# Patient Record
Sex: MALE | Race: WHITE | NOT HISPANIC OR LATINO | Employment: FULL TIME | URBAN - NONMETROPOLITAN AREA
[De-identification: names, ages, dates, MRNs, and addresses within clinical notes are randomized per-mention and may not be internally consistent; named-entity substitution may affect disease eponyms.]

---

## 2017-01-19 DIAGNOSIS — E78.2 MIXED HYPERLIPIDEMIA: Primary | ICD-10-CM

## 2017-01-20 RX ORDER — SIMVASTATIN 20 MG
TABLET ORAL
Qty: 90 TABLET | Refills: 2 | Status: SHIPPED | OUTPATIENT
Start: 2017-01-20 | End: 2017-11-26

## 2017-05-10 ENCOUNTER — OFFICE VISIT (OUTPATIENT)
Dept: FAMILY MEDICINE | Facility: OTHER | Age: 57
End: 2017-05-10
Attending: FAMILY MEDICINE
Payer: COMMERCIAL

## 2017-05-10 VITALS
WEIGHT: 200 LBS | BODY MASS INDEX: 32.28 KG/M2 | OXYGEN SATURATION: 97 % | SYSTOLIC BLOOD PRESSURE: 130 MMHG | DIASTOLIC BLOOD PRESSURE: 82 MMHG | TEMPERATURE: 98.7 F | HEART RATE: 79 BPM

## 2017-05-10 DIAGNOSIS — R21 RASH: ICD-10-CM

## 2017-05-10 DIAGNOSIS — K62.5 RECTAL BLEEDING: Primary | ICD-10-CM

## 2017-05-10 PROCEDURE — 99214 OFFICE O/P EST MOD 30 MIN: CPT | Performed by: FAMILY MEDICINE

## 2017-05-10 ASSESSMENT — PAIN SCALES - GENERAL: PAINLEVEL: NO PAIN (0)

## 2017-05-10 NOTE — MR AVS SNAPSHOT
After Visit Summary   5/10/2017    Neo Cowan    MRN: [id number]           Patient Information     Date Of Birth          1960        Visit Information        Provider Department      5/10/2017 2:40 PM Sukhjinder Mccarthy MD [place] [facility]        Today's Diagnoses     Rectal bleeding    -  1    Rash          Care Instructions    Appointment with General Surgery  scheduled for evaluation and recommendations for further management.         Follow-ups after your visit        Additional Services     GENERAL SURG ADULT REFERRAL       Your provider has referred you to: [facility]: [facility] [place] [phone]   [email or web address]    Please be aware that coverage of these services is subject to the terms and limitations of your health insurance plan.  Call member services at your health plan with any benefit or coverage questions.      Please bring the following with you to your appointment:    (1) Any X-Rays, CTs or MRIs which have been performed.  Contact the facility where they were done to arrange for  prior to your scheduled appointment.   (2) List of current medications   (3) This referral request   (4) Any documents/labs given to you for this referral                  Follow-up notes from your care team     Return if symptoms worsen or fail to improve.      Who to contact     If you have questions or need follow up information about today's clinic visit or your schedule please contact [facility] [place] directly at [phone].  Normal or non-critical lab and imaging results will be communicated to you by MyChart, letter or phone within 4 business days after the clinic has received the results. If you do not hear from us within 7 days, please contact the clinic through MyChart or phone. If you have a critical or abnormal lab result, we will notify you by phone as soon as possible.  Submit refill requests  through Codeanywhere or call your pharmacy and they will forward the refill request to us. Please allow 3 business days for your refill to be completed.          Additional Information About Your Visit        Gridsumhart Information     Codeanywhere gives you secure access to your electronic health record. If you see a primary care provider, you can also send messages to your care team and make appointments. If you have questions, please call your primary care clinic.  If you do not have a primary care provider, please call [phone] and they will assist you.        Care EveryWhere ID     This is your Care EveryWhere ID. This could be used by other organizations to access your [facility] medical records  [id number]        Your Vitals Were     Pulse Temperature Pulse Oximetry BMI (Body Mass Index)          79 98.7  F (37.1  C) 97% 32.28 kg/m2         Blood Pressure from Last 3 Encounters:   05/10/17 130/82   09/27/16 116/62   04/06/16 118/72    Weight from Last 3 Encounters:   05/10/17 200 lb (90.7 kg)   09/27/16 212 lb (96.2 kg)   04/06/16 200 lb (90.7 kg)              We Performed the Following     GENERAL SURG ADULT REFERRAL        Primary Care Provider Office Phone # Fax #    Sukhjinder Mccarthy [phone]       [facility] [address]  [place] [facility] [place] [address]        Thank you!     Thank you for choosing [facility]  for your care. Our goal is always to provide you with excellent care. Hearing back from our patients is one way we can continue to improve our services. Please take a few minutes to complete the written survey that you may receive in the mail after your visit with us. Thank you!             Your Updated Medication List - Protect others around you: Learn how to safely use, store and throw away your medicines at www.disposemymeds.org.          This list is accurate as of: 5/10/17 11:59 PM.  Always use your most recent med list.                   Brand Name Dispense Instructions for  use    blood glucose monitoring lancets     1 Box    Use to test blood sugar 3 times daily or as directed.       blood glucose monitoring test strip    ACCU-CHEK SMARTVIEW    1 Month    Use to test blood sugar 3 times daily or as directed.       cetirizine 10 MG tablet    zyrTEC    60 tablet    Take 1 tablet (10 mg) by mouth every evening       mometasone 0.1 % cream    ELOCON    45 g    Apply sparingly to affected area twice daily as needed.  Do not apply to face.       simvastatin 20 MG tablet    ZOCOR    90 tablet    TAKE 1 TABLET BY MOUTH EVERY EVENING - GENERIC FOR ZOCOR

## 2017-05-10 NOTE — NURSING NOTE
"Chief Complaint   Patient presents with     Derm Problem     c/o intermittent intensely itchy rash, usually on chest for over 6 months  (since dx of shingles).  Also c/o rectal itching/bleeding for the past 6+ mos.     Toenail     left great toenail ripped off x 1wk ago, denies s/s infection       Initial /82 (BP Location: Right arm, Patient Position: Chair, Cuff Size: Adult Large)  Pulse 79  Temp 98.7  F (37.1  C)  Wt 200 lb (90.7 kg)  SpO2 97%  BMI 32.28 kg/m2 Estimated body mass index is 32.28 kg/(m^2) as calculated from the following:    Height as of 9/27/16: 5' 6\" (1.676 m).    Weight as of this encounter: 200 lb (90.7 kg).  Medication Reconciliation: complete     Pam Zhang      "

## 2017-05-10 NOTE — PROGRESS NOTES
SUBJECTIVE:  Neo Cowan, 56 year old, male is seen with rash and itching.  Located on the chest. Will occur intermittently. He has a previous history of herpes zoster as well as allergic rhinitis.    He continues to have intermittent rectal bleeding.  Some perirectal itching.  Present over the last 6 months. He has had previous colonoscopy in 2013 which was negative although his prior showed colonic polyps.    Denies fever, shaking, chills, dysphagia, change in appetite, weight loss, nausea, vomiting, diarrhea, or melena. No change in bowel habits.      Current Outpatient Prescriptions   Medication Sig Dispense Refill     simvastatin (ZOCOR) 20 MG tablet TAKE 1 TABLET BY MOUTH EVERY EVENING - GENERIC FOR ZOCOR 90 tablet 2     cetirizine (ZYRTEC) 10 MG tablet Take 1 tablet (10 mg) by mouth every evening 60 tablet 1     blood glucose (ACCU-CHEK SMARTVIEW) test strip Use to test blood sugar 3 times daily or as directed. 1 Month 12     blood glucose monitoring (ACCU-CHEK FASTCLIX) lancets Use to test blood sugar 3 times daily or as directed. 1 Box 12     mometasone (ELOCON) 0.1 % cream Apply sparingly to affected area twice daily as needed.  Do not apply to face. (Patient not taking: Reported on 5/10/2017) 45 g 0        Allergies   Allergen Reactions     Seasonal Allergies        Past Medical History:   Diagnosis Date     Alcoholism 1/1/2011     Allergic rhinitis, cause unspecified 3/2/2000     Anxiety  1/1/2011     Colonic Polyps 3/2/2000     Depression 1/1/2011     Gilbert's Syndrome 3/2/2000     Hypercholesterolemia 3/2/2000     Overweight(278.02) 3/2/2000     Prediabetes 3/2/2000     Past Surgical History:   Procedure Laterality Date     COLONOSCOPY  8/5/2013    Procedure: COLONOSCOPY;  colonoscopy ;  Surgeon: Yobani Ventura MD;  Location: [facility] OR     colonoscopy with polypectomy  1/27/2011    repeat 2 years     mandibular fracture repair       vasectomy       wisdom teeth extraction       Family History    Problem Relation Age of Onset     Alcohol/Drug Mother      Alcoholism     C.A.D. Father      C.A.D. 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    OBJECTIVE:  Vitals: B/P: 130/82, T: 98.7, P: 79, R: Data Unavailable    Exam:  Physical Exam   Constitutional: He is oriented to person, place, and time. He appears well-developed and well-nourished. No distress.   Genitourinary: Rectum normal.   Genitourinary Comments: No obvious hemorrhoids or fissures.   Neurological: He is alert and oriented to person, place, and time.   Skin: Skin is warm and dry.   There is a fine erythematous rash noted on the anterior chest.   Psychiatric: He has a normal mood and affect.     Other exam not repeated    Labs:  Orders Only on 10/10/2016   Component Date Value Ref Range Status     Sodium 10/10/2016 138  133 - 144 mmol/L Final     Potassium 10/10/2016 4.2  3.4 - 5.3 mmol/L Final     Chloride 10/10/2016 106  94 - 109 mmol/L Final     Carbon Dioxide  10/10/2016 23  20 - 32 mmol/L Final     Anion Gap 10/10/2016 9  3 - 14 mmol/L Final     Glucose 10/10/2016 140* 70 - 99 mg/dL Final     Urea Nitrogen 10/10/2016 16  7 - 30 mg/dL Final     Creatinine 10/10/2016 0.96  0.66 - 1.25 mg/dL Final     GFR Estimate 10/10/2016 81  >60 mL/min/1.7m2 Final    Non  GFR Calc     GFR Estimate If Black 10/10/2016   >60 mL/min/1.7m2 Final                    Value:>90   GFR Calc       Calcium 10/10/2016 8.6  8.5 - 10.1 mg/dL Final     Bilirubin Total 10/10/2016 1.5* 0.2 - 1.3 mg/dL Final     Albumin 10/10/2016 4.0  3.4 - 5.0 g/dL Final     Protein Total 10/10/2016 7.5  6.8 - 8.8 g/dL Final     Alkaline Phosphatase 10/10/2016 79  40 - 150 U/L Final     ALT 10/10/2016 46  0 - 70 U/L Final     AST 10/10/2016 32  0 - 45 U/L Final     Cholesterol 10/10/2016 146  <200 mg/dL Final     Triglycerides 10/10/2016 122  <150 mg/dL Final    Fasting specimen     HDL Cholesterol 10/10/2016 43  >39 mg/dL Final     LDL Cholesterol Calculated 10/10/2016 79  <100 mg/dL Final    Desirable:       <100 mg/dl     Non HDL Cholesterol 10/10/2016 103  <130 mg/dL Final     TSH 10/10/2016 1.14  0.40 - 4.00 mU/L Final     Hemoglobin A1C 10/10/2016 7.1* 4.3 - 6.0 % Final     WBC 10/10/2016 3.9* 4.0 - 11.0 10e9/L Final     RBC Count 10/10/2016 4.34* 4.4 - 5.9 10e12/L Final     Hemoglobin 10/10/2016 11.2* 13.3 - 17.7 g/dL Final     Hematocrit 10/10/2016 35.5* 40.0 - 53.0 % Final     MCV 10/10/2016 82  78 - 100 fl Final     MCH 10/10/2016 25.8* 26.5 - 33.0 pg Final     MCHC 10/10/2016 31.5  31.5 - 36.5 g/dL Final     RDW 10/10/2016 14.8  10.0 - 15.0 % Final     Platelet Count 10/10/2016 187  150 - 450 10e9/L Final     Diff Method 10/10/2016 Automated Method   Final     % Neutrophils 10/10/2016 56.9  % Final     % Lymphocytes 10/10/2016 33.0  % Final     % Monocytes 10/10/2016 9.0  % Final     % Eosinophils 10/10/2016 0.5  % Final     % Basophils 10/10/2016 0.3  % Final     % Immature  Granulocytes 10/10/2016 0.3  % Final     Nucleated RBCs 10/10/2016 0  0 /100 Final     Absolute Neutrophil 10/10/2016 2.2  1.6 - 8.3 10e9/L Final     Absolute Lymphocytes 10/10/2016 1.3  0.8 - 5.3 10e9/L Final     Absolute Monocytes 10/10/2016 0.4  0.0 - 1.3 10e9/L Final     Absolute Eosinophils 10/10/2016 0.0  0.0 - 0.7 10e9/L Final     Absolute Basophils 10/10/2016 0.0  0.0 - 0.2 10e9/L Final     Abs Immature Granulocytes 10/10/2016 0.0  0 - 0.4 10e9/L Final     Absolute Nucleated RBC 10/10/2016 0.0   Final     Creatinine Urine 10/10/2016 180  mg/dL Final     Albumin Urine mg/L 10/10/2016 10  mg/L Final     Albumin Urine mg/g Cr 10/10/2016 5.83  0 - 17 mg/g Cr Final     Color Urine 10/10/2016 Yellow   Final     Appearance Urine 10/10/2016 Clear   Final     Glucose Urine 10/10/2016 Negative  NEG mg/dL Final     Bilirubin Urine 10/10/2016 Negative  NEG Final     Ketones Urine 10/10/2016 Negative  NEG mg/dL Final     Specific Gravity Urine 10/10/2016 1.015  1.003 - 1.035 Final     Blood Urine 10/10/2016 Negative  NEG Final     pH Urine 10/10/2016 5.0  4.7 - 8.0 pH Final     Protein Albumin Urine 10/10/2016 Negative  NEG mg/dL Final     Urobilinogen mg/dL 10/10/2016 Normal  0.0 - 2.0 mg/dL Final     Nitrite Urine 10/10/2016 Negative  NEG Final     Leukocyte Esterase Urine 10/10/2016 Trace* NEG Final     Source 10/10/2016 Midstream Urine   Final     WBC Urine 10/10/2016 1  0 - 2 /HPF Final     RBC Urine 10/10/2016 <1  0 - 2 /HPF Final     Mucous Urine 10/10/2016 Present* NEG /LPF Final     PSA 10/10/2016 1.83  0 - 4 ug/L Final    Assay Method:  Chemiluminescence using Siemens Vista analyzer     Vitamin D Deficiency screening 10/10/2016 26  20 - 75 ug/L Final    Comment: Season, race, dietary intake, and treatment affect the concentration of   25-hydroxy-Vitamin D. Values may decrease during winter months and increase   during summer months. Values 20-29 ug/L may indicate Vitamin D insufficiency   and values <20 ug/L  may indicate Vitamin D deficiency.   Vitamin D determination is routinely performed by an immunoassay specific for   25 hydroxyvitamin D3.  If an individual is on vitamin D2 (ergocalciferol)   supplementation, please specify 25 OH vitamin D2 and D3 level determination   by   LCMSMS test VITD23.       Estimated Average Glucose 10/10/2016 157  mg/dL Final       ASSESSMENT/PLAN:  Rectal bleeding  No obvious source on external exam. Appointment with General Surgery scheduled for evaluation and recommendations for further management.   - GENERAL SURG ADULT REFERRAL    Rash  Suspect neurodermatitis.  This is discussed.  Will follow.            Sukhjinder Mccarthy MD

## 2017-05-17 NOTE — PATIENT INSTRUCTIONS
Appointment with General Surgery  scheduled for evaluation and recommendations for further management.

## 2017-05-26 ENCOUNTER — TELEPHONE (OUTPATIENT)
Dept: FAMILY MEDICINE | Facility: OTHER | Age: 57
End: 2017-05-26

## 2017-08-28 ENCOUNTER — OFFICE VISIT (OUTPATIENT)
Dept: FAMILY MEDICINE | Facility: OTHER | Age: 57
End: 2017-08-28
Attending: FAMILY MEDICINE
Payer: COMMERCIAL

## 2017-08-28 ENCOUNTER — TELEPHONE (OUTPATIENT)
Dept: FAMILY MEDICINE | Facility: OTHER | Age: 57
End: 2017-08-28

## 2017-08-28 VITALS
HEIGHT: 66 IN | BODY MASS INDEX: 32.14 KG/M2 | WEIGHT: 200 LBS | TEMPERATURE: 98.1 F | RESPIRATION RATE: 19 BRPM | SYSTOLIC BLOOD PRESSURE: 128 MMHG | OXYGEN SATURATION: 98 % | DIASTOLIC BLOOD PRESSURE: 74 MMHG | HEART RATE: 67 BPM

## 2017-08-28 DIAGNOSIS — J01.01 ACUTE RECURRENT MAXILLARY SINUSITIS: Primary | ICD-10-CM

## 2017-08-28 PROCEDURE — 99213 OFFICE O/P EST LOW 20 MIN: CPT | Performed by: FAMILY MEDICINE

## 2017-08-28 RX ORDER — AZITHROMYCIN 250 MG/1
TABLET, FILM COATED ORAL
Qty: 6 TABLET | Refills: 0 | Status: SHIPPED | OUTPATIENT
Start: 2017-08-28 | End: 2017-12-15

## 2017-08-28 ASSESSMENT — ANXIETY QUESTIONNAIRES

## 2017-08-28 ASSESSMENT — PATIENT HEALTH QUESTIONNAIRE - PHQ9
SUM OF ALL RESPONSES TO PHQ QUESTIONS 1-9: 0
5. POOR APPETITE OR OVEREATING: NOT AT ALL

## 2017-08-28 ASSESSMENT — PAIN SCALES - GENERAL: PAINLEVEL: NO PAIN (0)

## 2017-08-28 NOTE — NURSING NOTE
"Chief Complaint   Patient presents with     Allergies     runny nose, congestion, scratchy throat.        Initial /74  Pulse 67  Temp 98.1  F (36.7  C) (Tympanic)  Resp 19  Ht 5' 6\" (1.676 m)  Wt 200 lb (90.7 kg)  SpO2 98%  BMI 32.28 kg/m2 Estimated body mass index is 32.28 kg/(m^2) as calculated from the following:    Height as of this encounter: 5' 6\" (1.676 m).    Weight as of this encounter: 200 lb (90.7 kg).  Medication Reconciliation: complete   Anabel Pierce      "

## 2017-08-28 NOTE — TELEPHONE ENCOUNTER
8:22 AM    Reason for Call: OVERBOOK    Patient is having the following symptoms: Poss sinus infection for 1 weeks.    The patient is requesting an appointment for ASAP with Dr Mccarthy.    Was an appointment offered for this call? Yes  If yes : Appointment type   short              Date  09/01/2017    Preferred method for responding to this message: Telephone Call  What is your phone number ?    If we cannot reach you directly, may we leave a detailed response at the number you provided? Yes    Can this message wait until your PCP/provider returns, if unavailable today? Not applicable    Iliana Muir

## 2017-08-28 NOTE — MR AVS SNAPSHOT
After Visit Summary   8/28/2017    Neo Cowan    MRN: [id number]           Patient Information     Date Of Birth          1960        Visit Information        Provider Department      8/28/2017 11:20 AM Sukhjinder cMcarthy MD [facility]        Today's Diagnoses     Acute recurrent maxillary sinusitis    -  1      Care Instructions    Take azithromycin (Zithromax) as written          Follow-ups after your visit        Follow-up notes from your care team     Return if symptoms worsen or fail to improve.      Who to contact     If you have questions or need follow up information about today's clinic visit or your schedule please contact [facility] directly at [phone].  Normal or non-critical lab and imaging results will be communicated to you by MyChart, letter or phone within 4 business days after the clinic has received the results. If you do not hear from us within 7 days, please contact the clinic through StreamBase Systemshart or phone. If you have a critical or abnormal lab result, we will notify you by phone as soon as possible.  Submit refill requests through Waluzi or call your pharmacy and they will forward the refill request to us. Please allow 3 business days for your refill to be completed.          Additional Information About Your Visit        MyChart Information     Waluzi gives you secure access to your electronic health record. If you see a primary care provider, you can also send messages to your care team and make appointments. If you have questions, please call your primary care clinic.  If you do not have a primary care provider, please call [phone] and they will assist you.        Care EveryWhere ID     This is your Care EveryWhere ID. This could be used by other organizations to access your [facility] medical records  [id number]        Your Vitals Were     Pulse Temperature Respirations Height Pulse Oximetry BMI (Body Mass Index)    67 98.1  F  "(36.7  C) (Tympanic) 19 5' 6\" (1.676 m) 98% 32.28 kg/m2       Blood Pressure from Last 3 Encounters:   08/28/17 128/74   05/10/17 130/82   09/27/16 116/62    Weight from Last 3 Encounters:   08/28/17 200 lb (90.7 kg)   05/10/17 200 lb (90.7 kg)   09/27/16 212 lb (96.2 kg)              Today, you had the following     No orders found for display         Today's Medication Changes          These changes are accurate as of: 8/28/17 11:59 PM.  If you have any questions, ask your nurse or doctor.               Start taking these medicines.        Dose/Directions    azithromycin 250 MG tablet   Commonly known as:  ZITHROMAX   Used for:  Acute recurrent maxillary sinusitis   Started by:  Sukhjinder Mccarthy MD        Two tablets first day, then one tablet daily for four days.   Quantity:  6 tablet   Refills:  0            Where to get your medicines      These medications were sent to [id number] - [place] - [address]  [address] [place] [address]     Phone:  [phone]     azithromycin 250 MG tablet                Primary Care Provider Office Phone # Fax #    Sukhjinder Mccarthy [phone]       [facility] [address]  [place] [address]        Equal Access to Services     [facility] AGUEDA AH: Hadii aad ku hadasho Soomaali, waaxda luqadaha, qaybta kaalmada adeegyada, waxay patricia kim. So [facility] [phone].    ATENCIÓN: Si habla español, tiene a fabian disposición servicios gratuitos de asistencia lingüística. Llame al [phone].    We comply with applicable federal civil rights laws and [place] laws. We do not discriminate on the basis of race, color, national origin, age, disability sex, sexual orientation or gender identity.            Thank you!     Thank you for choosing [facility]  for your care. Our goal is always to provide you with excellent care. Hearing back from our patients is one way we can continue to improve our " services. Please take a few minutes to complete the written survey that you may receive in the mail after your visit with us. Thank you!             Your Updated Medication List - Protect others around you: Learn how to safely use, store and throw away your medicines at www.disposemymeds.org.          This list is accurate as of: 8/28/17 11:59 PM.  Always use your most recent med list.                   Brand Name Dispense Instructions for use Diagnosis    azithromycin 250 MG tablet    ZITHROMAX    6 tablet    Two tablets first day, then one tablet daily for four days.    Acute recurrent maxillary sinusitis       blood glucose monitoring lancets     1 Box    Use to test blood sugar 3 times daily or as directed.    Diabetes mellitus, type 2 (H)       blood glucose monitoring test strip    ACCU-CHEK SMARTVIEW    1 Month    Use to test blood sugar 3 times daily or as directed.    Diabetes mellitus, type 2 (H)       cetirizine 10 MG tablet    zyrTEC    60 tablet    Take 1 tablet (10 mg) by mouth every evening    Allergic rhinitis       simvastatin 20 MG tablet    ZOCOR    90 tablet    TAKE 1 TABLET BY MOUTH EVERY EVENING - GENERIC FOR ZOCOR    Mixed hyperlipidemia

## 2017-08-28 NOTE — PROGRESS NOTES
SUBJECTIVE:  Neo Cowan, 56 year old, male is seen with nasal congestion, cough, and maxillary pain and pressure.  Present over the last 2 two weeks.  Mild sore throat.  He has a history of allergic rhinitis.    Denies fever, shaking, chills, nausea, vomiting, or diarrhea.  No household contacts are ill.      Current Outpatient Prescriptions   Medication Sig Dispense Refill     simvastatin (ZOCOR) 20 MG tablet TAKE 1 TABLET BY MOUTH EVERY EVENING - GENERIC FOR ZOCOR 90 tablet 2     cetirizine (ZYRTEC) 10 MG tablet Take 1 tablet (10 mg) by mouth every evening 60 tablet 1     blood glucose (ACCU-CHEK SMARTVIEW) test strip Use to test blood sugar 3 times daily or as directed. 1 Month 12     blood glucose monitoring (ACCU-CHEK FASTCLIX) lancets Use to test blood sugar 3 times daily or as directed. 1 Box 12        Allergies   Allergen Reactions     Seasonal Allergies        Past Medical History:   Diagnosis Date     Alcoholism 1/1/2011     Allergic rhinitis, cause unspecified 3/2/2000     Anxiety  1/1/2011     Colonic Polyps 3/2/2000     Depression 1/1/2011     Gilbert's Syndrome 3/2/2000     Hypercholesterolemia 3/2/2000     Overweight(278.02) 3/2/2000     Prediabetes 3/2/2000     Past Surgical History:   Procedure Laterality Date     COLONOSCOPY  8/5/2013    Procedure: COLONOSCOPY;  colonoscopy ;  Surgeon: Yobani Ventura MD;  Location: [facility]     colonoscopy with polypectomy  1/27/2011    repeat 2 years     mandibular fracture repair       vasectomy       wisdom teeth extraction       Family History   Problem Relation Age of Onset     Alcohol/Drug Mother      Alcoholism     C.A.D. Father      C.A.D. 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      OBJECTIVE:/74  Pulse 67  Temp 98.1  F (36.7  C) (Tympanic)  Resp 19  Ht 5' 6\" (1.676 m)  Wt 200 lb (90.7 kg)  SpO2 98%  BMI 32.28 kg/m2      Exam:  Physical Exam   Constitutional: He is oriented to person, place, and time. He appears well-developed and well-nourished. No distress.   HENT:   Head: Normocephalic.   Right Ear: Hearing, tympanic membrane, external ear and ear canal normal.   Left Ear: Hearing, tympanic membrane, external ear and ear canal normal.   Nose: Right sinus exhibits maxillary sinus tenderness. Right sinus exhibits no frontal sinus tenderness. Left sinus exhibits maxillary sinus tenderness. Left sinus exhibits no frontal sinus tenderness.   Mouth/Throat: Uvula is midline and oropharynx is clear and moist. No oropharyngeal exudate or posterior oropharyngeal erythema.   Eyes: Conjunctivae are normal.   Neck: Neck supple.   Pulmonary/Chest: Effort normal and breath sounds normal.   Lymphadenopathy:     He has no cervical adenopathy.   Neurological: He is alert and oriented to person, place, and time.   Skin: Skin is warm and dry.   Psychiatric: He has a normal mood and affect.     Other exam not repeated    Labs:  Orders Only on 10/10/2016   Component Date " Value Ref Range Status     Sodium 10/10/2016 138  133 - 144 mmol/L Final     Potassium 10/10/2016 4.2  3.4 - 5.3 mmol/L Final     Chloride 10/10/2016 106  94 - 109 mmol/L Final     Carbon Dioxide 10/10/2016 23  20 - 32 mmol/L Final     Anion Gap 10/10/2016 9  3 - 14 mmol/L Final     Glucose 10/10/2016 140* 70 - 99 mg/dL Final     Urea Nitrogen 10/10/2016 16  7 - 30 mg/dL Final     Creatinine 10/10/2016 0.96  0.66 - 1.25 mg/dL Final     GFR Estimate 10/10/2016 81  >60 mL/min/1.7m2 Final    Non  GFR Calc     GFR Estimate If Black 10/10/2016   >60 mL/min/1.7m2 Final                    Value:>90   GFR Calc       Calcium 10/10/2016 8.6  8.5 - 10.1 mg/dL Final     Bilirubin Total 10/10/2016 1.5* 0.2 - 1.3 mg/dL Final     Albumin 10/10/2016 4.0  3.4 - 5.0 g/dL Final     Protein Total 10/10/2016 7.5  6.8 - 8.8 g/dL Final     Alkaline Phosphatase 10/10/2016 79  40 - 150 U/L Final     ALT 10/10/2016 46  0 - 70 U/L Final     AST 10/10/2016 32  0 - 45 U/L Final     Cholesterol 10/10/2016 146  <200 mg/dL Final     Triglycerides 10/10/2016 122  <150 mg/dL Final    Fasting specimen     HDL Cholesterol 10/10/2016 43  >39 mg/dL Final     LDL Cholesterol Calculated 10/10/2016 79  <100 mg/dL Final    Desirable:       <100 mg/dl     Non HDL Cholesterol 10/10/2016 103  <130 mg/dL Final     TSH 10/10/2016 1.14  0.40 - 4.00 mU/L Final     Hemoglobin A1C 10/10/2016 7.1* 4.3 - 6.0 % Final     WBC 10/10/2016 3.9* 4.0 - 11.0 10e9/L Final     RBC Count 10/10/2016 4.34* 4.4 - 5.9 10e12/L Final     Hemoglobin 10/10/2016 11.2* 13.3 - 17.7 g/dL Final     Hematocrit 10/10/2016 35.5* 40.0 - 53.0 % Final     MCV 10/10/2016 82  78 - 100 fl Final     MCH 10/10/2016 25.8* 26.5 - 33.0 pg Final     MCHC 10/10/2016 31.5  31.5 - 36.5 g/dL Final     RDW 10/10/2016 14.8  10.0 - 15.0 % Final     Platelet Count 10/10/2016 187  150 - 450 10e9/L Final     Diff Method 10/10/2016 Automated Method   Final     % Neutrophils 10/10/2016  56.9  % Final     % Lymphocytes 10/10/2016 33.0  % Final     % Monocytes 10/10/2016 9.0  % Final     % Eosinophils 10/10/2016 0.5  % Final     % Basophils 10/10/2016 0.3  % Final     % Immature Granulocytes 10/10/2016 0.3  % Final     Nucleated RBCs 10/10/2016 0  0 /100 Final     Absolute Neutrophil 10/10/2016 2.2  1.6 - 8.3 10e9/L Final     Absolute Lymphocytes 10/10/2016 1.3  0.8 - 5.3 10e9/L Final     Absolute Monocytes 10/10/2016 0.4  0.0 - 1.3 10e9/L Final     Absolute Eosinophils 10/10/2016 0.0  0.0 - 0.7 10e9/L Final     Absolute Basophils 10/10/2016 0.0  0.0 - 0.2 10e9/L Final     Abs Immature Granulocytes 10/10/2016 0.0  0 - 0.4 10e9/L Final     Absolute Nucleated RBC 10/10/2016 0.0   Final     Creatinine Urine 10/10/2016 180  mg/dL Final     Albumin Urine mg/L 10/10/2016 10  mg/L Final     Albumin Urine mg/g Cr 10/10/2016 5.83  0 - 17 mg/g Cr Final     Color Urine 10/10/2016 Yellow   Final     Appearance Urine 10/10/2016 Clear   Final     Glucose Urine 10/10/2016 Negative  NEG mg/dL Final     Bilirubin Urine 10/10/2016 Negative  NEG Final     Ketones Urine 10/10/2016 Negative  NEG mg/dL Final     Specific Gravity Urine 10/10/2016 1.015  1.003 - 1.035 Final     Blood Urine 10/10/2016 Negative  NEG Final     pH Urine 10/10/2016 5.0  4.7 - 8.0 pH Final     Protein Albumin Urine 10/10/2016 Negative  NEG mg/dL Final     Urobilinogen mg/dL 10/10/2016 Normal  0.0 - 2.0 mg/dL Final     Nitrite Urine 10/10/2016 Negative  NEG Final     Leukocyte Esterase Urine 10/10/2016 Trace* NEG Final     Source 10/10/2016 Midstream Urine   Final     WBC Urine 10/10/2016 1  0 - 2 /HPF Final     RBC Urine 10/10/2016 <1  0 - 2 /HPF Final     Mucous Urine 10/10/2016 Present* NEG /LPF Final     PSA 10/10/2016 1.83  0 - 4 ug/L Final    Assay Method:  Chemiluminescence using Siemens Vista analyzer     Vitamin D Deficiency screening 10/10/2016 26  20 - 75 ug/L Final    Comment: Season, race, dietary intake, and treatment affect the  concentration of   25-hydroxy-Vitamin D. Values may decrease during winter months and increase   during summer months. Values 20-29 ug/L may indicate Vitamin D insufficiency   and values <20 ug/L may indicate Vitamin D deficiency.   Vitamin D determination is routinely performed by an immunoas.diagmspecific for   25 hydroxyvitamin D3.  If an individual is on vitamin D2 (ergocalciferol)   supplementation, please specify 25 OH vitamin D2 and D3 level determination   by   LCMSMS test VITD23.       Estimated Average Glucose 10/10/2016 157  mg/dL Final       ASSESSMENT/PLAN:  Acute recurrent maxillary sinusitis  Azithromycin (Zithromax) as written.  Nasal saline.  Follow up with persistent symptoms;.  - azithromycin (ZITHROMAX) 250 MG tablet; Two tablets first day, then one tablet daily for four days.            Sukhjinder Mccarthy MD

## 2017-08-28 NOTE — TELEPHONE ENCOUNTER
Please schedule patient for date/time: can work into schedule today at 11:20, arrival time 11:00.     Have patient go to ER/Urgent Care Center. Urgent Care hours are 9:30 am to 8 pm, open 7 days a week. No.    Provider will call patient.No.    Other:

## 2017-08-29 ASSESSMENT — ANXIETY QUESTIONNAIRES: GAD7 TOTAL SCORE: 0

## 2017-11-26 DIAGNOSIS — E78.2 MIXED HYPERLIPIDEMIA: ICD-10-CM

## 2017-11-26 NOTE — LETTER
[facility]  [address]  [facility] [address]  Phone: [phone]    November 29, 2017       Neo Cowan  [address] [facility] [address]            Dear Neo:    We are concerned about your health care.  We recently provided you with medication refills.  Lab tests (Lipid Profile) are required every 12 months in order to continue refilling your Simvastatin.  Orders have been placed in our computer and should be accessible at most [facility] labs. Please complete this lab work as soon as possible.        Thank You,      Polo Mccarthy MD

## 2017-11-27 NOTE — TELEPHONE ENCOUNTER
Zocor      Last Written Prescription Date: 1/20/17  Last Fill Quantity: 90,  # refills: 2   Last Office Visit with G, [facility] or [facility] prescribing provider: 8/28/17

## 2017-11-29 RX ORDER — SIMVASTATIN 20 MG
TABLET ORAL
Qty: 30 TABLET | Refills: 0 | Status: SHIPPED | OUTPATIENT
Start: 2017-11-29 | End: 2017-12-23

## 2017-12-15 ENCOUNTER — OFFICE VISIT (OUTPATIENT)
Dept: FAMILY MEDICINE | Facility: OTHER | Age: 57
End: 2017-12-15
Attending: FAMILY MEDICINE
Payer: COMMERCIAL

## 2017-12-15 VITALS
HEART RATE: 74 BPM | DIASTOLIC BLOOD PRESSURE: 84 MMHG | OXYGEN SATURATION: 98 % | WEIGHT: 218 LBS | SYSTOLIC BLOOD PRESSURE: 138 MMHG | HEIGHT: 66 IN | BODY MASS INDEX: 35.03 KG/M2 | TEMPERATURE: 98.6 F

## 2017-12-15 DIAGNOSIS — E80.4 GILBERT SYNDROME: ICD-10-CM

## 2017-12-15 DIAGNOSIS — E66.09 NON MORBID OBESITY DUE TO EXCESS CALORIES: ICD-10-CM

## 2017-12-15 DIAGNOSIS — Z12.5 SCREENING FOR PROSTATE CANCER: ICD-10-CM

## 2017-12-15 DIAGNOSIS — E78.2 MIXED HYPERLIPIDEMIA: ICD-10-CM

## 2017-12-15 DIAGNOSIS — Z86.0101 HX OF ADENOMATOUS COLONIC POLYPS: ICD-10-CM

## 2017-12-15 DIAGNOSIS — Z00.00 ROUTINE GENERAL MEDICAL EXAMINATION AT A HEALTH CARE FACILITY: Primary | ICD-10-CM

## 2017-12-15 DIAGNOSIS — E11.9 TYPE 2 DIABETES MELLITUS WITHOUT COMPLICATION, WITHOUT LONG-TERM CURRENT USE OF INSULIN (H): ICD-10-CM

## 2017-12-15 PROCEDURE — 99396 PREV VISIT EST AGE 40-64: CPT | Performed by: FAMILY MEDICINE

## 2017-12-15 ASSESSMENT — ANXIETY QUESTIONNAIRES
GAD7 TOTAL SCORE: 0
2. NOT BEING ABLE TO STOP OR CONTROL WORRYING: NOT AT ALL
5. BEING SO RESTLESS THAT IT IS HARD TO SIT STILL: NOT AT ALL
7. FEELING AFRAID AS IF SOMETHING AWFUL MIGHT HAPPEN: NOT AT ALL
6. BECOMING EASILY ANNOYED OR IRRITABLE: NOT AT ALL
1. FEELING NERVOUS, ANXIOUS, OR ON EDGE: NOT AT ALL
3. WORRYING TOO MUCH ABOUT DIFFERENT THINGS: NOT AT ALL
IF YOU CHECKED OFF ANY PROBLEMS ON THIS QUESTIONNAIRE, HOW DIFFICULT HAVE THESE PROBLEMS MADE IT FOR YOU TO DO YOUR WORK, TAKE CARE OF THINGS AT HOME, OR GET ALONG WITH OTHER PEOPLE: NOT DIFFICULT AT ALL

## 2017-12-15 ASSESSMENT — PATIENT HEALTH QUESTIONNAIRE - PHQ9
SUM OF ALL RESPONSES TO PHQ QUESTIONS 1-9: 0
5. POOR APPETITE OR OVEREATING: NOT AT ALL

## 2017-12-15 ASSESSMENT — PAIN SCALES - GENERAL: PAINLEVEL: NO PAIN (0)

## 2017-12-15 NOTE — PROGRESS NOTES
"  SUBJECTIVE:   CC: Neo Cowan is an 56 year old male who presents for preventative health visit.     Healthy Habits:    Do you get at least three servings of calcium containing foods daily (dairy, green leafy vegetables, etc.)? No     Amount of exercise or daily activities, outside of work: 1-7 day(s) per week \"dends what is going on\"    Problems taking medications regularly No    Medication side effects: No    Have you had an eye exam in the past two years? yes    Do you see a dentist twice per year? yes    Do you have sleep apnea, excessive snoring or daytime drowsiness? Yes, snores when sleeping on back (very seldom happens), sometimes tired during the day         Today's PHQ-2 Score: PHQ-2 ( 1999 Pfizer) 7/17/2013   Q1: Little interest or pleasure in doing things 0   Q2: Feeling down, depressed or hopeless 0   PHQ-2 Score 0         Abuse: Current or Past(Physical, Sexual or Emotional)- No  Do you feel safe in your environment - Yes  Social History   Substance Use Topics     Smoking status: Never Smoker     Smokeless tobacco: Current User     Types: Chew      Comment: Tried to quit; No passive exposure     Alcohol use No      If you drink alcohol do you typically have >3 drinks per day or >7 drinks per week? No                      Last PSA:   PSA   Date Value Ref Range Status   10/10/2016 1.83 0 - 4 ug/L Final     Comment:     Assay Method:  Chemiluminescence using Siemens Vista analyzer       Reviewed orders with patient. Reviewed health maintenance and updated orders accordingly - Yes  Patient Active Problem List   Diagnosis     Gilbert syndrome     Obesity     Dyslipidemia     Seasonal allergic rhinitis     H/O adenomatous colonic polyps     Type 2 diabetes mellitus without complication (H)     Vitamin D deficiency     Comprehensive Medical Examination     Eczema     Past Surgical History:   Procedure Laterality Date     COLONOSCOPY  8/5/2013    Procedure: COLONOSCOPY;  colonoscopy ;  Surgeon: Lorenzo" Yobani MELCHOR MD;  Location: [facility]     colonoscopy with polypectomy  1/27/2011    repeat 2 years     mandibular fracture repair       vasectomy       wisdom teeth extraction         Social History   Substance Use Topics     Smoking status: Never Smoker     Smokeless tobacco: Current User     Types: Chew      Comment: Tried to quit; No passive exposure     Alcohol use No     Family History   Problem Relation Age of Onset     Alcohol/Drug Mother      Alcoholism     C.A.D. Father      C.A.D. Other      Family hx     CANCER Paternal Uncle      Cervical     CANCER Father      Lung, cause of death     CANCER Paternal Uncle      Throat     Other - See Comments Father      Colon polyps     Hypertension Other      Family hx     Other - See Comments Mother      Liver disease, cause of death         Current Outpatient Prescriptions   Medication Sig Dispense Refill     simvastatin (ZOCOR) 20 MG tablet TAKE 1 TABLET BY MOUTH EVERY EVENING - GENERIC FOR ZOCOR 30 tablet 0     cetirizine (ZYRTEC) 10 MG tablet Take 1 tablet (10 mg) by mouth every evening 60 tablet 1     blood glucose (ACCU-CHEK SMARTVIEW) test strip Use to test blood sugar 3 times daily or as directed. 1 Month 12     blood glucose monitoring (ACCU-CHEK FASTCLIX) lancets Use to test blood sugar 3 times daily or as directed. 1 Box 12     Allergies   Allergen Reactions     Seasonal Allergies          Reviewed and updated as needed this visit by clinical staffTobacco  Allergies  Meds  Med Hx  Surg Hx  Fam Hx  Soc Hx        Reviewed and updated as needed this visit by Provider        Past Medical History:   Diagnosis Date     Alcoholism 1/1/2011     Allergic rhinitis, cause unspecified 3/2/2000     Anxiety  1/1/2011     Colonic Polyps 3/2/2000     Depression 1/1/2011     Gilbert's Syndrome 3/2/2000     Hypercholesterolemia 3/2/2000     Overweight(278.02) 3/2/2000     Prediabetes 3/2/2000      Past Surgical History:   Procedure Laterality Date     COLONOSCOPY  8/5/2013  "   Procedure: COLONOSCOPY;  colonoscopy ;  Surgeon: Yobani Ventura MD;  Location: [facility]     colonoscopy with polypectomy  1/27/2011    repeat 2 years     mandibular fracture repair       vasectomy       wisdom teeth extraction         ROS:  C: NEGATIVE for fever, chills, change in weight  I: NEGATIVE for worrisome rashes, moles or lesions  E: NEGATIVE for vision changes or irritation  ENT: NEGATIVE for ear, mouth and throat problems  R: NEGATIVE for significant cough or SOB  CV: NEGATIVE for chest pain, palpitations or peripheral edema  GI: NEGATIVE for nausea, abdominal pain, heartburn, or change in bowel habits   male: negative for dysuria, hematuria, decreased urinary stream, erectile dysfunction, urethral discharge  M: NEGATIVE for significant arthralgias or myalgia  N: NEGATIVE for weakness, dizziness or paresthesias  P: NEGATIVE for changes in mood or affect    OBJECTIVE:   /84  Pulse 74  Temp 98.6  F (37  C) (Tympanic)  Ht 5' 6\" (1.676 m)  Wt 218 lb (98.9 kg)  SpO2 98%  BMI 35.19 kg/m2  EXAM:  Physical Exam   Constitutional: He is oriented to person, place, and time. He appears well-developed and well-nourished. No distress.   HENT:   Head: Normocephalic and atraumatic.   Right Ear: External ear normal.   Left Ear: External ear normal.   Nose: Nose normal.   Mouth/Throat: Oropharynx is clear and moist.   Eyes: Conjunctivae and EOM are normal. Pupils are equal, round, and reactive to light.   Neck: Normal range of motion. Neck supple. No JVD present. No thyromegaly present.   Cardiovascular: Normal rate, regular rhythm, normal heart sounds and intact distal pulses.  Exam reveals no gallop and no friction rub.    No murmur heard.  Pulmonary/Chest: Effort normal and breath sounds normal. He has no wheezes. He has no rales.   Abdominal: Soft. Bowel sounds are normal. He exhibits no mass. There is no tenderness.   Genitourinary: Rectum normal, prostate normal and penis normal.   Musculoskeletal: " "Normal range of motion.   Lymphadenopathy:     He has no cervical adenopathy.   Neurological: He is alert and oriented to person, place, and time. He has normal reflexes.   Skin: Skin is warm and dry.   Psychiatric: He has a normal mood and affect.         ASSESSMENT/PLAN:   1. Comprehensive Medical Examination  Appropriate screening labs are drawn.  Most recent colonoscopy and immunizations are reviewed.  Risk factors, aspirin use, screening recommendations, and follow up discussed.  Routine exam in 1 year.      2. Screening for prostate cancer  PSA drawn  - Prostate spec antigen screen; Future    3. Type 2 diabetes mellitus without complication, without long-term current use of insulin (H)  Fasting labs are reviewed.  Goal of hemoglobin A1C of less than 7 discussed.  Instructed in the importance of diet, exercise, and good glycemic control in prevention of secondary complications.    Continue same medication regimen. Repeat fasting glucose and hemoglobin A1C in 6 months.   - UA reflex to Microscopic and Culture; Future  - Hemoglobin A1c; Future    4. Dyslipidemia  Fasting labs reviewed.  Goals discussed.  Discussed the importance of diet, exercise, and weight reduction.  Follow up 12 months.    - CBC with platelets differential; Future  - Lipid Profile; Future  - Comprehensive metabolic panel; Future  - TSH with free T4 reflex; Future    5. Gilbert syndrome  Stable    6. Obesity  Encourage weight loss    7. H/O adenomatous colonic polyps  Follow colonoscopy      COUNSELING:  Reviewed preventive health counseling, as reflected in patient instructions  Special attention given to:        Regular exercise       Healthy diet/nutrition     reports that he has never smoked. His smokeless tobacco use includes Chew.      Estimated body mass index is 35.19 kg/(m^2) as calculated from the following:    Height as of this encounter: 5' 6\" (1.676 m).    Weight as of this encounter: 218 lb (98.9 kg).   Weight management plan: " Discussed healthy diet and exercise guidelines and patient will follow up in 12 months in clinic to re-evaluate.    Counseling Resources:  ATP IV Guidelines  Pooled Cohorts Equation Calculator  FRAX Risk Assessment  ICSI Preventive Guidelines  Dietary Guidelines for Americans, 2010  USDA's MyPlate  ASA Prophylaxis  Lung CA Screening    Sukhjinder Mccarthy MD  [facility]

## 2017-12-15 NOTE — MR AVS SNAPSHOT
After Visit Summary   12/15/2017    Neo Cowan    MRN: [id number]           Patient Information     Date Of Birth          1960        Visit Information        Provider Department      12/15/2017 2:00 PM Sukhjinder Mccarthy MD [facility]        Today's Diagnoses     Comprehensive Medical Examination    -  1    Screening for prostate cancer        Type 2 diabetes mellitus without complication, without long-term current use of insulin (H)        Dyslipidemia        Gilbert syndrome        Obesity        H/O adenomatous colonic polyps          Care Instructions      Preventive Health Recommendations  Male Ages 50 - 64    Yearly exam:             See your health care provider every year in order to  o   Review health changes.   o   Discuss preventive care.    o   Review your medicines if your doctor has prescribed any.     Have a cholesterol test every 5 years, or more frequently if you are at risk for high cholesterol/heart disease.     Have a diabetes test (fasting glucose) every three years. If you are at risk for diabetes, you should have this test more often.     Have a colonoscopy at age 50, or have a yearly FIT test (stool test). These exams will check for colon cancer.      Talk with your health care provider about whether or not a prostate cancer screening test (PSA) is right for you.    You should be tested each year for STDs (sexually transmitted diseases), if you re at risk.     Shots: Get a flu shot each year. Get a tetanus shot every 10 years.     Nutrition:    Eat at least 5 servings of fruits and vegetables daily.     Eat whole-grain bread, whole-wheat pasta and brown rice instead of white grains and rice.     Talk to your provider about Calcium and Vitamin D.     Lifestyle    Exercise for at least 150 minutes a week (30 minutes a day, 5 days a week). This will help you control your weight and prevent disease.     Limit alcohol to one drink per day.     No smoking.  "    Wear sunscreen to prevent skin cancer.     See your dentist every six months for an exam and cleaning.     See your eye doctor every 1 to 2 years.            Follow-ups after your visit        Follow-up notes from your care team     Return in about 6 months (around 6/15/2018) for Follow Up Visit.      Who to contact     If you have questions or need follow up information about today's clinic visit or your schedule please contact [facility] [place] directly at [phone].  Normal or non-critical lab and imaging results will be communicated to you by Crowdxhart, letter or phone within 4 business days after the clinic has received the results. If you do not hear from us within 7 days, please contact the clinic through NICOt or phone. If you have a critical or abnormal lab result, we will notify you by phone as soon as possible.  Submit refill requests through Acopio or call your pharmacy and they will forward the refill request to us. Please allow 3 business days for your refill to be completed.          Additional Information About Your Visit        CrowdxharDashwire Information     Acopio gives you secure access to your electronic health record. If you see a primary care provider, you can also send messages to your care team and make appointments. If you have questions, please call your primary care clinic.  If you do not have a primary care provider, please call [phone] and they will assist you.        Care EveryWhere ID     This is your Care EveryWhere ID. This could be used by other organizations to access your [place] medical records  [id number]        Your Vitals Were     Pulse Temperature Height Pulse Oximetry BMI (Body Mass Index)       74 98.6  F (37  C) (Tympanic) 5' 6\" (1.676 m) 98% 35.19 kg/m2        Blood Pressure from Last 3 Encounters:   12/15/17 138/84   08/28/17 128/74   05/10/17 130/82    Weight from Last 3 Encounters:   12/15/17 218 lb (98.9 kg)   08/28/17 200 lb (90.7 kg)   05/10/17 200 " lb (90.7 kg)               Primary Care Provider Office Phone # Fax #    Sukhjinder Mccarthy [phone]       [facility] [address]  [facility] [address]        Equal Access to Services     VANE ROMEO : Hadii patricia ku haddurano Soomaali, waaxda luqadaha, qaybta kaalmada adeegyada, waxfantasma idiin haytiarran adeverena langston sabi kim. So [facility] [phone].    ATENCIÓN: Si habla español, tiene a fabian disposición servicios gratuitos de asistencia lingüística. Llame al [phone].    We comply with applicable federal civil rights laws and [place] laws. We do not discriminate on the basis of race, color, national origin, age, disability, sex, sexual orientation, or gender identity.            Thank you!     Thank you for choosing [facility]  for your care. Our goal is always to provide you with excellent care. Hearing back from our patients is one way we can continue to improve our services. Please take a few minutes to complete the written survey that you may receive in the mail after your visit with us. Thank you!             Your Updated Medication List - Protect others around you: Learn how to safely use, store and throw away your medicines at www.disposemymeds.org.          This list is accurate as of: 12/15/17 11:59 PM.  Always use your most recent med list.                   Brand Name Dispense Instructions for use Diagnosis    blood glucose monitoring lancets     1 Box    Use to test blood sugar 3 times daily or as directed.    Diabetes mellitus, type 2 (H)       blood glucose monitoring test strip    ACCU-CHEK SMARTVIEW    1 Month    Use to test blood sugar 3 times daily or as directed.    Diabetes mellitus, type 2 (H)       cetirizine 10 MG tablet    zyrTEC    60 tablet    Take 1 tablet (10 mg) by mouth every evening    Allergic rhinitis       simvastatin 20 MG tablet    ZOCOR    30 tablet    TAKE 1 TABLET BY MOUTH EVERY EVENING - GENERIC FOR ZOCOR    Mixed hyperlipidemia

## 2017-12-15 NOTE — NURSING NOTE
"Chief Complaint   Patient presents with     Physical       Initial /84  Pulse 74  Temp 98.6  F (37  C) (Tympanic)  Ht 5' 6\" (1.676 m)  Wt 218 lb (98.9 kg)  SpO2 98%  BMI 35.19 kg/m2 Estimated body mass index is 35.19 kg/(m^2) as calculated from the following:    Height as of this encounter: 5' 6\" (1.676 m).    Weight as of this encounter: 218 lb (98.9 kg).  Medication Reconciliation: complete   GEORGE MACIAS LPN  "

## 2017-12-16 ASSESSMENT — ANXIETY QUESTIONNAIRES: GAD7 TOTAL SCORE: 0

## 2017-12-18 PROBLEM — Z12.5 SCREENING FOR PROSTATE CANCER: Status: ACTIVE | Noted: 2017-12-18

## 2017-12-19 DIAGNOSIS — Z12.5 SCREENING FOR PROSTATE CANCER: ICD-10-CM

## 2017-12-19 DIAGNOSIS — E11.9 TYPE 2 DIABETES MELLITUS WITHOUT COMPLICATION, WITHOUT LONG-TERM CURRENT USE OF INSULIN (H): ICD-10-CM

## 2017-12-19 DIAGNOSIS — E78.2 MIXED HYPERLIPIDEMIA: ICD-10-CM

## 2017-12-19 LAB
ALBUMIN SERPL-MCNC: 3.8 G/DL (ref 3.4–5)
ALBUMIN UR-MCNC: 10 MG/DL
ALP SERPL-CCNC: 98 U/L (ref 40–150)
ALT SERPL W P-5'-P-CCNC: 72 U/L (ref 0–70)
ANION GAP SERPL CALCULATED.3IONS-SCNC: 6 MMOL/L (ref 3–14)
APPEARANCE UR: CLEAR
AST SERPL W P-5'-P-CCNC: 39 U/L (ref 0–45)
BACTERIA #/AREA URNS HPF: ABNORMAL /HPF
BASOPHILS # BLD AUTO: 0 10E9/L (ref 0–0.2)
BASOPHILS NFR BLD AUTO: 0.1 %
BILIRUB SERPL-MCNC: 1.2 MG/DL (ref 0.2–1.3)
BILIRUB UR QL STRIP: NEGATIVE
BUN SERPL-MCNC: 10 MG/DL (ref 7–30)
CALCIUM SERPL-MCNC: 9.1 MG/DL (ref 8.5–10.1)
CHLORIDE SERPL-SCNC: 103 MMOL/L (ref 94–109)
CHOLEST SERPL-MCNC: 149 MG/DL
CO2 SERPL-SCNC: 28 MMOL/L (ref 20–32)
COLOR UR AUTO: YELLOW
CREAT SERPL-MCNC: 0.98 MG/DL (ref 0.66–1.25)
DIFFERENTIAL METHOD BLD: NORMAL
EOSINOPHIL # BLD AUTO: 0 10E9/L (ref 0–0.7)
EOSINOPHIL NFR BLD AUTO: 0.6 %
ERYTHROCYTE [DISTWIDTH] IN BLOOD BY AUTOMATED COUNT: 12.4 % (ref 10–15)
EST. AVERAGE GLUCOSE BLD GHB EST-MCNC: 189 MG/DL
GFR SERPL CREATININE-BSD FRML MDRD: 79 ML/MIN/1.7M2
GLUCOSE SERPL-MCNC: 182 MG/DL (ref 70–99)
GLUCOSE UR STRIP-MCNC: NEGATIVE MG/DL
HBA1C MFR BLD: 8.2 % (ref 4.3–6)
HCT VFR BLD AUTO: 43.5 % (ref 40–53)
HDLC SERPL-MCNC: 37 MG/DL
HGB BLD-MCNC: 14.8 G/DL (ref 13.3–17.7)
HGB UR QL STRIP: NEGATIVE
IMM GRANULOCYTES # BLD: 0 10E9/L (ref 0–0.4)
IMM GRANULOCYTES NFR BLD: 0.1 %
KETONES UR STRIP-MCNC: NEGATIVE MG/DL
LDLC SERPL CALC-MCNC: 82 MG/DL
LEUKOCYTE ESTERASE UR QL STRIP: NEGATIVE
LYMPHOCYTES # BLD AUTO: 1.3 10E9/L (ref 0.8–5.3)
LYMPHOCYTES NFR BLD AUTO: 19.1 %
MCH RBC QN AUTO: 30.6 PG (ref 26.5–33)
MCHC RBC AUTO-ENTMCNC: 34 G/DL (ref 31.5–36.5)
MCV RBC AUTO: 90 FL (ref 78–100)
MONOCYTES # BLD AUTO: 0.5 10E9/L (ref 0–1.3)
MONOCYTES NFR BLD AUTO: 6.6 %
MUCOUS THREADS #/AREA URNS LPF: PRESENT /LPF
NEUTROPHILS # BLD AUTO: 5 10E9/L (ref 1.6–8.3)
NEUTROPHILS NFR BLD AUTO: 73.5 %
NITRATE UR QL: NEGATIVE
NONHDLC SERPL-MCNC: 112 MG/DL
NRBC # BLD AUTO: 0 10*3/UL
NRBC BLD AUTO-RTO: 0 /100
PH UR STRIP: 5.5 PH (ref 4.7–8)
PLATELET # BLD AUTO: 180 10E9/L (ref 150–450)
POTASSIUM SERPL-SCNC: 4.3 MMOL/L (ref 3.4–5.3)
PROT SERPL-MCNC: 7.8 G/DL (ref 6.8–8.8)
PSA SERPL-ACNC: 3.05 UG/L (ref 0–4)
RBC # BLD AUTO: 4.84 10E12/L (ref 4.4–5.9)
RBC #/AREA URNS AUTO: 0 /HPF (ref 0–2)
SODIUM SERPL-SCNC: 137 MMOL/L (ref 133–144)
SOURCE: ABNORMAL
SP GR UR STRIP: 1.01 (ref 1–1.03)
TRIGL SERPL-MCNC: 152 MG/DL
TSH SERPL DL<=0.005 MIU/L-ACNC: 1.17 MU/L (ref 0.4–4)
UROBILINOGEN UR STRIP-MCNC: NORMAL MG/DL (ref 0–2)
WBC # BLD AUTO: 6.8 10E9/L (ref 4–11)
WBC #/AREA URNS AUTO: <1 /HPF (ref 0–2)

## 2017-12-19 PROCEDURE — 80050 GENERAL HEALTH PANEL: CPT | Performed by: FAMILY MEDICINE

## 2017-12-19 PROCEDURE — 80061 LIPID PANEL: CPT | Performed by: FAMILY MEDICINE

## 2017-12-19 PROCEDURE — 83036 HEMOGLOBIN GLYCOSYLATED A1C: CPT | Performed by: FAMILY MEDICINE

## 2017-12-19 PROCEDURE — 36415 COLL VENOUS BLD VENIPUNCTURE: CPT | Performed by: FAMILY MEDICINE

## 2017-12-19 PROCEDURE — G0103 PSA SCREENING: HCPCS | Performed by: FAMILY MEDICINE

## 2017-12-19 PROCEDURE — 81001 URINALYSIS AUTO W/SCOPE: CPT | Performed by: FAMILY MEDICINE

## 2017-12-23 ENCOUNTER — HOSPITAL ENCOUNTER (EMERGENCY)
Facility: HOSPITAL | Age: 57
Discharge: HOME | End: 2017-12-23
Attending: NURSE PRACTITIONER | Admitting: NURSE PRACTITIONER
Payer: COMMERCIAL

## 2017-12-23 VITALS
TEMPERATURE: 98.3 F | RESPIRATION RATE: 18 BRPM | OXYGEN SATURATION: 98 % | DIASTOLIC BLOOD PRESSURE: 91 MMHG | HEART RATE: 81 BPM | BODY MASS INDEX: 34.7 KG/M2 | WEIGHT: 215 LBS | SYSTOLIC BLOOD PRESSURE: 171 MMHG

## 2017-12-23 DIAGNOSIS — E78.2 MIXED HYPERLIPIDEMIA: ICD-10-CM

## 2017-12-23 DIAGNOSIS — H10.32 ACUTE CONJUNCTIVITIS OF LEFT EYE, UNSPECIFIED ACUTE CONJUNCTIVITIS TYPE: ICD-10-CM

## 2017-12-23 DIAGNOSIS — J32.9 SINUSITIS, UNSPECIFIED CHRONICITY, UNSPECIFIED LOCATION: ICD-10-CM

## 2017-12-23 PROCEDURE — 99213 OFFICE O/P EST LOW 20 MIN: CPT | Performed by: NURSE PRACTITIONER

## 2017-12-23 PROCEDURE — 99213 OFFICE O/P EST LOW 20 MIN: CPT

## 2017-12-23 RX ORDER — POLYMYXIN B SULFATE AND TRIMETHOPRIM 1; 10000 MG/ML; [USP'U]/ML
1 SOLUTION OPHTHALMIC
Qty: 1 BOTTLE | Refills: 0 | Status: SHIPPED | OUTPATIENT
Start: 2017-12-23 | End: 2017-12-30

## 2017-12-23 ASSESSMENT — ENCOUNTER SYMPTOMS
SINUS PRESSURE: 1
COUGH: 1
PHOTOPHOBIA: 0
EYE ITCHING: 1
EYE REDNESS: 1
APPETITE CHANGE: 0
RHINORRHEA: 1
SINUS PAIN: 1
EYE DISCHARGE: 1
EYE PAIN: 0
FEVER: 0

## 2017-12-23 NOTE — ED PROVIDER NOTES
"  History     Chief Complaint   Patient presents with     Eye Drainage     \"left eye red and draining\"      Sinusitis     The history is provided by the patient. No  was used.     Neo Cowan is a 57 year old male who presents with sinus congestion for 10 days. Left eye drainage since yesterday. Can't sleep, has a mild cough. Face hurts. Has a HA that comes and goes. Taking his allergy meds and an occasional Alkalizer cold medication.     Problem List:    Patient Active Problem List    Diagnosis Date Noted     Screening for prostate cancer 12/18/2017     Priority: Medium     Comprehensive Medical Examination 09/27/2016     Priority: Medium     Eczema 09/27/2016     Priority: Medium     Type 2 diabetes mellitus without complication (H) 10/01/2014     Priority: Medium     Vitamin D deficiency 10/01/2014     Priority: Medium     H/O adenomatous colonic polyps 07/19/2013     Priority: Medium     Dyslipidemia 07/17/2013     Priority: Medium     Seasonal allergic rhinitis 07/17/2013     Priority: Medium     Gilbert syndrome 03/02/2000     Priority: Medium     Obesity 03/02/2000     Priority: Medium        Past Medical History:    Past Medical History:   Diagnosis Date     Alcoholism 1/1/2011     Allergic rhinitis, cause unspecified 3/2/2000     Anxiety  1/1/2011     Colonic Polyps 3/2/2000     Depression 1/1/2011     Gilbert's Syndrome 3/2/2000     Hypercholesterolemia 3/2/2000     Overweight(278.02) 3/2/2000     Prediabetes 3/2/2000       Past Surgical History:    Past Surgical History:   Procedure Laterality Date     COLONOSCOPY  8/5/2013    Procedure: COLONOSCOPY;  colonoscopy ;  Surgeon: Yobani Ventura MD;  Location: [facility] OR     colonoscopy with polypectomy  1/27/2011    repeat 2 years     mandibular fracture repair       vasectomy       wisdom teeth extraction         Family History:    Family History   Problem Relation Age of Onset     Alcohol/Drug Mother      Alcoholism     C.A.D. Father "      C.A.D. Other      Family hx     CANCER Paternal Uncle      Cervical     CANCER Father      Lung, cause of death     CANCER Paternal Uncle      Throat     Other - See Comments Father      Colon polyps     Hypertension Other      Family hx     Other - See Comments Mother      Liver disease, cause of death       Social History:  Marital Status:   [2]  Social History   Substance Use Topics     Smoking status: Never Smoker     Smokeless tobacco: Current User     Types: Chew      Comment: Tried to quit; No passive exposure     Alcohol use No        Medications:      trimethoprim-polymyxin b (POLYTRIM) ophthalmic solution   amoxicillin-clavulanate (AUGMENTIN) 875-125 MG per tablet   simvastatin (ZOCOR) 20 MG tablet   cetirizine (ZYRTEC) 10 MG tablet   blood glucose (ACCU-CHEK SMARTVIEW) test strip   blood glucose monitoring (ACCU-CHEK FASTCLIX) lancets         Review of Systems   Constitutional: Negative for appetite change and fever.   HENT: Positive for postnasal drip, rhinorrhea, sinus pain and sinus pressure.    Eyes: Positive for discharge, redness and itching. Negative for photophobia, pain and visual disturbance.   Respiratory: Positive for cough.        Physical Exam   BP: 171/91  Pulse: 81  Temp: 98.3  F (36.8  C)  Resp: 18  Weight: 97.5 kg (215 lb)  SpO2: 98 %      Physical Exam   Constitutional: Vital signs are normal. He appears well-developed and well-nourished. He is active and cooperative. He does not appear ill. No distress.   HENT:   Head: Normocephalic and atraumatic. Head is without right periorbital erythema and without left periorbital erythema.   Right Ear: Tympanic membrane and external ear normal.   Left Ear: Tympanic membrane and external ear normal.   Nose: Right sinus exhibits maxillary sinus tenderness and frontal sinus tenderness. Left sinus exhibits maxillary sinus tenderness and frontal sinus tenderness.   Mouth/Throat: Uvula is midline, oropharynx is clear and moist and mucous  membranes are normal. No oropharyngeal exudate.   Eyes: EOM are normal. Pupils are equal, round, and reactive to light. Left eye exhibits discharge. Left eye exhibits no exudate and no hordeolum. No foreign body present in the left eye. Right conjunctiva is not injected. Left conjunctiva is injected.   Clear yellow drainage, crusted at corners of eyes.   Neck: Normal range of motion. Neck supple.   Cardiovascular: Normal rate, regular rhythm and normal heart sounds.    Pulmonary/Chest: Effort normal and breath sounds normal. No respiratory distress. He has no wheezes.   Lymphadenopathy:     He has no cervical adenopathy.   Skin: He is not diaphoretic.   Nursing note and vitals reviewed.      ED Course     ED Course     Procedures    Labs Ordered and Resulted from Time of ED Arrival Up to the Time of Departure from the ED - No data to display    Assessments & Plan (with Medical Decision Making)     I have reviewed the nursing notes.  I have reviewed the findings, diagnosis, plan and need for follow up with the patient.  Advised:   Warm wash clothes to the eye for 5 minutes, then put in eye drops as ordered.   Take antibiotics as ordered. Rest and push fluids.   Follow up with Dr. Mccarthy in 5-7 days for re-evalution or sooner if needed.     Discharge Medication List as of 12/23/2017 11:23 AM      START taking these medications    Details   trimethoprim-polymyxin b (POLYTRIM) ophthalmic solution Apply 1 drop to eye every 3 hours for 7 days, Disp-1 Bottle, R-0, E-Prescribe      amoxicillin-clavulanate (AUGMENTIN) 875-125 MG per tablet Take 1 tablet by mouth 2 times daily for 10 days, Disp-20 tablet, R-0, E-Prescribe             Final diagnoses:   Acute conjunctivitis of left eye, unspecified acute conjunctivitis type   Sinusitis, unspecified chronicity, unspecified location       12/23/2017   [facility]     Mandi Quiroga NP  12/23/17 [id number]

## 2017-12-23 NOTE — ED AVS SNAPSHOT
[facility]    [address]    [place] [address]    Phone:  [phone]                                       Neo Cowan   MRN: [id number]    Department:  [facility]   Date of Visit:  12/23/2017           Patient Information     Date Of Birth          1960        Your diagnoses for this visit were:     Acute conjunctivitis of left eye, unspecified acute conjunctivitis type     Sinusitis, unspecified chronicity, unspecified location        You were seen by Mandi Quiroga NP.      Follow-up Information     Follow up with Sukhjinder Mccarthy MD. Call in 1 week.    Specialty:  Family Practice    Why:  for re-evaluation    Contact information:    [facility]  [address] [place] [address]  [phone]          Discharge Instructions       Warm wash clothes to the eye for 5 minutes, then put in eye drops as ordered.     Take antibiotics as ordered. Rest and push fluids.     Follow up with Dr. Mccarthy in 5-7 days for re-evalution or sooner if needed.       What Is Conjunctivitis?    Conjunctivitis is an irritation or infection. It affects the membrane that covers the white of your eye and the inside of your eyelid (conjunctiva). It can happen to one or both eyes. The membrane swells and the blood vessels enlarge (dilate). This makes your eye red. That's why conjunctivitis is sometimes called red eye or pink eye.  What are the symptoms?  If you have one or more of these symptoms, see an eye doctor:    Redness in and around your eye    Eyes that are puffy and sore    Itching, burning, or stinging eyes    Watery eyes or discharge from your eye    Eyelids that are crusty or stuck together when you wake up in the morning    Pink color in the whites of one or both eyes  Getting treatment quickly can help prevent damage to your eyes.  How is it diagnosed?  Conjunctivitis is usually a minor eye infection. But it can sometimes become a more serious problem. Some more  serious eye diseases have symptoms that look like conjunctivitis. So it's important for an eye doctor to diagnose you. Your eye doctor will ask about your symptoms and any medicines you take. He or she will ask about any illnesses or medical conditions you may have. The doctor will also check your eyes with a hand-held light and a special microscope called a slit lamp.  Date Last Reviewed: 6/11/2015 2000-2017 [employer]. [address] [place] [address], [place] [address]. All rights reserved. This information is not intended as a substitute for professional medical care. Always follow your healthcare professional's instructions.             Review of your medicines      START taking        Dose / Directions Last dose taken    amoxicillin-clavulanate 875-125 MG per tablet   Commonly known as:  AUGMENTIN   Dose:  1 tablet   Quantity:  20 tablet        Take 1 tablet by mouth 2 times daily for 10 days   Refills:  0        trimethoprim-polymyxin b ophthalmic solution   Commonly known as:  POLYTRIM   Dose:  1 drop   Quantity:  1 Bottle        Apply 1 drop to eye every 3 hours for 7 days   Refills:  0          Our records show that you are taking the medicines listed below. If these are incorrect, please call your family doctor or clinic.        Dose / Directions Last dose taken    blood glucose monitoring lancets   Quantity:  1 Box        Use to test blood sugar 3 times daily or as directed.   Refills:  12        blood glucose monitoring test strip   Commonly known as:  ACCU-CHEK SMARTVIEW   Quantity:  1 Month        Use to test blood sugar 3 times daily or as directed.   Refills:  12        cetirizine 10 MG tablet   Commonly known as:  zyrTEC   Dose:  10 mg   Quantity:  60 tablet        Take 1 tablet (10 mg) by mouth every evening   Refills:  1        simvastatin 20 MG tablet   Commonly known as:  ZOCOR   Quantity:  30 tablet        TAKE 1 TABLET BY MOUTH EVERY EVENING - GENERIC FOR ZOCOR   Refills:  0                 Prescriptions were sent or printed at these locations (2 Prescriptions)                   [id number] - [place], [place] - [address]   [address] [place] [address]    Telephone:  [phone]   Fax:  [phone]   Hours:                  E-Prescribed (2 of 2)         trimethoprim-polymyxin b (POLYTRIM) ophthalmic solution               amoxicillin-clavulanate (AUGMENTIN) 875-125 MG per tablet                Orders Needing Specimen Collection     None      Pending Results     No orders found from 12/21/2017 to 12/24/2017.            Pending Culture Results     No orders found from 12/21/2017 to 12/24/2017.            Thank you for choosing [place]       Thank you for choosing [place] for your care. Our goal is always to provide you with excellent care. Hearing back from our patients is one way we can continue to improve our services. Please take a few minutes to complete the written survey that you may receive in the mail after you visit with us. Thank you!        StylectharDuriana Information     [employer] gives you secure access to your electronic health record. If you see a primary care provider, you can also send messages to your care team and make appointments. If you have questions, please call your primary care clinic.  If you do not have a primary care provider, please call [phone] and they will assist you.        Care EveryWhere ID     This is your Care EveryWhere ID. This could be used by other organizations to access your [place] medical records  [id number]        Equal Access to Services     VANE ROMEO : Hadangela Can, waodalis benavides, qaybta chaipncito arizmendi . So [facility] [phone].    ATENCIÓN: Si habla español, tiene a fabian disposición servicios gratuitos de asistencia lingüística. Christina al [phone].    We comply with applicable federal civil rights laws and [place] laws. We do not discriminate on the basis of  race, color, national origin, age, disability, sex, sexual orientation, or gender identity.            After Visit Summary       This is your record. Keep this with you and show to your community pharmacist(s) and doctor(s) at your next visit.

## 2017-12-23 NOTE — DISCHARGE INSTRUCTIONS
Warm wash clothes to the eye for 5 minutes, then put in eye drops as ordered.     Take antibiotics as ordered. Rest and push fluids.     Follow up with Dr. Mccarthy in 5-7 days for re-evalution or sooner if needed.       What Is Conjunctivitis?    Conjunctivitis is an irritation or infection. It affects the membrane that covers the white of your eye and the inside of your eyelid (conjunctiva). It can happen to one or both eyes. The membrane swells and the blood vessels enlarge (dilate). This makes your eye red. That's why conjunctivitis is sometimes called red eye or pink eye.  What are the symptoms?  If you have one or more of these symptoms, see an eye doctor:    Redness in and around your eye    Eyes that are puffy and sore    Itching, burning, or stinging eyes    Watery eyes or discharge from your eye    Eyelids that are crusty or stuck together when you wake up in the morning    Pink color in the whites of one or both eyes  Getting treatment quickly can help prevent damage to your eyes.  How is it diagnosed?  Conjunctivitis is usually a minor eye infection. But it can sometimes become a more serious problem. Some more serious eye diseases have symptoms that look like conjunctivitis. So it's important for an eye doctor to diagnose you. Your eye doctor will ask about your symptoms and any medicines you take. He or she will ask about any illnesses or medical conditions you may have. The doctor will also check your eyes with a hand-held light and a special microscope called a slit lamp.  Date Last Reviewed: 6/11/2015 2000-2017 [employer]. [address] [place] [address], [place] [address]. All rights reserved. This information is not intended as a substitute for professional medical care. Always follow your healthcare professional's instructions.

## 2017-12-23 NOTE — ED NOTES
C/o week long history of sinusitis and headaches, pt states left eye bagan to drain and become red yesterday.

## 2017-12-23 NOTE — ED AVS SNAPSHOT
[facility]    [address] [place], [place] [address] [address]    Phone:  [phone]                                       Neo Cowan   MRN: [id number]    Department:  [facility]   Date of Visit:  12/23/2017           After Visit Summary Signature Page     I have received my discharge instructions, and my questions have been answered. I have discussed any challenges I see with this plan with the nurse or doctor.    ..........................................................................................................................................  Patient/Patient Representative Signature      ..........................................................................................................................................  Patient Representative Print Name and Relationship to Patient    ..................................................               ................................................  Date                                            Time    ..........................................................................................................................................  Reviewed by Signature/Title    ...................................................              ..............................................  Date                                                            Time

## 2017-12-26 RX ORDER — SIMVASTATIN 20 MG
TABLET ORAL
Qty: 30 TABLET | Refills: 5 | Status: SHIPPED | OUTPATIENT
Start: 2017-12-26 | End: 2018-06-20

## 2018-01-16 ENCOUNTER — OFFICE VISIT (OUTPATIENT)
Dept: FAMILY MEDICINE | Facility: OTHER | Age: 58
End: 2018-01-16
Attending: FAMILY MEDICINE
Payer: COMMERCIAL

## 2018-01-16 VITALS
OXYGEN SATURATION: 98 % | WEIGHT: 217 LBS | RESPIRATION RATE: 20 BRPM | TEMPERATURE: 98 F | HEART RATE: 58 BPM | BODY MASS INDEX: 34.87 KG/M2 | SYSTOLIC BLOOD PRESSURE: 140 MMHG | DIASTOLIC BLOOD PRESSURE: 98 MMHG | HEIGHT: 66 IN

## 2018-01-16 DIAGNOSIS — J30.1 CHRONIC SEASONAL ALLERGIC RHINITIS DUE TO POLLEN: ICD-10-CM

## 2018-01-16 DIAGNOSIS — K13.70 ORAL MUCOSAL LESION: ICD-10-CM

## 2018-01-16 DIAGNOSIS — R42 VERTIGO: ICD-10-CM

## 2018-01-16 DIAGNOSIS — Z12.11 COLON CANCER SCREENING: Primary | ICD-10-CM

## 2018-01-16 PROCEDURE — 99214 OFFICE O/P EST MOD 30 MIN: CPT | Performed by: FAMILY MEDICINE

## 2018-01-16 ASSESSMENT — ANXIETY QUESTIONNAIRES
7. FEELING AFRAID AS IF SOMETHING AWFUL MIGHT HAPPEN: NOT AT ALL
4. TROUBLE RELAXING: NOT AT ALL
3. WORRYING TOO MUCH ABOUT DIFFERENT THINGS: NOT AT ALL
1. FEELING NERVOUS, ANXIOUS, OR ON EDGE: NOT AT ALL
5. BEING SO RESTLESS THAT IT IS HARD TO SIT STILL: NOT AT ALL
6. BECOMING EASILY ANNOYED OR IRRITABLE: NOT AT ALL
GAD7 TOTAL SCORE: 0
2. NOT BEING ABLE TO STOP OR CONTROL WORRYING: NOT AT ALL

## 2018-01-16 ASSESSMENT — PATIENT HEALTH QUESTIONNAIRE - PHQ9: SUM OF ALL RESPONSES TO PHQ QUESTIONS 1-9: 0

## 2018-01-16 ASSESSMENT — PAIN SCALES - GENERAL: PAINLEVEL: NO PAIN (0)

## 2018-01-16 NOTE — MR AVS SNAPSHOT
After Visit Summary   1/16/2018    Neo Cowan    MRN: [id number]           Patient Information     Date Of Birth          1960        Visit Information        Provider Department      1/16/2018 4:00 PM Sukhjinder Mccarthy MD [facility]        Today's Diagnoses     Colon cancer screening    -  1    Chronic seasonal allergic rhinitis due to pollen        Oral mucosal lesion        Vertigo          Care Instructions    Appointment with ENT scheduled for evaluation and recommendations for further management.           Follow-ups after your visit        Additional Services     GENERAL SURG ADULT REFERRAL       Your provider has referred you to: [facility]: [facility] [phone]   [email or web address]    Please be aware that coverage of these services is subject to the terms and limitations of your health insurance plan.  Call member services at your health plan with any benefit or coverage questions.      Please bring the following with you to your appointment:    (1) Any X-Rays, CTs or MRIs which have been performed.  Contact the facility where they were done to arrange for  prior to your scheduled appointment.   (2) List of current medications   (3) This referral request   (4) Any documents/labs given to you for this referral            OTOLARYNGOLOGY REFERRAL       Your provider has referred you to: [facility] [phone]   [email or web address]    Please be aware that coverage of these services is subject to the terms and limitations of your health insurance plan.  Call member services at your health plan with any benefit or coverage questions.      Please bring the following with you to your appointment:    (1) Any X-Rays, CTs or MRIs which have been performed.  Contact the facility where they were done to arrange for   prior to your scheduled appointment.   (2) List of current medications  (3) This referral request   (4) Any documents/labs given to you for this referral                  Follow-up notes from your care team     Return if symptoms worsen or fail to improve.      Your next 10 appointments already scheduled     Jan 30, 2018  2:00 PM CST   (Arrive by 1:45 PM)   Hearing Eval with Alvaro Almaguer   [facility] [place] ([facility] [place] [facility]    [address]  [place] [address]   [phone]            Jan 30, 2018  2:30 PM CST   (Arrive by 2:15 PM)   New Visit with Rona Moraes PA-C   [facility] [place] ([facility] [place] )    [address]  [place] [address]   [phone]            Feb 02, 2018  3:00 PM CST   (Arrive by 2:45 PM)   CONSULT with Donovan Mayfield MD   [facility] [place] ([facility] [place] )    [address]  [place] [address]   [phone]              Who to contact     If you have questions or need follow up information about today's clinic visit or your schedule please contact [facility] directly at [phone].  Normal or non-critical lab and imaging results will be communicated to you by MyChart, letter or phone within 4 business days after the clinic has received the results. If you do not hear from us within 7 days, please contact the clinic through [employer] or phone. If you have a critical or abnormal lab result, we will notify you by phone as soon as possible.  Submit refill requests through [employer] or call your pharmacy and they will forward the refill request to us. Please allow 3 business days for your refill to be completed.          Additional Information About Your Visit        [employer] Information     [employer] gives you secure access to your electronic health record. If you see a primary care provider, you can also send messages to your care team and make appointments. If you have questions, please call  "your primary care clinic.  If you do not have a primary care provider, please call [phone] and they will assist you.        Care EveryWhere ID     This is your Care EveryWhere ID. This could be used by other organizations to access your [facility] medical records  [id number]        Your Vitals Were     Pulse Temperature Respirations Height Pulse Oximetry BMI (Body Mass Index)    58 98  F (36.7  C) (Tympanic) 20 5' 6\" (1.676 m) 98% 35.02 kg/m2       Blood Pressure from Last 3 Encounters:   01/16/18 (!) 140/98   12/23/17 171/91   12/15/17 138/84    Weight from Last 3 Encounters:   01/16/18 217 lb (98.4 kg)   12/23/17 215 lb (97.5 kg)   12/15/17 218 lb (98.9 kg)              We Performed the Following     GENERAL SURG ADULT REFERRAL     OTOLARYNGOLOGY REFERRAL        Primary Care Provider Office Phone # Fax #    Sukhjinder Mccarthy [phone]       [facility] [address] [address]        Equal Access to Services     [facility] : Hadii aad ku hadasho Soomaali, waaxda luqadaha, qaybta kaalmada adeegyada, chapincito celestin . So [facility] [phone].    ATENCIÓN: Si habla español, tiene a fabian disposición servicios gratuitos de asistencia lingüística. [facility] [phone].    We comply with applicable federal civil rights laws and [place] laws. We do not discriminate on the basis of race, color, national origin, age, disability, sex, sexual orientation, or gender identity.            Thank you!     Thank you for choosing [facility]  for your care. Our goal is always to provide you with excellent care. Hearing back from our patients is one way we can continue to improve our services. Please take a few minutes to complete the written survey that you may receive in the mail after your visit with us. Thank you!             Your Updated Medication List - Protect others around you: Learn how to safely use, store and throw away your medicines at " www.disposemymeds.org.          This list is accurate as of: 1/16/18 11:59 PM.  Always use your most recent med list.                   Brand Name Dispense Instructions for use Diagnosis    blood glucose monitoring lancets     1 Box    Use to test blood sugar 3 times daily or as directed.    Diabetes mellitus, type 2 (H)       blood glucose monitoring test strip    ACCU-CHEK SMARTVIEW    1 Month    Use to test blood sugar 3 times daily or as directed.    Diabetes mellitus, type 2 (H)       cetirizine 10 MG tablet    zyrTEC    60 tablet    Take 1 tablet (10 mg) by mouth every evening    Allergic rhinitis       simvastatin 20 MG tablet    ZOCOR    30 tablet    TAKE 1 TABLET BY MOUTH EVERY EVENING    Mixed hyperlipidemia

## 2018-01-16 NOTE — NURSING NOTE
"Chief Complaint   Patient presents with     Sinus Problem       Initial BP (!) 140/98 (BP Location: Right arm, Patient Position: Sitting, Cuff Size: Adult Large)  Pulse 58  Temp 98  F (36.7  C) (Tympanic)  Resp 20  Ht 5' 6\" (1.676 m)  Wt 217 lb (98.4 kg)  SpO2 98%  BMI 35.02 kg/m2 Estimated body mass index is 35.02 kg/(m^2) as calculated from the following:    Height as of this encounter: 5' 6\" (1.676 m).    Weight as of this encounter: 217 lb (98.4 kg).  Medication Reconciliation: complete   Asha Mackey LPN      "

## 2018-01-16 NOTE — PROGRESS NOTES
SUBJECTIVE:  Neo Cowan, 57 year old, male is seen with the following medical problems.    URI.  He has noted persistent nasal congestion, sore throat, as well as cough.  Some associated vertigiinous symptoms. Neo was previously placed on amoxicillin-clavulinic acid (Augmentin).  He has a history of allergic rhinitis and has been taking cetirizine.    Left lip lesion.  In addition, he has noted a lower lip lesion.  Located in the left oral mucosa.  This was associated with a tingling sensation..  History of oral tobacco abuse.  Current Outpatient Prescriptions   Medication Sig Dispense Refill     simvastatin (ZOCOR) 20 MG tablet TAKE 1 TABLET BY MOUTH EVERY EVENING 30 tablet 5     cetirizine (ZYRTEC) 10 MG tablet Take 1 tablet (10 mg) by mouth every evening 60 tablet 1     blood glucose (ACCU-CHEK SMARTVIEW) test strip Use to test blood sugar 3 times daily or as directed. 1 Month 12     blood glucose monitoring (ACCU-CHEK FASTCLIX) lancets Use to test blood sugar 3 times daily or as directed. 1 Box 12        Allergies   Allergen Reactions     Seasonal Allergies        Past Medical History:   Diagnosis Date     Alcoholism 1/1/2011     Allergic rhinitis, cause unspecified 3/2/2000     Anxiety  1/1/2011     Colonic Polyps 3/2/2000     Depression 1/1/2011     Gilbert's Syndrome 3/2/2000     Hypercholesterolemia 3/2/2000     Overweight(278.02) 3/2/2000     Prediabetes 3/2/2000     Past Surgical History:   Procedure Laterality Date     COLONOSCOPY  8/5/2013    Procedure: COLONOSCOPY;  colonoscopy ;  Surgeon: Yobani Ventura MD;  Location: [facility] OR     colonoscopy with polypectomy  1/27/2011    repeat 2 years     mandibular fracture repair       vasectomy       wisdom teeth extraction       Family History   Problem Relation Age of Onset     C.A.D. Father      CANCER Father      Lung, cause of death     Other - See Comments Father      Colon polyps     Alcohol/Drug Mother      Alcoholism     Other - See Comments  "Mother      Liver disease, cause of death     C.A.D. 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      OBJECTIVE:BP (!) 140/98 (BP Location: Right arm, Patient Position: Sitting, Cuff Size: Adult Large)  Pulse 58  Temp 98  F (36.7  C) (Tympanic)  Resp 20  Ht 5' 6\" (1.676 m)  Wt 217 lb (98.4 kg)  SpO2 98%  BMI 35.02 kg/m2      Exam:  Physical Exam   Constitutional: He is oriented to person, place, and time. He appears well-developed and well-nourished. No distress.   HENT:   Head: Normocephalic and atraumatic.   Right Ear: Hearing, tympanic membrane, external ear and ear canal normal.   Left Ear: Hearing, tympanic membrane, external ear and ear canal normal.   Nose: Nose normal. Right sinus exhibits no maxillary sinus tenderness and no frontal sinus tenderness. Left sinus exhibits no maxillary sinus tenderness and no frontal sinus tenderness.   There is an lesion noted in the oral mucosa left lower lip. Erythematous with a whitish area.   Eyes: Conjunctivae are normal.   Neck: Neck supple. No thyromegaly present.   Cardiovascular: Regular rhythm.    Pulmonary/Chest: Effort normal and " breath sounds normal. He has no wheezes. He has no rales.   Lymphadenopathy:     He has no cervical adenopathy.   Neurological: He is alert and oriented to person, place, and time.   Skin: Skin is warm and dry.   Psychiatric: He has a normal mood and affect.     Other exam not repeated    Labs:  Orders Only on 12/19/2017   Component Date Value Ref Range Status     WBC 12/19/2017 6.8  4.0 - 11.0 10e9/L Final     RBC Count 12/19/2017 4.84  4.4 - 5.9 10e12/L Final     Hemoglobin 12/19/2017 14.8  13.3 - 17.7 g/dL Final     Hematocrit 12/19/2017 43.5  40.0 - 53.0 % Final     MCV 12/19/2017 90  78 - 100 fl Final     MCH 12/19/2017 30.6  26.5 - 33.0 pg Final     MCHC 12/19/2017 34.0  31.5 - 36.5 g/dL Final     RDW 12/19/2017 12.4  10.0 - 15.0 % Final     Platelet Count 12/19/2017 180  150 - 450 10e9/L Final     Diff Method 12/19/2017 Automated Method   Final     % Neutrophils 12/19/2017 73.5  % Final     % Lymphocytes 12/19/2017 19.1  % Final     % Monocytes 12/19/2017 6.6  % Final     % Eosinophils 12/19/2017 0.6  % Final     % Basophils 12/19/2017 0.1  % Final     % Immature Granulocytes 12/19/2017 0.1  % Final     Nucleated RBCs 12/19/2017 0  0 /100 Final     Absolute Neutrophil 12/19/2017 5.0  1.6 - 8.3 10e9/L Final     Absolute Lymphocytes 12/19/2017 1.3  0.8 - 5.3 10e9/L Final     Absolute Monocytes 12/19/2017 0.5  0.0 - 1.3 10e9/L Final     Absolute Eosinophils 12/19/2017 0.0  0.0 - 0.7 10e9/L Final     Absolute Basophils 12/19/2017 0.0  0.0 - 0.2 10e9/L Final     Abs Immature Granulocytes 12/19/2017 0.0  0 - 0.4 10e9/L Final     Absolute Nucleated RBC 12/19/2017 0.0   Final     Color Urine 12/19/2017 Yellow   Final     Appearance Urine 12/19/2017 Clear   Final     Glucose Urine 12/19/2017 Negative  NEG^Negative mg/dL Final     Bilirubin Urine 12/19/2017 Negative  NEG^Negative Final     Ketones Urine 12/19/2017 Negative  NEG^Negative mg/dL Final     Specific Gravity Urine 12/19/2017 1.015  1.003 - 1.035 Final      Blood Urine 12/19/2017 Negative  NEG^Negative Final     pH Urine 12/19/2017 5.5  4.7 - 8.0 pH Final     Protein Albumin Urine 12/19/2017 10* NEG^Negative mg/dL Final     Urobilinogen mg/dL 12/19/2017 Normal  0.0 - 2.0 mg/dL Final     Nitrite Urine 12/19/2017 Negative  NEG^Negative Final     Leukocyte Esterase Urine 12/19/2017 Negative  NEG^Negative Final     Source 12/19/2017 Midstream Urine   Final     RBC Urine 12/19/2017 0  0 - 2 /HPF Final     WBC Urine 12/19/2017 <1  0 - 2 /HPF Final     Bacteria Urine 12/19/2017 None* NEG^Negative /HPF Final     Mucous Urine 12/19/2017 Present* NEG^Negative /LPF Final     PSA 12/19/2017 3.05  0 - 4 ug/L Final    Assay Method:  Chemiluminescence using Siemens Vista analyzer     Cholesterol 12/19/2017 149  <200 mg/dL Final     Triglycerides 12/19/2017 152* <150 mg/dL Final    Comment: Borderline high:  150-199 mg/dl  High:             200-499 mg/dl  Very high:       >499 mg/dl       HDL Cholesterol 12/19/2017 37* >39 mg/dL Final     LDL Cholesterol Calculated 12/19/2017 82  <100 mg/dL Final    Desirable:       <100 mg/dl     Non HDL Cholesterol 12/19/2017 112  <130 mg/dL Final     Sodium 12/19/2017 137  133 - 144 mmol/L Final     Potassium 12/19/2017 4.3  3.4 - 5.3 mmol/L Final     Chloride 12/19/2017 103  94 - 109 mmol/L Final     Carbon Dioxide 12/19/2017 28  20 - 32 mmol/L Final     Anion Gap 12/19/2017 6  3 - 14 mmol/L Final     Glucose 12/19/2017 182* 70 - 99 mg/dL Final     Urea Nitrogen 12/19/2017 10  7 - 30 mg/dL Final     Creatinine 12/19/2017 0.98  0.66 - 1.25 mg/dL Final     GFR Estimate 12/19/2017 79  >60 mL/min/1.7m2 Final    Non  GFR Calc     GFR Estimate If Black 12/19/2017 >90  >60 mL/min/1.7m2 Final    African American GFR Calc     Calcium 12/19/2017 9.1  8.5 - 10.1 mg/dL Final     Bilirubin Total 12/19/2017 1.2  0.2 - 1.3 mg/dL Final     Albumin 12/19/2017 3.8  3.4 - 5.0 g/dL Final     Protein Total 12/19/2017 7.8  6.8 - 8.8 g/dL Final      Alkaline Phosphatase 12/19/2017 98  40 - 150 U/L Final     ALT 12/19/2017 72* 0 - 70 U/L Final     AST 12/19/2017 39  0 - 45 U/L Final     TSH 12/19/2017 1.17  0.40 - 4.00 mU/L Final     Hemoglobin A1C 12/19/2017 8.2* 4.3 - 6.0 % Final     Estimated Average Glucose 12/19/2017 189  mg/dL Final       ASSESSMENT/PLAN:  Chronic seasonal allergic rhinitis due to pollen  Continue antihisatmines and symptomatic therapy.  Discussed nasal steroids.  Appointment with ENT scheduled for evaluation and recommendations for further management.   - OTOLARYNGOLOGY REFERRAL    Oral mucosal lesion  Concerned regarding malignancy in light of oral tobacco use.  - OTOLARYNGOLOGY REFERRAL    Vertigo  Will follow.  - OTOLARYNGOLOGY REFERRAL    Colon cancer screening  Colonoscopy to be updated.  - GENERAL SURG ADULT REFERRAL            Sukhjinder Mccarthy MD

## 2018-01-17 ASSESSMENT — ANXIETY QUESTIONNAIRES: GAD7 TOTAL SCORE: 0

## 2018-01-22 DIAGNOSIS — H81.10 BENIGN PAROXYSMAL POSITIONAL VERTIGO, UNSPECIFIED LATERALITY: Primary | ICD-10-CM

## 2018-01-23 ENCOUNTER — TELEPHONE (OUTPATIENT)
Dept: FAMILY MEDICINE | Facility: OTHER | Age: 58
End: 2018-01-23

## 2018-01-23 ENCOUNTER — OFFICE VISIT (OUTPATIENT)
Dept: FAMILY MEDICINE | Facility: OTHER | Age: 58
End: 2018-01-23
Attending: FAMILY MEDICINE
Payer: COMMERCIAL

## 2018-01-23 VITALS
RESPIRATION RATE: 18 BRPM | TEMPERATURE: 98.6 F | HEIGHT: 66 IN | BODY MASS INDEX: 34.87 KG/M2 | WEIGHT: 217 LBS | OXYGEN SATURATION: 96 % | SYSTOLIC BLOOD PRESSURE: 146 MMHG | DIASTOLIC BLOOD PRESSURE: 98 MMHG | HEART RATE: 85 BPM

## 2018-01-23 DIAGNOSIS — R30.0 DYSURIA: Primary | ICD-10-CM

## 2018-01-23 LAB
ALBUMIN UR-MCNC: NEGATIVE MG/DL
APPEARANCE UR: CLEAR
BILIRUB UR QL STRIP: NEGATIVE
COLOR UR AUTO: ABNORMAL
GLUCOSE UR STRIP-MCNC: >1000 MG/DL
HGB UR QL STRIP: NEGATIVE
KETONES UR STRIP-MCNC: NEGATIVE MG/DL
LEUKOCYTE ESTERASE UR QL STRIP: NEGATIVE
NITRATE UR QL: NEGATIVE
PH UR STRIP: 6 PH (ref 4.7–8)
SOURCE: ABNORMAL
SP GR UR STRIP: 1 (ref 1–1.03)
UROBILINOGEN UR STRIP-MCNC: NORMAL MG/DL (ref 0–2)

## 2018-01-23 PROCEDURE — 99000 SPECIMEN HANDLING OFFICE-LAB: CPT | Performed by: FAMILY MEDICINE

## 2018-01-23 PROCEDURE — 36415 COLL VENOUS BLD VENIPUNCTURE: CPT | Performed by: FAMILY MEDICINE

## 2018-01-23 PROCEDURE — 86694 HERPES SIMPLEX NES ANTBDY: CPT | Mod: 90 | Performed by: FAMILY MEDICINE

## 2018-01-23 PROCEDURE — 81003 URINALYSIS AUTO W/O SCOPE: CPT | Performed by: FAMILY MEDICINE

## 2018-01-23 PROCEDURE — 86696 HERPES SIMPLEX TYPE 2 TEST: CPT | Mod: 90 | Performed by: FAMILY MEDICINE

## 2018-01-23 PROCEDURE — 99213 OFFICE O/P EST LOW 20 MIN: CPT | Performed by: FAMILY MEDICINE

## 2018-01-23 PROCEDURE — 86695 HERPES SIMPLEX TYPE 1 TEST: CPT | Mod: 90 | Performed by: FAMILY MEDICINE

## 2018-01-23 ASSESSMENT — PAIN SCALES - GENERAL: PAINLEVEL: NO PAIN (0)

## 2018-01-23 ASSESSMENT — PATIENT HEALTH QUESTIONNAIRE - PHQ9: SUM OF ALL RESPONSES TO PHQ QUESTIONS 1-9: 0

## 2018-01-23 ASSESSMENT — ANXIETY QUESTIONNAIRES
3. WORRYING TOO MUCH ABOUT DIFFERENT THINGS: NOT AT ALL
GAD7 TOTAL SCORE: 0
4. TROUBLE RELAXING: NOT AT ALL
2. NOT BEING ABLE TO STOP OR CONTROL WORRYING: NOT AT ALL
5. BEING SO RESTLESS THAT IT IS HARD TO SIT STILL: NOT AT ALL
7. FEELING AFRAID AS IF SOMETHING AWFUL MIGHT HAPPEN: NOT AT ALL
6. BECOMING EASILY ANNOYED OR IRRITABLE: NOT AT ALL
1. FEELING NERVOUS, ANXIOUS, OR ON EDGE: NOT AT ALL

## 2018-01-23 NOTE — PROGRESS NOTES
SUBJECTIVE:  eNo Cowan, 57 year old, male is seen with the following medical problems.    Herpes simplex.  Neo is seen with concerns about herpes simplex.  His wife was recently diagnosed.  He notes no symptoms;    No fever, flank pain, nausea, vomiting, dysuria, hematuria, pneumaturia, nocturia, frequency, urgency, or incontinence.    Current Outpatient Prescriptions   Medication Sig Dispense Refill     simvastatin (ZOCOR) 20 MG tablet TAKE 1 TABLET BY MOUTH EVERY EVENING 30 tablet 5     cetirizine (ZYRTEC) 10 MG tablet Take 1 tablet (10 mg) by mouth every evening 60 tablet 1     blood glucose (ACCU-CHEK SMARTVIEW) test strip Use to test blood sugar 3 times daily or as directed. 1 Month 12     blood glucose monitoring (ACCU-CHEK FASTCLIX) lancets Use to test blood sugar 3 times daily or as directed. 1 Box 12        Allergies   Allergen Reactions     Seasonal Allergies        Past Medical History:   Diagnosis Date     Alcoholism 1/1/2011     Allergic rhinitis, cause unspecified 3/2/2000     Anxiety  1/1/2011     Colonic Polyps 3/2/2000     Depression 1/1/2011     Gilbert's Syndrome 3/2/2000     Hypercholesterolemia 3/2/2000     Overweight(278.02) 3/2/2000     Prediabetes 3/2/2000     Past Surgical History:   Procedure Laterality Date     COLONOSCOPY  8/5/2013    Procedure: COLONOSCOPY;  colonoscopy ;  Surgeon: Yobani Ventura MD;  Location: [facility]     colonoscopy with polypectomy  1/27/2011    repeat 2 years     mandibular fracture repair       vasectomy       wisdom teeth extraction       Family History   Problem Relation Age of Onset     C.A.D. Father      CANCER Father      Lung, cause of death     Other - See Comments Father      Colon polyps     Alcohol/Drug Mother      Alcoholism     Other - See Comments Mother      Liver disease, cause of death     C.A.D. 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    OBJECTIVE:  BP (!) 146/98 (BP Location: Left arm, Patient Position: Sitting, Cuff Size: Adult Large)  Pulse 85  Temp 98.6  F (37  C) (Tympanic)  Resp 18  Ht 5' 6\" (1.676 m)  Wt 217 lb (98.4 kg)  SpO2 96%  BMI 35.02 kg/m2      Exam:  Physical Exam   Constitutional: He is oriented to person, place, and time. He appears well-developed and well-nourished. No distress.   Neurological: He is alert and oriented to person, place, and time.   Psychiatric: He has a normal mood and affect.     Other exam not repeated    Labs:  Orders Only on 12/19/2017   Component Date Value Ref Range Status     WBC 12/19/2017 6.8  4.0 - 11.0 10e9/L Final     RBC Count 12/19/2017 4.84  4.4 - 5.9 10e12/L Final     Hemoglobin 12/19/2017 14.8  13.3 - 17.7 g/dL Final     Hematocrit 12/19/2017 43.5  40.0 - 53.0 % Final     MCV 12/19/2017 90  78 - 100 fl Final     MCH 12/19/2017 30.6  26.5 - 33.0 pg Final     MCHC 12/19/2017 34.0  31.5 - 36.5 g/dL Final     RDW 12/19/2017 12.4  10.0 - 15.0 % Final     Platelet Count 12/19/2017 180  150 - 450 10e9/L Final     Diff Method 12/19/2017 Automated Method   Final     % Neutrophils 12/19/2017 73.5  % Final     % Lymphocytes 12/19/2017 19.1  % Final     % Monocytes " 12/19/2017 6.6  % Final     % Eosinophils 12/19/2017 0.6  % Final     % Basophils 12/19/2017 0.1  % Final     % Immature Granulocytes 12/19/2017 0.1  % Final     Nucleated RBCs 12/19/2017 0  0 /100 Final     Absolute Neutrophil 12/19/2017 5.0  1.6 - 8.3 10e9/L Final     Absolute Lymphocytes 12/19/2017 1.3  0.8 - 5.3 10e9/L Final     Absolute Monocytes 12/19/2017 0.5  0.0 - 1.3 10e9/L Final     Absolute Eosinophils 12/19/2017 0.0  0.0 - 0.7 10e9/L Final     Absolute Basophils 12/19/2017 0.0  0.0 - 0.2 10e9/L Final     Abs Immature Granulocytes 12/19/2017 0.0  0 - 0.4 10e9/L Final     Absolute Nucleated RBC 12/19/2017 0.0   Final     Color Urine 12/19/2017 Yellow   Final     Appearance Urine 12/19/2017 Clear   Final     Glucose Urine 12/19/2017 Negative  NEG^Negative mg/dL Final     Bilirubin Urine 12/19/2017 Negative  NEG^Negative Final     Ketones Urine 12/19/2017 Negative  NEG^Negative mg/dL Final     Specific Gravity Urine 12/19/2017 1.015  1.003 - 1.035 Final     Blood Urine 12/19/2017 Negative  NEG^Negative Final     pH Urine 12/19/2017 5.5  4.7 - 8.0 pH Final     Protein Albumin Urine 12/19/2017 10* NEG^Negative mg/dL Final     Urobilinogen mg/dL 12/19/2017 Normal  0.0 - 2.0 mg/dL Final     Nitrite Urine 12/19/2017 Negative  NEG^Negative Final     Leukocyte Esterase Urine 12/19/2017 Negative  NEG^Negative Final     Source 12/19/2017 Midstream Urine   Final     RBC Urine 12/19/2017 0  0 - 2 /HPF Final     WBC Urine 12/19/2017 <1  0 - 2 /HPF Final     Bacteria Urine 12/19/2017 None* NEG^Negative /HPF Final     Mucous Urine 12/19/2017 Present* NEG^Negative /LPF Final     PSA 12/19/2017 3.05  0 - 4 ug/L Final    Assay Method:  Chemiluminescence using Siemens Vista analyzer     Cholesterol 12/19/2017 149  <200 mg/dL Final     Triglycerides 12/19/2017 152* <150 mg/dL Final    Comment: Borderline high:  150-199 mg/dl  High:             200-499 mg/dl  Very high:       >499 mg/dl       HDL Cholesterol 12/19/2017 37* >39  mg/dL Final     LDL Cholesterol Calculated 12/19/2017 82  <100 mg/dL Final    Desirable:       <100 mg/dl     Non HDL Cholesterol 12/19/2017 112  <130 mg/dL Final     Sodium 12/19/2017 137  133 - 144 mmol/L Final     Potassium 12/19/2017 4.3  3.4 - 5.3 mmol/L Final     Chloride 12/19/2017 103  94 - 109 mmol/L Final     Carbon Dioxide 12/19/2017 28  20 - 32 mmol/L Final     Anion Gap 12/19/2017 6  3 - 14 mmol/L Final     Glucose 12/19/2017 182* 70 - 99 mg/dL Final     Urea Nitrogen 12/19/2017 10  7 - 30 mg/dL Final     Creatinine 12/19/2017 0.98  0.66 - 1.25 mg/dL Final     GFR Estimate 12/19/2017 79  >60 mL/min/1.7m2 Final    Non  GFR Calc     GFR Estimate If Black 12/19/2017 >90  >60 mL/min/1.7m2 Final    African American GFR Calc     Calcium 12/19/2017 9.1  8.5 - 10.1 mg/dL Final     Bilirubin Total 12/19/2017 1.2  0.2 - 1.3 mg/dL Final     Albumin 12/19/2017 3.8  3.4 - 5.0 g/dL Final     Protein Total 12/19/2017 7.8  6.8 - 8.8 g/dL Final     Alkaline Phosphatase 12/19/2017 98  40 - 150 U/L Final     ALT 12/19/2017 72* 0 - 70 U/L Final     AST 12/19/2017 39  0 - 45 U/L Final     TSH 12/19/2017 1.17  0.40 - 4.00 mU/L Final     Hemoglobin A1C 12/19/2017 8.2* 4.3 - 6.0 % Final     Estimated Average Glucose 12/19/2017 189  mg/dL Final       ASSESSMENT/PLAN:  Dysuria  Acute and convalescent HS1 and HS2 titers drawn.  Symptoms discussed.  Routine follow up.  - UA reflex to Microscopic and Culture - [place]  - Herpes Simplex Virus 1 and 2 IgG  - Herpes: HSV IgM antibody            Sukhjinder Mccarthy MD

## 2018-01-23 NOTE — NURSING NOTE
"Chief Complaint   Patient presents with     Dysuria     onset 2 days       Initial BP (!) 146/98 (BP Location: Left arm, Patient Position: Sitting, Cuff Size: Adult Large)  Pulse 85  Temp 98.6  F (37  C) (Tympanic)  Resp 18  Ht 5' 6\" (1.676 m)  Wt 217 lb (98.4 kg)  SpO2 96%  BMI 35.02 kg/m2 Estimated body mass index is 35.02 kg/(m^2) as calculated from the following:    Height as of this encounter: 5' 6\" (1.676 m).    Weight as of this encounter: 217 lb (98.4 kg).  Medication Reconciliation: complete   Asha Mackey LPN      "

## 2018-01-23 NOTE — TELEPHONE ENCOUNTER
8:06 AM    Reason for Call: OVERBOOK    Patient is having the following symptoms: Urinary issues for 2-3 days.    The patient is requesting an appointment for ASAP with Dr Mccarthy.    Was an appointment offered for this call? Yes  If yes : Appointment type short              Date  01/23/18    Preferred method for responding to this message: Telephone Call  What is your phone number ?  [phone]    If we cannot reach you directly, may we leave a detailed response at the number you provided? Yes    Can this message wait until your PCP/provider returns, if unavailable today? Not applicable    Iliana Muir

## 2018-01-23 NOTE — MR AVS SNAPSHOT
After Visit Summary   1/23/2018    Neo Cowan    MRN: [id number]           Patient Information     Date Of Birth          1960        Visit Information        Provider Department      1/23/2018 2:00 PM Sukhjinder Mccarthy MD [facility]        Today's Diagnoses     Dysuria    -  1      Care Instructions    Labs are drawn.          Follow-ups after your visit        Follow-up notes from your care team     Return if symptoms worsen or fail to improve.      Your next 10 appointments already scheduled     Jan 30, 2018  2:00 PM CST   (Arrive by 1:45 PM)   Hearing Eval with Alvaro Almaguer   [facility] [place] ([facility] [place] )    [address]  [place] [address]   [phone]            Jan 30, 2018  2:30 PM CST   (Arrive by 2:15 PM)   New Visit with Rona Moraes PA-C   [facility] [place] ([facility] [place] )    [address]  [place] [address]   [phone]            Feb 02, 2018  3:40 PM CST   (Arrive by 3:25 PM)   SHORT with Sukhjinder Mccarthy MD   [facility] [place] ([facility] [place] )    [address]  [place] [address]   [phone]            Feb 06, 2018  3:00 PM CST   (Arrive by 2:45 PM)   CONSULT with Donovan Mayfield MD   [facility] [place] ([facility] [place] )    [address]  [place] [address]   [phone]              Who to contact     If you have questions or need follow up information about today's clinic visit or your schedule please contact [facility] directly at [phone].  Normal or non-critical lab and imaging results will be communicated to you by MyChart, letter or phone within 4 business days after the clinic has received the results. If you do not hear from us within 7 days, please contact the clinic through MyChart or phone. If you have a critical or abnormal lab result, we will notify you by phone as soon as possible.  Submit  "refill requests through [employer] or call your pharmacy and they will forward the refill request to us. Please allow 3 business days for your refill to be completed.          Additional Information About Your Visit        DevelopIntelligencehart Information     [employer]io gives you secure access to your electronic health record. If you see a primary care provider, you can also send messages to your care team and make appointments. If you have questions, please call your primary care clinic.  If you do not have a primary care provider, please call [phone] and they will assist you.        Care EveryWhere ID     This is your Care EveryWhere ID. This could be used by other organizations to access your [place] medical records  [id number]        Your Vitals Were     Pulse Temperature Respirations Height Pulse Oximetry BMI (Body Mass Index)    85 98.6  F (37  C) (Tympanic) 18 5' 6\" (1.676 m) 96% 35.02 kg/m2       Blood Pressure from Last 3 Encounters:   01/23/18 (!) 146/98   01/16/18 (!) 140/98   12/23/17 171/91    Weight from Last 3 Encounters:   01/23/18 217 lb (98.4 kg)   01/16/18 217 lb (98.4 kg)   12/23/17 215 lb (97.5 kg)              We Performed the Following     Herpes Simplex Virus 1 and 2 IgG     Herpes: HSV IgM antibody     UA reflex to Microscopic and Culture - [place]        Primary Care Provider Office Phone # Fax #    Sukhjinder Mccarthy [phone]       [facility] [address]  [place] [address]        Equal Access to Services     [facility] AH: Hadii aad ku hadasho Soomaali, waaxda luqadaha, qaybta kaalmada adeegyada, chapincito kim. So [facility] [phone].    ATENCIÓN: Si habla fili, tiene a fabian disposición servicios gratuitos de asistencia lingüística. Llame al [phone].    We comply with applicable federal civil rights laws and [place] laws. We do not discriminate on the basis of race, color, national origin, age, disability, sex, sexual orientation, or gender " identity.            Thank you!     Thank you for choosing [facility] [place]  for your care. Our goal is always to provide you with excellent care. Hearing back from our patients is one way we can continue to improve our services. Please take a few minutes to complete the written survey that you may receive in the mail after your visit with us. Thank you!             Your Updated Medication List - Protect others around you: Learn how to safely use, store and throw away your medicines at www.disposemymeds.org.          This list is accurate as of 1/23/18 11:59 PM.  Always use your most recent med list.                   Brand Name Dispense Instructions for use Diagnosis    blood glucose monitoring lancets     1 Box    Use to test blood sugar 3 times daily or as directed.    Diabetes mellitus, type 2 (H)       blood glucose monitoring test strip    ACCU-CHEK SMARTVIEW    1 Month    Use to test blood sugar 3 times daily or as directed.    Diabetes mellitus, type 2 (H)       cetirizine 10 MG tablet    zyrTEC    60 tablet    Take 1 tablet (10 mg) by mouth every evening    Allergic rhinitis       simvastatin 20 MG tablet    ZOCOR    30 tablet    TAKE 1 TABLET BY MOUTH EVERY EVENING    Mixed hyperlipidemia

## 2018-01-24 ASSESSMENT — ANXIETY QUESTIONNAIRES: GAD7 TOTAL SCORE: 0

## 2018-01-25 LAB
HSV1 IGG SERPL QL IA: >8 AI (ref 0–0.8)
HSV2 IGG SERPL QL IA: <0.2 AI (ref 0–0.8)

## 2018-01-26 ENCOUNTER — TELEPHONE (OUTPATIENT)
Dept: FAMILY MEDICINE | Facility: OTHER | Age: 58
End: 2018-01-26

## 2018-01-26 NOTE — TELEPHONE ENCOUNTER
Reason for call:  RESULTS    1. What is the test that was ordered? Labs  2. Who ordered your test? Dr Mccarthy  3. When was the test performed?  1-23-18  Description: Patient is requesting the results of the labs performed  Was an appointment offered for this a call? No  If Yes :  Appointment type                 Date    Preferred method for responding to this message: Telephone Call  What is your phone number ?    If we cannot reach you directly, may we leave a detailed response at the number you provided? Yes  Can this message wait until your PCP/Provider returns if not available today? YES

## 2018-01-30 ENCOUNTER — OFFICE VISIT (OUTPATIENT)
Dept: AUDIOLOGY | Facility: OTHER | Age: 58
End: 2018-01-30
Attending: AUDIOLOGIST
Payer: COMMERCIAL

## 2018-01-30 ENCOUNTER — OFFICE VISIT (OUTPATIENT)
Dept: OTOLARYNGOLOGY | Facility: OTHER | Age: 58
End: 2018-01-30
Attending: FAMILY MEDICINE
Payer: COMMERCIAL

## 2018-01-30 VITALS
DIASTOLIC BLOOD PRESSURE: 88 MMHG | TEMPERATURE: 97.7 F | HEIGHT: 66 IN | BODY MASS INDEX: 34.87 KG/M2 | WEIGHT: 217 LBS | HEART RATE: 78 BPM | SYSTOLIC BLOOD PRESSURE: 144 MMHG

## 2018-01-30 DIAGNOSIS — R42 VERTIGO: ICD-10-CM

## 2018-01-30 DIAGNOSIS — H90.3 SENSORINEURAL HEARING LOSS (SNHL) OF BOTH EARS: Primary | ICD-10-CM

## 2018-01-30 DIAGNOSIS — J34.2 DNS (DEVIATED NASAL SEPTUM): ICD-10-CM

## 2018-01-30 DIAGNOSIS — H90.3 SENSORINEURAL HEARING LOSS, BILATERAL: ICD-10-CM

## 2018-01-30 DIAGNOSIS — Z71.6 ENCOUNTER FOR TOBACCO USE CESSATION COUNSELING: ICD-10-CM

## 2018-01-30 DIAGNOSIS — R42 DIZZINESS: ICD-10-CM

## 2018-01-30 DIAGNOSIS — J30.1 CHRONIC SEASONAL ALLERGIC RHINITIS DUE TO POLLEN: ICD-10-CM

## 2018-01-30 DIAGNOSIS — K13.70 ORAL MUCOSAL LESION: ICD-10-CM

## 2018-01-30 LAB — HSV 1+2 IGM SER-IMP: 0.41 INDEX VALUE (ref 0–0.89)

## 2018-01-30 PROCEDURE — 99214 OFFICE O/P EST MOD 30 MIN: CPT | Mod: 25 | Performed by: PHYSICIAN ASSISTANT

## 2018-01-30 PROCEDURE — 92557 COMPREHENSIVE HEARING TEST: CPT | Performed by: AUDIOLOGIST

## 2018-01-30 PROCEDURE — 92567 TYMPANOMETRY: CPT | Performed by: AUDIOLOGIST

## 2018-01-30 PROCEDURE — 31231 NASAL ENDOSCOPY DX: CPT | Performed by: PHYSICIAN ASSISTANT

## 2018-01-30 RX ORDER — FEXOFENADINE HCL 180 MG/1
180 TABLET ORAL DAILY
Qty: 90 TABLET | Refills: 1 | Status: SHIPPED | OUTPATIENT
Start: 2018-01-30 | End: 2019-12-31

## 2018-01-30 ASSESSMENT — PAIN SCALES - GENERAL: PAINLEVEL: NO PAIN (0)

## 2018-01-30 NOTE — PATIENT INSTRUCTIONS
Ears normal. No fluid, infection.   Vertigo- multifactorial- related to orthostatic changes, possible glucose.   Consider PT for further testing if there is no improvement.     Hearing protection. Moderate hearing loss, high frequencies.     Stop tobacco. Arrange for oral biopsy.  Consider septoplasty     Start Nasal saline or rinse. Start Allegra one tablet. Hold Zyrtec.   Follow up in 1-2 months, will complete biopsy at that time    Thank you for allowing MATTHEW Morris and our ENT team to participate in your care.  If your medications are too expensive, please give the nurse a call.  We can possibly change this medication.  If you have a scheduling or an appointment question please contact [facility] our Health Unit Coordinator at their direct line [phone].   ALL nursing questions or concerns can be directed to your ENT nurse at: [phone] Kristina

## 2018-01-30 NOTE — MR AVS SNAPSHOT
After Visit Summary   1/30/2018    Neo Cowan    MRN: [id number]           Patient Information     Date Of Birth          1960        Visit Information        Provider Department      1/30/2018 2:00 PM Arabella Alston AuD [facility] [place]        Today's Diagnoses     Dizziness        Sensorineural hearing loss, bilateral           Follow-ups after your visit        Your next 10 appointments already scheduled     Jan 30, 2018  2:30 PM CST   (Arrive by 2:15 PM)   New Visit with Rona Moraes PA-C   [facility] [place] ([facility] [place] )    [address]  [place] [address]   [phone]            Feb 06, 2018  3:00 PM CST   (Arrive by 2:45 PM)   CONSULT with Donovan Mayfield MD   [facility] [place] ([facility] [place] )    [address]  [place] [address]   [phone]              Who to contact     If you have questions or need follow up information about today's clinic visit or your schedule please contact [facility] directly at [phone].  Normal or non-critical lab and imaging results will be communicated to you by MyChart, letter or phone within 4 business days after the clinic has received the results. If you do not hear from us within 7 days, please contact the clinic through Tag[employer]ity[employer] or phone. If you have a critical or abnormal lab result, we will notify you by phone as soon as possible.  Submit refill requests through [employer] or call your pharmacy and they will forward the refill request to us. Please allow 3 business days for your refill to be completed.          Additional Information About Your Visit        MyChart Information     [employer] gives you secure access to your electronic health record. If you see a primary care provider, you can also send messages to your care team and make appointments. If you have questions, please call your primary care clinic.  If you do not have a primary care provider,  please call [phone] and they will assist you.        Care EveryWhere ID     This is your Care EveryWhere ID. This could be used by other organizations to access your [place] medical records  [id number]         Blood Pressure from Last 3 Encounters:   01/23/18 (!) 146/98   01/16/18 (!) 140/98   12/23/17 171/91    Weight from Last 3 Encounters:   01/23/18 217 lb (98.4 kg)   01/16/18 217 lb (98.4 kg)   12/23/17 215 lb (97.5 kg)              We Performed the Following     AUDIOGRAM/TYMPANOGRAM - INTERFACE        Primary Care Provider Office Phone # Fax #    Sukhjinder Mccarthy [phone]       [facility] [address]  [place] [address]        Equal Access to Services     VANE ROMEO : Hadii patricia mar hadasho Soomaali, waaxda luqadaha, qaybta kaalmada adeegyada, chapincito celestin . So [facility] [phone].    ATENCIÓN: Si habla español, tiene a fabian disposición servicios gratuitos de asistencia lingüística. Christina al [phone].    We comply with applicable federal civil rights laws and [place] laws. We do not discriminate on the basis of race, color, national origin, age, disability, sex, sexual orientation, or gender identity.            Thank you!     Thank you for choosing [facility]  for your care. Our goal is always to provide you with excellent care. Hearing back from our patients is one way we can continue to improve our services. Please take a few minutes to complete the written survey that you may receive in the mail after your visit with us. Thank you!             Your Updated Medication List - Protect others around you: Learn how to safely use, store and throw away your medicines at www.disposemymeds.org.          This list is accurate as of 1/30/18  2:16 PM.  Always use your most recent med list.                   Brand Name Dispense Instructions for use Diagnosis    blood glucose monitoring lancets     1 Box    Use to test blood sugar 3 times  daily or as directed.    Diabetes mellitus, type 2 (H)       blood glucose monitoring test strip    ACCU-CHEK SMARTVIEW    1 Month    Use to test blood sugar 3 times daily or as directed.    Diabetes mellitus, type 2 (H)       cetirizine 10 MG tablet    zyrTEC    60 tablet    Take 1 tablet (10 mg) by mouth every evening    Allergic rhinitis       simvastatin 20 MG tablet    ZOCOR    30 tablet    TAKE 1 TABLET BY MOUTH EVERY EVENING    Mixed hyperlipidemia

## 2018-01-30 NOTE — PROGRESS NOTES
Audiology Evaluation Completed. Please refer SCANNED AUDIOGRAM and/or TYMPANOGRAM for BACKGROUND, RESULTS, RECOMMENDATIONS.    UNDER RECOMMENDATIONS ON AUDIOGRAM PATIENT REFERRED TO ENT WITH SYMPTOMS      Arabella ZAZUETA, [facility]  Audiologist #[id number]        NO EPIC REFERRAL/ORDER NEEDED TO ENT BY AUDIOLOGY AS PATIENT ALREADY HAS AN APPOINTMENT WITH ENT

## 2018-01-30 NOTE — NURSING NOTE
"Chief Complaint   Patient presents with     Consult     Allergies, Oral Lesion and Vertigo; Referred by Dr. Mccarthy       Initial /88 (Cuff Size: Adult Regular)  Pulse 78  Temp 97.7  F (36.5  C) (Tympanic)  Ht 5' 6\" (1.676 m)  Wt 217 lb (98.4 kg)  BMI 35.02 kg/m2 Estimated body mass index is 35.02 kg/(m^2) as calculated from the following:    Height as of this encounter: 5' 6\" (1.676 m).    Weight as of this encounter: 217 lb (98.4 kg).  Medication Reconciliation: complete   Tamika Almonte      "

## 2018-01-30 NOTE — LETTER
2018         RE: Neo Cowan  [address] [facility] [address]        Dear Colleague,    Thank you for referring your patient, Neo Cowan, to the [facility]. Please see a copy of my visit note below.    Otolaryngology Consultation    Patient: Neo Cowan  : 1960    Patient presents with:  Consult: Allergies, Oral Lesion and Vertigo; Referred by Dr. Mccarthy      HPI:  Neo Cowan is a 57 year old male seen today for hearing loss, vertigo.   He presents with hearing loss and dizziness. He has no concerns with his hearing at this time. He has lightheadedness upon standing up quickly, bending over. He has no rotary vertigo.   No otalgia, otorrhea. No hx of COM or otologic surgeries.   He does firearm exposure during hunting. No tinnitus.  Neo feels more symptoms when he awakes in the night and AM upon standing up from bed. He feels lightheaded. He does have some symptoms w/ turning to his left side in bed. He has no concerns when turing to left now.   Symptoms present for several weeks.   No associated hearing loss, nausea or emesis.      Audiogram  Type A tympanograms.   Thresholds are in normal range sloping to moderate SNHL.     Allergies and sinus concerns.    Neo presents for concerns for sinus and allergy issues. He has symptoms of nasal congestion, allergies for several years. He has noticed increase in symptoms with swimming, eye irritation worsening in the last 2 years. He has symptoms worsening in spring, fall. Neo has using Zyrtec with some relief, but not optimal. He generally has sinusitis twice a year.   No daily facial pain or pressure.     No recent trials of other AH.   Tried nasal sprays years ago, and has not used them.   No recent CT, MQT. No use of fish med rinses.     Oral lesion- he reports hx of oral lesion, but it has gone. He denies any lesions now. No pain, bleeding. He does chew at this time.   No concerns with white lesions in oral  "mucosa.       Current Outpatient Rx   Medication Sig Dispense Refill     simvastatin (ZOCOR) 20 MG tablet TAKE 1 TABLET BY MOUTH EVERY EVENING 30 tablet 5     cetirizine (ZYRTEC) 10 MG tablet Take 1 tablet (10 mg) by mouth every evening 60 tablet 1     blood glucose (ACCU-CHEK SMARTVIEW) test strip Use to test blood sugar 3 times daily or as directed. 1 Month 12     blood glucose monitoring (ACCU-CHEK FASTCLIX) lancets Use to test blood sugar 3 times daily or as directed. 1 Box 12       Allergies: Seasonal allergies     Past Medical History:   Diagnosis Date     Alcoholism 1/1/2011     Allergic rhinitis, cause unspecified 3/2/2000     Anxiety  1/1/2011     Colonic Polyps 3/2/2000     Depression 1/1/2011     Gilbert's Syndrome 3/2/2000     Hypercholesterolemia 3/2/2000     Overweight(278.02) 3/2/2000     Prediabetes 3/2/2000       Past Surgical History:   Procedure Laterality Date     COLONOSCOPY  8/5/2013    Procedure: COLONOSCOPY;  colonoscopy ;  Surgeon: Yobani Ventura MD;  Location: [facility] OR     colonoscopy with polypectomy  1/27/2011    repeat 2 years     mandibular fracture repair       vasectomy       wisdom teeth extraction         ENT family history reviewed    Social History   Substance Use Topics     Smoking status: Never Smoker     Smokeless tobacco: Current User     Types: Chew      Comment: Tried to quit; No passive exposure     Alcohol use No       Review of Systems  ROS: 10 point ROS neg other than the symptoms noted above in the HPI     Physical Exam  /88 (Cuff Size: Adult Regular)  Pulse 78  Temp 97.7  F (36.5  C) (Tympanic)  Ht 5' 6\" (1.676 m)  Wt 217 lb (98.4 kg)  BMI 35.02 kg/m2  General - The patient is well nourished and well developed, and appears to have good nutritional status.  Alert and oriented to person and place, answers questions and cooperates with examination appropriately.   Head and Face - Normocephalic and atraumatic, with no gross asymmetry noted.  The facial nerve " is intact, with strong symmetric movements.  Voice and Breathing - The patient was breathing comfortably without the use of accessory muscles. There was no wheezing, stridor, or stertor.  The patients voice was clear and strong, and had appropriate pitch and quality.  Ears -The external auditory canals are patent, the tympanic membranes are intact without effusion, retraction or mass.  Bony landmarks are intact.  Eyes - Extraocular movements intact, and the pupils were reactive to light.  Sclera were not icteric or injected, conjunctiva were pink and moist.  Mouth - Examination of the oral cavity showed pink, healthy oral mucosa. No lesions or ulcerations noted.  The tongue was mobile and midline, and the dentition were in good condition.  Lower left mucosa. Changes c/w tobacco use, white lesions.     Throat - The walls of the oropharynx were smooth, pink, moist, symmetric, and had no lesions or ulcerations.  The tonsillar pillars and soft palate were symmetric.  The uvula was midline on elevation.    Neck - Normal midline excursion of the laryngotracheal complex during swallowing.  Full range of motion on passive movement.  Palpation of the occipital, submental, submandibular, internal jugular chain, and supraclavicular nodes did not demonstrate any abnormal lymph nodes or masses.  Palpation of the thyroid was soft and smooth, with no nodules or goiter appreciated.  The trachea was mobile and midline.  Nose - External nares normal. DNS to RIGHT with obstruction.   NEURO:  equal  strength, neg Romberg, DTR II/IV bilaterally (UE and LE), finger to nose normal, CN intact, ambulates without difficulty.  no focal deficits noted.  Negative Ervin Hallpike.     To further evaluate the nasal cavity, I performed rigid nasal endoscopy.  I first sprayed the nasal cavity bilaterally with a mix of lidocaine and neosynephrine.  I then began on the left side using a 2.7mm, 30 degree rigid nasal endoscope.  The septum was fairly  straight and the nasal airway was open.  No abnormal secretions, purulence, or polyps were noted. The left middle turbinate and middle meatus were clearly visualized and normal in appearance.  Looking up, the olfactory cleft was unobstructed.  Going further back, the sphenoethmoid recess was normal in appearance, with healthy appearing mucosa on the face of the sphenoid.  The nasopharynx was unremarkable, and the eustachian tube opening on this side was unobstructed.    I then turned my attention to the right side.  Once again, the septum was fairly straight, and the airway was open.  No abnormal secretions, purulence, polyps were noted.  The right middle turbinate and middle meatus were clearly visualized and normal in appearance.  Looking up, the olfactory cleft was unobstructed.  Going further back the right sphenoethmoid recess was normal in appearance, and eustachian tube opening was unobstructed.    1) DNS to RIGHT with obstruction.       ASSESSMENT:    ICD-10-CM    1. Sensorineural hearing loss (SNHL) of both ears H90.3    2. Vertigo R42    3. Chronic seasonal allergic rhinitis due to pollen J30.1 sodium chloride (OCEAN) 0.65 % nasal spray     fexofenadine (ALLEGRA) 180 MG tablet   4. DNS (deviated nasal septum) J34.2    5. Oral mucosal lesion K13.70    6. Encounter for tobacco use cessation counseling Z71.6      Ears normal. No fluid, infection. Reassured normal ear exam. SNHl reviewed with patient. Monitor hearing, hearing protection.    Moderate hearing loss, high frequencies.   Discussed with patient, I do not suspect BPPV, Meniere contributing to his complaints. Start exercises.     Vertigo- multifactorial- related to orthostatic changes, possible glucose. Monitor glucose, reviewed past DM results. He does have elevated BP on last several occasions, may need to see PCP for orthostatic BPs.     Consider PT for further testing if there is no improvement.     Stop tobacco. Arrange for oral biopsy.  Consider  septoplasty     Start Nasal saline or rinse. Start Allegra one tablet. Hold Zyrtec.   Consider MQT.   Follow up in 1-2 months, will complete biopsy at that time    It takes 20 years of quitting tobacco to be at same risk of developing head and neck cancer as a never smoker.  Every year of cessation offers health benefits. This was discussed with the patient today and they voiced understanding.  Tobacco cessation was strongly encouraged.    Rona Moraes PA-C  ENT  [facility]  [phone]      Again, thank you for allowing me to participate in the care of your patient.        Sincerely,        Rona Moraes PA-C

## 2018-01-30 NOTE — MR AVS SNAPSHOT
After Visit Summary   1/30/2018    Neo Cowan    MRN: [id number]           Patient Information     Date Of Birth          1960        Visit Information        Provider Department      1/30/2018 2:30 PM Rona Moraes PA-C [facility] [place]        Today's Diagnoses     Oral mucosal lesion        Vertigo        Chronic seasonal allergic rhinitis due to pollen          Care Instructions    Ears normal. No fluid, infection.   Vertigo- multifactorial- related to orthostatic changes, possible glucose.   Consider PT for further testing if there is no improvement.     Hearing protection. Moderate hearing loss, high frequencies.     Stop tobacco. Arrange for oral biopsy.  Consider septoplasty     Start Nasal saline or rinse. Start Allegra one tablet. Hold Zyrtec.   Follow up in 1-2 months, will complete biopsy at that time    Thank you for allowing MATTHEW Morris and our ENT team to participate in your care.  If your medications are too expensive, please give the nurse a call.  We can possibly change this medication.  If you have a scheduling or an appointment question please contact [facility] Unit Coordinator at their direct line [phone].   ALL nursing questions or concerns can be directed to your ENT nurse at: [phone] [facility]              Follow-ups after your visit        Your next 10 appointments already scheduled     Feb 06, 2018  3:00 PM CST   (Arrive by 2:45 PM)   CONSULT with Donovan Mayfield MD   [facility] [place] ([facility] [place] )    [address] [place] [address] [phone]              Who to contact     If you have questions or need follow up information about today's clinic visit or your schedule please contact [facility] directly at [phone].  Normal or non-critical lab and imaging results will be communicated to you by MyChart, letter or phone within 4 business days after the clinic has received the results. If  "you do not hear from us within 7 days, please contact the clinic through [employer] or phone. If you have a critical or abnormal lab result, we will notify you by phone as soon as possible.  Submit refill requests through [employer] or call your pharmacy and they will forward the refill request to us. Please allow 3 business days for your refill to be completed.          Additional Information About Your Visit        [employer] Information     [employer] gives you secure access to your electronic health record. If you see a primary care provider, you can also send messages to your care team and make appointments. If you have questions, please call your primary care clinic.  If you do not have a primary care provider, please call [phone] and they will assist you.        Care EveryWhere ID     This is your Care EveryWhere ID. This could be used by other organizations to access your [place] medical records  [id number]        Your Vitals Were     Pulse Temperature Height BMI (Body Mass Index)          78 97.7  F (36.5  C) (Tympanic) 5' 6\" (1.676 m) 35.02 kg/m2         Blood Pressure from Last 3 Encounters:   01/30/18 144/88   01/23/18 (!) 146/98   01/16/18 (!) 140/98    Weight from Last 3 Encounters:   01/30/18 217 lb (98.4 kg)   01/23/18 217 lb (98.4 kg)   01/16/18 217 lb (98.4 kg)              Today, you had the following     No orders found for display         Today's Medication Changes          These changes are accurate as of 1/30/18  3:03 PM.  If you have any questions, ask your nurse or doctor.               Start taking these medicines.        Dose/Directions    fexofenadine 180 MG tablet   Commonly known as:  ALLEGRA   Used for:  Chronic seasonal allergic rhinitis due to pollen   Started by:  Rona Moraes PA-C        Dose:  180 mg   Take 1 tablet (180 mg) by mouth daily   Quantity:  90 tablet   Refills:  1       sodium chloride 0.65 % nasal spray   Commonly known as:  OCEAN   Used for:  Chronic seasonal allergic " rhinitis due to pollen   Started by:  Rona Moraes PA-C        Dose:  2 spray   Spray 2 sprays into both nostrils daily as needed for congestion   Quantity:  2 Bottle   Refills:  11            Where to get your medicines      These medications were sent to [id number] - [place], [place] - [address]  [address] [place] [address]     Phone:  [phone]     fexofenadine 180 MG tablet    sodium chloride 0.65 % nasal spray                Primary Care Provider Office Phone # Fax #    Sukhjinder Mccarthy [phone]       [facility] [address]  [place] [address]        Equal Access to Services     [facility]: Hadii aad zaid hadasho Soomaali, waaxda luqadaha, qaybta kaalmada adeegyada, chapincito celestin . So [facility] [phone].    ATENCIÓN: Si habla español, tiene a fabian disposición servicios gratuitos de asistencia lingüística. Llame al [phone].    We comply with applicable federal civil rights laws and [place] laws. We do not discriminate on the basis of race, color, national origin, age, disability, sex, sexual orientation, or gender identity.            Thank you!     Thank you for choosing [facility]  for your care. Our goal is always to provide you with excellent care. Hearing back from our patients is one way we can continue to improve our services. Please take a few minutes to complete the written survey that you may receive in the mail after your visit with us. Thank you!             Your Updated Medication List - Protect others around you: Learn how to safely use, store and throw away your medicines at www.disposemymeds.org.          This list is accurate as of 1/30/18  3:03 PM.  Always use your most recent med list.                   Brand Name Dispense Instructions for use Diagnosis    blood glucose monitoring lancets     1 Box    Use to test blood sugar 3 times daily or as directed.    Diabetes mellitus, type 2 (H)        blood glucose monitoring test strip    ACCU-CHEK SMARTVIEW    1 Month    Use to test blood sugar 3 times daily or as directed.    Diabetes mellitus, type 2 (H)       cetirizine 10 MG tablet    zyrTEC    60 tablet    Take 1 tablet (10 mg) by mouth every evening    Allergic rhinitis       fexofenadine 180 MG tablet    ALLEGRA    90 tablet    Take 1 tablet (180 mg) by mouth daily    Chronic seasonal allergic rhinitis due to pollen       simvastatin 20 MG tablet    ZOCOR    30 tablet    TAKE 1 TABLET BY MOUTH EVERY EVENING    Mixed hyperlipidemia       sodium chloride 0.65 % nasal spray    OCEAN    2 Bottle    Spray 2 sprays into both nostrils daily as needed for congestion    Chronic seasonal allergic rhinitis due to pollen

## 2018-01-30 NOTE — PROGRESS NOTES
Otolaryngology Consultation    Patient: Neo Cowan  : 1960    Patient presents with:  Consult: Allergies, Oral Lesion and Vertigo; Referred by Dr. Mccarthy      HPI:  Neo Cowan is a 57 year old male seen today for hearing loss, vertigo.   He presents with hearing loss and dizziness. He has no concerns with his hearing at this time. He has lightheadedness upon standing up quickly, bending over. He has no rotary vertigo.   No otalgia, otorrhea. No hx of COM or otologic surgeries.   He does firearm exposure during hunting. No tinnitus.  Neo feels more symptoms when he awakes in the night and AM upon standing up from bed. He feels lightheaded. He does have some symptoms w/ turning to his left side in bed. He has no concerns when turing to left now.   Symptoms present for several weeks.   No associated hearing loss, nausea or emesis.      Audiogram  Type A tympanograms.   Thresholds are in normal range sloping to moderate SNHL.     Allergies and sinus concerns.    Neo presents for concerns for sinus and allergy issues. He has symptoms of nasal congestion, allergies for several years. He has noticed increase in symptoms with swimming, eye irritation worsening in the last 2 years. He has symptoms worsening in spring, fall. Neo has using Zyrtec with some relief, but not optimal. He generally has sinusitis twice a year.   No daily facial pain or pressure.     No recent trials of other AH.   Tried nasal sprays years ago, and has not used them.   No recent CT, MQT. No use of fish med rinses.     Oral lesion- he reports hx of oral lesion, but it has gone. He denies any lesions now. No pain, bleeding. He does chew at this time.   No concerns with white lesions in oral mucosa.       Current Outpatient Rx   Medication Sig Dispense Refill     simvastatin (ZOCOR) 20 MG tablet TAKE 1 TABLET BY MOUTH EVERY EVENING 30 tablet 5     cetirizine (ZYRTEC) 10 MG tablet Take 1 tablet (10 mg) by mouth every evening 60  "tablet 1     blood glucose (ACCU-CHEK SMARTVIEW) test strip Use to test blood sugar 3 times daily or as directed. 1 Month 12     blood glucose monitoring (ACCU-CHEK FASTCLIX) lancets Use to test blood sugar 3 times daily or as directed. 1 Box 12       Allergies: Seasonal allergies     Past Medical History:   Diagnosis Date     Alcoholism 1/1/2011     Allergic rhinitis, cause unspecified 3/2/2000     Anxiety  1/1/2011     Colonic Polyps 3/2/2000     Depression 1/1/2011     Gilbert's Syndrome 3/2/2000     Hypercholesterolemia 3/2/2000     Overweight(278.02) 3/2/2000     Prediabetes 3/2/2000       Past Surgical History:   Procedure Laterality Date     COLONOSCOPY  8/5/2013    Procedure: COLONOSCOPY;  colonoscopy ;  Surgeon: Yobani Ventura MD;  Location: [facility] OR     colonoscopy with polypectomy  1/27/2011    repeat 2 years     mandibular fracture repair       vasectomy       wisdom teeth extraction         ENT family history reviewed    Social History   Substance Use Topics     Smoking status: Never Smoker     Smokeless tobacco: Current User     Types: Chew      Comment: Tried to quit; No passive exposure     Alcohol use No       Review of Systems  ROS: 10 point ROS neg other than the symptoms noted above in the HPI     Physical Exam  /88 (Cuff Size: Adult Regular)  Pulse 78  Temp 97.7  F (36.5  C) (Tympanic)  Ht 5' 6\" (1.676 m)  Wt 217 lb (98.4 kg)  BMI 35.02 kg/m2  General - The patient is well nourished and well developed, and appears to have good nutritional status.  Alert and oriented to person and place, answers questions and cooperates with examination appropriately.   Head and Face - Normocephalic and atraumatic, with no gross asymmetry noted.  The facial nerve is intact, with strong symmetric movements.  Voice and Breathing - The patient was breathing comfortably without the use of accessory muscles. There was no wheezing, stridor, or stertor.  The patients voice was clear and strong, and had " appropriate pitch and quality.  Ears -The external auditory canals are patent, the tympanic membranes are intact without effusion, retraction or mass.  Bony landmarks are intact.  Eyes - Extraocular movements intact, and the pupils were reactive to light.  Sclera were not icteric or injected, conjunctiva were pink and moist.  Mouth - Examination of the oral cavity showed pink, healthy oral mucosa. No lesions or ulcerations noted.  The tongue was mobile and midline, and the dentition were in good condition.  Lower left mucosa. Changes c/w tobacco use, white lesions.     Throat - The walls of the oropharynx were smooth, pink, moist, symmetric, and had no lesions or ulcerations.  The tonsillar pillars and soft palate were symmetric.  The uvula was midline on elevation.    Neck - Normal midline excursion of the laryngotracheal complex during swallowing.  Full range of motion on passive movement.  Palpation of the occipital, submental, submandibular, internal jugular chain, and supraclavicular nodes did not demonstrate any abnormal lymph nodes or masses.  Palpation of the thyroid was soft and smooth, with no nodules or goiter appreciated.  The trachea was mobile and midline.  Nose - External nares normal. DNS to RIGHT with obstruction.   NEURO:  equal  strength, neg Romberg, DTR II/IV bilaterally (UE and LE), finger to nose normal, CN intact, ambulates without difficulty.  no focal deficits noted.  Negative [place] Hallpike.     To further evaluate the nasal cavity, I performed rigid nasal endoscopy.  I first sprayed the nasal cavity bilaterally with a mix of lidocaine and neosynephrine.  I then began on the left side using a 2.7mm, 30 degree rigid nasal endoscope.  The septum was fairly straight and the nasal airway was open.  No abnormal secretions, purulence, or polyps were noted. The left middle turbinate and middle meatus were clearly visualized and normal in appearance.  Looking up, the olfactory cleft was  unobstructed.  Going further back, the sphenoethmoid recess was normal in appearance, with healthy appearing mucosa on the face of the sphenoid.  The nasopharynx was unremarkable, and the eustachian tube opening on this side was unobstructed.    I then turned my attention to the right side.  Once again, the septum was fairly straight, and the airway was open.  No abnormal secretions, purulence, polyps were noted.  The right middle turbinate and middle meatus were clearly visualized and normal in appearance.  Looking up, the olfactory cleft was unobstructed.  Going further back the right sphenoethmoid recess was normal in appearance, and eustachian tube opening was unobstructed.    1) DNS to RIGHT with obstruction.       ASSESSMENT:    ICD-10-CM    1. Sensorineural hearing loss (SNHL) of both ears H90.3    2. Vertigo R42    3. Chronic seasonal allergic rhinitis due to pollen J30.1 sodium chloride (OCEAN) 0.65 % nasal spray     fexofenadine (ALLEGRA) 180 MG tablet   4. DNS (deviated nasal septum) J34.2    5. Oral mucosal lesion K13.70    6. Encounter for tobacco use cessation counseling Z71.6      Ears normal. No fluid, infection. Reassured normal ear exam. SNHl reviewed with patient. Monitor hearing, hearing protection.    Moderate hearing loss, high frequencies.   Discussed with patient, I do not suspect BPPV, Meniere contributing to his complaints. Start exercises.     Vertigo- multifactorial- related to orthostatic changes, possible glucose. Monitor glucose, reviewed past DM results. He does have elevated BP on last several occasions, may need to see PCP for orthostatic BPs.     Consider PT for further testing if there is no improvement.     Stop tobacco. Arrange for oral biopsy.  Consider septoplasty     Start Nasal saline or rinse. Start Allegra one tablet. Hold Zyrtec.   Consider MQT.   Follow up in 1-2 months, will complete biopsy at that time    It takes 20 years of quitting tobacco to be at same risk of  developing head and neck cancer as a never smoker.  Every year of cessation offers health benefits. This was discussed with the patient today and they voiced understanding.  Tobacco cessation was strongly encouraged.    Rona Moraes PA-C  [facility]  [phone]

## 2018-02-06 ENCOUNTER — OFFICE VISIT (OUTPATIENT)
Dept: SURGERY | Facility: OTHER | Age: 58
End: 2018-02-06
Attending: FAMILY MEDICINE
Payer: COMMERCIAL

## 2018-02-06 VITALS
HEIGHT: 66 IN | DIASTOLIC BLOOD PRESSURE: 84 MMHG | RESPIRATION RATE: 16 BRPM | WEIGHT: 210 LBS | HEART RATE: 77 BPM | TEMPERATURE: 98 F | SYSTOLIC BLOOD PRESSURE: 136 MMHG | BODY MASS INDEX: 33.75 KG/M2 | OXYGEN SATURATION: 98 %

## 2018-02-06 DIAGNOSIS — Z01.818 ENCOUNTER FOR PREOPERATIVE EXAMINATION FOR GENERAL SURGICAL PROCEDURE: ICD-10-CM

## 2018-02-06 DIAGNOSIS — Z12.11 SPECIAL SCREENING FOR MALIGNANT NEOPLASMS, COLON: Primary | ICD-10-CM

## 2018-02-06 PROCEDURE — 99214 OFFICE O/P EST MOD 30 MIN: CPT | Performed by: SURGERY

## 2018-02-06 PROCEDURE — 2894A VOIDCORRECT: CPT | Performed by: SURGERY

## 2018-02-06 PROCEDURE — 93000 ELECTROCARDIOGRAM COMPLETE: CPT | Performed by: INTERNAL MEDICINE

## 2018-02-06 ASSESSMENT — PAIN SCALES - GENERAL: PAINLEVEL: NO PAIN (0)

## 2018-02-06 NOTE — MR AVS SNAPSHOT
After Visit Summary   2/6/2018    Neo Cowan    MRN: [id number]           Patient Information     Date Of Birth          1960        Visit Information        Provider Department      2/6/2018 3:00 PM Donovan Mayfield MD [facility]        Today's Diagnoses     Special screening for malignant neoplasms, colon    -  1    Encounter for preoperative examination for general surgical procedure          Care Instructions    Thank you for allowing Dr. Mayfield and our surgical team to participate in your care.  If you have a scheduling or an appointment question please contact [facility] Unit Coordinator at her direct line [phone].   ALL nursing questions or concerns can be directed to Katina [phone]  You are scheduled for a: colonoscopy  Your procedure date is: 3/12/18    Follow printed Gol[employer]ely instructions provided -     You need a friend or family member available to drive you home AND stay with you for 24 hours after you leave the hospital. You will not be allowed to drive yourself. IF you need to take a taxi or the bus you MUST have a responsible person to ride with you. YOUR PROCEDURE WILL BE CANCELLED IF YOU DO NOT HAVE A RESPONSIBLE ADULT TO DRIVE YOU HOME.       You CANNOT have anything to eat or drink after midnight the night before your surgery, ncluding water and coffee. Your stomach needs to be completely empty. Do NOT chew gum, suck on hard candy, or smoke. You can brush your teeth the morning of surgery.       You need to call our Surgery Education Nurses 1-2 weeks prior to your surgery date at  [phone] or toll free [phone]. Please have you medication and allergy lists ready.      Stop your aspirin or other NSAIDs(Ibuprofen, Motrin, Aleve, Celebrex, Naproxen, etc...) 7 days before your surgery.      Hospital admitting will call you the day before your surgery with your arrival time. If you are scheduled on a Monday admitting will call  "you the Friday before.      Please call your primary care physician if you should become ill within 24 hours of scheduled surgery. (ex.vomiting, diarrhea, fever)            Follow-ups after your visit        Your next 10 appointments already scheduled     Mar 14, 2018  2:30 PM CDT   (Arrive by 2:15 PM)   Return Visit with Rona Moraes PA-C   [facility] [place] ([facility] [place] )    [place] [address]   [phone]              Who to contact     If you have questions or need follow up information about today's clinic visit or your schedule please contact [facility] directly at [phone].  Normal or non-critical lab and imaging results will be communicated to you by MyChart, letter or phone within 4 business days after the clinic has received the results. If you do not hear from us within 7 days, please contact the clinic through [employer] or phone. If you have a critical or abnormal lab result, we will notify you by phone as soon as possible.  Submit refill requests through [employer] or call your pharmacy and they will forward the refill request to us. Please allow 3 business days for your refill to be completed.          Additional Information About Your Visit        MyChart Information     [employer] gives you secure access to your electronic health record. If you see a primary care provider, you can also send messages to your care team and make appointments. If you have questions, please call your primary care clinic.  If you do not have a primary care provider, please call [phone] and they will assist you.        Care EveryWhere ID     This is your Care EveryWhere ID. This could be used by other organizations to access your [place] medical records  [id number]        Your Vitals Were     Pulse Temperature Respirations Height Pulse Oximetry BMI (Body Mass Index)    77 98  F (36.7  C) (Tympanic) 16 5' 6\" (1.676 m) 98% 33.89 kg/m2       Blood Pressure from " Last 3 Encounters:   02/06/18 136/84   01/30/18 144/88   01/23/18 (!) 146/98    Weight from Last 3 Encounters:   02/06/18 210 lb (95.3 kg)   01/30/18 217 lb (98.4 kg)   01/23/18 217 lb (98.4 kg)              Today, you had the following     No orders found for display       Primary Care Provider Office Phone # Fax #    Sukhjinder Mccarthy [phone]       [facility] [address] [address]        Equal Access to Services     [facility]: Hadii aad ku hadasho Soomaali, waaxda luqadaha, qaybta kaalmada adeegyada, chapincito celestin . So [facility] [phone].    ATENCIÓN: Si habla español, tiene a fabian disposición servicios gratuitos de asistencia lingüística. Llame al [phone].    We comply with applicable federal civil rights laws and [place] laws. We do not discriminate on the basis of race, color, national origin, age, disability, sex, sexual orientation, or gender identity.            Thank you!     Thank you for choosing [facility]  for your care. Our goal is always to provide you with excellent care. Hearing back from our patients is one way we can continue to improve our services. Please take a few minutes to complete the written survey that you may receive in the mail after your visit with us. Thank you!             Your Updated Medication List - Protect others around you: Learn how to safely use, store and throw away your medicines at www.disposemymeds.org.          This list is accurate as of 2/6/18  3:25 PM.  Always use your most recent med list.                   Brand Name Dispense Instructions for use Diagnosis    blood glucose monitoring lancets     1 Box    Use to test blood sugar 3 times daily or as directed.    Diabetes mellitus, type 2 (H)       blood glucose monitoring test strip    ACCU-CHEK SMARTVIEW    1 Month    Use to test blood sugar 3 times daily or as directed.    Diabetes mellitus, type 2 (H)       fexofenadine 180 MG  tablet    ALLEGRA    90 tablet    Take 1 tablet (180 mg) by mouth daily    Chronic seasonal allergic rhinitis due to pollen       simvastatin 20 MG tablet    ZOCOR    30 tablet    TAKE 1 TABLET BY MOUTH EVERY EVENING    Mixed hyperlipidemia       sodium chloride 0.65 % nasal spray    OCEAN    2 Bottle    Spray 2 sprays into both nostrils daily as needed for congestion    Chronic seasonal allergic rhinitis due to pollen

## 2018-02-06 NOTE — PATIENT INSTRUCTIONS
Thank you for allowing Dr. Mayfield and our surgical team to participate in your care.  If you have a scheduling or an appointment question please contact [facility] Unit Coordinator at her direct line [phone].   ALL nursing questions or concerns can be directed to Katina [phone]  You are scheduled for a: colonoscopy  Your procedure date is: 3/12/18    Follow printed Golytely instructions provided -     You need a friend or family member available to drive you home AND stay with you for 24 hours after you leave the hospital. You will not be allowed to drive yourself. IF you need to take a taxi or the bus you MUST have a responsible person to ride with you. YOUR PROCEDURE WILL BE CANCELLED IF YOU DO NOT HAVE A RESPONSIBLE ADULT TO DRIVE YOU HOME.       You CANNOT have anything to eat or drink after midnight the night before your surgery, ncluding water and coffee. Your stomach needs to be completely empty. Do NOT chew gum, suck on hard candy, or smoke. You can brush your teeth the morning of surgery.       You need to call our Surgery Education Nurses 1-2 weeks prior to your surgery date at  [phone] or toll free [phone]. Please have you medication and allergy lists ready.      Stop your aspirin or other NSAIDs(Ibuprofen, Motrin, Aleve, Celebrex, Naproxen, etc...) 7 days before your surgery.      Hospital admitting will call you the day before your surgery with your arrival time. If you are scheduled on a Monday admitting will call you the Friday before.      Please call your primary care physician if you should become ill within 24 hours of scheduled surgery. (ex.vomiting, diarrhea, fever)

## 2018-02-06 NOTE — NURSING NOTE
"Chief Complaint   Patient presents with     Consult     last colonoscopy 2013/ no family history of colon cancer/ no chnages with bowels        Initial /84  Pulse 77  Temp 98  F (36.7  C) (Tympanic)  Resp 16  Ht 5' 6\" (1.676 m)  Wt 210 lb (95.3 kg)  SpO2 98%  BMI 33.89 kg/m2 Estimated body mass index is 33.89 kg/(m^2) as calculated from the following:    Height as of this encounter: 5' 6\" (1.676 m).    Weight as of this encounter: 210 lb (95.3 kg).  Medication Reconciliation: complete     Katina Escalona LPN  "

## 2018-02-06 NOTE — PROGRESS NOTES
[facility] Surgery Consultation    CC:  Colorectal cancer screening    HPI:  This 57 year old year old male is seen at the request of Sukhjinder Mccarthy for evaluation of colorectal cancer screening.  The history is obtained from the patient, and reviewing the medical record.  He is good medical historian. He states that he has undergone colonoscopies in the past where he has multiple polyps identified. On his last colonoscopy there was no identification of any polyps. He states that he has had some minimal blood on the toilet paper. He will have anal pruritus that he will have to use some wipes and then it will go away. He has not had any anal drainage. He has not had any pain with defecation. He has no family history of colon cancer. He has not had any diarrhea, constipation, melena, or hematochezia. He has no abdominal pain or upper gastrointestinal symptoms.     Past Medical History:   Diagnosis Date     Alcoholism 1/1/2011     Allergic rhinitis, cause unspecified 3/2/2000     Anxiety  1/1/2011     Colonic Polyps 3/2/2000     Depression 1/1/2011     Gilbert's Syndrome 3/2/2000     Hypercholesterolemia 3/2/2000     Overweight(278.02) 3/2/2000     Prediabetes 3/2/2000       Past Surgical History:   Procedure Laterality Date     COLONOSCOPY  8/5/2013    Procedure: COLONOSCOPY;  colonoscopy ;  Surgeon: Yobani Ventura MD;  Location: [facility] OR     colonoscopy with polypectomy  1/27/2011    repeat 2 years     mandibular fracture repair       vasectomy       wisdom teeth extraction         Pt denied problems with bleeding or anesthesia    Current Outpatient Prescriptions   Medication Sig Dispense Refill     sodium chloride (OCEAN) 0.65 % nasal spray Spray 2 sprays into both nostrils daily as needed for congestion 2 Bottle 11     fexofenadine (ALLEGRA) 180 MG tablet Take 1 tablet (180 mg) by mouth daily 90 tablet 1     simvastatin (ZOCOR) 20 MG tablet TAKE 1 TABLET BY MOUTH EVERY EVENING 30 tablet 5     blood glucose  "(ACCU-CHEK SMARTVIEW) test strip Use to test blood sugar 3 times daily or as directed. 1 Month 12     blood glucose monitoring (ACCU-CHEK FASTCLIX) lancets Use to test blood sugar 3 times daily or as directed. 1 Box 12          Allergies   Allergen Reactions     Seasonal Allergies          HABITS:    Social History   Substance Use Topics     Smoking status: Never Smoker     Smokeless tobacco: Current User     Types: Chew      Comment: Tried to quit; No passive exposure     Alcohol use No     No mood altering drug use.    Family History   Problem Relation Age of Onset     C.A.D. Father      CANCER Father      Lung, cause of death     Other - See Comments Father      Colon polyps     Alcohol/Drug Mother      Alcoholism     Other - See Comments Mother      Liver disease, cause of death     C.A.D. Other      Family hx     CANCER Paternal Uncle      Cervical     CANCER Paternal Uncle      Throat     Hypertension Other      Family hx       REVIEW OF SYSTEMS:  Ten point review of systems negative except those mentioned in the HPI.     The patient denies sleep apnea, latex allergies or MRSA    OBJECTIVE:    /84  Pulse 77  Temp 98  F (36.7  C) (Tympanic)  Resp 16  Ht 5' 6\" (1.676 m)  Wt 210 lb (95.3 kg)  SpO2 98%  BMI 33.89 kg/m2    GENERAL: Generally appears well, in no distress with appropriate affect.  HEENT:   Sclerae anicteric - No cervical, supra/infraclavicular lymphadenopathy, no thyroid masses  Respiratory:  Lungs clear to ausculation bilaterally with good air excursion  Cardiovascular:  Regular Rate and Rhythm with no murmurs gallops or rubs, normal   Abdomen: soft, reducible umbilical hernia with defect ~5-8 mm, non-tender, non-distended  :  deferred  Extremities:  Extremities normal. No deformities, edema, or skin discoloration.  Skin:  no suspicious lesions or rashes  Neurological: grossly intact    Psych:  Alert, oriented, affect appropriate with normal decision making " ability.      IMPRESSION:  57 year old male for colorectal cancer screening    PLAN:  A detailed description of the [place] Preventive Task Force development of colorectal cancer screening was had. I described the pathology of the adenoma to carcinoma progression and its genetic changes that occur. I discussed how with colorectal cancer screening by endoscopic surveillance we are able to identify potential malignancies and remove them before they progress along the adenoma to carcinoma pathway. The risks for colon cancer progression were discussed including first degree relatives with colon cancer, inflammatory bowel disease, smoking, obesity, and diet. I then discussed how there are certain attributes which can decrease the risk of colon cancer such as a healthy diet and physical activity. The patient understood the adenoma to carcinoma sequence, the reasoning for screening at specific intervals, and risk factor modification. I then described the technical portion of the procedure.    The indications, risks, benefits and technical aspects of whole colon colonoscopy were outlined with risks including, but not limited to, perforation, bleeding and inability to visualize entire colon.  Management of each was reviewed.  The need of mechanical preparation of the colon was reviewed along with the use of monitored anesthetic care.  The patient's questions were asked and answered.  Scheduled first available date.          Donovan Mayfield MD    2/6/2018  2:57 PM    cc:  Sukhjinder Mccarthy

## 2018-03-11 ENCOUNTER — ANESTHESIA EVENT (OUTPATIENT)
Dept: SURGERY | Facility: HOSPITAL | Age: 58
End: 2018-03-11
Payer: COMMERCIAL

## 2018-03-11 NOTE — ANESTHESIA PREPROCEDURE EVALUATION
Anesthesia Evaluation     . Pt has had prior anesthetic.     No history of anesthetic complications          ROS/MED HX    ENT/Pulmonary:     (+)allergic rhinitis, , . .    Neurologic:  - neg neurologic ROS     Cardiovascular:  - neg cardiovascular ROS   (+) ----. : . . . :. . Previous cardiac testing date:results:date: results:ECG reviewed date:2/6/2018 results:SB@59 w/ sinus arrhythmia date: results:          METS/Exercise Tolerance:     Hematologic:         Musculoskeletal:   (+) , , other musculoskeletal- Eczema      GI/Hepatic:     (+) bowel prep, Other GI/Hepatic Gilbert's Syndrome      Renal/Genitourinary:  - ROS Renal section negative       Endo:     (+) type II DM Last HgA1c: 8.2 date: 12/19/2017 Obesity, .      Psychiatric:     (+) psychiatric history depression and anxiety      Infectious Disease:  - neg infectious disease ROS       Malignancy:      - no malignancy   Other: Comment: EtOH Abuse                    Physical Exam  Normal systems: dental    Airway   Mallampati: III  TM distance: >3 FB  Neck ROM: full  Comment: Short neck    Dental     Cardiovascular   Rhythm and rate: regular and normal      Pulmonary    breath sounds clear to auscultation                    Anesthesia Plan      History & Physical Review  History and physical reviewed and following examination; no interval change.    ASA Status:  3 .    NPO Status:  > 8 hours    Plan for MAC with Intravenous and Propofol induction. Maintenance will be TIVA.  Reason for MAC:  Chronic cardiopulmonary disease (G9), Extreme anxiety (QS) and Other - see comments  PONV prophylaxis:  Ondansetron (or other 5HT-3)  Surgeon requests deep sedation. Patient is an ASA 3 and has an anxiety d/o treated with anxiolytics. Will provide MAC.      Postoperative Care  Postoperative pain management:  IV analgesics.      Consents  Anesthetic plan, risks, benefits and alternatives discussed with:  Patient..                          .

## 2018-03-12 ENCOUNTER — SURGERY (OUTPATIENT)
Age: 58
End: 2018-03-12

## 2018-03-12 ENCOUNTER — HOSPITAL ENCOUNTER (OUTPATIENT)
Facility: HOSPITAL | Age: 58
Discharge: HOME | End: 2018-03-12
Attending: SURGERY | Admitting: SURGERY
Payer: COMMERCIAL

## 2018-03-12 ENCOUNTER — ANESTHESIA (OUTPATIENT)
Dept: SURGERY | Facility: HOSPITAL | Age: 58
End: 2018-03-12
Payer: COMMERCIAL

## 2018-03-12 VITALS
HEIGHT: 66 IN | WEIGHT: 205 LBS | BODY MASS INDEX: 32.95 KG/M2 | RESPIRATION RATE: 16 BRPM | HEART RATE: 69 BPM | DIASTOLIC BLOOD PRESSURE: 94 MMHG | TEMPERATURE: 97.4 F | OXYGEN SATURATION: 100 % | SYSTOLIC BLOOD PRESSURE: 173 MMHG

## 2018-03-12 PROCEDURE — 36000050 ZZH SURGERY LEVEL 2 1ST 30 MIN: Performed by: SURGERY

## 2018-03-12 PROCEDURE — 27210794 ZZH OR GENERAL SUPPLY STERILE: Performed by: SURGERY

## 2018-03-12 PROCEDURE — 71000027 ZZH RECOVERY PHASE 2 EACH 15 MINS: Performed by: SURGERY

## 2018-03-12 PROCEDURE — 25000125 ZZHC RX 250: Performed by: NURSE ANESTHETIST, CERTIFIED REGISTERED

## 2018-03-12 PROCEDURE — 40000306 ZZH STATISTIC PRE PROC ASSESS II: Performed by: SURGERY

## 2018-03-12 PROCEDURE — 25000128 H RX IP 250 OP 636: Performed by: ANESTHESIOLOGY

## 2018-03-12 PROCEDURE — 25000128 H RX IP 250 OP 636: Performed by: NURSE ANESTHETIST, CERTIFIED REGISTERED

## 2018-03-12 PROCEDURE — 45385 COLONOSCOPY W/LESION REMOVAL: CPT | Performed by: ANESTHESIOLOGY

## 2018-03-12 PROCEDURE — 01999 UNLISTED ANES PROCEDURE: CPT | Performed by: NURSE ANESTHETIST, CERTIFIED REGISTERED

## 2018-03-12 PROCEDURE — 88305 TISSUE EXAM BY PATHOLOGIST: CPT | Mod: TC | Performed by: SURGERY

## 2018-03-12 PROCEDURE — 37000008 ZZH ANESTHESIA TECHNICAL FEE, 1ST 30 MIN: Performed by: SURGERY

## 2018-03-12 PROCEDURE — 45380 COLONOSCOPY AND BIOPSY: CPT | Mod: PT | Performed by: SURGERY

## 2018-03-12 RX ORDER — SODIUM CHLORIDE, SODIUM LACTATE, POTASSIUM CHLORIDE, CALCIUM CHLORIDE 600; 310; 30; 20 MG/100ML; MG/100ML; MG/100ML; MG/100ML
INJECTION, SOLUTION INTRAVENOUS CONTINUOUS
Status: DISCONTINUED | OUTPATIENT
Start: 2018-03-12 | End: 2018-03-12 | Stop reason: HOSPADM

## 2018-03-12 RX ORDER — FLUMAZENIL 0.1 MG/ML
0.2 INJECTION, SOLUTION INTRAVENOUS
Status: CANCELLED | OUTPATIENT
Start: 2018-03-12 | End: 2018-03-12

## 2018-03-12 RX ORDER — ONDANSETRON 2 MG/ML
4 INJECTION INTRAMUSCULAR; INTRAVENOUS EVERY 30 MIN PRN
Status: DISCONTINUED | OUTPATIENT
Start: 2018-03-12 | End: 2018-03-12 | Stop reason: HOSPADM

## 2018-03-12 RX ORDER — ALBUTEROL SULFATE 0.83 MG/ML
2.5 SOLUTION RESPIRATORY (INHALATION) EVERY 4 HOURS PRN
Status: DISCONTINUED | OUTPATIENT
Start: 2018-03-12 | End: 2018-03-12 | Stop reason: HOSPADM

## 2018-03-12 RX ORDER — FENTANYL CITRATE 50 UG/ML
INJECTION, SOLUTION INTRAMUSCULAR; INTRAVENOUS PRN
Status: DISCONTINUED | OUTPATIENT
Start: 2018-03-12 | End: 2018-03-12

## 2018-03-12 RX ORDER — ONDANSETRON 4 MG/1
4 TABLET, ORALLY DISINTEGRATING ORAL EVERY 30 MIN PRN
Status: DISCONTINUED | OUTPATIENT
Start: 2018-03-12 | End: 2018-03-12 | Stop reason: HOSPADM

## 2018-03-12 RX ORDER — DEXAMETHASONE SODIUM PHOSPHATE 4 MG/ML
4 INJECTION, SOLUTION INTRA-ARTICULAR; INTRALESIONAL; INTRAMUSCULAR; INTRAVENOUS; SOFT TISSUE EVERY 10 MIN PRN
Status: DISCONTINUED | OUTPATIENT
Start: 2018-03-12 | End: 2018-03-12 | Stop reason: HOSPADM

## 2018-03-12 RX ORDER — FENTANYL CITRATE 50 UG/ML
25-50 INJECTION, SOLUTION INTRAMUSCULAR; INTRAVENOUS
Status: DISCONTINUED | OUTPATIENT
Start: 2018-03-12 | End: 2018-03-12 | Stop reason: HOSPADM

## 2018-03-12 RX ORDER — HYDRALAZINE HYDROCHLORIDE 20 MG/ML
2.5-5 INJECTION INTRAMUSCULAR; INTRAVENOUS EVERY 10 MIN PRN
Status: DISCONTINUED | OUTPATIENT
Start: 2018-03-12 | End: 2018-03-12 | Stop reason: HOSPADM

## 2018-03-12 RX ORDER — LIDOCAINE HYDROCHLORIDE 20 MG/ML
INJECTION, SOLUTION INFILTRATION; PERINEURAL PRN
Status: DISCONTINUED | OUTPATIENT
Start: 2018-03-12 | End: 2018-03-12

## 2018-03-12 RX ORDER — NALOXONE HYDROCHLORIDE 0.4 MG/ML
.1-.4 INJECTION, SOLUTION INTRAMUSCULAR; INTRAVENOUS; SUBCUTANEOUS
Status: CANCELLED | OUTPATIENT
Start: 2018-03-12 | End: 2018-03-13

## 2018-03-12 RX ORDER — NALOXONE HYDROCHLORIDE 0.4 MG/ML
.1-.4 INJECTION, SOLUTION INTRAMUSCULAR; INTRAVENOUS; SUBCUTANEOUS
Status: DISCONTINUED | OUTPATIENT
Start: 2018-03-12 | End: 2018-03-12 | Stop reason: HOSPADM

## 2018-03-12 RX ORDER — KETOROLAC TROMETHAMINE 30 MG/ML
30 INJECTION, SOLUTION INTRAMUSCULAR; INTRAVENOUS EVERY 6 HOURS PRN
Status: DISCONTINUED | OUTPATIENT
Start: 2018-03-12 | End: 2018-03-12 | Stop reason: HOSPADM

## 2018-03-12 RX ORDER — PROPOFOL 10 MG/ML
INJECTION, EMULSION INTRAVENOUS PRN
Status: DISCONTINUED | OUTPATIENT
Start: 2018-03-12 | End: 2018-03-12

## 2018-03-12 RX ORDER — LIDOCAINE 40 MG/G
CREAM TOPICAL
Status: DISCONTINUED | OUTPATIENT
Start: 2018-03-12 | End: 2018-03-12 | Stop reason: HOSPADM

## 2018-03-12 RX ORDER — PROMETHAZINE HYDROCHLORIDE 25 MG/ML
12.5 INJECTION, SOLUTION INTRAMUSCULAR; INTRAVENOUS
Status: DISCONTINUED | OUTPATIENT
Start: 2018-03-12 | End: 2018-03-12 | Stop reason: HOSPADM

## 2018-03-12 RX ORDER — MEPERIDINE HYDROCHLORIDE 25 MG/ML
12.5 INJECTION INTRAMUSCULAR; INTRAVENOUS; SUBCUTANEOUS
Status: DISCONTINUED | OUTPATIENT
Start: 2018-03-12 | End: 2018-03-12 | Stop reason: HOSPADM

## 2018-03-12 RX ADMIN — PROPOFOL 30 MG: 10 INJECTION, EMULSION INTRAVENOUS at 10:48

## 2018-03-12 RX ADMIN — PROPOFOL 20 MG: 10 INJECTION, EMULSION INTRAVENOUS at 11:03

## 2018-03-12 RX ADMIN — PROPOFOL 20 MG: 10 INJECTION, EMULSION INTRAVENOUS at 10:50

## 2018-03-12 RX ADMIN — PROPOFOL 20 MG: 10 INJECTION, EMULSION INTRAVENOUS at 10:54

## 2018-03-12 RX ADMIN — PROPOFOL 20 MG: 10 INJECTION, EMULSION INTRAVENOUS at 10:57

## 2018-03-12 RX ADMIN — FENTANYL CITRATE 100 MCG: 50 INJECTION, SOLUTION INTRAMUSCULAR; INTRAVENOUS at 10:45

## 2018-03-12 RX ADMIN — SODIUM CHLORIDE, POTASSIUM CHLORIDE, SODIUM LACTATE AND CALCIUM CHLORIDE: 600; 310; 30; 20 INJECTION, SOLUTION INTRAVENOUS at 09:12

## 2018-03-12 RX ADMIN — MIDAZOLAM 2 MG: 1 INJECTION INTRAMUSCULAR; INTRAVENOUS at 10:45

## 2018-03-12 RX ADMIN — LIDOCAINE HYDROCHLORIDE 40 MG: 20 INJECTION, SOLUTION INFILTRATION; PERINEURAL at 10:47

## 2018-03-12 RX ADMIN — PROPOFOL 20 MG: 10 INJECTION, EMULSION INTRAVENOUS at 11:01

## 2018-03-12 NOTE — OR NURSING
Patient and responsible adult given discharge instructions with no questions regarding instructions. Haritha score 20/20. Pain level 0/10.  Discharged from unit via ambulatory. Patient discharged to home.

## 2018-03-12 NOTE — ANESTHESIA POSTPROCEDURE EVALUATION
Patient: Neo Cowan    Procedure(s):  COLONOSCOPY with polypectomy - Wound Class: II-Clean Contaminated    Diagnosis:SCREENING  Diagnosis Additional Information: No value filed.    Anesthesia Type:  MAC    Note:  Anesthesia Post Evaluation    Patient location during evaluation: Phase 2 and Bedside  Patient participation: Able to fully participate in evaluation  Level of consciousness: awake and alert  Pain management: adequate  Airway patency: patent  Cardiovascular status: acceptable  Respiratory status: acceptable  Hydration status: stable  PONV: none     Anesthetic complications: None          Last vitals:  Vitals:    03/12/18 1155 03/12/18 1200 03/12/18 1205   BP: 179/88 169/92 173/94   Pulse:      Resp: 16 16 16   Temp:   97.4  F (36.3  C)   SpO2:  100% 100%         Electronically Signed By: Justice Yanes MD  March 12, 2018  5:32 PM

## 2018-03-12 NOTE — ANESTHESIA CARE TRANSFER NOTE
Patient: Neo Cowan    Procedure(s):  COLONOSCOPY with polypectomy - Wound Class: II-Clean Contaminated    Diagnosis: SCREENING  Diagnosis Additional Information: No value filed.    Anesthesia Type:   MAC     Note:  Airway :Nasal Cannula  Patient transferred to:Phase II  Handoff Report: Identifed the Patient, Identified the Reponsible Provider, Reviewed the pertinent medical history, Discussed the surgical course, Reviewed Intra-OP anesthesia mangement and issues during anesthesia, Set expectations for post-procedure period and Allowed opportunity for questions and acknowledgement of understanding      Vitals: (Last set prior to Anesthesia Care Transfer)    CRNA VITALS  3/12/2018 1039 - 3/12/2018 1110      3/12/2018             Resp Rate (set): 8                Electronically Signed By: AHMET Avila CRNA  March 12, 2018  11:10 AM

## 2018-03-12 NOTE — OP NOTE
Neo Cowan MRN# [id number]   YOB: 1960 Age: 57 year old      Date of Admission:  3/12/2018    Primary care provider: Sukhjinder Mccarthy    PREOPERATIVE DIAGNOSIS:  Screening colonoscopy.         POSTOPERATIVE DIAGNOSIS:  Normal colonoscopic examination.          PROCEDURE:  Whole colon colonoscopy.         INDICATIONS:  This 57 year old male presents for interval screening colonoscopy.        OPERATIVE FINDINGS:  The colonic mucosa was normal from cecum to the rectum. In the rectum there was a small polyp that was removed with cold forcep biopsy.          DESCRIPTION OF PROCEDURE:  With the patient in the supine position on the transport cart, IV sedation was administered by the nurse anesthetist.  His correct identity and planned procedure were confirmed during the requisite timeout pause and he was rolled to the left lateral position.  The anus was digitally dilated and the fiberoptic colonoscope was introduced and negotiated through the length of the colon to the cecal base.  The cecum was intubated and its landmarks clearly identified.  A circumferential examination of the mucosa on introduction of the colonoscope and on its slow withdrawal confirmed the absence of neoplasia, inflammation and/or stricture.  There were minimal diverticuli noted. In the mid rectum there was a small polyp that was biopsied with cold forcep. Hemostasis was maintained.  Retroflex in the rectal ampulla showed no evidence of pathology.  Air was aspirated and the colonoscope was withdrawn; the patient was returned to day surgery in good condition, without suggestion of complication and with our invitation to return in 10 years for followup screening examination.   The post surgical debriefing was held and acknowledged at completion.          Donovan Mayfield MD    3/12/2018 11:08 AM

## 2018-03-12 NOTE — DISCHARGE INSTRUCTIONS
INSTRUCTIONS AFTER COLONOSCOPY    WHEN YOU ARE BACK HOME:    Plan to rest for an hour or two after you get home.    You may have some cramping or pressure until you pass gas.    You may resume your regular medications.    Eat a small, light meal at first, and then gradually return to normal meal sizes.  If you had a polyp removed:    Slight bleeding may occur.  You may have a slight blood stain on the toilet paper after a bowel movement.    To lessen the chance of bleeding, avoid heavy exercise for ONE WEEK.  This includes heavy lifting, vigorous sport activities, and heavy physical labor.  You may resume your normal sexual activity.      Avoid aspirin or aspirin products if instructed by your doctor.    WHAT TO WATCH FOR:  Problems rarely occur after the exam; however, it is important for you to watch for early signs of possible problems.  If you have     Unusual pain in your abdomen    Nausea and vomiting that persists    Excessive bleeding    Black or bloody bowel movements    Fever or temperature above 100.6 F  Please call your doctor ([facility] [phone]) or go to the nearest hospital emergency room.    Post-Anesthesia Patient Instructions    IMMEDIATELY FOLLOWING SURGERY:  Do not drive or operate machinery for the first twenty four hours after surgery.  Do not make any important decisions for twenty four hours after surgery or while taking narcotic pain medications or sedatives.  If you develop intractable nausea and vomiting or a severe headache please notify your doctor immediately.    FOLLOW-UP:  Please make an appointment with your surgeon as instructed. You do not need to follow up with anesthesia unless specifically instructed to do so.    WOUND CARE INSTRUCTIONS (if applicable):  Keep a dry clean dressing on the anesthesia/puncture wound site if there is drainage.  Once the wound has quit draining you may leave it open to air.  Generally you should leave the bandage intact for twenty four  hours unless there is drainage.  If the epidural site drains for more than 36-48 hours please call the anesthesia department.    QUESTIONS?:  Please feel free to call your physician or the hospital  if you have any questions, and they will be happy to assist you.

## 2018-03-12 NOTE — H&P
[facility] Surgery Consultation    CC:  Colorectal cancer screening    HPI: This 57 year old year old male is seen at the request of Sukhjinder Mccarthy for evaluation of colorectal cancer screening.  The history is obtained from the patient, and reviewing the medical record.  He is good medical historian. He states that he has undergone colonoscopies in the past where he has multiple polyps identified. On his last colonoscopy there was no identification of any polyps. He states that he has had some minimal blood on the toilet paper. He will have anal pruritus that he will have to use some wipes and then it will go away. He has not had any anal drainage. He has not had any pain with defecation. He has no family history of colon cancer. He has not had any diarrhea, constipation, melena, or hematochezia. He has no abdominal pain or upper gastrointestinal symptoms    He states he has not had any changes since our last visit and has tolerated the bowel prep with no complications.     Past Medical History:   Diagnosis Date     Alcoholism 1/1/2011     Allergic rhinitis, cause unspecified 3/2/2000     Anxiety  1/1/2011     Colonic Polyps 3/2/2000     Depression 1/1/2011     Gilbert's Syndrome 3/2/2000     Hypercholesterolemia 3/2/2000     Overweight(278.02) 3/2/2000     Prediabetes 3/2/2000       Past Surgical History:   Procedure Laterality Date     COLONOSCOPY  8/5/2013    Procedure: COLONOSCOPY;  colonoscopy ;  Surgeon: Yobani Ventura MD;  Location: [facility] OR     colonoscopy with polypectomy  1/27/2011    repeat 2 years     mandibular fracture repair       vasectomy       wisdom teeth extraction         Pt denied problems with bleeding or anesthesia    No current outpatient prescriptions on file.          Allergies   Allergen Reactions     Seasonal Allergies          HABITS:    Social History   Substance Use Topics     Smoking status: Never Smoker     Smokeless tobacco: Current User     Types: Chew      Comment: Tried  "to quit; No passive exposure     Alcohol use No     No mood altering drug use.    Family History   Problem Relation Age of Onset     C.A.D. Father      CANCER Father      Lung, cause of death     Other - See Comments Father      Colon polyps     Alcohol/Drug Mother      Alcoholism     Other - See Comments Mother      Liver disease, cause of death     C.A.D. Other      Family hx     CANCER Paternal Uncle      Cervical     CANCER Paternal Uncle      Throat     Hypertension Other      Family hx       REVIEW OF SYSTEMS:  Ten point review of systems negative except those mentioned in the HPI.     The patient denies sleep apnea, latex allergies or MRSA    OBJECTIVE:    Pulse 69  Temp 97.4  F (36.3  C) (Oral)  Resp 20  Ht 1.676 m (5' 6\")  Wt 93 kg (205 lb)  SpO2 99%  BMI 33.09 kg/m2    GENERAL: Generally appears well, in no distress with appropriate affect.  Respiratory:  Lungs clear to ausculation bilaterally with good air excursion  Cardiovascular:  Regular Rate and Rhythm with no murmurs gallops or rubs, normal   Abdomen: soft, non-tender, non-distended  Psych:  Alert, oriented, affect appropriate with normal decision making ability.      IMPRESSION:  57 year old male for colonoscopy    PLAN:  OK to proceed with colonoscopy.        Donovan Mayfield MD    3/12/2018  9:55 AM        "

## 2018-03-12 NOTE — IP AVS SNAPSHOT
[facility] Preop/Phase II    [address] [address]    Phone:  [phone]                                       After Visit Summary   3/12/2018    Neo Cowan    MRN: [id number]           After Visit Summary Signature Page     I have received my discharge instructions, and my questions have been answered. I have discussed any challenges I see with this plan with the nurse or doctor.    ..........................................................................................................................................  Patient/Patient Representative Signature      ..........................................................................................................................................  Patient Representative Print Name and Relationship to Patient    ..................................................               ................................................  Date                                            Time    ..........................................................................................................................................  Reviewed by Signature/Title    ...................................................              ..............................................  Date                                                            Time

## 2018-03-12 NOTE — IP AVS SNAPSHOT
MRN:[id number]                      After Visit Summary   3/12/2018    Neo Cowan    MRN: [id number]           Thank you!     Thank you for choosing [facility] for your care. Our goal is always to provide you with excellent care. Hearing back from our patients is one way we can continue to improve our services. Please take a few minutes to complete the written survey that you may receive in the mail after you visit with us. Thank you!        Patient Information     Date Of Birth          1960        About your hospital stay     You were admitted on:  March 12, 2018 You last received care in the:  [facility] Preop/Phase II    You were discharged on:  March 12, 2018       Who to Call     For medical emergencies, please call 911.  For non-urgent questions about your medical care, please call your primary care provider or clinic, [phone]  For questions related to your surgery, please call your surgery clinic        Attending Provider     Provider Specialty    Donovan Mayfield MD Surgery       Primary Care Provider Office Phone # Fax #    Sukhjinder Mccarthy [phone]      After Care Instructions     Discharge Instructions       Resume pre procedure diet            Discharge Instructions       Restart home medications.                  Your next 10 appointments already scheduled     Mar 14, 2018  2:30 PM CDT   (Arrive by 2:15 PM)   Return Visit with Rona Moraes PA-C   [facility] ([facility] [place] [facility] )    [address] [place] [address]   [phone]              Further instructions from your care team           INSTRUCTIONS AFTER COLONOSCOPY    WHEN YOU ARE BACK HOME:    Plan to rest for an hour or two after you get home.    You may have some cramping or pressure until you pass gas.    You may resume your regular medications.    Eat a small, light meal at first, and then gradually return to normal meal sizes.  If you had a polyp  removed:    Slight bleeding may occur.  You may have a slight blood stain on the toilet paper after a bowel movement.    To lessen the chance of bleeding, avoid heavy exercise for ONE WEEK.  This includes heavy lifting, vigorous sport activities, and heavy physical labor.  You may resume your normal sexual activity.      Avoid aspirin or aspirin products if instructed by your doctor.    WHAT TO WATCH FOR:  Problems rarely occur after the exam; however, it is important for you to watch for early signs of possible problems.  If you have     Unusual pain in your abdomen    Nausea and vomiting that persists    Excessive bleeding    Black or bloody bowel movements    Fever or temperature above 100.6 F  Please call your doctor ([facility] [phone]) or go to the nearest hospital emergency room.    Post-Anesthesia Patient Instructions    IMMEDIATELY FOLLOWING SURGERY:  Do not drive or operate machinery for the first twenty four hours after surgery.  Do not make any important decisions for twenty four hours after surgery or while taking narcotic pain medications or sedatives.  If you develop intractable nausea and vomiting or a severe headache please notify your doctor immediately.    FOLLOW-UP:  Please make an appointment with your surgeon as instructed. You do not need to follow up with anesthesia unless specifically instructed to do so.    WOUND CARE INSTRUCTIONS (if applicable):  Keep a dry clean dressing on the anesthesia/puncture wound site if there is drainage.  Once the wound has quit draining you may leave it open to air.  Generally you should leave the bandage intact for twenty four hours unless there is drainage.  If the epidural site drains for more than 36-48 hours please call the anesthesia department.    QUESTIONS?:  Please feel free to call your physician or the hospital  if you have any questions, and they will be happy to assist you.       Pending Results     No orders found from 3/10/2018 to  "3/13/2018.            Admission Information     Date & Time Provider Department Dept. Phone    3/12/2018 Donovan Mayfield MD HI Preop/Phase [phone]      Your Vitals Were     Blood Pressure Pulse Temperature Respirations Height Weight    120/74 69 97.4  F (36.3  C) (Oral) 16 1.676 m (5' 6\") 93 kg (205 lb)    Pulse Oximetry BMI (Body Mass Index)                95% 33.09 kg/m2          [employer] Information     [email or web address] gives you secure access to your electronic health record. If you see a primary care provider, you can also send messages to your care team and make appointments. If you have questions, please call your primary care clinic.  If you do not have a primary care provider, please call [phone] and they will assist you.        Care EveryWhere ID     This is your Care EveryWhere ID. This could be used by other organizations to access your [facility] medical records  [id number]        Equal Access to Services     VANE ROMEO : Hadii patricia garcias Socrow, waaxda lumarvaadaha, qaybta kaalmada adelarisa, chapincito celestin . So [facility] [phone].    ATENCIÓN: Si habla español, tiene a fabian disposición servicios gratuitos de asistencia lingüística. Llame al [phone].    We comply with applicable federal civil rights laws and [place] laws. We do not discriminate on the basis of race, color, national origin, age, disability, sex, sexual orientation, or gender identity.               Review of your medicines      CONTINUE these medicines which have NOT CHANGED        Dose / Directions    blood glucose monitoring lancets   Used for:  Diabetes mellitus, type 2 (H)        Use to test blood sugar 3 times daily or as directed.   Quantity:  1 Box   Refills:  12       blood glucose monitoring test strip   Commonly known as:  ACCU-CHEK SMARTVIEW   Used for:  Diabetes mellitus, type 2 (H)        Use to test blood sugar 3 times daily or as directed.   Quantity:  1 Month   Refills:  12       " fexofenadine 180 MG tablet   Commonly known as:  ALLEGRA   Used for:  Chronic seasonal allergic rhinitis due to pollen        Dose:  180 mg   Take 1 tablet (180 mg) by mouth daily   Quantity:  90 tablet   Refills:  1       simvastatin 20 MG tablet   Commonly known as:  ZOCOR   Used for:  Mixed hyperlipidemia        TAKE 1 TABLET BY MOUTH EVERY EVENING   Quantity:  30 tablet   Refills:  5       sodium chloride 0.65 % nasal spray   Commonly known as:  OCEAN   Used for:  Chronic seasonal allergic rhinitis due to pollen        Dose:  2 spray   Spray 2 sprays into both nostrils daily as needed for congestion   Quantity:  2 Bottle   Refills:  11         STOP taking     polyethylene glycol 236 G suspension   Commonly known as:  GOLYTELY                    Protect others around you: Learn how to safely use, store and throw away your medicines at www.disposemymeds.org.             Medication List: This is a list of all your medications and when to take them. Check marks below indicate your daily home schedule. Keep this list as a reference.      Medications           Morning Afternoon Evening Bedtime As Needed    blood glucose monitoring lancets   Use to test blood sugar 3 times daily or as directed.                                blood glucose monitoring test strip   Commonly known as:  ACCU-CHEK SMARTVIEW   Use to test blood sugar 3 times daily or as directed.                                fexofenadine 180 MG tablet   Commonly known as:  ALLEGRA   Take 1 tablet (180 mg) by mouth daily                                simvastatin 20 MG tablet   Commonly known as:  ZOCOR   TAKE 1 TABLET BY MOUTH EVERY EVENING                                sodium chloride 0.65 % nasal spray   Commonly known as:  OCEAN   Spray 2 sprays into both nostrils daily as needed for congestion

## 2018-03-13 LAB — COPATH REPORT: NORMAL

## 2018-03-14 ENCOUNTER — OFFICE VISIT (OUTPATIENT)
Dept: OTOLARYNGOLOGY | Facility: OTHER | Age: 58
End: 2018-03-14
Attending: PHYSICIAN ASSISTANT
Payer: COMMERCIAL

## 2018-03-14 VITALS
HEIGHT: 66 IN | HEART RATE: 72 BPM | WEIGHT: 205 LBS | BODY MASS INDEX: 32.95 KG/M2 | DIASTOLIC BLOOD PRESSURE: 90 MMHG | OXYGEN SATURATION: 98 % | TEMPERATURE: 98 F | SYSTOLIC BLOOD PRESSURE: 158 MMHG

## 2018-03-14 DIAGNOSIS — Z71.6 ENCOUNTER FOR TOBACCO USE CESSATION COUNSELING: ICD-10-CM

## 2018-03-14 DIAGNOSIS — K13.70 ORAL MUCOSAL LESION: Primary | ICD-10-CM

## 2018-03-14 PROCEDURE — 88305 TISSUE EXAM BY PATHOLOGIST: CPT | Mod: TC | Performed by: PHYSICIAN ASSISTANT

## 2018-03-14 PROCEDURE — 40808 BIOPSY OF MOUTH LESION: CPT | Performed by: PHYSICIAN ASSISTANT

## 2018-03-14 ASSESSMENT — PAIN SCALES - GENERAL: PAINLEVEL: NO PAIN (0)

## 2018-03-14 NOTE — LETTER
3/14/2018         RE: Neo Cowan  [address] [place] [address]        Dear Colleague,    Thank you for referring your patient, [facility] Cowan, to the [facility]. Please see a copy of my visit note below.    Chief Complaint   Patient presents with     Procedure     Oral Mucosal Lesion Biopsy     Neo returns for oral bx. He was last seen on 1/30/18 for SNHL, vertigo, seasonal allergies, DNS and oral lesions. We discussed his SNHL, vertigo, allergies at length at his last visit.   He was started on Nasal saline and Allegra with fair control. He does do well with use of allegra. He has no other concerns. He has not used nasal saline due to congestion.     He had normal ear exam, and SNHL reviewed with patient. He did have lightheadedness. Recommended monitor, fluids, monitor glucose, recheck orthostatic with his PCP.     He was to return for oral bx. He has left lower mucosal changes. C/w with use of oral tobacco. He felt that at his last visit it was gone, and had no oral lesions present. However, upon examination there was mucosal changes from chronic tobacco use on exam.     Past Medical History:   Diagnosis Date     Alcoholism 1/1/2011     Allergic rhinitis, cause unspecified 3/2/2000     Anxiety  1/1/2011     Colonic Polyps 3/2/2000     Depression 1/1/2011     Gilbert's Syndrome 3/2/2000     Hypercholesterolemia 3/2/2000     Overweight(278.02) 3/2/2000     Prediabetes 3/2/2000        Allergies   Allergen Reactions     Seasonal Allergies      Current Outpatient Prescriptions   Medication     sodium chloride (OCEAN) 0.65 % nasal spray     fexofenadine (ALLEGRA) 180 MG tablet     simvastatin (ZOCOR) 20 MG tablet     blood glucose (ACCU-CHEK SMARTVIEW) test strip     blood glucose monitoring (ACCU-CHEK FASTCLIX) lancets     No current facility-administered medications for this visit.       ROS: 10 point ROS neg other than the symptoms noted above in the HPI.  /90 (Cuff Size: Adult  "Regular)  Pulse 72  Temp 98  F (36.7  C) (Tympanic)  Ht 5' 6\" (1.676 m)  Wt 205 lb (93 kg)  SpO2 98%  BMI 33.09 kg/m2      Procedure:  After informed consent. I reviewed risks and complications with patient. Written consent obtained. He was placed in supine position for left lateral buccal mucosal biopsy. Scattered whitish marks along mucosa, possible papilloma appearance vs. Fibroma. Limited oral exam. (patient had jaw fx).   I palpated the site and then infiltrated with 1% lidocaine with 1:100,000 Epinephrine.  After this was done, I then used a fifteen blade to incise through mucosa. I removed a small specimen. Site was irrigated. Cauterized with silver nitrate. No bleeding.   Specimen sent to lab.     May require additional excision. However, due to site it is difficulty\ in office.     ASSESSMENT:    ICD-10-CM    1. Oral mucosal lesion K13.70 Surgical pathology exam   2. Encounter for tobacco use cessation counseling Z71.6      Biopsy completed today. Tolerated well  Follow closely  Tobacco cessation encouraged    Wound cares reviewed. Gargle with peroxide/ water for 5 days.   Pending pathology may need to consider repeat bx in upcoming 1-2 years.     It takes 20 years of quitting tobacco to be at same risk of developing head and neck cancer as a never smoker.  Every year of cessation offers health benefits. This was discussed with the patient today and they voiced understanding.  Tobacco cessation was strongly encouraged.    Rona Moraes PA-C  ENT  [facility], [place]  [phone]          Again, thank you for allowing me to participate in the care of your patient.        Sincerely,        Rona Moraes PA-C    "

## 2018-03-14 NOTE — MR AVS SNAPSHOT
After Visit Summary   3/14/2018    Neo Cowan    MRN: [id number]           Patient Information     Date Of Birth          1960        Visit Information        Provider Department      3/14/2018 2:30 PM Rona Moraes PA-C [facility] [address]        Today's Diagnoses     Oral mucosal lesion    -  1    Encounter for tobacco use cessation counseling          Care Instructions    Continue with Allegra daily.   Use Nasal saline 2 sprays to each nostril 2-3 times daily/ as needed.   Consider septoplasty in future if symptoms worsen.     Monitor blood pressure. Multiple higher readings. Follow/ check blood pressure.   Hearing protection.     Monitor oral lesions.   Rinse w/ water/ peroxide 3-4 times daily. Especially after meals.   No bleeding, no sutures were placed. If bleeding use cold water/ ice to area.     Thank you for allowing MATTHEW Morris and our ENT team to participate in your care.  If your medications are too expensive, please give the nurse a call.  We can possibly change this medication.  If you have a scheduling or an appointment question please contact [facility] Unit Coordinator at their direct line [phone].   ALL nursing questions or concerns can be directed to your ENT nurse at: [phone] [facility]                     Follow-ups after your visit        Who to contact     If you have questions or need follow up information about today's clinic visit or your schedule please contact [facility] JESSICA directly at [phone].  Normal or non-critical lab and imaging results will be communicated to you by MyChart, letter or phone within 4 business days after the clinic has received the results. If you do not hear from us within 7 days, please contact the clinic through MyChart or phone. If you have a critical or abnormal lab result, we will notify you by phone as soon as possible.  Submit refill requests through [employer] or call your pharmacy and they will forward  "the refill request to us. Please allow 3 business days for your refill to be completed.          Additional Information About Your Visit        [employer] Information     [employer] gives you secure access to your electronic health record. If you see a primary care provider, you can also send messages to your care team and make appointments. If you have questions, please call your primary care clinic.  If you do not have a primary care provider, please call [phone] and they will assist you.        Care EveryWhere ID     This is your Care EveryWhere ID. This could be used by other organizations to access your [facility] medical records  [id number]        Your Vitals Were     Pulse Temperature Height Pulse Oximetry BMI (Body Mass Index)       72 98  F (36.7  C) (Tympanic) 5' 6\" (1.676 m) 98% 33.09 kg/m2        Blood Pressure from Last 3 Encounters:   03/14/18 158/90   03/12/18 173/94   02/06/18 136/84    Weight from Last 3 Encounters:   03/14/18 205 lb (93 kg)   03/12/18 205 lb (93 kg)   02/06/18 210 lb (95.3 kg)              Today, you had the following     No orders found for display       Primary Care Provider Office Phone # Fax #    Sukhjinder Mccarthy [phone]       [address] [place] [id number]        Equal Access to Services     VANE ROMEO AH: Hadii patricia mar hadasho Soomaali, waaxda luqadaha, qaybta kaalmada adeegyada, chapincito kim. So [facility] [phone].    ATENCIÓN: Si habla español, tiene a fabian disposición servicios gratuitos de asistencia lingüística. Llame al [phone].    We comply with applicable federal civil rights laws and [place] laws. We do not discriminate on the basis of race, color, national origin, age, disability, sex, sexual orientation, or gender identity.            Thank you!     Thank you for choosing [facility]  for your care. Our goal is always to provide you with excellent care. Hearing back from our patients is one " way we can continue to improve our services. Please take a few minutes to complete the written survey that you may receive in the mail after your visit with us. Thank you!             Your Updated Medication List - Protect others around you: Learn how to safely use, store and throw away your medicines at www.disposemymeds.org.          This list is accurate as of 3/14/18  2:57 PM.  Always use your most recent med list.                   Brand Name Dispense Instructions for use Diagnosis    blood glucose monitoring lancets     1 Box    Use to test blood sugar 3 times daily or as directed.    Diabetes mellitus, type 2 (H)       blood glucose monitoring test strip    ACCU-CHEK SMARTVIEW    1 Month    Use to test blood sugar 3 times daily or as directed.    Diabetes mellitus, type 2 (H)       fexofenadine 180 MG tablet    ALLEGRA    90 tablet    Take 1 tablet (180 mg) by mouth daily    Chronic seasonal allergic rhinitis due to pollen       simvastatin 20 MG tablet    ZOCOR    30 tablet    TAKE 1 TABLET BY MOUTH EVERY EVENING    Mixed hyperlipidemia       sodium chloride 0.65 % nasal spray    OCEAN    2 Bottle    Spray 2 sprays into both nostrils daily as needed for congestion    Chronic seasonal allergic rhinitis due to pollen

## 2018-03-14 NOTE — PROGRESS NOTES
"Chief Complaint   Patient presents with     Procedure     Oral Mucosal Lesion Biopsy     Neo returns for oral bx. He was last seen on 1/30/18 for SNHL, vertigo, seasonal allergies, DNS and oral lesions. We discussed his SNHL, vertigo, allergies at length at his last visit.   He was started on Nasal saline and Allegra with fair control. He does do well with use of allegra. He has no other concerns. He has not used nasal saline due to congestion.     He had normal ear exam, and SNHL reviewed with patient. He did have lightheadedness. Recommended monitor, fluids, monitor glucose, recheck orthostatic with his PCP.     He was to return for oral bx. He has left lower mucosal changes. C/w with use of oral tobacco. He felt that at his last visit it was gone, and had no oral lesions present. However, upon examination there was mucosal changes from chronic tobacco use on exam.     Past Medical History:   Diagnosis Date     Alcoholism 1/1/2011     Allergic rhinitis, cause unspecified 3/2/2000     Anxiety  1/1/2011     Colonic Polyps 3/2/2000     Depression 1/1/2011     Gilbert's Syndrome 3/2/2000     Hypercholesterolemia 3/2/2000     Overweight(278.02) 3/2/2000     Prediabetes 3/2/2000        Allergies   Allergen Reactions     Seasonal Allergies      Current Outpatient Prescriptions   Medication     sodium chloride (OCEAN) 0.65 % nasal spray     fexofenadine (ALLEGRA) 180 MG tablet     simvastatin (ZOCOR) 20 MG tablet     blood glucose (ACCU-CHEK SMARTVIEW) test strip     blood glucose monitoring (ACCU-CHEK FASTCLIX) lancets     No current facility-administered medications for this visit.       ROS: 10 point ROS neg other than the symptoms noted above in the HPI.  /90 (Cuff Size: Adult Regular)  Pulse 72  Temp 98  F (36.7  C) (Tympanic)  Ht 5' 6\" (1.676 m)  Wt 205 lb (93 kg)  SpO2 98%  BMI 33.09 kg/m2      Procedure:  After informed consent. I reviewed risks and complications with patient. Written consent " obtained. He was placed in supine position for left lateral buccal mucosal biopsy. Scattered whitish marks along mucosa, possible papilloma appearance vs. Fibroma. Limited oral exam. (patient had jaw fx).   I palpated the site and then infiltrated with 1% lidocaine with 1:100,000 Epinephrine.  After this was done, I then used a fifteen blade to incise through mucosa. I removed a small specimen. Site was irrigated. Cauterized with silver nitrate. No bleeding.   Specimen sent to lab.     May require additional excision. However, due to site it is difficulty\ in office.     ASSESSMENT:    ICD-10-CM    1. Oral mucosal lesion K13.70 Surgical pathology exam   2. Encounter for tobacco use cessation counseling Z71.6      Biopsy completed today. Tolerated well  Follow closely  Tobacco cessation encouraged    Wound cares reviewed. Gargle with peroxide/ water for 5 days.   Pending pathology may need to consider repeat bx in upcoming 1-2 years.     It takes 20 years of quitting tobacco to be at same risk of developing head and neck cancer as a never smoker.  Every year of cessation offers health benefits. This was discussed with the patient today and they voiced understanding.  Tobacco cessation was strongly encouraged.    Rona Moraes PA-C  ENT  [facility], [place]  [phone]

## 2018-03-14 NOTE — NURSING NOTE
"Chief Complaint   Patient presents with     Procedure     Oral Mucosal Lesion Biopsy       Initial /90 (Cuff Size: Adult Regular)  Pulse 72  Temp 98  F (36.7  C) (Tympanic)  Ht 5' 6\" (1.676 m)  Wt 205 lb (93 kg)  SpO2 98%  BMI 33.09 kg/m2 Estimated body mass index is 33.09 kg/(m^2) as calculated from the following:    Height as of this encounter: 5' 6\" (1.676 m).    Weight as of this encounter: 205 lb (93 kg).  Medication Reconciliation: complete   Tamika Almonte      "

## 2018-03-14 NOTE — PATIENT INSTRUCTIONS
Continue with Allegra daily.   Use Nasal saline 2 sprays to each nostril 2-3 times daily/ as needed.   Consider septoplasty in future if symptoms worsen.     Monitor blood pressure. Multiple higher readings. Follow/ check blood pressure.   Hearing protection.     Monitor oral lesions.   Rinse w/ water/ peroxide 3-4 times daily. Especially after meals.   No bleeding, no sutures were placed. If bleeding use cold water/ ice to area.     Thank you for allowing MATTHEW Morris and our ENT team to participate in your care.  If your medications are too expensive, please give the nurse a call.  We can possibly change this medication.  If you have a scheduling or an appointment question please contact [facility] our Health Unit Coordinator at their direct line [phone].   ALL nursing questions or concerns can be directed to your ENT nurse at: [phone] Kristina

## 2018-03-16 LAB — COPATH REPORT: NORMAL

## 2018-06-20 DIAGNOSIS — E78.2 MIXED HYPERLIPIDEMIA: ICD-10-CM

## 2018-06-20 RX ORDER — SIMVASTATIN 20 MG
TABLET ORAL
Qty: 30 TABLET | Refills: 4 | Status: SHIPPED | OUTPATIENT
Start: 2018-06-20 | End: 2018-11-14

## 2018-06-20 NOTE — TELEPHONE ENCOUNTER
Simvastatin   Last Written Prescription Date: 12/26/17  Last Fill Quantity: 30 # of Refills: 5  Last Office Visit: 1/23/18    Mahsa Whyte LPN

## 2018-09-23 NOTE — TELEPHONE ENCOUNTER
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 No    Preferred method for responding to this message: Telephone Call    If we cannot reach you directly, may we leave a detailed response at the number you provided? Yes    Can this message wait until your PCP/provider returns, if available today? Not applicable, pcp is in    Teagan Muñoz    
May  tomorrow  
Patient notified that form would be left at  for .   
overweight

## 2018-09-24 ENCOUNTER — TRANSFERRED RECORDS (OUTPATIENT)
Dept: HEALTH INFORMATION MANAGEMENT | Facility: CLINIC | Age: 58
End: 2018-09-24

## 2018-09-26 ENCOUNTER — TELEPHONE (OUTPATIENT)
Dept: FAMILY MEDICINE | Facility: OTHER | Age: 58
End: 2018-09-26

## 2018-12-14 NOTE — PATIENT INSTRUCTIONS
Preventive Health Recommendations  Male Ages 50 - 64    Yearly exam:             See your health care provider every year in order to  o   Review health changes.   o   Discuss preventive care.    o   Review your medicines if your doctor has prescribed any.     Have a cholesterol test every 5 years, or more frequently if you are at risk for high cholesterol/heart disease.     Have a diabetes test (fasting glucose) every three years. If you are at risk for diabetes, you should have this test more often.     Have a colonoscopy at age 50, or have a yearly FIT test (stool test). These exams will check for colon cancer.      Talk with your health care provider about whether or not a prostate cancer screening test (PSA) is right for you.    You should be tested each year for STDs (sexually transmitted diseases), if you re at risk.     Shots: Get a flu shot each year. Get a tetanus shot every 10 years.     Nutrition:    Eat at least 5 servings of fruits and vegetables daily.     Eat whole-grain bread, whole-wheat pasta and brown rice instead of white grains and rice.     Get adequate Calcium and Vitamin D.     Lifestyle    Exercise for at least 150 minutes a week (30 minutes a day, 5 days a week). This will help you control your weight and prevent disease.     Limit alcohol to one drink per day.     No smoking.     Wear sunscreen to prevent skin cancer.     See your dentist every six months for an exam and cleaning.     See your eye doctor every 1 to 2 years.    
62

## 2018-12-14 NOTE — PROGRESS NOTES
SUBJECTIVE:   CC: Neo Cowan is an 57 year old male who presents for preventive health visit.     Healthy Habits:    Do you get at least three servings of calcium containing foods daily (dairy, green leafy vegetables, etc.)? yes    Amount of exercise or daily activities, outside of work: 2-3 day(s) per week    Problems taking medications regularly No    Medication side effects: No    Have you had an eye exam in the past two years? yes    Do you see a dentist twice per year? yes    Do you have sleep apnea, excessive snoring or daytime drowsiness?no    Nasal lesion dorsum nose  Today's PHQ-2 Score:   PHQ-2 ( 1999 [employer]) 7/17/2013   Q1: Little interest or pleasure in doing things 0   Q2: Feeling down, depressed or hopeless 0   PHQ-2 Score 0       Abuse: Current or Past(Physical, Sexual or Emotional)- No  Do you feel safe in your environment? Yes    Social History     Tobacco Use     Smoking status: Never Smoker     Smokeless tobacco: Current User     Types: Chew     Tobacco comment: Tried to quit; No passive exposure   Substance Use Topics     Alcohol use: No     If you drink alcohol do you typically have >3 drinks per day or >7 drinks per week? No    Last PSA:   PSA   Date Value Ref Range Status   12/19/2017 3.05 0 - 4 ug/L Final     Comment:     Assay Method:  Chemiluminescence using Siemens Vista analyzer       Reviewed orders with patient. Reviewed health maintenance and updated orders accordingly - Yes  BP Readings from Last 3 Encounters:   12/18/18 134/82   03/14/18 158/90   03/12/18 173/94    Wt Readings from Last 3 Encounters:   12/18/18 95.3 kg (210 lb)   03/14/18 93 kg (205 lb)   03/12/18 93 kg (205 lb)                  Patient Active Problem List   Diagnosis     Gilbert syndrome     Obesity     Dyslipidemia     Seasonal allergic rhinitis     H/O adenomatous colonic polyps     Type 2 diabetes mellitus without complication (H)     Vitamin D deficiency     Comprehensive Medical Examination     Eczema      Screening for prostate cancer     Past Surgical History:   Procedure Laterality Date     COLONOSCOPY  8/5/2013    Procedure: COLONOSCOPY;  colonoscopy ;  Surgeon: Yobani Ventura MD;  Location: [facility]     COLONOSCOPY N/A 3/12/2018    Procedure: COLONOSCOPY;  COLONOSCOPY with polypectomy;  Surgeon: Donovan Mayfield MD;  Location: [facility]     colonoscopy with polypectomy  1/27/2011    repeat 2 years     mandibular fracture repair       vasectomy       wisdom teeth extraction         Social History     Tobacco Use     Smoking status: Never Smoker     Smokeless tobacco: Current User     Types: Chew     Tobacco comment: Tried to quit; No passive exposure   Substance Use Topics     Alcohol use: No     Family History   Problem Relation Age of Onset     C.A.D. Father      Cancer Father         Lung, cause of death     Other - See Comments Father         Colon polyps     Alcohol/Drug Mother         Alcoholism     Other - See Comments Mother         Liver disease, cause of death     C.A.D. Other         Family hx     Cancer Paternal Uncle         Cervical     Cancer Paternal Uncle         Throat     Hypertension Other         Family hx         Current Outpatient Medications   Medication Sig Dispense Refill     blood glucose (ACCU-CHEK SMARTVIEW) test strip Use to test blood sugar 3 times daily or as directed. 1 Month 12     blood glucose monitoring (ACCU-CHEK FASTCLIX) lancets Use to test blood sugar 3 times daily or as directed. 1 Box 12     fexofenadine (ALLEGRA) 180 MG tablet Take 1 tablet (180 mg) by mouth daily 90 tablet 1     simvastatin (ZOCOR) 20 MG tablet TAKE 1 TABLET BY MOUTH EVERY EVENING 30 tablet 0     Allergies   Allergen Reactions     Seasonal Allergies      Recent Labs   Lab Test 12/19/17  0739 10/10/16  0754 09/24/15  0748   A1C 8.2* 7.1* 6.5*   LDL 82 79 65   HDL 37* 43 41   TRIG 152* 122 81   ALT 72* 46 44   CR 0.98 0.96 0.98   GFRESTIMATED 79 81 79   GFRESTBLACK >90 >90   GFR  Calc   >90   GFR Calc     POTASSIUM 4.3 4.2 4.1   TSH 1.17 1.14 1.31        Reviewed and updated as needed this visit by clinical staff  Tobacco  Allergies  Meds  Med Hx  Surg Hx  Fam Hx  Soc Hx        Reviewed and updated as needed this visit by Provider        Past Medical History:   Diagnosis Date     Alcoholism 1/1/2011     Allergic rhinitis, cause unspecified 3/2/2000     Anxiety  1/1/2011     Colonic Polyps 3/2/2000     Depression 1/1/2011     Gilbert's Syndrome 3/2/2000     Hypercholesterolemia 3/2/2000     Overweight(278.02) 3/2/2000     Prediabetes 3/2/2000      Past Surgical History:   Procedure Laterality Date     COLONOSCOPY  8/5/2013    Procedure: COLONOSCOPY;  colonoscopy ;  Surgeon: Yobani Ventura MD;  [facility]     COLONOSCOPY N/A 3/12/2018    Procedure: COLONOSCOPY;  COLONOSCOPY with polypectomy;  Surgeon: Donovan Mayfield MD;  Location: [facility]     colonoscopy with polypectomy  1/27/2011    repeat 2 years     mandibular fracture repair       vasectomy       wisdom teeth extraction         ROS:  CONSTITUTIONAL: NEGATIVE for fever, chills, change in weight  INTEGUMENTARY/SKIN: NEGATIVE for worrisome rashes, moles or lesions  EYES: NEGATIVE for vision changes or irritation  ENT: NEGATIVE for ear, mouth and throat problems  RESP: NEGATIVE for significant cough or SOB  CV: NEGATIVE for chest pain, palpitations or peripheral edema  GI: NEGATIVE for nausea, abdominal pain, heartburn, or change in bowel habits   male: negative for dysuria, hematuria, decreased urinary stream, erectile dysfunction, urethral discharge  MUSCULOSKELETAL: NEGATIVE for significant arthralgias or myalgia  NEURO: NEGATIVE for weakness, dizziness or paresthesias  PSYCHIATRIC: NEGATIVE for changes in mood or affect    OBJECTIVE:   /82 (BP Location: Right arm, Patient Position: Sitting, Cuff Size: Adult Regular)   Pulse 88   Temp 98.4  F (36.9  C) (Tympanic)   Resp 18   Ht 1.676 m (5'  "6\")   Wt 95.3 kg (210 lb)   SpO2 97%   BMI 33.89 kg/m    EXAM:  Physical Exam   Constitutional: He is oriented to person, place, and time. He appears well-developed and well-nourished. No distress.   HENT:   Head: Normocephalic and atraumatic.   Right Ear: External ear normal.   Left Ear: External ear normal.   Nose: Nose normal.   Mouth/Throat: Oropharynx is clear and moist.   Eyes: Conjunctivae and EOM are normal. Pupils are equal, round, and reactive to light.   Neck: Normal range of motion. Neck supple. No JVD present. No thyromegaly present.   Cardiovascular: Normal rate, regular rhythm, normal heart sounds and intact distal pulses. Exam reveals no gallop and no friction rub.   No murmur heard.  Pulmonary/Chest: Effort normal and breath sounds normal. He has no wheezes. He has no rales.   Abdominal: Soft. Bowel sounds are normal. He exhibits no mass. There is no tenderness.   Genitourinary: Rectum normal, prostate normal and penis normal.   Musculoskeletal: Normal range of motion.   Lymphadenopathy:     He has no cervical adenopathy.   Neurological: He is alert and oriented to person, place, and time. He has normal reflexes.   Skin: Skin is warm and dry.   Psychiatric: He has a normal mood and affect.         Diagnostic Test Results:  none     ASSESSMENT/PLAN:   1. Comprehensive Medical Examination  Appropriate screening labs are drawn.  Most recent colonoscopy and immunizations are reviewed.  Risk factors, aspirin use, screening recommendations, and follow up discussed.  Routine exam in 12 year.      2. Screening for prostate cancer  PSA drawn  - PSA Diagnostic; Future    3. Type 2 diabetes mellitus without complication, without long-term current use of insulin (H)  Fasting labs are reviewed.  Goal of hemoglobin A1C of less than 7 discussed.  Instructed in the importance of diet, exercise, and good glycemic control in prevention of secondary complications.    Continue same medication regimen. Repeat fasting " "glucose and hemoglobin A1C in 3 months.   - C FOOT EXAM  NO CHARGE  - Hemoglobin A1c; Future  - Albumin Random Urine Quantitative with Creat Ratio; Future    4. Dyslipidemia  Fasting labs reviewed.  Goals discussed.  Discussed the importance of diet, exercise, and weight reduction.  Follow up 12 months.   - CBC with platelets and differential; Future  - Comprehensive metabolic panel; Future  - Lipid Profile (Chol, Trig, HDL, LDL calc); Future  - TSH with free T4 reflex; Future  - UA with Microscopic reflex to Culture - [place]; Future    5. Gilbert syndrome  Stable.      COUNSELING:  Reviewed preventive health counseling, as reflected in patient instructions  Special attention given to:        Regular exercise       Healthy diet/nutrition    BP Readings from Last 1 Encounters:   12/18/18 134/82     Estimated body mass index is 33.89 kg/m  as calculated from the following:    Height as of this encounter: 1.676 m (5' 6\").    Weight as of this encounter: 95.3 kg (210 lb).    Weight management plan: Discussed healthy diet and exercise guidelines     reports that  has never smoked. His smokeless tobacco use includes chew.      Counseling Resources:  ATP IV Guidelines  Pooled Cohorts Equation Calculator  FRAX Risk Assessment  ICSI Preventive Guidelines  Dietary Guidelines for Americans, 2010  USDA's MyPlate  ASA Prophylaxis  Lung CA Screening    Sukhjinder Mccarthy MD  [facility] [place]  "

## 2018-12-18 ENCOUNTER — OFFICE VISIT (OUTPATIENT)
Dept: FAMILY MEDICINE | Facility: OTHER | Age: 58
End: 2018-12-18
Attending: FAMILY MEDICINE
Payer: COMMERCIAL

## 2018-12-18 VITALS
WEIGHT: 210 LBS | BODY MASS INDEX: 33.75 KG/M2 | SYSTOLIC BLOOD PRESSURE: 134 MMHG | RESPIRATION RATE: 18 BRPM | TEMPERATURE: 98.4 F | DIASTOLIC BLOOD PRESSURE: 82 MMHG | OXYGEN SATURATION: 97 % | HEIGHT: 66 IN | HEART RATE: 88 BPM

## 2018-12-18 DIAGNOSIS — E11.9 TYPE 2 DIABETES MELLITUS WITHOUT COMPLICATION, WITHOUT LONG-TERM CURRENT USE OF INSULIN (H): ICD-10-CM

## 2018-12-18 DIAGNOSIS — E80.4 GILBERT SYNDROME: ICD-10-CM

## 2018-12-18 DIAGNOSIS — Z00.00 ROUTINE GENERAL MEDICAL EXAMINATION AT A HEALTH CARE FACILITY: Primary | ICD-10-CM

## 2018-12-18 DIAGNOSIS — Z12.5 SCREENING FOR PROSTATE CANCER: ICD-10-CM

## 2018-12-18 DIAGNOSIS — E78.2 MIXED HYPERLIPIDEMIA: ICD-10-CM

## 2018-12-18 PROCEDURE — 99396 PREV VISIT EST AGE 40-64: CPT | Performed by: FAMILY MEDICINE

## 2018-12-18 ASSESSMENT — ANXIETY QUESTIONNAIRES
4. TROUBLE RELAXING: NOT AT ALL
6. BECOMING EASILY ANNOYED OR IRRITABLE: NOT AT ALL
2. NOT BEING ABLE TO STOP OR CONTROL WORRYING: NOT AT ALL
1. FEELING NERVOUS, ANXIOUS, OR ON EDGE: NOT AT ALL
7. FEELING AFRAID AS IF SOMETHING AWFUL MIGHT HAPPEN: NOT AT ALL
IF YOU CHECKED OFF ANY PROBLEMS ON THIS QUESTIONNAIRE, HOW DIFFICULT HAVE THESE PROBLEMS MADE IT FOR YOU TO DO YOUR WORK, TAKE CARE OF THINGS AT HOME, OR GET ALONG WITH OTHER PEOPLE: NOT DIFFICULT AT ALL
5. BEING SO RESTLESS THAT IT IS HARD TO SIT STILL: NOT AT ALL
GAD7 TOTAL SCORE: 0
3. WORRYING TOO MUCH ABOUT DIFFERENT THINGS: NOT AT ALL

## 2018-12-18 ASSESSMENT — PAIN SCALES - GENERAL: PAINLEVEL: NO PAIN (0)

## 2018-12-18 ASSESSMENT — PATIENT HEALTH QUESTIONNAIRE - PHQ9: SUM OF ALL RESPONSES TO PHQ QUESTIONS 1-9: 0

## 2018-12-18 ASSESSMENT — MIFFLIN-ST. JEOR: SCORE: 1715.3

## 2018-12-18 NOTE — NURSING NOTE
"Chief Complaint   Patient presents with     Physical       Initial /82 (BP Location: Right arm, Patient Position: Sitting, Cuff Size: Adult Regular)   Pulse 88   Temp 98.4  F (36.9  C) (Tympanic)   Resp 18   Ht 1.676 m (5' 6\")   Wt 95.3 kg (210 lb)   SpO2 97%   BMI 33.89 kg/m   Estimated body mass index is 33.89 kg/m  as calculated from the following:    Height as of this encounter: 1.676 m (5' 6\").    Weight as of this encounter: 95.3 kg (210 lb).  Medication Reconciliation: complete    Ashley A. Lechevalier, LPN  "

## 2018-12-19 ASSESSMENT — ANXIETY QUESTIONNAIRES: GAD7 TOTAL SCORE: 0

## 2018-12-20 DIAGNOSIS — E11.9 TYPE 2 DIABETES MELLITUS WITHOUT COMPLICATION, WITHOUT LONG-TERM CURRENT USE OF INSULIN (H): ICD-10-CM

## 2018-12-20 DIAGNOSIS — Z12.5 SCREENING FOR PROSTATE CANCER: ICD-10-CM

## 2018-12-20 DIAGNOSIS — E78.2 MIXED HYPERLIPIDEMIA: ICD-10-CM

## 2018-12-20 LAB
ALBUMIN SERPL-MCNC: 4.1 G/DL (ref 3.4–5)
ALBUMIN UR-MCNC: 30 MG/DL
ALP SERPL-CCNC: 161 U/L (ref 40–150)
ALT SERPL W P-5'-P-CCNC: 110 U/L (ref 0–70)
ANION GAP SERPL CALCULATED.3IONS-SCNC: 10 MMOL/L (ref 3–14)
APPEARANCE UR: CLEAR
AST SERPL W P-5'-P-CCNC: 74 U/L (ref 0–45)
BACTERIA #/AREA URNS HPF: ABNORMAL /HPF
BASOPHILS # BLD AUTO: 0 10E9/L (ref 0–0.2)
BASOPHILS NFR BLD AUTO: 0.2 %
BILIRUB SERPL-MCNC: 0.7 MG/DL (ref 0.2–1.3)
BILIRUB UR QL STRIP: NEGATIVE
BUN SERPL-MCNC: 10 MG/DL (ref 7–30)
CALCIUM SERPL-MCNC: 9.1 MG/DL (ref 8.5–10.1)
CHLORIDE SERPL-SCNC: 101 MMOL/L (ref 94–109)
CHOLEST SERPL-MCNC: 153 MG/DL
CO2 SERPL-SCNC: 25 MMOL/L (ref 20–32)
COLOR UR AUTO: YELLOW
CREAT SERPL-MCNC: 0.92 MG/DL (ref 0.66–1.25)
CREAT UR-MCNC: 163 MG/DL
DIFFERENTIAL METHOD BLD: NORMAL
EOSINOPHIL # BLD AUTO: 0 10E9/L (ref 0–0.7)
EOSINOPHIL NFR BLD AUTO: 0.7 %
ERYTHROCYTE [DISTWIDTH] IN BLOOD BY AUTOMATED COUNT: 12.2 % (ref 10–15)
EST. AVERAGE GLUCOSE BLD GHB EST-MCNC: 263 MG/DL
GFR SERPL CREATININE-BSD FRML MDRD: >90 ML/MIN/{1.73_M2}
GLUCOSE SERPL-MCNC: 259 MG/DL (ref 70–99)
GLUCOSE UR STRIP-MCNC: >1000 MG/DL
HBA1C MFR BLD: 10.8 % (ref 0–5.6)
HCT VFR BLD AUTO: 44.3 % (ref 40–53)
HDLC SERPL-MCNC: 41 MG/DL
HGB BLD-MCNC: 15.8 G/DL (ref 13.3–17.7)
HGB UR QL STRIP: NEGATIVE
IMM GRANULOCYTES # BLD: 0 10E9/L (ref 0–0.4)
IMM GRANULOCYTES NFR BLD: 0.4 %
KETONES UR STRIP-MCNC: 5 MG/DL
LDLC SERPL CALC-MCNC: 83 MG/DL
LEUKOCYTE ESTERASE UR QL STRIP: NEGATIVE
LYMPHOCYTES # BLD AUTO: 1 10E9/L (ref 0.8–5.3)
LYMPHOCYTES NFR BLD AUTO: 17.4 %
MCH RBC QN AUTO: 32.2 PG (ref 26.5–33)
MCHC RBC AUTO-ENTMCNC: 35.7 G/DL (ref 31.5–36.5)
MCV RBC AUTO: 90 FL (ref 78–100)
MICROALBUMIN UR-MCNC: 231 MG/L
MICROALBUMIN/CREAT UR: 141.72 MG/G CR (ref 0–17)
MONOCYTES # BLD AUTO: 0.5 10E9/L (ref 0–1.3)
MONOCYTES NFR BLD AUTO: 7.9 %
NEUTROPHILS # BLD AUTO: 4.2 10E9/L (ref 1.6–8.3)
NEUTROPHILS NFR BLD AUTO: 73.4 %
NITRATE UR QL: NEGATIVE
NONHDLC SERPL-MCNC: 112 MG/DL
NRBC # BLD AUTO: 0 10*3/UL
NRBC BLD AUTO-RTO: 0 /100
PH UR STRIP: 5.5 PH (ref 4.7–8)
PLATELET # BLD AUTO: 167 10E9/L (ref 150–450)
POTASSIUM SERPL-SCNC: 4.1 MMOL/L (ref 3.4–5.3)
PROT SERPL-MCNC: 7.6 G/DL (ref 6.8–8.8)
PSA SERPL-MCNC: 2.27 UG/L (ref 0–4)
RBC # BLD AUTO: 4.9 10E12/L (ref 4.4–5.9)
RBC #/AREA URNS AUTO: 1 /HPF (ref 0–2)
SODIUM SERPL-SCNC: 136 MMOL/L (ref 133–144)
SOURCE: ABNORMAL
SP GR UR STRIP: 1.02 (ref 1–1.03)
TRIGL SERPL-MCNC: 145 MG/DL
TSH SERPL DL<=0.005 MIU/L-ACNC: 1.65 MU/L (ref 0.4–4)
UROBILINOGEN UR STRIP-MCNC: NORMAL MG/DL (ref 0–2)
WBC # BLD AUTO: 5.7 10E9/L (ref 4–11)
WBC #/AREA URNS AUTO: <1 /HPF (ref 0–5)

## 2018-12-20 PROCEDURE — 36415 COLL VENOUS BLD VENIPUNCTURE: CPT | Performed by: FAMILY MEDICINE

## 2018-12-20 PROCEDURE — 82043 UR ALBUMIN QUANTITATIVE: CPT | Performed by: FAMILY MEDICINE

## 2018-12-20 PROCEDURE — 80050 GENERAL HEALTH PANEL: CPT | Performed by: FAMILY MEDICINE

## 2018-12-20 PROCEDURE — 80061 LIPID PANEL: CPT | Performed by: FAMILY MEDICINE

## 2018-12-20 PROCEDURE — 83036 HEMOGLOBIN GLYCOSYLATED A1C: CPT | Performed by: FAMILY MEDICINE

## 2018-12-20 PROCEDURE — 81001 URINALYSIS AUTO W/SCOPE: CPT | Performed by: FAMILY MEDICINE

## 2018-12-20 PROCEDURE — G0103 PSA SCREENING: HCPCS | Performed by: FAMILY MEDICINE

## 2018-12-21 DIAGNOSIS — E78.2 MIXED HYPERLIPIDEMIA: ICD-10-CM

## 2018-12-24 RX ORDER — SIMVASTATIN 20 MG
TABLET ORAL
Qty: 30 TABLET | Refills: 11 | Status: SHIPPED | OUTPATIENT
Start: 2018-12-24 | End: 2020-01-09

## 2019-02-22 ENCOUNTER — MYC MEDICAL ADVICE (OUTPATIENT)
Dept: FAMILY MEDICINE | Facility: OTHER | Age: 59
End: 2019-02-22

## 2019-02-22 DIAGNOSIS — L98.9 SKIN LESION: Primary | ICD-10-CM

## 2019-04-08 ENCOUNTER — OFFICE VISIT (OUTPATIENT)
Dept: FAMILY MEDICINE | Facility: OTHER | Age: 59
End: 2019-04-08
Attending: FAMILY MEDICINE
Payer: COMMERCIAL

## 2019-04-08 VITALS
DIASTOLIC BLOOD PRESSURE: 88 MMHG | WEIGHT: 203 LBS | TEMPERATURE: 98 F | SYSTOLIC BLOOD PRESSURE: 144 MMHG | OXYGEN SATURATION: 98 % | HEART RATE: 79 BPM | BODY MASS INDEX: 32.77 KG/M2

## 2019-04-08 DIAGNOSIS — H65.91 OME (OTITIS MEDIA WITH EFFUSION), RIGHT: Primary | ICD-10-CM

## 2019-04-08 PROCEDURE — 99213 OFFICE O/P EST LOW 20 MIN: CPT | Performed by: FAMILY MEDICINE

## 2019-04-08 RX ORDER — AZITHROMYCIN 250 MG/1
TABLET, FILM COATED ORAL
Qty: 6 TABLET | Refills: 0 | Status: SHIPPED | OUTPATIENT
Start: 2019-04-08 | End: 2019-07-23

## 2019-04-08 ASSESSMENT — PAIN SCALES - GENERAL: PAINLEVEL: NO PAIN (0)

## 2019-04-08 NOTE — PROGRESS NOTES
"  SUBJECTIVE:                                                    Neo Cowan is a 58 year old male who presents to clinic today for the following health issues:        Right Ear problem       Duration: ongoing for a month feeling of water in the ear     Description (location/character/radiation): right ear    Intensity:  No pain    Accompanying signs and symptoms: water in the ear feeling, feels like its \"sloshing\" around     History (similar episodes/previous evaluation): None    Precipitating or alleviating factors: None    Therapies tried and outcome: None       Problem list and histories reviewed & adjusted, as indicated.  Additional history: as documented    Patient Active Problem List   Diagnosis     Gilbert syndrome     Obesity     Dyslipidemia     Seasonal allergic rhinitis     H/O adenomatous colonic polyps     Type 2 diabetes mellitus without complication (H)     Vitamin D deficiency     Comprehensive Medical Examination     Eczema     Screening for prostate cancer     Past Surgical History:   Procedure Laterality Date     COLONOSCOPY  8/5/2013    Procedure: COLONOSCOPY;  colonoscopy ;  Surgeon: Yobani Ventura MD;  [facility]     COLONOSCOPY N/A 3/12/2018    Procedure: COLONOSCOPY;  COLONOSCOPY with polypectomy;  Surgeon: Donovan Mayfield MD;  Location: [facility]     colonoscopy with polypectomy  1/27/2011    repeat 2 years     mandibular fracture repair       vasectomy       wisdom teeth extraction         Social History     Tobacco Use     Smoking status: Never Smoker     Smokeless tobacco: Current User     Types: Chew     Tobacco comment: Tried to quit; No passive exposure   Substance Use Topics     Alcohol use: No     Family History   Problem Relation Age of Onset     C.A.D. Father      Cancer Father         Lung, cause of death     Other - See Comments Father         Colon polyps     Alcohol/Drug Mother         Alcoholism     Other - See Comments Mother         Liver disease, cause of " death     C.A.D. Other         Family hx     Cancer Paternal Uncle         Cervical     Cancer Paternal Uncle         Throat     Hypertension Other         Family hx         Current Outpatient Medications   Medication Sig Dispense Refill     blood glucose (ACCU-CHEK SMARTVIEW) test strip Use to test blood sugar 3 times daily or as directed. 1 Month 12     blood glucose monitoring (ACCU-CHEK FASTCLIX) lancets Use to test blood sugar 3 times daily or as directed. 1 Box 12     fexofenadine (ALLEGRA) 180 MG tablet Take 1 tablet (180 mg) by mouth daily 90 tablet 1     simvastatin (ZOCOR) 20 MG tablet TAKE 1 TABLET BY MOUTH EVERY EVENING 30 tablet 11     Allergies   Allergen Reactions     Seasonal Allergies      Recent Labs   Lab Test 12/20/18  0741 12/19/17  0739 10/10/16  0754   A1C 10.8* 8.2* 7.1*   LDL 83 82 79   HDL 41 37* 43   TRIG 145 152* 122   * 72* 46   CR 0.92 0.98 0.96   GFRESTIMATED >90 79 81   GFRESTBLACK >90 >90 >90  African American GFR Calc     POTASSIUM 4.1 4.3 4.2   TSH 1.65 1.17 1.14      BP Readings from Last 3 Encounters:   04/08/19 144/88   12/18/18 134/82   03/14/18 158/90    Wt Readings from Last 3 Encounters:   04/08/19 92.1 kg (203 lb)   12/18/18 95.3 kg (210 lb)   03/14/18 93 kg (205 lb)                    ROS:  Constitutional, HEENT, cardiovascular, pulmonary, gi and gu systems are negative, except as otherwise noted.    OBJECTIVE:     /88 (BP Location: Right arm, Patient Position: Sitting, Cuff Size: Adult Large)   Pulse 79   Temp 98  F (36.7  C) (Tympanic)   Wt 92.1 kg (203 lb)   SpO2 98%   BMI 32.77 kg/m    Body mass index is 32.77 kg/m .  Physical Exam   Constitutional: He is oriented to person, place, and time. He appears well-developed and well-nourished. No distress.   HENT:   Head: Normocephalic.   Right Ear: Hearing, external ear and ear canal normal. Tympanic membrane is bulging. A middle ear effusion is present.   Left Ear: Hearing, tympanic membrane, external ear  and ear canal normal.   Nose: Nose normal. Right sinus exhibits no maxillary sinus tenderness and no frontal sinus tenderness. Left sinus exhibits no maxillary sinus tenderness and no frontal sinus tenderness.   Mouth/Throat: Uvula is midline and oropharynx is clear and moist. No oropharyngeal exudate or posterior oropharyngeal erythema.   Eyes: Conjunctivae are normal.   Neck: Neck supple.   Pulmonary/Chest: Effort normal and breath sounds normal.   Lymphadenopathy:     He has no cervical adenopathy.   Neurological: He is alert and oriented to person, place, and time.   Skin: Skin is warm and dry.   Psychiatric: He has a normal mood and affect.         Diagnostic Test Results:  none     ASSESSMENT/PLAN:     OME (otitis media with effusion), right  Azithromycin (Zithromax) as written.  Continue antihistamines.  If persists recommend ENT consultation.  - azithromycin (ZITHROMAX) 250 MG tablet; Two tablets first day, then one tablet daily for four days.    Sukhjinder Mccarthy MD  [facility] [place]

## 2019-04-08 NOTE — NURSING NOTE
"Chief Complaint   Patient presents with     Otitis Media       Initial /88 (BP Location: Right arm, Patient Position: Sitting, Cuff Size: Adult Large)   Pulse 79   Temp 98  F (36.7  C) (Tympanic)   Wt 92.1 kg (203 lb)   SpO2 98%   BMI 32.77 kg/m   Estimated body mass index is 32.77 kg/m  as calculated from the following:    Height as of 12/18/18: 1.676 m (5' 6\").    Weight as of this encounter: 92.1 kg (203 lb).  Medication Reconciliation: complete    Pati Linton LPN  "

## 2019-07-23 ENCOUNTER — OFFICE VISIT (OUTPATIENT)
Dept: DERMATOLOGY | Facility: OTHER | Age: 59
End: 2019-07-23
Attending: FAMILY MEDICINE
Payer: COMMERCIAL

## 2019-07-23 VITALS
SYSTOLIC BLOOD PRESSURE: 138 MMHG | DIASTOLIC BLOOD PRESSURE: 84 MMHG | BODY MASS INDEX: 32.28 KG/M2 | HEART RATE: 97 BPM | OXYGEN SATURATION: 97 % | WEIGHT: 200 LBS | RESPIRATION RATE: 16 BRPM

## 2019-07-23 DIAGNOSIS — L98.9 SKIN LESION: ICD-10-CM

## 2019-07-23 PROCEDURE — 99201 ZZC OFFICE/OUTPT VISIT, NEW, LEVEL I: CPT | Mod: 25 | Performed by: DERMATOLOGY

## 2019-07-23 PROCEDURE — 17000 DESTRUCT PREMALG LESION: CPT | Performed by: DERMATOLOGY

## 2019-07-23 ASSESSMENT — PAIN SCALES - GENERAL: PAINLEVEL: NO PAIN (0)

## 2019-07-23 NOTE — PROGRESS NOTES
Visit Date:   2019      SUBJECTIVE:  Radha comes in because of a lesion on the nose that has been of concern to him.  It scales from time to time and is causing him some concern.      OBJECTIVE:  Shows a very subtle tiny 1 mm area of roughness on touch.  Does not have a visible component, but I suspect the location and that particular symptom of feeling something that comes and goes is an actinic keratosis.      The lesion was treated with cryotherapy.  He was reassured.  He did have another lesion on his chin that appeared to be a tiny hemangioma.  Chest and back were checked and these did not show any lesions of concern.     PLAN:  Return p.rreal CONLEY MD             D: 2019   T: 2019   MT: LIANNA      Name:     RADHA RAYGOZA   MRN:      [id number]        Account:      [id number]   :      1960           Visit Date:   2019      Document: [id number]

## 2019-07-23 NOTE — LETTER
2019       RE: Radha Raygoza  [address] [facility] [id number]     Dear Colleague,    Thank you for referring your patient, Radha Raygoza, to the [facility]. Please see a copy of my visit note below.    Visit Date:   2019      SUBJECTIVE:  Radha comes in because of a lesion on the nose that has been of concern to him.  It scales from time to time and is causing him some concern.      OBJECTIVE:  Shows a very subtle tiny 1 mm area of roughness on touch.  Does not have a visible component, but I suspect the location and that particular symptom of feeling something that comes and goes is an actinic keratosis.      The lesion was treated with cryotherapy.  He was reassured.  He did have another lesion on his chin that appeared to be a tiny hemangioma.  Chest and back were checked and these did not show any lesions of concern.     PLAN:  Return p.r.n.         CAYLA CONLEY MD             D: 2019   T: 2019   MT: LIANNA      Name:     RADHA RAYGOZA   MRN:      [id number]        Account:      [id number]   :      1960           Visit Date:   2019      Document: [id number]        Again, thank you for allowing me to participate in the care of your patient.      Sincerely,    CAYLA Conley MD

## 2019-07-23 NOTE — NURSING NOTE
"Chief Complaint   Patient presents with     Consult     Skin lesion per Shari       Initial /84   Pulse 97   Resp 16   Wt 90.7 kg (200 lb)   SpO2 97%   BMI 32.28 kg/m   Estimated body mass index is 32.28 kg/m  as calculated from the following:    Height as of 12/18/18: 1.676 m (5' 6\").    Weight as of this encounter: 90.7 kg (200 lb).  Medication Reconciliation: complete    "

## 2019-09-30 ENCOUNTER — TRANSFERRED RECORDS (OUTPATIENT)
Dept: HEALTH INFORMATION MANAGEMENT | Facility: CLINIC | Age: 59
End: 2019-09-30

## 2019-10-23 ENCOUNTER — TRANSFERRED RECORDS (OUTPATIENT)
Dept: HEALTH INFORMATION MANAGEMENT | Facility: CLINIC | Age: 59
End: 2019-10-23

## 2019-12-26 NOTE — PROGRESS NOTES
3  SUBJECTIVE:   CC: Neo Cowan is an 59 year old male who presents for preventive health visit.     Healthy Habits:    Do you get at least three servings of calcium containing foods daily (dairy, green leafy vegetables, etc.)? no, taking calcium and/or vitamin D supplement: no    Amount of exercise or daily activities, outside of work: 2 day(s) per week    Problems taking medications regularly No    Medication side effects: No    Have you had an eye exam in the past two years? yes    Do you see a dentist twice per year? yes    Do you have sleep apnea, excessive snoring or daytime drowsiness?no      Today's PHQ-2 Score:   PHQ-2 ( 1999 [employer]) 12/31/2019 4/8/2019   Q1: Little interest or pleasure in doing things 0 0   Q2: Feeling down, depressed or hopeless 0 0   PHQ-2 Score 0 0       Abuse: Current or Past(Physical, Sexual or Emotional)- No  Do you feel safe in your environment? Yes    Have you ever done Advance Care Planning? (For example, a Health Directive, POLST, or a discussion with a medical provider or your loved ones about your wishes): Yes, patient states has an Advance Care Planning document and will bring a copy to the clinic.    Social History     Tobacco Use     Smoking status: Never Smoker     Smokeless tobacco: Current User     Types: Chew     Tobacco comment: Tried to quit; No passive exposure   Substance Use Topics     Alcohol use: No     If you drink alcohol do you typically have >3 drinks per day or >7 drinks per week? No                      Last PSA:   PSA   Date Value Ref Range Status   12/20/2018 2.27 0 - 4 ug/L Final     Comment:     Assay Method:  Chemiluminescence using Siemens Vista analyzer       Reviewed orders with patient. Reviewed health maintenance and updated orders accordingly - Yes  BP Readings from Last 3 Encounters:   12/31/19 (!) 154/84   07/23/19 138/84   04/08/19 144/88    Wt Readings from Last 3 Encounters:   12/31/19 91.6 kg (202 lb)   07/23/19 90.7 kg (200 lb)    04/08/19 92.1 kg (203 lb)                  Patient Active Problem List   Diagnosis     Gilbert syndrome     Obesity     Dyslipidemia     Seasonal allergic rhinitis     H/O adenomatous colonic polyps     Type 2 diabetes mellitus without complication (H)     Vitamin D deficiency     Comprehensive Medical Examination     Eczema     Screening for prostate cancer     Past Surgical History:   Procedure Laterality Date     COLONOSCOPY  8/5/2013    Procedure: COLONOSCOPY;  colonoscopy ;  Surgeon: Yobani Ventura MD;  Location: [facility]     COLONOSCOPY N/A 3/12/2018    Procedure: COLONOSCOPY;  COLONOSCOPY with polypectomy;  Surgeon: Donovan Mayfield MD;  Location: [facility]     colonoscopy with polypectomy  1/27/2011    repeat 2 years     mandibular fracture repair       vasectomy       wisdom teeth extraction         Social History     Tobacco Use     Smoking status: Never Smoker     Smokeless tobacco: Current User     Types: Chew     Tobacco comment: Tried to quit; No passive exposure   Substance Use Topics     Alcohol use: No     Family History   Problem Relation Age of Onset     C.A.D. Father      Cancer Father         Lung, cause of death     Other - See Comments Father         Colon polyps     Alcohol/Drug Mother         Alcoholism     Other - See Comments Mother         Liver disease, cause of death     C.A.D. Other         Family hx     Cancer Paternal Uncle         Cervical     Cancer Paternal Uncle         Throat     Hypertension Other         Family hx         Current Outpatient Medications   Medication Sig Dispense Refill     blood glucose (ACCU-CHEK SMARTVIEW) test strip Use to test blood sugar 3 times daily or as directed. 1 Month 12     blood glucose monitoring (ACCU-CHEK FASTCLIX) lancets Use to test blood sugar 3 times daily or as directed. 1 Box 12     fexofenadine (ALLEGRA) 180 MG tablet Take 1 tablet (180 mg) by mouth daily 90 tablet 1     simvastatin (ZOCOR) 20 MG tablet TAKE 1 TABLET BY MOUTH EVERY  EVENING 30 tablet 11     Allergies   Allergen Reactions     Seasonal Allergies      Recent Labs   Lab Test 12/20/18  0741 12/19/17  0739 10/10/16  0754   A1C 10.8* 8.2* 7.1*   LDL 83 82 79   HDL 41 37* 43   TRIG 145 152* 122   * 72* 46   CR 0.92 0.98 0.96   GFRESTIMATED >90 79 81   GFRESTBLACK >90 >90 >90  African American GFR Calc     POTASSIUM 4.1 4.3 4.2   TSH 1.65 1.17 1.14        Reviewed and updated as needed this visit by clinical staff  Tobacco  Allergies  Meds  Problems  Med Hx  Surg Hx  Fam Hx         Reviewed and updated as needed this visit by Provider        Past Medical History:   Diagnosis Date     Alcoholism 1/1/2011     Allergic rhinitis, cause unspecified 3/2/2000     Anxiety  1/1/2011     Colonic Polyps 3/2/2000     Depression 1/1/2011     Gilbert's Syndrome 3/2/2000     Hypercholesterolemia 3/2/2000     Overweight(278.02) 3/2/2000     Prediabetes 3/2/2000      Past Surgical History:   Procedure Laterality Date     COLONOSCOPY  8/5/2013    Procedure: COLONOSCOPY;  colonoscopy ;  Surgeon: Yobani Ventura MD;  [facility]     COLONOSCOPY N/A 3/12/2018    Procedure: COLONOSCOPY;  COLONOSCOPY with polypectomy;  Surgeon: Donovan Mayfield MD;  Location: [facility]     colonoscopy with polypectomy  1/27/2011    repeat 2 years     mandibular fracture repair       vasectomy       wisdom teeth extraction         ROS:  CONSTITUTIONAL: NEGATIVE for fever, chills, change in weight  INTEGUMENTARY/SKIN: NEGATIVE for worrisome rashes, moles or lesions  EYES: NEGATIVE for vision changes or irritation  ENT: NEGATIVE for ear, mouth and throat problems  RESP: NEGATIVE for significant cough or SOB  CV: NEGATIVE for chest pain, palpitations or peripheral edema  GI: NEGATIVE for nausea, abdominal pain, heartburn, or change in bowel habits   male: negative for dysuria, hematuria, decreased urinary stream, erectile dysfunction, urethral discharge  MUSCULOSKELETAL: NEGATIVE for significant  "arthralgias or myalgia  NEURO: NEGATIVE for weakness, dizziness or paresthesias  PSYCHIATRIC: NEGATIVE for changes in mood or affect    OBJECTIVE:   BP (!) 154/84 (BP Location: Left arm, Patient Position: Sitting, Cuff Size: Adult Regular)   Pulse 79   Temp 98.7  F (37.1  C) (Tympanic)   Resp 20   Ht 1.676 m (5' 6\")   Wt 91.6 kg (202 lb)   SpO2 97%   BMI 32.60 kg/m    EXAM:  GENERAL: healthy, alert and no distress  EYES: Eyes grossly normal to inspection, PERRL and conjunctivae and sclerae normal  HENT: ear canals and TM's normal, nose and mouth without ulcers or lesions  NECK: no adenopathy, no asymmetry, masses, or scars and thyroid normal to palpation  RESP: lungs clear to auscultation - no rales, rhonchi or wheezes  CV: regular rate and rhythm, normal S1 S2, no S3 or S4, no murmur, click or rub, no peripheral edema and peripheral pulses strong  ABDOMEN: soft, nontender, no hepatosplenomegaly, no masses and bowel sounds normal  MS: no gross musculoskeletal defects noted, no edema  SKIN: no suspicious lesions or rashes  NEURO: Normal strength and tone, mentation intact and speech normal  PSYCH: mentation appears normal, affect normal/bright    Diagnostic Test Results:  Labs reviewed in Epic    ASSESSMENT/PLAN:   1. Routine general medical examination at a health care facility  Appropriate screening labs are drawn.  Most recent colonoscopy and immunizations are reviewed.  Risk factors, aspirin use, screening recommendations, and follow up discussed.  Routine exam in 1 year.      2. Type 2 diabetes mellitus without complication, without long-term current use of insulin (H)  Fasting labs are reviewed.  Goal of hemoglobin A1C of less than 7 discussed.  Instructed in the importance of diet, exercise, and good glycemic control in prevention of secondary complications.    Continue same medication regimen. Repeat fasting glucose and hemoglobin A1C in 6 months.     3. Dyslipidemia  Fasting labs reviewed.  Goals " "discussed.  Discussed the importance of diet, exercise, and weight reduction.  Follow up 12 months.       COUNSELING:  Reviewed preventive health counseling, as reflected in patient instructions  Special attention given to:        Regular exercise       Healthy diet/nutrition    Estimated body mass index is 32.6 kg/m  as calculated from the following:    Height as of this encounter: 1.676 m (5' 6\").    Weight as of this encounter: 91.6 kg (202 lb).    Weight management plan: Discussed healthy diet and exercise guidelines     reports that he has never smoked. His smokeless tobacco use includes chew.      Counseling Resources:  ATP IV Guidelines  Pooled Cohorts Equation Calculator  FRAX Risk Assessment  ICSI Preventive Guidelines  Dietary Guidelines for Americans, 2010  USDA's MyPlate  ASA Prophylaxis  Lung CA Screening    Sukhjinder Mccarthy MD  [facility] - [place]  "

## 2019-12-31 ENCOUNTER — OFFICE VISIT (OUTPATIENT)
Dept: FAMILY MEDICINE | Facility: OTHER | Age: 59
End: 2019-12-31
Attending: FAMILY MEDICINE
Payer: COMMERCIAL

## 2019-12-31 VITALS
RESPIRATION RATE: 20 BRPM | WEIGHT: 202 LBS | BODY MASS INDEX: 32.47 KG/M2 | TEMPERATURE: 98.7 F | DIASTOLIC BLOOD PRESSURE: 84 MMHG | HEIGHT: 66 IN | OXYGEN SATURATION: 97 % | SYSTOLIC BLOOD PRESSURE: 154 MMHG | HEART RATE: 79 BPM

## 2019-12-31 DIAGNOSIS — Z12.5 SCREENING FOR PROSTATE CANCER: ICD-10-CM

## 2019-12-31 DIAGNOSIS — J30.1 CHRONIC SEASONAL ALLERGIC RHINITIS DUE TO POLLEN: ICD-10-CM

## 2019-12-31 DIAGNOSIS — E11.9 TYPE 2 DIABETES MELLITUS WITHOUT COMPLICATION, WITHOUT LONG-TERM CURRENT USE OF INSULIN (H): ICD-10-CM

## 2019-12-31 DIAGNOSIS — Z00.00 ROUTINE GENERAL MEDICAL EXAMINATION AT A HEALTH CARE FACILITY: Primary | ICD-10-CM

## 2019-12-31 DIAGNOSIS — E78.2 MIXED HYPERLIPIDEMIA: ICD-10-CM

## 2019-12-31 PROCEDURE — 99396 PREV VISIT EST AGE 40-64: CPT | Performed by: FAMILY MEDICINE

## 2019-12-31 RX ORDER — FEXOFENADINE HCL 180 MG/1
180 TABLET ORAL DAILY
Qty: 90 TABLET | Refills: 1 | Status: SHIPPED | OUTPATIENT
Start: 2019-12-31 | End: 2022-11-30

## 2019-12-31 ASSESSMENT — MIFFLIN-ST. JEOR: SCORE: 1669.02

## 2019-12-31 ASSESSMENT — PAIN SCALES - GENERAL: PAINLEVEL: NO PAIN (0)

## 2019-12-31 NOTE — NURSING NOTE
"Chief Complaint   Patient presents with     Physical       Initial BP (!) 154/84 (BP Location: Left arm, Patient Position: Sitting, Cuff Size: Adult Regular)   Pulse 79   Temp 98.7  F (37.1  C) (Tympanic)   Resp 20   Ht 1.676 m (5' 6\")   Wt 91.6 kg (202 lb)   SpO2 97%   BMI 32.60 kg/m   Estimated body mass index is 32.6 kg/m  as calculated from the following:    Height as of this encounter: 1.676 m (5' 6\").    Weight as of this encounter: 91.6 kg (202 lb).  Medication Reconciliation: complete  Asha Mackey LPN  "

## 2020-01-13 DIAGNOSIS — Z12.5 SCREENING FOR PROSTATE CANCER: ICD-10-CM

## 2020-01-13 DIAGNOSIS — E78.2 MIXED HYPERLIPIDEMIA: ICD-10-CM

## 2020-01-13 DIAGNOSIS — E11.9 TYPE 2 DIABETES MELLITUS WITHOUT COMPLICATION, WITHOUT LONG-TERM CURRENT USE OF INSULIN (H): ICD-10-CM

## 2020-01-13 LAB
ALBUMIN SERPL-MCNC: 3.6 G/DL (ref 3.4–5)
ALP SERPL-CCNC: 83 U/L (ref 40–150)
ALT SERPL W P-5'-P-CCNC: 106 U/L (ref 0–70)
ANION GAP SERPL CALCULATED.3IONS-SCNC: 6 MMOL/L (ref 3–14)
AST SERPL W P-5'-P-CCNC: 67 U/L (ref 0–45)
BASOPHILS # BLD AUTO: 0 10E9/L (ref 0–0.2)
BASOPHILS NFR BLD AUTO: 0.2 %
BILIRUB SERPL-MCNC: 1.2 MG/DL (ref 0.2–1.3)
BUN SERPL-MCNC: 11 MG/DL (ref 7–30)
CALCIUM SERPL-MCNC: 9.3 MG/DL (ref 8.5–10.1)
CHLORIDE SERPL-SCNC: 101 MMOL/L (ref 94–109)
CHOLEST SERPL-MCNC: 143 MG/DL
CO2 SERPL-SCNC: 26 MMOL/L (ref 20–32)
CREAT SERPL-MCNC: 1 MG/DL (ref 0.66–1.25)
DIFFERENTIAL METHOD BLD: ABNORMAL
EOSINOPHIL # BLD AUTO: 0 10E9/L (ref 0–0.7)
EOSINOPHIL NFR BLD AUTO: 0.7 %
ERYTHROCYTE [DISTWIDTH] IN BLOOD BY AUTOMATED COUNT: 12.2 % (ref 10–15)
EST. AVERAGE GLUCOSE BLD GHB EST-MCNC: 258 MG/DL
GFR SERPL CREATININE-BSD FRML MDRD: 82 ML/MIN/{1.73_M2}
GLUCOSE SERPL-MCNC: 243 MG/DL (ref 70–99)
HBA1C MFR BLD: 10.6 % (ref 0–5.6)
HCT VFR BLD AUTO: 46.6 % (ref 40–53)
HDLC SERPL-MCNC: 40 MG/DL
HGB BLD-MCNC: 16 G/DL (ref 13.3–17.7)
IMM GRANULOCYTES # BLD: 0 10E9/L (ref 0–0.4)
IMM GRANULOCYTES NFR BLD: 0.2 %
LDLC SERPL CALC-MCNC: 76 MG/DL
LYMPHOCYTES # BLD AUTO: 1.4 10E9/L (ref 0.8–5.3)
LYMPHOCYTES NFR BLD AUTO: 30.1 %
MCH RBC QN AUTO: 31.5 PG (ref 26.5–33)
MCHC RBC AUTO-ENTMCNC: 34.3 G/DL (ref 31.5–36.5)
MCV RBC AUTO: 92 FL (ref 78–100)
MONOCYTES # BLD AUTO: 0.6 10E9/L (ref 0–1.3)
MONOCYTES NFR BLD AUTO: 12 %
NEUTROPHILS # BLD AUTO: 2.6 10E9/L (ref 1.6–8.3)
NEUTROPHILS NFR BLD AUTO: 56.8 %
NONHDLC SERPL-MCNC: 103 MG/DL
NRBC # BLD AUTO: 0 10*3/UL
NRBC BLD AUTO-RTO: 0 /100
PLATELET # BLD AUTO: 120 10E9/L (ref 150–450)
POTASSIUM SERPL-SCNC: 3.9 MMOL/L (ref 3.4–5.3)
PROT SERPL-MCNC: 7.7 G/DL (ref 6.8–8.8)
PSA SERPL-ACNC: 1.68 UG/L (ref 0–4)
RBC # BLD AUTO: 5.08 10E12/L (ref 4.4–5.9)
SODIUM SERPL-SCNC: 133 MMOL/L (ref 133–144)
TRIGL SERPL-MCNC: 135 MG/DL
TSH SERPL DL<=0.005 MIU/L-ACNC: 1.71 MU/L (ref 0.4–4)
WBC # BLD AUTO: 4.6 10E9/L (ref 4–11)

## 2020-01-13 PROCEDURE — 80061 LIPID PANEL: CPT | Performed by: FAMILY MEDICINE

## 2020-01-13 PROCEDURE — 36415 COLL VENOUS BLD VENIPUNCTURE: CPT | Performed by: FAMILY MEDICINE

## 2020-01-13 PROCEDURE — 80050 GENERAL HEALTH PANEL: CPT | Performed by: FAMILY MEDICINE

## 2020-01-13 PROCEDURE — 83036 HEMOGLOBIN GLYCOSYLATED A1C: CPT | Performed by: FAMILY MEDICINE

## 2020-01-13 PROCEDURE — G0103 PSA SCREENING: HCPCS | Performed by: FAMILY MEDICINE

## 2020-01-24 ENCOUNTER — OFFICE VISIT (OUTPATIENT)
Dept: FAMILY MEDICINE | Facility: OTHER | Age: 60
End: 2020-01-24
Attending: FAMILY MEDICINE
Payer: COMMERCIAL

## 2020-01-24 VITALS
BODY MASS INDEX: 32.14 KG/M2 | OXYGEN SATURATION: 98 % | SYSTOLIC BLOOD PRESSURE: 156 MMHG | HEIGHT: 66 IN | TEMPERATURE: 98.5 F | DIASTOLIC BLOOD PRESSURE: 90 MMHG | HEART RATE: 79 BPM | WEIGHT: 200 LBS | RESPIRATION RATE: 18 BRPM

## 2020-01-24 DIAGNOSIS — E11.9 TYPE 2 DIABETES MELLITUS WITHOUT COMPLICATION, WITHOUT LONG-TERM CURRENT USE OF INSULIN (H): Primary | ICD-10-CM

## 2020-01-24 PROCEDURE — 99213 OFFICE O/P EST LOW 20 MIN: CPT | Performed by: FAMILY MEDICINE

## 2020-01-24 ASSESSMENT — ANXIETY QUESTIONNAIRES
4. TROUBLE RELAXING: NOT AT ALL
1. FEELING NERVOUS, ANXIOUS, OR ON EDGE: NOT AT ALL
GAD7 TOTAL SCORE: 0
7. FEELING AFRAID AS IF SOMETHING AWFUL MIGHT HAPPEN: NOT AT ALL
2. NOT BEING ABLE TO STOP OR CONTROL WORRYING: NOT AT ALL
3. WORRYING TOO MUCH ABOUT DIFFERENT THINGS: NOT AT ALL
5. BEING SO RESTLESS THAT IT IS HARD TO SIT STILL: NOT AT ALL
6. BECOMING EASILY ANNOYED OR IRRITABLE: NOT AT ALL

## 2020-01-24 ASSESSMENT — MIFFLIN-ST. JEOR: SCORE: 1659.94

## 2020-01-24 ASSESSMENT — PATIENT HEALTH QUESTIONNAIRE - PHQ9: SUM OF ALL RESPONSES TO PHQ QUESTIONS 1-9: 0

## 2020-01-24 ASSESSMENT — PAIN SCALES - GENERAL: PAINLEVEL: NO PAIN (0)

## 2020-01-24 NOTE — NURSING NOTE
"Chief Complaint   Patient presents with     RECHECK       Initial BP (!) 156/90 (BP Location: Right arm, Patient Position: Sitting, Cuff Size: Adult Large)   Pulse 79   Temp 98.5  F (36.9  C) (Tympanic)   Resp 18   Ht 1.676 m (5' 6\")   Wt 90.7 kg (200 lb)   SpO2 98%   BMI 32.28 kg/m   Estimated body mass index is 32.28 kg/m  as calculated from the following:    Height as of this encounter: 1.676 m (5' 6\").    Weight as of this encounter: 90.7 kg (200 lb).  Medication Reconciliation: complete  Asha Mackey LPN  "

## 2020-01-25 ASSESSMENT — ANXIETY QUESTIONNAIRES: GAD7 TOTAL SCORE: 0

## 2020-01-25 NOTE — PROGRESS NOTES
SUBJECTIVE:   Neo Cowan is a 59 year old male who presents to clinic today for the following health issues:    Diabetes Follow-up      How often are you checking your blood sugar? Not at all    What concerns do you have today about your diabetes? Other: Elevated blood glucose      Do you have any of these symptoms? (Select all that apply)  No numbness or tingling in feet.  No redness, sores or blisters on feet.  No complaints of excessive thirst.  No reports of blurry vision.  No significant changes to weight.      BP Readings from Last 2 Encounters:   01/24/20 (!) 156/90   12/31/19 (!) 154/84     Hemoglobin A1C (%)   Date Value   01/13/2020 10.6 (H)   12/20/2018 10.8 (H)     LDL Cholesterol Calculated (mg/dL)   Date Value   01/13/2020 76   12/20/2018 83       How many servings of fruits and vegetables do you eat daily?  2-3    On average, how many sweetened beverages do you drink each day (Examples: soda, juice, sweet tea, etc.  Do NOT count diet or artificially sweetened beverages)?   0    How many days per week do you exercise enough to make your heart beat faster? 3 or less    How many minutes a day do you exercise enough to make your heart beat faster? 10 - 19    How many days per week do you miss taking your medication? 0    Neo has had a progressively rising hemoglobin A1c over the last years.  He has been on diet therapy and has not required medication.  Neo has not been regularly checking his blood glucose.       Problem list and histories reviewed & adjusted, as indicated.  Additional history: as documented    Patient Active Problem List   Diagnosis     Gilbert syndrome     Obesity     Dyslipidemia     Seasonal allergic rhinitis     H/O adenomatous colonic polyps     Type 2 diabetes mellitus without complication (H)     Vitamin D deficiency     Comprehensive Medical Examination     Eczema     Screening for prostate cancer     Past Surgical History:   Procedure Laterality Date     COLONOSCOPY   8/5/2013    Procedure: COLONOSCOPY;  colonoscopy ;  Surgeon: Yobani Ventura MD;  [facility]     COLONOSCOPY N/A 3/12/2018    Procedure: COLONOSCOPY;  COLONOSCOPY with polypectomy;  Surgeon: Donovan Mayfield MD;  Location: [facility]     colonoscopy with polypectomy  1/27/2011    repeat 2 years     mandibular fracture repair       vasectomy       wisdom teeth extraction         Social History     Tobacco Use     Smoking status: Never Smoker     Smokeless tobacco: Current User     Types: Chew     Tobacco comment: Tried to quit; No passive exposure   Substance Use Topics     Alcohol use: No     Family History   Problem Relation Age of Onset     C.A.D. Father      Cancer Father         Lung, cause of death     Other - See Comments Father         Colon polyps     Alcohol/Drug Mother         Alcoholism     Other - See Comments Mother         Liver disease, cause of death     C.A.D. Other         Family hx     Cancer Paternal Uncle         Cervical     Cancer Paternal Uncle         Throat     Hypertension Other         Family hx         Current Outpatient Medications   Medication Sig Dispense Refill     blood glucose (ACCU-CHEK SMARTVIEW) test strip Use to test blood sugar 3 times daily or as directed. 1 Month 12     blood glucose monitoring (ACCU-CHEK FASTCLIX) lancets Use to test blood sugar 3 times daily or as directed. 1 Box 12     fexofenadine (ALLEGRA) 180 MG tablet Take 1 tablet (180 mg) by mouth daily 90 tablet 1     metFORMIN (GLUCOPHAGE) 500 MG tablet Take 1 tablet (500 mg) by mouth 2 times daily (with meals) 60 tablet 1     simvastatin (ZOCOR) 20 MG tablet TAKE 1 TABLET BY MOUTH EVERY EVENING 30 tablet 0     Allergies   Allergen Reactions     Seasonal Allergies      Recent Labs   Lab Test 01/13/20  0749 12/20/18  0741 12/19/17  0739   A1C 10.6* 10.8* 8.2*   LDL 76 83 82   HDL 40 41 37*   TRIG 135 145 152*   * 110* 72*   CR 1.00 0.92 0.98   GFRESTIMATED 82 >90 79   GFRESTBLACK >90 >90 >90  "  POTASSIUM 3.9 4.1 4.3   TSH 1.71 1.65 1.17      BP Readings from Last 3 Encounters:   01/24/20 (!) 156/90   12/31/19 (!) 154/84   07/23/19 138/84    Wt Readings from Last 3 Encounters:   01/24/20 90.7 kg (200 lb)   12/31/19 91.6 kg (202 lb)   07/23/19 90.7 kg (200 lb)                    Reviewed and updated as needed this visit by clinical staff  Tobacco  Allergies  Meds  Problems  Med Hx  Surg Hx  Fam Hx       Reviewed and updated as needed this visit by Provider         ROS:  Constitutional, HEENT, cardiovascular, pulmonary, gi and gu systems are negative, except as otherwise noted.    OBJECTIVE:     BP (!) 156/90 (BP Location: Right arm, Patient Position: Sitting, Cuff Size: Adult Large)   Pulse 79   Temp 98.5  F (36.9  C) (Tympanic)   Resp 18   Ht 1.676 m (5' 6\")   Wt 90.7 kg (200 lb)   SpO2 98%   BMI 32.28 kg/m    Body mass index is 32.28 kg/m .  Physical Exam    Other exam not repeated.  Diagnostic Test Results:  No results found for this or any previous visit (from the past 24 hour(s)).    ASSESSMENT/PLAN:     Type 2 diabetes mellitus without complication, without long-term current use of insulin (H)  Fasting labs are reviewed.  Goal of hemoglobin A1C of less than 7 discussed.  Instructed in the importance of diet, exercise, and good glycemic control in prevention of secondary complications.  Begin metformin twice daily.  Repeat fasting glucose and hemoglobin A1C in 6 months.   - metFORMIN (GLUCOPHAGE) 500 MG tablet; Take 1 tablet (500 mg) by mouth 2 times daily (with meals)  - DIABETES EDUCATION REFERRAL ([place])            Sukhjinder Mccarthy MD  [facility] - [place]    "

## 2020-02-06 ENCOUNTER — HOSPITAL ENCOUNTER (OUTPATIENT)
Dept: EDUCATION SERVICES | Facility: HOSPITAL | Age: 60
Discharge: HOME | End: 2020-02-06
Attending: FAMILY MEDICINE | Admitting: FAMILY MEDICINE
Payer: COMMERCIAL

## 2020-02-06 VITALS
BODY MASS INDEX: 31.34 KG/M2 | DIASTOLIC BLOOD PRESSURE: 80 MMHG | HEART RATE: 87 BPM | RESPIRATION RATE: 16 BRPM | OXYGEN SATURATION: 97 % | SYSTOLIC BLOOD PRESSURE: 140 MMHG | HEIGHT: 67 IN | WEIGHT: 199.7 LBS

## 2020-02-06 DIAGNOSIS — E11.9 TYPE 2 DIABETES MELLITUS WITHOUT COMPLICATION, WITHOUT LONG-TERM CURRENT USE OF INSULIN (H): Primary | ICD-10-CM

## 2020-02-06 DIAGNOSIS — E11.9 DIABETES MELLITUS, TYPE 2 (H): ICD-10-CM

## 2020-02-06 PROCEDURE — G0108 DIAB MANAGE TRN  PER INDIV: HCPCS | Performed by: DIETITIAN, REGISTERED

## 2020-02-06 RX ORDER — LANCETS
EACH MISCELLANEOUS
Qty: 100 EACH | Refills: 3 | Status: SHIPPED | OUTPATIENT
Start: 2020-02-06

## 2020-02-06 ASSESSMENT — MIFFLIN-ST. JEOR: SCORE: 1674.46

## 2020-02-06 ASSESSMENT — PAIN SCALES - GENERAL: PAINLEVEL: NO PAIN (0)

## 2020-02-06 NOTE — LETTER
"    2/6/2020        RE: Neo LEWIS Camryn  [address] [facility] [address]        Diabetes Self-Management Education & Support    Presents for: Individual review    SUBJECTIVE/OBJECTIVE:  Presents for: Individual review  Accompanied by: Self  Diabetes education in the past 24mo: No  Focus of Visit: Monitoring, Assistance w/ making life changes  Diabetes type: Type 2  Date of diagnosis: 12/2014  Disease course: Worsening  How confident are you filling out medical forms by yourself:: Extremely  Diabetes management related comments/concerns: Wants to get A1c down  Transportation concerns: No  Difficulty affording diabetes medication?: No  Difficulty affording diabetes testing supplies?: No  Other concerns:: None  Cultural Influences/Ethnic Background:  American    Diabetes Symptoms & Complications:  Fatigue: No  Neuropathy: No  Polydipsia: No  Polyphagia: No  Polyuria: No  Visual change: No  Slow healing wounds: No  Other: Yes(Recent cold, fever, chills )  Symptom course: Stable  Weight trend: Stable  Complications assessed today?: Yes  CVA: No  Heart disease: No  Nephropathy: No    Patient Problem List and Family Medical History reviewed for relevant medical history, current medical status, and diabetes risk factors.    Vitals:  /80   Pulse 87   Resp 16   Ht 1.702 m (5' 7\")   Wt 90.6 kg (199 lb 11.2 oz)   SpO2 97%   BMI 31.28 kg/m     Estimated body mass index is 31.28 kg/m  as calculated from the following:    Height as of this encounter: 1.702 m (5' 7\").    Weight as of this encounter: 90.6 kg (199 lb 11.2 oz).   Last 3 BP:   BP Readings from Last 3 Encounters:   02/06/20 140/80   01/24/20 (!) 156/90   12/31/19 (!) 154/84       History   Smoking Status     Never Smoker   Smokeless Tobacco     Current User     Types: Chew     Comment: Tried to quit; No passive exposure       Labs:  Lab Results   Component Value Date    A1C 10.6 01/13/2020     Lab Results   Component Value Date     01/13/2020     Lab " Results   Component Value Date    LDL 76 01/13/2020     HDL Cholesterol   Date Value Ref Range Status   01/13/2020 40 >39 mg/dL Final   ]  GFR Estimate   Date Value Ref Range Status   01/13/2020 82 >60 mL/min/[1.73_m2] Final     Comment:     Non  GFR Calc  Starting 12/18/2018, serum creatinine based estimated GFR (eGFR) will be   calculated using the Chronic Kidney Disease Epidemiology Collaboration   (CKD-EPI) equation.       GFR Estimate If Black   Date Value Ref Range Status   01/13/2020 >90 >60 mL/min/[1.73_m2] Final     Comment:      GFR Calc  Starting 12/18/2018, serum creatinine based estimated GFR (eGFR) will be   calculated using the Chronic Kidney Disease Epidemiology Collaboration   (CKD-EPI) equation.       Lab Results   Component Value Date    CR 1.00 01/13/2020     No results found for: MICROALBUMIN    Healthy Eating:  Healthy Eating Assessed Today: Yes  Cultural/Mandaeism diet restrictions?: No  Meal planning/habits: Carb counting  How many times a week on average do you eat food made away from home (restaurant/take-out)?: (Limited dining out)  Meals include: Breakfast, Lunch, Dinner, Morning Snack  Breakfast: 2 each granola bars or egg muffins - water   Lunch: salads with cheese/sometimes eggs, Kosovan/blue cheese - water or diet soda   Dinner: meat, potatoes, veggies -water   Snacks: am-cheese stick, jerky, peanuts, sometimes popcorn   Other: stopped milk and chocolate milk   Beverages: Water, Diet soda  Has patient met with a dietitian in the past?: Yes    Being Active:  Being Active Assessed Today: Yes  Exercise:: Currently not exercising  Barrier to exercise: None    Monitoring:  Monitoring Assessed Today: Yes  Did patient bring glucose meter to appointment? : No(Meter is broken )    Taking Medications:  Diabetes Medication(s)     Biguanides       metFORMIN (GLUCOPHAGE) 500 MG tablet    Take 1 tablet (500 mg) by mouth 2 times daily (with meals)        Taking  Medication Assessed Today: Yes  Current Treatments: Diet, Oral Medication (taken by mouth)(Metformin 500 mg bid - just started on 1/24/20 )  Problems taking diabetes medications regularly?: No  Diabetes medication side effects?: Yes(some diarrhea but tolerable)    Problem Solving:  Problem Solving Assessed Today: Yes  Is the patient at risk for hypoglycemia?: No  Is the patient at risk for DKA?: No  Does patient have severe weather/disaster plan for diabetes management?: Not Needed    Reducing Risks:  Reducing Risks Assessed Today: Yes  Diabetes Risks: Age over 45 years, Hyperlipidemia, Sedentary Lifestyle  CAD Risks: Diabetes Mellitus, Family history, Male sex, Obesity, Sedentary lifestyle  Has dilated eye exam at least once a year?: Yes  Sees dentist every 6 months?: Yes  Feet checked by healthcare provider in the last year?: No    Healthy Coping:  Healthy Coping Assessed Today: Yes  Emotional response to diabetes: Acceptance, Concern for health and well-being  Informal Support system:: Family  Stage of change: ACTION (Actively working towards change)  Support resources: None  Patient Activation Measure Survey Score:  NOY Score (Last Two) 12/18/2018   NOY Raw Score 37   Activation Score 79.2   NOY Level 4     Diabetes knowledge and skills assessment:   Patient is knowledgeable in diabetes management concepts related to: Healthy Eating    Patient needs further education on the following diabetes management concepts: Being Active, Monitoring, Taking Medication, Problem Solving, Reducing Risks and Healthy Coping    Based on learning assessment above, most appropriate setting for further diabetes education would be: Individual setting.      INTERVENTIONS:    Education provided today on:  AADE Self-Care Behaviors:  Healthy Eating: carbohydrate counting  Being Active: relationship to blood glucose  Monitoring: New Accu-Chek Guide Meter provided and supplies ordered.  Instructed pt on use.  Glucose level was 166 - 1.5  hours post lunch  Reducing Risks:  A1c goal of < 7.0%.     Opportunities for ongoing education and support in diabetes-self management were discussed.    Pt verbalized understanding of concepts discussed and recommendations provided today.       Education Materials Provided:  Accu-Chek Guide Me meter kit      ASSESSMENT:  Pt had education when first diagnosed with diabetes in 2014 but none since.  He has not been testing glucose - meter broken.  Pt does follow healthy diet for glucose control.  He does not exercise and this was discussed.  Metformin recently started.  Pt listened attentively and seemed willing to make changes necessary to control glucose levels.     Goals Addressed as of 2/6/2020        Patient Stated      Monitoring (pt-stated)     Added 2/6/20 by Jacque Lamas RD     My Goal: I will lower A1c to less than 7.0%.     What I need to meet my goal: continue to follow meal plan, add exercise, take medication as ordered.     I plan to meet my goal by this date: next A1c check.             Patient's most recent   Lab Results   Component Value Date    A1C 10.6 01/13/2020    is not meeting goal of <7.0    PLAN  Continue to follow a low carbohydrate meal plan - 60 gram/meal, 15-30 grams/snack.  Try to add some routine exercise.  Test glucose 2x/day - fasting and 2 hours post supper.  See Patient Instructions for co-developed, patient-stated behavior change goals.  AVS printed and provided to patient today.   Follow up in one month.  Bring meter.    Time Spent: 40 minutes  Encounter Type: Individual    Any diabetes medication dose changes were made via the CDE Protocol and Collaborative Practice Agreement with the patient's referring provider. A copy of this encounter was shared with the provider.        Sincerely,        Jacque Lamas RD

## 2020-02-06 NOTE — PROGRESS NOTES
"Diabetes Self-Management Education & Support    Presents for: Individual review    SUBJECTIVE/OBJECTIVE:  Presents for: Individual review  Accompanied by: Self  Diabetes education in the past 24mo: No  Focus of Visit: Monitoring, Assistance w/ making life changes  Diabetes type: Type 2  Date of diagnosis: 12/2014  Disease course: Worsening  How confident are you filling out medical forms by yourself:: Extremely  Diabetes management related comments/concerns: Wants to get A1c down  Transportation concerns: No  Difficulty affording diabetes medication?: No  Difficulty affording diabetes testing supplies?: No  Other concerns:: None  Cultural Influences/Ethnic Background:  American    Diabetes Symptoms & Complications:  Fatigue: No  Neuropathy: No  Polydipsia: No  Polyphagia: No  Polyuria: No  Visual change: No  Slow healing wounds: No  Other: Yes(Recent cold, fever, chills )  Symptom course: Stable  Weight trend: Stable  Complications assessed today?: Yes  CVA: No  Heart disease: No  Nephropathy: No    Patient Problem List and Family Medical History reviewed for relevant medical history, current medical status, and diabetes risk factors.    Vitals:  /80   Pulse 87   Resp 16   Ht 1.702 m (5' 7\")   Wt 90.6 kg (199 lb 11.2 oz)   SpO2 97%   BMI 31.28 kg/m    Estimated body mass index is 31.28 kg/m  as calculated from the following:    Height as of this encounter: 1.702 m (5' 7\").    Weight as of this encounter: 90.6 kg (199 lb 11.2 oz).   Last 3 BP:   BP Readings from Last 3 Encounters:   02/06/20 140/80   01/24/20 (!) 156/90   12/31/19 (!) 154/84       History   Smoking Status     Never Smoker   Smokeless Tobacco     Current User     Types: Chew     Comment: Tried to quit; No passive exposure       Labs:  Lab Results   Component Value Date    A1C 10.6 01/13/2020     Lab Results   Component Value Date     01/13/2020     Lab Results   Component Value Date    LDL 76 01/13/2020     HDL Cholesterol   Date " Value Ref Range Status   01/13/2020 40 >39 mg/dL Final   ]  GFR Estimate   Date Value Ref Range Status   01/13/2020 82 >60 mL/min/[1.73_m2] Final     Comment:     Non  GFR Calc  Starting 12/18/2018, serum creatinine based estimated GFR (eGFR) will be   calculated using the Chronic Kidney Disease Epidemiology Collaboration   (CKD-EPI) equation.       GFR Estimate If Black   Date Value Ref Range Status   01/13/2020 >90 >60 mL/min/[1.73_m2] Final     Comment:      GFR Calc  Starting 12/18/2018, serum creatinine based estimated GFR (eGFR) will be   calculated using the Chronic Kidney Disease Epidemiology Collaboration   (CKD-EPI) equation.       Lab Results   Component Value Date    CR 1.00 01/13/2020     No results found for: MICROALBUMIN    Healthy Eating:  Healthy Eating Assessed Today: Yes  Cultural/Catholic diet restrictions?: No  Meal planning/habits: Carb counting  How many times a week on average do you eat food made away from home (restaurant/take-out)?: (Limited dining out)  Meals include: Breakfast, Lunch, Dinner, Morning Snack  Breakfast: 2 each granola bars or egg muffins - water   Lunch: salads with cheese/sometimes eggs, [place]/blue cheese - water or diet soda   Dinner: meat, potatoes, veggies -water   Snacks: am-cheese stick, jerky, peanuts, sometimes popcorn   Other: stopped milk and chocolate milk   Beverages: Water, Diet soda  Has patient met with a dietitian in the past?: Yes    Being Active:  Being Active Assessed Today: Yes  Exercise:: Currently not exercising  Barrier to exercise: None    Monitoring:  Monitoring Assessed Today: Yes  Did patient bring glucose meter to appointment? : No(Meter is broken )    Taking Medications:  Diabetes Medication(s)     Biguanides       metFORMIN (GLUCOPHAGE) 500 MG tablet    Take 1 tablet (500 mg) by mouth 2 times daily (with meals)        Taking Medication Assessed Today: Yes  Current Treatments: Diet, Oral Medication (taken by  mouth)(Metformin 500 mg bid - just started on 1/24/20 )  Problems taking diabetes medications regularly?: No  Diabetes medication side effects?: Yes(some diarrhea but tolerable)    Problem Solving:  Problem Solving Assessed Today: Yes  Is the patient at risk for hypoglycemia?: No  Is the patient at risk for DKA?: No  Does patient have severe weather/disaster plan for diabetes management?: Not Needed    Reducing Risks:  Reducing Risks Assessed Today: Yes  Diabetes Risks: Age over 45 years, Hyperlipidemia, Sedentary Lifestyle  CAD Risks: Diabetes Mellitus, Family history, Male sex, Obesity, Sedentary lifestyle  Has dilated eye exam at least once a year?: Yes  Sees dentist every 6 months?: Yes  Feet checked by healthcare provider in the last year?: No    Healthy Coping:  Healthy Coping Assessed Today: Yes  Emotional response to diabetes: Acceptance, Concern for health and well-being  Informal Support system:: Family  Stage of change: ACTION (Actively working towards change)  Support resources: None  Patient Activation Measure Survey Score:  NOY Score (Last Two) 12/18/2018   NOY Raw Score 37   Activation Score 79.2   NOY Level 4     Diabetes knowledge and skills assessment:   Patient is knowledgeable in diabetes management concepts related to: Healthy Eating    Patient needs further education on the following diabetes management concepts: Being Active, Monitoring, Taking Medication, Problem Solving, Reducing Risks and Healthy Coping    Based on learning assessment above, most appropriate setting for further diabetes education would be: Individual setting.      INTERVENTIONS:    Education provided today on:  AADE Self-Care Behaviors:  Healthy Eating: carbohydrate counting  Being Active: relationship to blood glucose  Monitoring: New Accu-Chek Guide Meter provided and supplies ordered.  Instructed pt on use.  Glucose level was 166 - 1.5 hours post lunch  Reducing Risks:  A1c goal of < 7.0%.     Opportunities for ongoing  education and support in diabetes-self management were discussed.    Pt verbalized understanding of concepts discussed and recommendations provided today.       Education Materials Provided:  Accu-Chek Guide Me meter kit      ASSESSMENT:  Pt had education when first diagnosed with diabetes in 2014 but none since.  He has not been testing glucose - meter broken.  Pt does follow healthy diet for glucose control.  He does not exercise and this was discussed.  Metformin recently started.  Pt listened attentively and seemed willing to make changes necessary to control glucose levels.     Goals Addressed as of 2/6/2020        Patient Stated      Monitoring (pt-stated)     Added 2/6/20 by Jacque Lamas RD     My Goal: I will lower A1c to less than 7.0%.     What I need to meet my goal: continue to follow meal plan, add exercise, take medication as ordered.     I plan to meet my goal by this date: next A1c check.             Patient's most recent   Lab Results   Component Value Date    A1C 10.6 01/13/2020    is not meeting goal of <7.0    PLAN  Continue to follow a low carbohydrate meal plan - 60 gram/meal, 15-30 grams/snack.  Try to add some routine exercise.  Test glucose 2x/day - fasting and 2 hours post supper.  See Patient Instructions for co-developed, patient-stated behavior change goals.  AVS printed and provided to patient today.   Follow up in one month.  Bring meter.    Time Spent: 40 minutes  Encounter Type: Individual    Any diabetes medication dose changes were made via the CDE Protocol and Collaborative Practice Agreement with the patient's referring provider. A copy of this encounter was shared with the provider.

## 2020-02-06 NOTE — PATIENT INSTRUCTIONS
-Continue following low carbohydrate meal plan - 60 grams (4 choices)/meal, 15-30 grams/ (1-2 choices)/snack.  -Try to begin some routine exercise - 30 minutes most days of the week is a good goal.  -Test glucose 2x/day - fasting and 2 hours after supper.   -Target levels are fasting , 2 hours after supper less than 180.  -Follow up in 2 weeks.  Bring your meter.  -Call with any concerns - TODD Bran, [phone].

## 2020-02-10 DIAGNOSIS — E78.2 MIXED HYPERLIPIDEMIA: ICD-10-CM

## 2020-02-10 RX ORDER — SIMVASTATIN 20 MG
TABLET ORAL
Qty: 30 TABLET | Refills: 10 | Status: SHIPPED | OUTPATIENT
Start: 2020-02-10 | End: 2021-01-04

## 2020-02-20 ENCOUNTER — TELEPHONE (OUTPATIENT)
Dept: EDUCATION SERVICES | Facility: HOSPITAL | Age: 60
End: 2020-02-20

## 2020-02-20 ENCOUNTER — HOSPITAL ENCOUNTER (OUTPATIENT)
Dept: EDUCATION SERVICES | Facility: HOSPITAL | Age: 60
Discharge: HOME | End: 2020-02-20
Attending: FAMILY MEDICINE | Admitting: FAMILY MEDICINE
Payer: COMMERCIAL

## 2020-02-20 VITALS
WEIGHT: 200.3 LBS | RESPIRATION RATE: 16 BRPM | OXYGEN SATURATION: 96 % | HEIGHT: 67 IN | DIASTOLIC BLOOD PRESSURE: 96 MMHG | SYSTOLIC BLOOD PRESSURE: 148 MMHG | BODY MASS INDEX: 31.44 KG/M2 | HEART RATE: 80 BPM

## 2020-02-20 DIAGNOSIS — E11.9 TYPE 2 DIABETES MELLITUS WITHOUT COMPLICATION, WITHOUT LONG-TERM CURRENT USE OF INSULIN (H): ICD-10-CM

## 2020-02-20 DIAGNOSIS — E11.65 TYPE 2 DIABETES MELLITUS WITH HYPERGLYCEMIA, WITHOUT LONG-TERM CURRENT USE OF INSULIN (H): Primary | ICD-10-CM

## 2020-02-20 PROCEDURE — 97803 MED NUTRITION INDIV SUBSEQ: CPT | Performed by: DIETITIAN, REGISTERED

## 2020-02-20 ASSESSMENT — MIFFLIN-ST. JEOR: SCORE: 1677.18

## 2020-02-20 ASSESSMENT — PAIN SCALES - GENERAL: PAINLEVEL: NO PAIN (0)

## 2020-02-20 NOTE — LETTER
"    2/20/2020        RE: Neo LEWIS Camryn  [address] [facility] [address]        Diabetes Self-Management Education & Support    Presents for: Individual review    SUBJECTIVE/OBJECTIVE:  Presents for: Individual review  Accompanied by: Self  Diabetes education in the past 24mo: No  Focus of Visit: Monitoring, Assistance w/ making life changes  Diabetes type: Type 2  Date of diagnosis: 12/2014  Disease course: Worsening  How confident are you filling out medical forms by yourself:: Extremely  Diabetes management related comments/concerns: Wants to get A1c down  Transportation concerns: No  Difficulty affording diabetes medication?: No  Difficulty affording diabetes testing supplies?: No  Other concerns:: None  Cultural Influences/Ethnic Background:  American    Diabetes Symptoms & Complications:  Fatigue: No  Neuropathy: No  Polydipsia: No  Polyphagia: No  Polyuria: No  Visual change: No  Slow healing wounds: No  Other: Yes(Recent cold, fever, chills )  Symptom course: Stable  Weight trend: Stable  Complications assessed today?: Yes  CVA: No  Heart disease: No  Nephropathy: No    Patient Problem List and Family Medical History reviewed for relevant medical history, current medical status, and diabetes risk factors.    Vitals:  /96   Pulse 80   Resp 16   Ht 1.702 m (5' 7\")   Wt 90.9 kg (200 lb 4.8 oz)   SpO2 96%   BMI 31.37 kg/m     Estimated body mass index is 31.37 kg/m  as calculated from the following:    Height as of this encounter: 1.702 m (5' 7\").    Weight as of this encounter: 90.9 kg (200 lb 4.8 oz).   Last 3 BP:   BP Readings from Last 3 Encounters:   02/20/20 148/96   02/06/20 140/80   01/24/20 (!) 156/90       History   Smoking Status     Never Smoker   Smokeless Tobacco     Current User     Types: Chew     Comment: Tried to quit; No passive exposure       Labs:  Lab Results   Component Value Date    A1C 10.6 01/13/2020     Lab Results   Component Value Date     01/13/2020     Lab " Results   Component Value Date    LDL 76 01/13/2020     HDL Cholesterol   Date Value Ref Range Status   01/13/2020 40 >39 mg/dL Final   ]  GFR Estimate   Date Value Ref Range Status   01/13/2020 82 >60 mL/min/[1.73_m2] Final     Comment:     Non  GFR Calc  Starting 12/18/2018, serum creatinine based estimated GFR (eGFR) will be   calculated using the Chronic Kidney Disease Epidemiology Collaboration   (CKD-EPI) equation.       GFR Estimate If Black   Date Value Ref Range Status   01/13/2020 >90 >60 mL/min/[1.73_m2] Final     Comment:      GFR Calc  Starting 12/18/2018, serum creatinine based estimated GFR (eGFR) will be   calculated using the Chronic Kidney Disease Epidemiology Collaboration   (CKD-EPI) equation.       Lab Results   Component Value Date    CR 1.00 01/13/2020     No results found for: MICROALBUMIN    Healthy Eating:  Healthy Eating Assessed Today: Yes  Cultural/Sikh diet restrictions?: No  Meal planning/habits: Carb counting  How many times a week on average do you eat food made away from home (restaurant/take-out)?: (Limited dining out)  Meals include: Breakfast, Lunch, Dinner, Morning Snack  Breakfast: 2 each granola bars or egg muffins - water   Lunch: salads with cheese/sometimes eggs, [place]/blue cheese - water or diet soda   Dinner: meat, potatoes, veggies -water   Snacks: am-cheese stick, jerky, peanuts, sometimes popcorn   Other: stopped milk and chocolate milk   Beverages: Water, Diet soda  Has patient met with a dietitian in the past?: Yes    Being Active:  Being Active Assessed Today: Yes  Exercise:: Currently not exercising  Barrier to exercise: None    Monitoring:  Monitoring Assessed Today: Yes  Did patient bring glucose meter to appointment? : Yes  Blood Glucose Meter: Accu-chek  Times checking blood sugar at home (number): 2  Times checking blood sugar at home (per): Day  Blood glucose trend: No change  [id number]  2 hours post supper-258,  200, 199, 258  Two week average is 193    Taking Medications:  Diabetes Medication(s)     Biguanides       metFORMIN (GLUCOPHAGE) 500 MG tablet    Take 1 tablet (500 mg) by mouth 2 times daily (with meals)          Taking Medication Assessed Today: Yes  Current Treatments: Diet, Oral Medication (taken by mouth)(Metformin 500 mg bid )  Problems taking diabetes medications regularly?: No  Diabetes medication side effects?: Yes(some diarrhea but tolerable)    Problem Solving:  Problem Solving Assessed Today: Yes  Is the patient at risk for hypoglycemia?: No  Is the patient at risk for DKA?: No  Does patient have severe weather/disaster plan for diabetes management?: Not Needed    Reducing Risks:  Reducing Risks Assessed Today: No  Diabetes Risks: Age over 45 years, Hyperlipidemia, Sedentary Lifestyle  CAD Risks: Diabetes Mellitus, Family history, Male sex, Obesity, Sedentary lifestyle  Has dilated eye exam at least once a year?: Yes  Sees dentist every 6 months?: Yes  Feet checked by healthcare provider in the last year?: No    Healthy Coping:  Healthy Coping Assessed Today: Yes  Emotional response to diabetes: Acceptance, Concern for health and well-being  Informal Support system:: Family  Stage of change: ACTION (Actively working towards change)  Support resources: None  Patient Activation Measure Survey Score:  NOY Score (Last Two) 12/18/2018   NOY Raw Score 37   Activation Score 79.2   NOY Level 4     Diabetes knowledge and skills assessment:   Patient is knowledgeable in diabetes management concepts related to: Healthy Eating, Being Active, Monitoring and Taking Medication    Patient needs further education on the following diabetes management concepts: Being Active, Taking Medication and Reducing Risks    Based on learning assessment above, most appropriate setting for further diabetes education would be: Individual setting.      INTERVENTIONS:    Education provided today on:  AADE Self-Care Behaviors:  Healthy  Eating: consistency in amount, composition, and timing of food intake and portion control  Being Active: relationship to blood glucose  Taking Medication: dose adjustment Metformin to get levels to target  Reducing Risks: major complications of diabetes and A1C - goals, relating to blood glucose levels, how often to check    Opportunities for ongoing education and support in diabetes-self management were discussed.    Pt verbalized understanding of concepts discussed and recommendations provided today.       Education Materials Provided:  No new materials      ASSESSMENT:  Glucose levels remain elevated even with addition of Metformin.  Pt notes he tries to limit foods high in carbohydrates but at times when special events occur he is not as compliant.  No exercise yet.      Patient's most recent   Lab Results   Component Value Date    A1C 10.6 01/13/2020    is not meeting goal of <7.0    PLAN  Continue efforts to follow a healthy low carbohydrate meal plan.   Try to add some routine exercise.  Keep testing 2x/day - fasting and 2 hours post supper.  Message sent to provider requesting increase to Metformin 1000 mg bid in effort to get glucose levels to target.   See Patient Instructions for co-developed, patient-stated behavior change goals.  AVS printed and provided to patient today.   Follow up in six weeks.    Time Spent: 20 minutes  Encounter Type: Individual    Any diabetes medication dose changes were made via the CDE Protocol and Collaborative Practice Agreement with the patient's referring provider. A copy of this encounter was shared with the provider.        Sincerely,        Jacque Lamas RD

## 2020-02-20 NOTE — PATIENT INSTRUCTIONS
-Keep limiting foods high in carbohydrates.   -Try to begin some routine exercise.   -Test 2x/day - fasting and 2 hours post supper meal.  -Target levels are fasting , 2 hours after supper less than 180.  -Increase your Metformin to 500 mg with breakfast and 1000 mg with supper.   If after one week you have no side effects then increase to 1000 mg with breakfast and supper.   -Call if any side effects.   -Follow up in 6 weeks.    -Jacque Lamas, TODD, -[phone]

## 2020-02-20 NOTE — TELEPHONE ENCOUNTER
Pt was here today for diabetes follow up.  He is taking 500 mg Metformin bid.  Glucose levels not yet to target.    [id number]   2 hours post [id number]  Okay to increase Metformin to 1000 mg bid?  Thanks!

## 2020-02-20 NOTE — PROGRESS NOTES
"Diabetes Self-Management Education & Support    Presents for: Individual review    SUBJECTIVE/OBJECTIVE:  Presents for: Individual review  Accompanied by: Self  Diabetes education in the past 24mo: No  Focus of Visit: Monitoring, Assistance w/ making life changes  Diabetes type: Type 2  Date of diagnosis: 12/2014  Disease course: Worsening  How confident are you filling out medical forms by yourself:: Extremely  Diabetes management related comments/concerns: Wants to get A1c down  Transportation concerns: No  Difficulty affording diabetes medication?: No  Difficulty affording diabetes testing supplies?: No  Other concerns:: None  Cultural Influences/Ethnic Background:  American    Diabetes Symptoms & Complications:  Fatigue: No  Neuropathy: No  Polydipsia: No  Polyphagia: No  Polyuria: No  Visual change: No  Slow healing wounds: No  Other: Yes(Recent cold, fever, chills )  Symptom course: Stable  Weight trend: Stable  Complications assessed today?: Yes  CVA: No  Heart disease: No  Nephropathy: No    Patient Problem List and Family Medical History reviewed for relevant medical history, current medical status, and diabetes risk factors.    Vitals:  /96   Pulse 80   Resp 16   Ht 1.702 m (5' 7\")   Wt 90.9 kg (200 lb 4.8 oz)   SpO2 96%   BMI 31.37 kg/m    Estimated body mass index is 31.37 kg/m  as calculated from the following:    Height as of this encounter: 1.702 m (5' 7\").    Weight as of this encounter: 90.9 kg (200 lb 4.8 oz).   Last 3 BP:   BP Readings from Last 3 Encounters:   02/20/20 148/96   02/06/20 140/80   01/24/20 (!) 156/90       History   Smoking Status     Never Smoker   Smokeless Tobacco     Current User     Types: Chew     Comment: Tried to quit; No passive exposure       Labs:  Lab Results   Component Value Date    A1C 10.6 01/13/2020     Lab Results   Component Value Date     01/13/2020     Lab Results   Component Value Date    LDL 76 01/13/2020     HDL Cholesterol   Date Value " Ref Range Status   01/13/2020 40 >39 mg/dL Final   ]  GFR Estimate   Date Value Ref Range Status   01/13/2020 82 >60 mL/min/[1.73_m2] Final     Comment:     Non  GFR Calc  Starting 12/18/2018, serum creatinine based estimated GFR (eGFR) will be   calculated using the Chronic Kidney Disease Epidemiology Collaboration   (CKD-EPI) equation.       GFR Estimate If Black   Date Value Ref Range Status   01/13/2020 >90 >60 mL/min/[1.73_m2] Final     Comment:      GFR Calc  Starting 12/18/2018, serum creatinine based estimated GFR (eGFR) will be   calculated using the Chronic Kidney Disease Epidemiology Collaboration   (CKD-EPI) equation.       Lab Results   Component Value Date    CR 1.00 01/13/2020     No results found for: MICROALBUMIN    Healthy Eating:  Healthy Eating Assessed Today: Yes  Cultural/Restorationist diet restrictions?: No  Meal planning/habits: Carb counting  How many times a week on average do you eat food made away from home (restaurant/take-out)?: (Limited dining out)  Meals include: Breakfast, Lunch, Dinner, Morning Snack  Breakfast: 2 each granola bars or egg muffins - water   Lunch: salads with cheese/sometimes eggs, [place]/blue cheese - water or diet soda   Dinner: meat, potatoes, veggies -water   Snacks: am-cheese stick, jerky, peanuts, sometimes popcorn   Other: stopped milk and chocolate milk   Beverages: Water, Diet soda  Has patient met with a dietitian in the past?: Yes    Being Active:  Being Active Assessed Today: Yes  Exercise:: Currently not exercising  Barrier to exercise: None    Monitoring:  Monitoring Assessed Today: Yes  Did patient bring glucose meter to appointment? : Yes  Blood Glucose Meter: Accu-chek  Times checking blood sugar at home (number): 2  Times checking blood sugar at home (per): Day  Blood glucose trend: No change  [id number]  2 hours post supper-258, 200, 199, 258  Two week average is 193    Taking Medications:  Diabetes Medication(s)      Biguanides       metFORMIN (GLUCOPHAGE) 500 MG tablet    Take 1 tablet (500 mg) by mouth 2 times daily (with meals)          Taking Medication Assessed Today: Yes  Current Treatments: Diet, Oral Medication (taken by mouth)(Metformin 500 mg bid )  Problems taking diabetes medications regularly?: No  Diabetes medication side effects?: Yes(some diarrhea but tolerable)    Problem Solving:  Problem Solving Assessed Today: Yes  Is the patient at risk for hypoglycemia?: No  Is the patient at risk for DKA?: No  Does patient have severe weather/disaster plan for diabetes management?: Not Needed    Reducing Risks:  Reducing Risks Assessed Today: No  Diabetes Risks: Age over 45 years, Hyperlipidemia, Sedentary Lifestyle  CAD Risks: Diabetes Mellitus, Family history, Male sex, Obesity, Sedentary lifestyle  Has dilated eye exam at least once a year?: Yes  Sees dentist every 6 months?: Yes  Feet checked by healthcare provider in the last year?: No    Healthy Coping:  Healthy Coping Assessed Today: Yes  Emotional response to diabetes: Acceptance, Concern for health and well-being  Informal Support system:: Family  Stage of change: ACTION (Actively working towards change)  Support resources: None  Patient Activation Measure Survey Score:  NOY Score (Last Two) 12/18/2018   NOY Raw Score 37   Activation Score 79.2   NOY Level 4     Diabetes knowledge and skills assessment:   Patient is knowledgeable in diabetes management concepts related to: Healthy Eating, Being Active, Monitoring and Taking Medication    Patient needs further education on the following diabetes management concepts: Being Active, Taking Medication and Reducing Risks    Based on learning assessment above, most appropriate setting for further diabetes education would be: Individual setting.      INTERVENTIONS:    Education provided today on:  AADE Self-Care Behaviors:  Healthy Eating: consistency in amount, composition, and timing of food intake and portion  control  Being Active: relationship to blood glucose  Taking Medication: dose adjustment Metformin to get levels to target  Reducing Risks: major complications of diabetes and A1C - goals, relating to blood glucose levels, how often to check    Opportunities for ongoing education and support in diabetes-self management were discussed.    Pt verbalized understanding of concepts discussed and recommendations provided today.       Education Materials Provided:  No new materials      ASSESSMENT:  Glucose levels remain elevated even with addition of Metformin.  Pt notes he tries to limit foods high in carbohydrates but at times when special events occur he is not as compliant.  No exercise yet.      Patient's most recent   Lab Results   Component Value Date    A1C 10.6 01/13/2020    is not meeting goal of <7.0    PLAN  Continue efforts to follow a healthy low carbohydrate meal plan.   Try to add some routine exercise.  Keep testing 2x/day - fasting and 2 hours post supper.  Message sent to provider requesting increase to Metformin 1000 mg bid in effort to get glucose levels to target.   See Patient Instructions for co-developed, patient-stated behavior change goals.  AVS printed and provided to patient today.   Follow up in six weeks.    Time Spent: 20 minutes  Encounter Type: Individual    Any diabetes medication dose changes were made via the CDE Protocol and Collaborative Practice Agreement with the patient's referring provider. A copy of this encounter was shared with the provider.

## 2020-04-03 ENCOUNTER — HOSPITAL ENCOUNTER (OUTPATIENT)
Dept: EDUCATION SERVICES | Facility: OTHER | Age: 60
Discharge: HOME | End: 2020-04-03
Attending: NURSE PRACTITIONER | Admitting: FAMILY MEDICINE
Payer: COMMERCIAL

## 2020-04-03 DIAGNOSIS — E11.9 TYPE 2 DIABETES MELLITUS WITHOUT COMPLICATION, WITHOUT LONG-TERM CURRENT USE OF INSULIN (H): ICD-10-CM

## 2020-04-03 PROCEDURE — 97803 MED NUTRITION INDIV SUBSEQ: CPT | Mod: 95 | Performed by: DIETITIAN, REGISTERED

## 2020-04-03 ASSESSMENT — PAIN SCALES - GENERAL: PAINLEVEL: NO PAIN (0)

## 2020-04-03 NOTE — LETTER
"    4/3/2020        RE: Neo Cowan  [address] [facility] [address]        Neo Cowan is a 59 year old male who is being evaluated via a billable telephone visit.      The patient has been notified of following:     \"This telephone visit will be conducted via a call between you and your physician/provider. We have found that certain health care needs can be provided without the need for a physical exam.  This service lets us provide the care you need with a short phone conversation.  If a prescription is necessary we can send it directly to your pharmacy.  If lab work is needed we can place an order for that and you can then stop by our lab to have the test done at a later time.    If during the course of the call the physician/provider feels a telephone visit is not appropriate, you will not be charged for this service.\"     Patient has given verbal consent for Telephone visit?  Yes    Neo Cowan complains of    Chief Complaint   Patient presents with     Diabetes       I have reviewed and updated the patient's Past Medical History, Social History, Family History and Medication List.    ALLERGIES  Seasonal allergies    The pt calls in BG reading's:  on 03/27/20 BG was 184 in the am and 183 after dinner  on 03/28/20 BG was  136 in the am and 192 after dinner   on 03/29/20 BG was 144 in the am, 198 after dinner   on 03/30/20 BG was  148 in the am, 196 after dinner   on 03/31/20 BG was 139 in the am, 154 after dinner  on 04/01/20 BG was 145 in the am, 155 after dinner   on 0402/20 BG was 015 in the am  On 04/03/20 BG was 136 in the am    Diabetes Education Telephone Visit    Subjective/Objective:    Noe Cowan is being evaluated for blood glucose and diabetes management review via a billable telephone visit.     Verbal Consent has been obtained for this service by care team member: yes.     See the scanned image of consent in the medical record.    I have reviewed and updated the patient's Past " Medical History, Social History, Family History and Medication List.    Date of last visit was: 2/20/2020.    Diabetes is being managed with   Lifestyle (diet/activity), Diabetes Medications   Diabetes Medication(s)     Biguanides       metFORMIN (GLUCOPHAGE) 500 MG tablet    Take 2 tablets (1,000 mg) by mouth 2 times daily (with meals)        BG/Food Log:   Breakfast- 2 granola bars (36 grams carb) on weekdays or eggs, duarte or sausage and 2 toast on weekends  Lunch-salad or sometimes skips (pt notes lunch is always within 45-60 grams carb)  Supper-meat, small amount starch, veggies   Pt drinks no drinks with sugar.   Snacks in am on cheese stick, beef jerky, nuts.     He has been walking his dog some but feels that will increase now with better weather.     Fasting-184, 136, 144, 148, 139, 145, 150, 136  2 hours post supper-183, 192, 198, 154, 155    Clinical Decision-Making and Treatment Plan  Assessment:  Glucose levels improved with increased dose of Metformin.  Pt still with loose stools but wants to continue for now vs changing to Metformin ER.  He has made positive changes to diet.     Plan/Response:  See Patient Instructions for co-developed, patient-stated behavior change goals.  Exercise / activity plan: Increase walking for exercise.    Keep a blood glucose record for next visit.  A1c check at next visit.  If A1c not to target will consider additional medication but levels are improving.    Follow up in six weeks.      Total time of call between patient and provider was 12 minutes     Any diabetes medication dose changes were made via the CDE Protocol and Collaborative Practice Agreement with the patient's referring provider. A copy of this encounter was shared with the provider.      Sincerely,        Jacque Lamas RD

## 2020-04-03 NOTE — PROGRESS NOTES
"Neo Cowan is a 59 year old male who is being evaluated via a billable telephone visit.      The patient has been notified of following:     \"This telephone visit will be conducted via a call between you and your physician/provider. We have found that certain health care needs can be provided without the need for a physical exam.  This service lets us provide the care you need with a short phone conversation.  If a prescription is necessary we can send it directly to your pharmacy.  If lab work is needed we can place an order for that and you can then stop by our lab to have the test done at a later time.    If during the course of the call the physician/provider feels a telephone visit is not appropriate, you will not be charged for this service.\"     Patient has given verbal consent for Telephone visit?  Yes    Neo Cowan complains of    Chief Complaint   Patient presents with     Diabetes       I have reviewed and updated the patient's Past Medical History, Social History, Family History and Medication List.    ALLERGIES  Seasonal allergies    The pt calls in BG reading's:  on 03/27/20 BG was 184 in the am and 183 after dinner  on 03/28/20 BG was  136 in the am and 192 after dinner   on 03/29/20 BG was 144 in the am, 198 after dinner   on 03/30/20 BG was  148 in the am, 196 after dinner   on 03/31/20 BG was 139 in the am, 154 after dinner  on 04/01/20 BG was 145 in the am, 155 after dinner   on 0402/20 BG was 015 in the am  On 04/03/20 BG was 136 in the am  "

## 2020-04-03 NOTE — PROGRESS NOTES
Diabetes Education Telephone Visit    Subjective/Objective:    Neo Cowan is being evaluated for blood glucose and diabetes management review via a billable telephone visit.     Verbal Consent has been obtained for this service by care team member: yes.     See the scanned image of consent in the medical record.    I have reviewed and updated the patient's Past Medical History, Social History, Family History and Medication List.    Date of last visit was: 2/20/2020.    Diabetes is being managed with   Lifestyle (diet/activity), Diabetes Medications   Diabetes Medication(s)     Biguanides       metFORMIN (GLUCOPHAGE) 500 MG tablet    Take 2 tablets (1,000 mg) by mouth 2 times daily (with meals)        BG/Food Log:   Breakfast- 2 granola bars (36 grams carb) on weekdays or eggs, duarte or sausage and 2 toast on weekends  Lunch-salad or sometimes skips (pt notes lunch is always within 45-60 grams carb)  Supper-meat, small amount starch, veggies   Pt drinks no drinks with sugar.   Snacks in am on cheese stick, beef jerky, nuts.     He has been walking his dog some but feels that will increase now with better weather.     Fasting-184, 136, 144, 148, 139, 145, 150, 136  2 hours post supper-183, 192, 198, 154, 155    Clinical Decision-Making and Treatment Plan  Assessment:  Glucose levels improved with increased dose of Metformin.  Pt still with loose stools but wants to continue for now vs changing to Metformin ER.  He has made positive changes to diet.     Plan/Response:  See Patient Instructions for co-developed, patient-stated behavior change goals.  Exercise / activity plan: Increase walking for exercise.    Keep a blood glucose record for next visit.  A1c check at next visit.  If A1c not to target will consider additional medication but levels are improving.    Follow up in six weeks.      Total time of call between patient and provider was 12 minutes     Any diabetes medication dose changes were made via the  CDE Protocol and Collaborative Practice Agreement with the patient's referring provider. A copy of this encounter was shared with the provider.

## 2020-05-19 ENCOUNTER — TELEPHONE (OUTPATIENT)
Dept: EDUCATION SERVICES | Facility: HOSPITAL | Age: 60
End: 2020-05-19

## 2020-05-19 ENCOUNTER — HOSPITAL ENCOUNTER (OUTPATIENT)
Dept: EDUCATION SERVICES | Facility: HOSPITAL | Age: 60
Discharge: HOME | End: 2020-05-19
Attending: NURSE PRACTITIONER | Admitting: FAMILY MEDICINE
Payer: COMMERCIAL

## 2020-05-19 DIAGNOSIS — E11.9 TYPE 2 DIABETES MELLITUS WITHOUT COMPLICATION, WITHOUT LONG-TERM CURRENT USE OF INSULIN (H): Primary | ICD-10-CM

## 2020-05-19 PROCEDURE — 97803 MED NUTRITION INDIV SUBSEQ: CPT | Mod: TEL | Performed by: DIETITIAN, REGISTERED

## 2020-05-19 RX ORDER — METFORMIN HCL 500 MG
2000 TABLET, EXTENDED RELEASE 24 HR ORAL
Qty: 120 TABLET | Refills: 3 | Status: SHIPPED | OUTPATIENT
Start: 2020-05-19 | End: 2020-10-09

## 2020-05-19 ASSESSMENT — PAIN SCALES - GENERAL: PAINLEVEL: NO PAIN (0)

## 2020-05-19 NOTE — TELEPHONE ENCOUNTER
Spoke with pt today for phone visit.  Glucose levels much improved with Metformin but patient is having gi issues.  Okay to change to Metformin ER?  Thanks!

## 2020-05-19 NOTE — PROGRESS NOTES
"Neo Cowan is a 59 year old male who is being evaluated via a billable telephone visit.      The patient has been notified of following:     \"This telephone visit will be conducted via a call between you and your physician/provider. We have found that certain health care needs can be provided without the need for a physical exam.  This service lets us provide the care you need with a short phone conversation.  If a prescription is necessary we can send it directly to your pharmacy.  If lab work is needed we can place an order for that and you can then stop by our lab to have the test done at a later time.    Telephone visits are billed at different rates depending on your insurance coverage. During this emergency period, for some insurers they may be billed the same as an in-person visit.  Please reach out to your insurance provider with any questions.    If during the course of the call the physician/provider feels a telephone visit is not appropriate, you will not be charged for this service.\"    Patient has given verbal consent for Telephone visit?  Yes    The pt calls in BG reading's:  on 05/19/20 BG was 133 in the am    on 05/18/20 BG was 152 in the am and 161 after dinner  on 05/17/20 BG was 135 in the am and 165 after dinner  on 05/16/20 BG was 129 in the am and 168 after dinner  on 05/15/20 BG was 140 in the am   on 05/14/20 BG was 119 in the am and 216 after dinner  on 05/13/20 BG was 121 in the am and 126 after dinner      "

## 2020-05-19 NOTE — PROGRESS NOTES
Diabetes Education Telephone Visit    Subjective/Objective:    Neo Cowan is being evaluated for blood glucose and diabetes management review via a billable telephone visit.     Verbal Consent has been obtained for this service by care team member: yes.     See the scanned image of consent in the medical record.    I have reviewed and updated the patient's Past Medical History, Social History, Family History and Medication List.    Date of last visit was: 4/3/2020.    Diabetes is being managed with   Lifestyle (diet/activity), Diabetes Medications   Diabetes Medication(s)     Biguanides       metFORMIN (GLUCOPHAGE) 500 MG tablet    Take 2 tablets (1,000 mg) by mouth 2 times daily (with meals)      Pt notes some gi issues with Metformin - diarrhea.     BG/Food Log:   Breakfast-2 granola bars on weekdays or eggs/duarte/2 toast on weekends  Lunch-limits to 45-60 grams carbohydrate - often a salad  Supper-meat, small portion of starch, veggies  Pt does not consume drinks with sugar.   No hs snacks.  If he snacks in the day it is popcorn, jerky, cheese.     Pt golfs in the summer and just [place] so will be busy working on that this summer.      Fasting-121, 119, 140, 129, 135, 152, 133  2 hours post supper-126, 216, 168, 161, 165  Pt notes very few 2 hour post supper levels above 180.     Clinical Decision-Making and Treatment Plan  Assessment:  Glucose levels much improved with addition of Metformin.  Pt does seem to make an effort to make healthy food choices and get some exercise.  A1c is due.      Glucose result assessment:  Fasting blood glucose: 43% in target.  After dinner glucose: 80% in target.    Plan/Response:  See Patient Instructions for co-developed, patient-stated behavior change goals.  Meal Plan Recommendation: Continue efforts to limit carbohydrates in diet.   Exercise / activity plan: Try to get some daily exercise.  Check blood sugars 1-2x/day.  Recommend change to oral  medication regimen - request sent to provider to change to Metformin ER r/t some gi issues.    A1c when able.     Total time of call between patient and provider was 10 minutes     Any diabetes medication dose changes were made via the CDE Protocol and Collaborative Practice Agreement with the patient's referring provider. A copy of this encounter was shared with the provider.

## 2020-05-25 DIAGNOSIS — E11.9 TYPE 2 DIABETES MELLITUS WITHOUT COMPLICATION, WITHOUT LONG-TERM CURRENT USE OF INSULIN (H): ICD-10-CM

## 2020-05-28 NOTE — TELEPHONE ENCOUNTER
metformin      Last Written Prescription Date:  5/19/2020  Last Fill Quantity: 120,   # refills: 3  Last Office Visit: 5/19/20 telephone  Future Office visit:       Routing refill request to provider for review/approval because:  Patient has documented A1c within the specified period of time.  Hemoglobin A1C   Date Value Ref Range Status   01/13/2020 10.6 (H) 0 - 5.6 % Final     Comment:     Normal <5.7% Prediabetes 5.7-6.4%  Diabetes 6.5% or higher - adopted from ADA   consensus guidelines.

## 2020-08-11 ENCOUNTER — HOSPITAL ENCOUNTER (OUTPATIENT)
Dept: EDUCATION SERVICES | Facility: HOSPITAL | Age: 60
Discharge: HOME | End: 2020-08-11
Attending: NURSE PRACTITIONER | Admitting: FAMILY MEDICINE
Payer: COMMERCIAL

## 2020-08-11 VITALS
WEIGHT: 189 LBS | HEIGHT: 67 IN | HEART RATE: 89 BPM | OXYGEN SATURATION: 98 % | RESPIRATION RATE: 16 BRPM | DIASTOLIC BLOOD PRESSURE: 72 MMHG | BODY MASS INDEX: 29.66 KG/M2 | SYSTOLIC BLOOD PRESSURE: 142 MMHG

## 2020-08-11 DIAGNOSIS — E11.9 TYPE 2 DIABETES MELLITUS WITHOUT COMPLICATION, WITHOUT LONG-TERM CURRENT USE OF INSULIN (H): Primary | ICD-10-CM

## 2020-08-11 LAB — HBA1C MFR BLD: 6 % (ref 0–5.7)

## 2020-08-11 PROCEDURE — G0108 DIAB MANAGE TRN  PER INDIV: HCPCS | Performed by: DIETITIAN, REGISTERED

## 2020-08-11 PROCEDURE — 36416 COLLJ CAPILLARY BLOOD SPEC: CPT | Performed by: DIETITIAN, REGISTERED

## 2020-08-11 PROCEDURE — 83036 HEMOGLOBIN GLYCOSYLATED A1C: CPT | Performed by: DIETITIAN, REGISTERED

## 2020-08-11 ASSESSMENT — MIFFLIN-ST. JEOR: SCORE: 1625.93

## 2020-08-11 ASSESSMENT — PAIN SCALES - GENERAL: PAINLEVEL: NO PAIN (0)

## 2020-08-11 NOTE — LETTER
"    8/11/2020        RE: Neo LEWIS Camryn  [address] [facility] [address]        Diabetes Self-Management Education & Support    Presents for: Individual review    SUBJECTIVE/OBJECTIVE:  Presents for: Individual review  Accompanied by: Self  Diabetes education in the past 24mo: Yes  Focus of Visit: Monitoring, Assistance w/ making life changes  Diabetes type: Type 2  Date of diagnosis: 12/2014  Disease course: Improving  How confident are you filling out medical forms by yourself:: Extremely  Diabetes management related comments/concerns: No concerns  Transportation concerns: No  Difficulty affording diabetes medication?: No  Difficulty affording diabetes testing supplies?: No  Other concerns:: None  Cultural Influences/Ethnic Background:  American    Diabetes Symptoms & Complications:  Fatigue: No  Neuropathy: No  Polydipsia: No  Polyphagia: No  Polyuria: No  Visual change: No  Slow healing wounds: No  Other: No  Symptom course: Stable  Weight trend: Decreasing(Down 11# since Feb 2020.)  Complications assessed today?: Yes  CVA: No  Heart disease: No  Nephropathy: No    Patient Problem List and Family Medical History reviewed for relevant medical history, current medical status, and diabetes risk factors.    Vitals:  /72   Pulse 89   Resp 16   Ht 1.702 m (5' 7\")   Wt 85.7 kg (189 lb)   SpO2 98%   BMI 29.60 kg/m    Estimated body mass index is 29.6 kg/m  as calculated from the following:    Height as of this encounter: 1.702 m (5' 7\").    Weight as of this encounter: 85.7 kg (189 lb).   Last 3 BP:   BP Readings from Last 3 Encounters:   08/11/20 142/72   02/20/20 148/96   02/06/20 140/80       History   Smoking Status     Never Smoker   Smokeless Tobacco     Current User     Types: Chew     Comment: Tried to quit; No passive exposure       Labs:  Lab Results   Component Value Date    A1C 6.0 08/11/2020     Lab Results   Component Value Date     01/13/2020     Lab Results   Component Value Date    " LDL 76 01/13/2020     HDL Cholesterol   Date Value Ref Range Status   01/13/2020 40 >39 mg/dL Final   ]  GFR Estimate   Date Value Ref Range Status   01/13/2020 82 >60 mL/min/[1.73_m2] Final     Comment:     Non  GFR Calc  Starting 12/18/2018, serum creatinine based estimated GFR (eGFR) will be   calculated using the Chronic Kidney Disease Epidemiology Collaboration   (CKD-EPI) equation.       GFR Estimate If Black   Date Value Ref Range Status   01/13/2020 >90 >60 mL/min/[1.73_m2] Final     Comment:      GFR Calc  Starting 12/18/2018, serum creatinine based estimated GFR (eGFR) will be   calculated using the Chronic Kidney Disease Epidemiology Collaboration   (CKD-EPI) equation.       Lab Results   Component Value Date    CR 1.00 01/13/2020     No results found for: MICROALBUMIN    Healthy Eating:  Healthy Eating Assessed Today: Yes  Cultural/Mormon diet restrictions?: No  Meal planning/habits: Carb counting(Pt keeps meals < 60 grams carbohydrate for the most part.)  How many times a week on average do you eat food made away from home (restaurant/take-out)?: (Limited dining out)  Meals include: Breakfast, Lunch, Dinner, Morning Snack  Breakfast: 2 each granola bars or egg muffins - water   Lunch: salads with cheese/sometimes eggs, Vincentian/blue cheese - water or diet soda   Dinner: meat, potatoes, veggies -water   Snacks: am-cheese stick, jerky, peanuts, sometimes popcorn   Other: stopped milk and chocolate milk   Beverages: Water, Diet soda  Has patient met with a dietitian in the past?: Yes    Being Active:  Being Active Assessed Today: Yes  Exercise:: Currently not exercising[place]  Barrier to exercise: None    Monitoring:  Monitoring Assessed Today: Yes  Did patient bring glucose meter to appointment? : Yes  Blood Glucose Meter: Accu-chek  Times checking blood sugar at home (number): 2  Times checking blood sugar at home  (per): Day  Blood glucose trend: No change  [id number]  2 hours post [id number]  Two week average is 134.    Taking Medications:  Diabetes Medication(s)     Biguanides       metFORMIN (GLUCOPHAGE-XR) 500 MG 24 hr tablet    Take 4 tablets (2,000 mg) by mouth daily (with dinner)        Taking Medication Assessed Today: Yes  Current Treatments: Diet, Oral Medication (taken by mouth)(Metformin XR 2000 mg hs)  Problems taking diabetes medications regularly?: No  Diabetes medication side effects?: Yes(some diarrhea even with change to Metformin XR but pt able to tolerate)    Problem Solving:  Problem Solving Assessed Today: Yes  Is the patient at risk for hypoglycemia?: No  Is the patient at risk for DKA?: No  Does patient have severe weather/disaster plan for diabetes management?: Not Needed    Reducing Risks:  Reducing Risks Assessed Today: No  Diabetes Risks: Age over 45 years, Hyperlipidemia, Sedentary Lifestyle  CAD Risks: Diabetes Mellitus, Family history, Male sex, Obesity, Sedentary lifestyle  Has dilated eye exam at least once a year?: Yes  Sees dentist every 6 months?: Yes  Feet checked by healthcare provider in the last year?: No    Healthy Coping:  Healthy Coping Assessed Today: Yes  Emotional response to diabetes: Acceptance, Concern for health and well-being  Informal Support system:: Family  Stage of change: ACTION (Actively working towards change)  Support resources: None  Patient Activation Measure Survey Score:  NOY Score (Last Two) 12/18/2018   NOY Raw Score 37   Activation Score 79.2   NOY Level 4       Diabetes knowledge and skills assessment:   Patient is knowledgeable in diabetes management concepts related to: Healthy Eating, Being Active, Monitoring and Taking Medication    Patient needs further education on the following diabetes management concepts: Pt is doing well.  No concerns.     Based on learning assessment above, most appropriate setting for further diabetes education would be:  Individual setting.      INTERVENTIONS:    Education provided today on:  AADE Self-Care Behaviors:  Reducing Risks: foot care, A1C - goals, relating to blood glucose levels, how often to check and blood pressure and goals    Opportunities for ongoing education and support in diabetes-self management were discussed.    Pt verbalized understanding of concepts discussed and recommendations provided today.       Education Materials Provided:  No new materials today.     ASSESSMENT:  Pt's A1c today is 6.0% which is down from 10.6% in January.  Pt is doing very well at following healthy diet and getting some exercise on most days.  He is pleased with his results.      Patient's most recent   Lab Results   Component Value Date    A1C 6.0 08/11/2020    is meeting goal of <7.0    PLAN  Continue efforts to eat a healthy low carbohydrate diet.  Try to get some daily exercise - 30 minutes is a good goal.  Test glucose 1-2x/day.    Follow up with provider in six months for A1c check.   See Patient Instructions for co-developed, patient-stated behavior change goals.  AVS printed and provided to patient today.  Follow up annually or sooner if indicated in diabetes resource center.     Time Spent: 30 minutes  Encounter Type: Individual    Any diabetes medication dose changes were made via the CDE Protocol and Collaborative Practice Agreement with the patient's referring provider. A copy of this encounter was shared with the provider.        Sincerely,        Jacque Lamas RD

## 2020-08-11 NOTE — PATIENT INSTRUCTIONS
-Keep limiting foods high in carbohydrates.  60 grams/meal, 15-30 grams/snack.  -Try to get some daily exercise - 30 minutes most days of the week is a good goal.  -Test glucose 1-2x/day.  Good times are fasting and 2 hours after supper.  -Target levels are fasting  and 2 hours after supper less than 180.  -A1c today was 6.0% which is down from 10.6% in January.  Great job!  Goal is to keep A1c less than 7.0%.   -You should have it checked every 6 months.  -Follow up annually or sooner if indicated.   -Call with any concerns - TODD Bran, [phone].

## 2020-08-11 NOTE — PROGRESS NOTES
"Diabetes Self-Management Education & Support    Presents for: Individual review    SUBJECTIVE/OBJECTIVE:  Presents for: Individual review  Accompanied by: Self  Diabetes education in the past 24mo: Yes  Focus of Visit: Monitoring, Assistance w/ making life changes  Diabetes type: Type 2  Date of diagnosis: 12/2014  Disease course: Improving  How confident are you filling out medical forms by yourself:: Extremely  Diabetes management related comments/concerns: No concerns  Transportation concerns: No  Difficulty affording diabetes medication?: No  Difficulty affording diabetes testing supplies?: No  Other concerns:: None  Cultural Influences/Ethnic Background:  American    Diabetes Symptoms & Complications:  Fatigue: No  Neuropathy: No  Polydipsia: No  Polyphagia: No  Polyuria: No  Visual change: No  Slow healing wounds: No  Other: No  Symptom course: Stable  Weight trend: Decreasing(Down 11# since Feb 2020.)  Complications assessed today?: Yes  CVA: No  Heart disease: No  Nephropathy: No    Patient Problem List and Family Medical History reviewed for relevant medical history, current medical status, and diabetes risk factors.    Vitals:  /72   Pulse 89   Resp 16   Ht 1.702 m (5' 7\")   Wt 85.7 kg (189 lb)   SpO2 98%   BMI 29.60 kg/m    Estimated body mass index is 29.6 kg/m  as calculated from the following:    Height as of this encounter: 1.702 m (5' 7\").    Weight as of this encounter: 85.7 kg (189 lb).   Last 3 BP:   BP Readings from Last 3 Encounters:   08/11/20 142/72   02/20/20 148/96   02/06/20 140/80       History   Smoking Status     Never Smoker   Smokeless Tobacco     Current User     Types: Chew     Comment: Tried to quit; No passive exposure       Labs:  Lab Results   Component Value Date    A1C 6.0 08/11/2020     Lab Results   Component Value Date     01/13/2020     Lab Results   Component Value Date    LDL 76 01/13/2020     HDL Cholesterol   Date Value Ref Range Status   01/13/2020 " 40 >39 mg/dL Final   ]  GFR Estimate   Date Value Ref Range Status   01/13/2020 82 >60 mL/min/[1.73_m2] Final     Comment:     Non  GFR Calc  Starting 12/18/2018, serum creatinine based estimated GFR (eGFR) will be   calculated using the Chronic Kidney Disease Epidemiology Collaboration   (CKD-EPI) equation.       GFR Estimate If Black   Date Value Ref Range Status   01/13/2020 >90 >60 mL/min/[1.73_m2] Final     Comment:      GFR Calc  Starting 12/18/2018, serum creatinine based estimated GFR (eGFR) will be   calculated using the Chronic Kidney Disease Epidemiology Collaboration   (CKD-EPI) equation.       Lab Results   Component Value Date    CR 1.00 01/13/2020     No results found for: MICROALBUMIN    Healthy Eating:  Healthy Eating Assessed Today: Yes  Cultural/Congregation diet restrictions?: No  Meal planning/habits: Carb counting(Pt keeps meals < 60 grams carbohydrate for the most part.)  How many times a week on average do you eat food made away from home (restaurant/take-out)?: (Limited dining out)  Meals include: Breakfast, Lunch, Dinner, Morning Snack  Breakfast: 2 each granola bars or egg muffins - water   Lunch: salads with cheese/sometimes eggs, Latvian/blue cheese - water or diet soda   Dinner: meat, potatoes, veggies -water   Snacks: am-cheese stick, jerky, peanuts, sometimes popcorn   Other: stopped milk and chocolate milk   Beverages: Water, Diet soda  Has patient met with a dietitian in the past?: Yes    Being Active:  Being Active Assessed Today: Yes  Exercise:: Currently not exercising[place]  Barrier to exercise: None    Monitoring:  Monitoring Assessed Today: Yes  Did patient bring glucose meter to appointment? : Yes  Blood Glucose Meter: Accu-chek  Times checking blood sugar at home (number): 2  Times checking blood sugar at home (per): Day  Blood glucose trend: No change  [id number]  2 hours post  [id number]  Two week average is 134.    Taking Medications:  Diabetes Medication(s)     Biguanides       metFORMIN (GLUCOPHAGE-XR) 500 MG 24 hr tablet    Take 4 tablets (2,000 mg) by mouth daily (with dinner)        Taking Medication Assessed Today: Yes  Current Treatments: Diet, Oral Medication (taken by mouth)(Metformin XR 2000 mg hs)  Problems taking diabetes medications regularly?: No  Diabetes medication side effects?: Yes(some diarrhea even with change to Metformin XR but pt able to tolerate)    Problem Solving:  Problem Solving Assessed Today: Yes  Is the patient at risk for hypoglycemia?: No  Is the patient at risk for DKA?: No  Does patient have severe weather/disaster plan for diabetes management?: Not Needed    Reducing Risks:  Reducing Risks Assessed Today: No  Diabetes Risks: Age over 45 years, Hyperlipidemia, Sedentary Lifestyle  CAD Risks: Diabetes Mellitus, Family history, Male sex, Obesity, Sedentary lifestyle  Has dilated eye exam at least once a year?: Yes  Sees dentist every 6 months?: Yes  Feet checked by healthcare provider in the last year?: No    Healthy Coping:  Healthy Coping Assessed Today: Yes  Emotional response to diabetes: Acceptance, Concern for health and well-being  Informal Support system:: Family  Stage of change: ACTION (Actively working towards change)  Support resources: None  Patient Activation Measure Survey Score:  NOY Score (Last Two) 12/18/2018   NOY Raw Score 37   Activation Score 79.2   NOY Level 4       Diabetes knowledge and skills assessment:   Patient is knowledgeable in diabetes management concepts related to: Healthy Eating, Being Active, Monitoring and Taking Medication    Patient needs further education on the following diabetes management concepts: Pt is doing well.  No concerns.     Based on learning assessment above, most appropriate setting for further diabetes education would be: Individual setting.      INTERVENTIONS:    Education provided today  on:  AADE Self-Care Behaviors:  Reducing Risks: foot care, A1C - goals, relating to blood glucose levels, how often to check and blood pressure and goals    Opportunities for ongoing education and support in diabetes-self management were discussed.    Pt verbalized understanding of concepts discussed and recommendations provided today.       Education Materials Provided:  No new materials today.     ASSESSMENT:  Pt's A1c today is 6.0% which is down from 10.6% in January.  Weight is down 11# since February 2020.   Pt is doing very well at following healthy diet and getting some exercise on most days.  He is pleased with his results.      Patient's most recent   Lab Results   Component Value Date    A1C 6.0 08/11/2020    is meeting goal of <7.0    PLAN  Continue efforts to eat a healthy low carbohydrate diet.  Try to get some daily exercise - 30 minutes is a good goal.  Test glucose 1-2x/day.    Follow up with provider in six months for A1c check.   See Patient Instructions for co-developed, patient-stated behavior change goals.  AVS printed and provided to patient today.  Follow up annually or sooner if indicated in diabetes resource center.     Time Spent: 30 minutes  Encounter Type: Individual    Any diabetes medication dose changes were made via the CDE Protocol and Collaborative Practice Agreement with the patient's referring provider. A copy of this encounter was shared with the provider.

## 2020-09-25 NOTE — TELEPHONE ENCOUNTER
DATE:  1/23/2018   TIME OF RECEIPT FROM LAB:  2:55pm  LAB TEST:  Urine glucose  LAB VALUE:  >1000  RESULTS GIVEN WITH READ-BACK TO (PROVIDER):    TIME LAB VALUE REPORTED TO PROVIDER:   2:56     no

## 2020-10-09 DIAGNOSIS — E11.9 DIABETES MELLITUS, TYPE 2 (H): ICD-10-CM

## 2020-10-09 DIAGNOSIS — E11.9 TYPE 2 DIABETES MELLITUS WITHOUT COMPLICATION, WITHOUT LONG-TERM CURRENT USE OF INSULIN (H): ICD-10-CM

## 2020-10-09 RX ORDER — BLOOD SUGAR DIAGNOSTIC
STRIP MISCELLANEOUS
Qty: 100 EACH | Refills: 3 | Status: SHIPPED | OUTPATIENT
Start: 2020-10-09

## 2020-10-09 RX ORDER — METFORMIN HCL 500 MG
2000 TABLET, EXTENDED RELEASE 24 HR ORAL
Qty: 120 TABLET | Refills: 3 | Status: SHIPPED | OUTPATIENT
Start: 2020-10-09 | End: 2021-01-25

## 2020-10-09 NOTE — TELEPHONE ENCOUNTER
metFORMIN (GLUCOPHAGE-XR) 500 MG 24 hr tablet  Last Written Prescription Date:  05/19/2020  Last Fill Quantity: 120,   # refills: 3     blood glucose (ACCU-CHEK GUIDE) test strip     Last Written Prescription Date:  02/06/2020  Last Fill Quantity: 100,   # refills: 3      Last Office Visit: 08/11/2020  Future Office visit:       Routing refill request to provider for review/approval because:

## 2021-01-04 DIAGNOSIS — E78.2 MIXED HYPERLIPIDEMIA: ICD-10-CM

## 2021-01-04 RX ORDER — SIMVASTATIN 20 MG
TABLET ORAL
Qty: 30 TABLET | Refills: 10 | Status: SHIPPED | OUTPATIENT
Start: 2021-01-04 | End: 2021-12-02

## 2021-01-04 NOTE — TELEPHONE ENCOUNTER
Simvastatin 20 mg      Last Written Prescription Date:  2-  Last Fill Quantity: 30,   # refills: 10  Last Office Visit: 1-

## 2021-01-25 DIAGNOSIS — E11.9 TYPE 2 DIABETES MELLITUS WITHOUT COMPLICATION, WITHOUT LONG-TERM CURRENT USE OF INSULIN (H): ICD-10-CM

## 2021-01-25 RX ORDER — METFORMIN HCL 500 MG
2000 TABLET, EXTENDED RELEASE 24 HR ORAL
Qty: 120 TABLET | Refills: 3 | Status: SHIPPED | OUTPATIENT
Start: 2021-01-25 | End: 2021-06-08

## 2021-01-25 NOTE — TELEPHONE ENCOUNTER
metformin      Last Written Prescription Date:  10/09/2020  Last Fill Quantity: 120,   # refills: 3  Last Office Visit: 01/24/2020  Future Office visit:

## 2021-05-11 NOTE — PROGRESS NOTES
3  SUBJECTIVE:   CC: Neo Cowan is an 60 year old male who presents for preventive health visit.     Patient has been advised of split billing requirements and indicates understanding: Yes  Healthy Habits:    Do you get at least three servings of calcium containing foods daily (dairy, green leafy vegetables, etc.)? no    Amount of exercise or daily activities, outside of work: 7 day(s) per week    Problems taking medications regularly No    Medication side effects: Yes Metformin loose stools    Have you had an eye exam in the past two years? yes    Do you see a dentist twice per year? yes    Do you have sleep apnea, excessive snoring or daytime drowsiness?no      Neo continues with diarrhea after taking metformin.  He will have up to 6-7 stools per day.  Worse in the morning.     Today's PHQ-2 Score:   PHQ-2 ( 1999 Pfizer) 8/11/2020 12/31/2019   Q1: Little interest or pleasure in doing things 0 0   Q2: Feeling down, depressed or hopeless 0 0   PHQ-2 Score 0 0       Abuse: Current or Past(Physical, Sexual or Emotional)- No  Do you feel safe in your environment? Yes        Social History     Tobacco Use     Smoking status: Never Smoker     Smokeless tobacco: Current User     Types: Chew     Tobacco comment: Tried to quit; No passive exposure   Substance Use Topics     Alcohol use: No     If you drink alcohol do you typically have >3 drinks per day or >7 drinks per week? No                      Last PSA:   PSA   Date Value Ref Range Status   01/13/2020 1.68 0 - 4 ug/L Final     Comment:     Assay Method:  Chemiluminescence using Siemens Vista analyzer       Reviewed orders with patient. Reviewed health maintenance and updated orders accordingly - Yes  BP Readings from Last 3 Encounters:   05/14/21 136/70   08/11/20 142/72   02/20/20 148/96    Wt Readings from Last 3 Encounters:   05/14/21 91.2 kg (201 lb)   08/11/20 85.7 kg (189 lb)   02/20/20 90.9 kg (200 lb 4.8 oz)                  Patient Active Problem  List   Diagnosis     Gilbert syndrome     Obesity     Dyslipidemia     Seasonal allergic rhinitis     H/O adenomatous colonic polyps     Type 2 diabetes mellitus without complication (H)     Vitamin D deficiency     Comprehensive Medical Examination     Eczema     Screening for prostate cancer     Past Surgical History:   Procedure Laterality Date     COLONOSCOPY  8/5/2013    Procedure: COLONOSCOPY;  colonoscopy ;  Surgeon: Yobani Ventura MD;  Location: [facility]     COLONOSCOPY N/A 3/12/2018    Procedure: COLONOSCOPY;  COLONOSCOPY with polypectomy;  Surgeon: Donovan Mayfield MD;  Location: [facility]     colonoscopy with polypectomy  1/27/2011    repeat 2 years     mandibular fracture repair       vasectomy       wisdom teeth extraction         Social History     Tobacco Use     Smoking status: Never Smoker     Smokeless tobacco: Current User     Types: Chew     Tobacco comment: Tried to quit; No passive exposure   Substance Use Topics     Alcohol use: No     Family History   Problem Relation Age of Onset     C.A.D. Father      Cancer Father         Lung, cause of death     Other - See Comments Father         Colon polyps     Alcohol/Drug Mother         Alcoholism     Other - See Comments Mother         Liver disease, cause of death     C.A.D. Other         Family hx     Cancer Paternal Uncle         Cervical     Cancer Paternal Uncle         Throat     Hypertension Other         Family hx         Current Outpatient Medications   Medication Sig Dispense Refill     blood glucose (ACCU-CHEK GUIDE) test strip Use to test blood sugar 2 times daily. 100 each 3     blood glucose monitoring (ACCU-CHEK FASTCLIX) lancets Use to test blood sugar 2 times daily. 100 each 3     fexofenadine (ALLEGRA) 180 MG tablet Take 1 tablet (180 mg) by mouth daily 90 tablet 1     metFORMIN (GLUCOPHAGE-XR) 500 MG 24 hr tablet TAKE 4 TABLETS (2,000 MG) BY MOUTH DAILY (WITH DINNER) 120 tablet 3     simvastatin (ZOCOR) 20 MG tablet TAKE 1  TABLET BY MOUTH EVERY EVENING *NEEDS TO BE SEEN FOR FUTURE FILLS* 30 tablet 10     Allergies   Allergen Reactions     Seasonal Allergies      Recent Labs   Lab Test 08/11/20 01/13/20  0749 12/20/18  0741 12/19/17  0739   A1C 6.0* 10.6* 10.8* 8.2*   LDL  --  76 83 82   HDL  --  40 41 37*   TRIG  --  135 145 152*   ALT  --  106* 110* 72*   CR  --  1.00 0.92 0.98   GFRESTIMATED  --  82 >90 79   GFRESTBLACK  --  >90 >90 >90   POTASSIUM  --  3.9 4.1 4.3   TSH  --  1.71 1.65 1.17        Reviewed and updated as needed this visit by clinical staff                 Reviewed and updated as needed this visit by Provider                Past Medical History:   Diagnosis Date     Alcoholism 1/1/2011     Allergic rhinitis, cause unspecified 3/2/2000     Anxiety  1/1/2011     Colonic Polyps 3/2/2000     Depression 1/1/2011     Gilbert's Syndrome 3/2/2000     Hypercholesterolemia 3/2/2000     Overweight(278.02) 3/2/2000     Prediabetes 3/2/2000      Past Surgical History:   Procedure Laterality Date     COLONOSCOPY  8/5/2013    Procedure: COLONOSCOPY;  colonoscopy ;  Surgeon: Yobain Ventura MD;  Location: [facility]     COLONOSCOPY N/A 3/12/2018    Procedure: COLONOSCOPY;  COLONOSCOPY with polypectomy;  Surgeon: Donovan Mayfield MD;  Location: [facility]     colonoscopy with polypectomy  1/27/2011    repeat 2 years     mandibular fracture repair       vasectomy       wisdom teeth extraction         ROS:  CONSTITUTIONAL: NEGATIVE for fever, chills, change in weight  INTEGUMENTARY/SKIN: NEGATIVE for worrisome rashes, moles or lesions  EYES: NEGATIVE for vision changes or irritation  ENT: NEGATIVE for ear, mouth and throat problems  RESP: NEGATIVE for significant cough or SOB  CV: NEGATIVE for chest pain, palpitations or peripheral edema  GI: NEGATIVE for nausea, abdominal pain, heartburn, or change in bowel habits   male: negative for dysuria, hematuria, decreased urinary stream, erectile dysfunction, urethral  discharge  MUSCULOSKELETAL: NEGATIVE for significant arthralgias or myalgia  NEURO: NEGATIVE for weakness, dizziness or paresthesias  PSYCHIATRIC: NEGATIVE for changes in mood or affect    OBJECTIVE:   There were no vitals taken for this visit.  EXAM:  GENERAL: healthy, alert and no distress  EYES: Eyes grossly normal to inspection, PERRL and conjunctivae and sclerae normal  HENT: ear canals and TM's normal, nose and mouth without ulcers or lesions  NECK: no adenopathy, no asymmetry, masses, or scars and thyroid normal to palpation  RESP: lungs clear to auscultation - no rales, rhonchi or wheezes  CV: regular rate and rhythm, normal S1 S2, no S3 or S4, no murmur, click or rub, no peripheral edema and peripheral pulses strong  ABDOMEN: soft, nontender, no hepatosplenomegaly, no masses and bowel sounds normal  RECTAL: normal sphincter tone, no rectal masses, prostate normal size, smooth, nontender without nodules or masses  MS: no gross musculoskeletal defects noted, no edema  SKIN: no suspicious lesions or rashes  NEURO: Normal strength and tone, mentation intact and speech normal  PSYCH: mentation appears normal, affect normal/bright    Diagnostic Test Results:  Labs reviewed in Epic    ASSESSMENT/PLAN:   1. Routine general medical examination at a health care facility  Appropriate screening labs are drawn.  Most recent colonoscopy and immunizations are reviewed.  Risk factors, aspirin use, screening recommendations, and follow up discussed.  Routine exam in 1 year.      2. Type 2 diabetes mellitus without complication, without long-term current use of insulin (H)  Fasting labs are reviewed.  Goal of hemoglobin A1C of less than 7 discussed.  Instructed in the importance of diet, exercise, and good glycemic control in prevention of secondary complications.    Continue same medication regimen.  Empiric Florastor to reduce bowel frequency. Repeat fasting glucose and hemoglobin A1C in 6 months.    - Hemoglobin A1c  -  "FOOT EXAM  NO CHARGE [25021.114]  - Albumin Random Urine Quantitative with Creat Ratio  - saccharomyces boulardii (FLORASTOR) 250 MG capsule; Take 1 capsule (250 mg) by mouth 2 times daily  Dispense: 60 capsule; Refill: 3    3. Dyslipidemia  Fasting labs reviewed.  Goals discussed.  Discussed the importance of diet, exercise, and weight reduction.  Follow up 12 months.    - CBC with platelets differential  - Comprehensive metabolic panel  - Lipid Profile  - TSH with free T4 reflex    4. Screening for prostate cancer  PSA drawn  - PSA, screen    Patient has been advised of split billing requirements and indicates understanding: Yes  COUNSELING:  Reviewed preventive health counseling, as reflected in patient instructions    Estimated body mass index is 29.6 kg/m  as calculated from the following:    Height as of 8/11/20: 1.702 m (5' 7\").    Weight as of 8/11/20: 85.7 kg (189 lb).    Weight management plan: Discussed healthy diet and exercise guidelines    He reports that he has never smoked. His smokeless tobacco use includes chew.  Tobacco Cessation Action Plan:   Self help information given to patient      Counseling Resources:  ATP IV Guidelines  Pooled Cohorts Equation Calculator  FRAX Risk Assessment  ICSI Preventive Guidelines  Dietary Guidelines for Americans, 2010  USDA's MyPlate  ASA Prophylaxis  Lung CA Screening    Sukhjinder Mccarthy MD  [facility] - [place]  "

## 2021-05-14 ENCOUNTER — OFFICE VISIT (OUTPATIENT)
Dept: FAMILY MEDICINE | Facility: OTHER | Age: 61
End: 2021-05-14
Attending: FAMILY MEDICINE
Payer: COMMERCIAL

## 2021-05-14 VITALS
HEIGHT: 67 IN | HEART RATE: 84 BPM | OXYGEN SATURATION: 99 % | DIASTOLIC BLOOD PRESSURE: 70 MMHG | WEIGHT: 201 LBS | SYSTOLIC BLOOD PRESSURE: 136 MMHG | BODY MASS INDEX: 31.55 KG/M2

## 2021-05-14 DIAGNOSIS — E78.2 MIXED HYPERLIPIDEMIA: ICD-10-CM

## 2021-05-14 DIAGNOSIS — E11.9 TYPE 2 DIABETES MELLITUS WITHOUT COMPLICATION, WITHOUT LONG-TERM CURRENT USE OF INSULIN (H): ICD-10-CM

## 2021-05-14 DIAGNOSIS — Z00.00 ROUTINE GENERAL MEDICAL EXAMINATION AT A HEALTH CARE FACILITY: Primary | ICD-10-CM

## 2021-05-14 DIAGNOSIS — Z12.5 SCREENING FOR PROSTATE CANCER: ICD-10-CM

## 2021-05-14 LAB
ALBUMIN SERPL-MCNC: 4.3 G/DL (ref 3.4–5)
ALP SERPL-CCNC: 68 U/L (ref 40–150)
ALT SERPL W P-5'-P-CCNC: 95 U/L (ref 0–70)
ANION GAP SERPL CALCULATED.3IONS-SCNC: 5 MMOL/L (ref 3–14)
AST SERPL W P-5'-P-CCNC: 60 U/L (ref 0–45)
BASOPHILS # BLD AUTO: 0 10E9/L (ref 0–0.2)
BASOPHILS NFR BLD AUTO: 0.4 %
BILIRUB SERPL-MCNC: 1.4 MG/DL (ref 0.2–1.3)
BUN SERPL-MCNC: 11 MG/DL (ref 7–30)
CALCIUM SERPL-MCNC: 9.8 MG/DL (ref 8.5–10.1)
CHLORIDE SERPL-SCNC: 104 MMOL/L (ref 94–109)
CHOLEST SERPL-MCNC: 163 MG/DL
CO2 SERPL-SCNC: 28 MMOL/L (ref 20–32)
CREAT SERPL-MCNC: 0.96 MG/DL (ref 0.66–1.25)
CREAT UR-MCNC: 119 MG/DL
DIFFERENTIAL METHOD BLD: NORMAL
EOSINOPHIL # BLD AUTO: 0 10E9/L (ref 0–0.7)
EOSINOPHIL NFR BLD AUTO: 0.4 %
ERYTHROCYTE [DISTWIDTH] IN BLOOD BY AUTOMATED COUNT: 12.5 % (ref 10–15)
EST. AVERAGE GLUCOSE BLD GHB EST-MCNC: 134 MG/DL
GFR SERPL CREATININE-BSD FRML MDRD: 85 ML/MIN/{1.73_M2}
GLUCOSE SERPL-MCNC: 125 MG/DL (ref 70–99)
HBA1C MFR BLD: 6.3 % (ref 0–5.6)
HCT VFR BLD AUTO: 42.4 % (ref 40–53)
HDLC SERPL-MCNC: 61 MG/DL
HGB BLD-MCNC: 14.6 G/DL (ref 13.3–17.7)
IMM GRANULOCYTES # BLD: 0 10E9/L (ref 0–0.4)
IMM GRANULOCYTES NFR BLD: 0.2 %
LDLC SERPL CALC-MCNC: 78 MG/DL
LYMPHOCYTES # BLD AUTO: 1.3 10E9/L (ref 0.8–5.3)
LYMPHOCYTES NFR BLD AUTO: 23.1 %
MCH RBC QN AUTO: 33 PG (ref 26.5–33)
MCHC RBC AUTO-ENTMCNC: 34.4 G/DL (ref 31.5–36.5)
MCV RBC AUTO: 96 FL (ref 78–100)
MICROALBUMIN UR-MCNC: 76 MG/L
MICROALBUMIN/CREAT UR: 63.61 MG/G CR (ref 0–17)
MONOCYTES # BLD AUTO: 0.5 10E9/L (ref 0–1.3)
MONOCYTES NFR BLD AUTO: 8.1 %
NEUTROPHILS # BLD AUTO: 3.8 10E9/L (ref 1.6–8.3)
NEUTROPHILS NFR BLD AUTO: 67.8 %
NONHDLC SERPL-MCNC: 102 MG/DL
NRBC # BLD AUTO: 0 10*3/UL
NRBC BLD AUTO-RTO: 0 /100
PLATELET # BLD AUTO: 169 10E9/L (ref 150–450)
POTASSIUM SERPL-SCNC: 4.2 MMOL/L (ref 3.4–5.3)
PROT SERPL-MCNC: 7.9 G/DL (ref 6.8–8.8)
PSA SERPL-ACNC: 2.07 UG/L (ref 0–4)
RBC # BLD AUTO: 4.42 10E12/L (ref 4.4–5.9)
SODIUM SERPL-SCNC: 137 MMOL/L (ref 133–144)
TRIGL SERPL-MCNC: 121 MG/DL
TSH SERPL DL<=0.005 MIU/L-ACNC: 1.14 MU/L (ref 0.4–4)
WBC # BLD AUTO: 5.5 10E9/L (ref 4–11)

## 2021-05-14 PROCEDURE — 83036 HEMOGLOBIN GLYCOSYLATED A1C: CPT | Performed by: FAMILY MEDICINE

## 2021-05-14 PROCEDURE — 80050 GENERAL HEALTH PANEL: CPT | Performed by: FAMILY MEDICINE

## 2021-05-14 PROCEDURE — G0103 PSA SCREENING: HCPCS | Performed by: FAMILY MEDICINE

## 2021-05-14 PROCEDURE — 82043 UR ALBUMIN QUANTITATIVE: CPT | Performed by: FAMILY MEDICINE

## 2021-05-14 PROCEDURE — 99N1182 PR STATISTIC ESTIMATED AVERAGE GLUCOSE: Performed by: FAMILY MEDICINE

## 2021-05-14 PROCEDURE — 80061 LIPID PANEL: CPT | Performed by: FAMILY MEDICINE

## 2021-05-14 PROCEDURE — 36415 COLL VENOUS BLD VENIPUNCTURE: CPT | Performed by: FAMILY MEDICINE

## 2021-05-14 PROCEDURE — 99396 PREV VISIT EST AGE 40-64: CPT | Performed by: FAMILY MEDICINE

## 2021-05-14 RX ORDER — SACCHAROMYCES BOULARDII 250 MG
250 CAPSULE ORAL 2 TIMES DAILY
Qty: 60 CAPSULE | Refills: 3 | Status: SHIPPED | OUTPATIENT
Start: 2021-05-14 | End: 2022-05-23

## 2021-05-14 ASSESSMENT — ANXIETY QUESTIONNAIRES
7. FEELING AFRAID AS IF SOMETHING AWFUL MIGHT HAPPEN: NOT AT ALL
2. NOT BEING ABLE TO STOP OR CONTROL WORRYING: NOT AT ALL
GAD7 TOTAL SCORE: 0
6. BECOMING EASILY ANNOYED OR IRRITABLE: NOT AT ALL
5. BEING SO RESTLESS THAT IT IS HARD TO SIT STILL: NOT AT ALL
1. FEELING NERVOUS, ANXIOUS, OR ON EDGE: NOT AT ALL
4. TROUBLE RELAXING: NOT AT ALL
3. WORRYING TOO MUCH ABOUT DIFFERENT THINGS: NOT AT ALL

## 2021-05-14 ASSESSMENT — MIFFLIN-ST. JEOR: SCORE: 1675.36

## 2021-05-14 ASSESSMENT — PAIN SCALES - GENERAL: PAINLEVEL: NO PAIN (0)

## 2021-05-14 ASSESSMENT — PATIENT HEALTH QUESTIONNAIRE - PHQ9: SUM OF ALL RESPONSES TO PHQ QUESTIONS 1-9: 0

## 2021-05-14 NOTE — NURSING NOTE
"Chief Complaint   Patient presents with     Physical       Initial /70 (BP Location: Left arm, Patient Position: Sitting)   Pulse 84   Ht 1.702 m (5' 7\")   Wt 91.2 kg (201 lb)   SpO2 99%   BMI 31.48 kg/m   Estimated body mass index is 31.48 kg/m  as calculated from the following:    Height as of this encounter: 1.702 m (5' 7\").    Weight as of this encounter: 91.2 kg (201 lb).  Medication Reconciliation: complete  Sobeida Gordon LPN  "

## 2021-05-15 ASSESSMENT — ANXIETY QUESTIONNAIRES: GAD7 TOTAL SCORE: 0

## 2021-06-06 DIAGNOSIS — E11.9 TYPE 2 DIABETES MELLITUS WITHOUT COMPLICATION, WITHOUT LONG-TERM CURRENT USE OF INSULIN (H): ICD-10-CM

## 2021-06-07 NOTE — TELEPHONE ENCOUNTER
Metformin 500 mg      Last Written Prescription Date:  1/25/21  Last Fill Quantity: 120,   # refills: 3  Last Office Visit: 5/14/21  Future Office visit:       Routing refill request to provider for review/approval because:  Drug not on the G, P or [facility] refill protocol or controlled substance

## 2021-06-08 RX ORDER — METFORMIN HCL 500 MG
2000 TABLET, EXTENDED RELEASE 24 HR ORAL
Qty: 120 TABLET | Refills: 3 | Status: SHIPPED | OUTPATIENT
Start: 2021-06-08 | End: 2021-10-05

## 2021-08-03 ENCOUNTER — TELEPHONE (OUTPATIENT)
Dept: FAMILY MEDICINE | Facility: OTHER | Age: 61
End: 2021-08-03

## 2021-08-03 DIAGNOSIS — E11.9 TYPE 2 DIABETES MELLITUS WITHOUT COMPLICATION, WITHOUT LONG-TERM CURRENT USE OF INSULIN (H): Primary | ICD-10-CM

## 2021-08-17 ENCOUNTER — HOSPITAL ENCOUNTER (OUTPATIENT)
Dept: EDUCATION SERVICES | Facility: HOSPITAL | Age: 61
Discharge: HOME | End: 2021-08-17
Attending: NURSE PRACTITIONER | Admitting: NURSE PRACTITIONER
Payer: COMMERCIAL

## 2021-08-17 VITALS
OXYGEN SATURATION: 99 % | HEIGHT: 67 IN | BODY MASS INDEX: 31.4 KG/M2 | HEART RATE: 74 BPM | WEIGHT: 200.1 LBS | SYSTOLIC BLOOD PRESSURE: 138 MMHG | RESPIRATION RATE: 16 BRPM | DIASTOLIC BLOOD PRESSURE: 92 MMHG

## 2021-08-17 DIAGNOSIS — E11.9 TYPE 2 DIABETES MELLITUS WITHOUT COMPLICATION, WITHOUT LONG-TERM CURRENT USE OF INSULIN (H): Primary | ICD-10-CM

## 2021-08-17 LAB — HBA1C MFR BLD: 6.5 % (ref 0–5.7)

## 2021-08-17 PROCEDURE — G0108 DIAB MANAGE TRN  PER INDIV: HCPCS | Performed by: DIETITIAN, REGISTERED

## 2021-08-17 PROCEDURE — 83036 HEMOGLOBIN GLYCOSYLATED A1C: CPT | Performed by: FAMILY MEDICINE

## 2021-08-17 ASSESSMENT — MIFFLIN-ST. JEOR: SCORE: 1667.31

## 2021-08-17 ASSESSMENT — PAIN SCALES - GENERAL: PAINLEVEL: NO PAIN (0)

## 2021-08-17 NOTE — LETTER
"    8/17/2021        RE: Neo LEWIS Haleyteri  [address] [facility] [address]        Diabetes Self-Management Education & Support    Presents for: Individual review (Annual)    SUBJECTIVE/OBJECTIVE:  Presents for: Individual review (Annual)  Accompanied by: Self  Diabetes education in the past 24mo: Yes  Focus of Visit: Monitoring, Assistance w/ making life changes  Diabetes type: Type 2  Date of diagnosis: 12/2014  Disease course: Improving  How confident are you filling out medical forms by yourself:: Extremely  Diabetes management related comments/concerns: No concerns  Transportation concerns: No  Difficulty affording diabetes medication?: No  Difficulty affording diabetes testing supplies?: No  Other concerns:: None  Cultural Influences/Ethnic Background:  American    Diabetes Symptoms & Complications:  Fatigue: No  Neuropathy: No  Polydipsia: No  Polyphagia: No  Polyuria: No  Visual change: No  Slow healing wounds: No  Other: No  Symptom course: Stable  Weight trend: Increasing (Weight is up 11# over the past year.)  Complications assessed today?: Yes  CVA: No  Heart disease: No  Nephropathy: No    Patient Problem List and Family Medical History reviewed for relevant medical history, current medical status, and diabetes risk factors.    Vitals:  /92   Pulse 74   Resp 16   Ht 1.695 m (5' 6.75\")   Wt 90.8 kg (200 lb 1.6 oz)   SpO2 99%   BMI 31.58 kg/m    Estimated body mass index is 31.58 kg/m  as calculated from the following:    Height as of this encounter: 1.695 m (5' 6.75\").    Weight as of this encounter: 90.8 kg (200 lb 1.6 oz).   Last 3 BP:   BP Readings from Last 3 Encounters:   08/17/21 138/92   05/14/21 136/70   08/11/20 142/72   Pt states BP can be checked at his work.  Encouraged him to have it checked at some random times to make sure level routinely < 140/90.      History   Smoking Status     Never Smoker   Smokeless Tobacco     Current User     Types: Chew     Comment: Tried to quit; No " passive exposure       Labs:  Lab Results   Component Value Date    A1C 6.5 08/17/2021     Lab Results   Component Value Date     05/14/2021     Lab Results   Component Value Date    LDL 78 05/14/2021     HDL Cholesterol   Date Value Ref Range Status   05/14/2021 61 >39 mg/dL Final   ]  GFR Estimate   Date Value Ref Range Status   05/14/2021 85 >60 mL/min/[1.73_m2] Final     Comment:     Non  GFR Calc  Starting 12/18/2018, serum creatinine based estimated GFR (eGFR) will be   calculated using the Chronic Kidney Disease Epidemiology Collaboration   (CKD-EPI) equation.       GFR Estimate If Black   Date Value Ref Range Status   05/14/2021 >90 >60 mL/min/[1.73_m2] Final     Comment:      GFR Calc  Starting 12/18/2018, serum creatinine based estimated GFR (eGFR) will be   calculated using the Chronic Kidney Disease Epidemiology Collaboration   (CKD-EPI) equation.       Lab Results   Component Value Date    CR 0.96 05/14/2021     No results found for: MICROALBUMIN    Healthy Eating:  Healthy Eating Assessed Today: Yes  Cultural/Rastafari diet restrictions?: No  Meal planning/habits: Carb counting (Pt keeps meals < 60 grams carbohydrate for the most part.)  How many times a week on average do you eat food made away from home (restaurant/take-out)?: 1  Meals include: Breakfast, Lunch, Dinner, Morning Snack  Breakfast: Egg/sausage sandwich on croissant or granola bar  Lunch: salads with cheese/sometimes eggs, Fijian/blue cheese or may skip  Dinner: meat, potatoes, veggies  Snacks: peanuts - no evening snack  Other: stopped milk and chocolate milk   Beverages: Water, Diet soda, Sports drinks (Gatorade Zero)  Has patient met with a dietitian in the past?: Yes    Being Active:  Being Active Assessed Today: Yes  Exercise:: Yes (plays golf but no routine exercise)  Barrier to exercise: None    Monitoring:  Monitoring Assessed Today: Yes  Did patient bring glucose meter to appointment? :  No  Blood Glucose Meter: Accu-chek  Times checking blood sugar at home (number): Other (random)    Taking Medications:  Diabetes Medication(s)     Biguanides       metFORMIN (GLUCOPHAGE-XR) 500 MG 24 hr tablet    TAKE 4 TABLETS (2,000 MG) BY MOUTH DAILY (WITH DINNER)        Taking Medication Assessed Today: Yes  Current Treatments: Diet, Oral Medication (taken by mouth) (Metformin XR 2000 mg hs)  Problems taking diabetes medications regularly?: No  Diabetes medication side effects?: Yes (some diarrhea even with change to Metformin XR but pt able to tolerate)    Problem Solving:  Problem Solving Assessed Today: Yes  Is the patient at risk for hypoglycemia?: No  Is the patient at risk for DKA?: No  Does patient have severe weather/disaster plan for diabetes management?: Not Needed    Reducing Risks:  Reducing Risks Assessed Today: No  Diabetes Risks: Age over 45 years, Hyperlipidemia, Sedentary Lifestyle  CAD Risks: Diabetes Mellitus, Family history, Male sex, Obesity, Sedentary lifestyle  Has dilated eye exam at least once a year?: Yes  Sees dentist every 6 months?: Yes  Feet checked by healthcare provider in the last year?: Yes    Healthy Coping:  Healthy Coping Assessed Today: Yes  Emotional response to diabetes: Acceptance, Concern for health and well-being  Informal Support system:: Family  Stage of change: MAINTENANCE (Working to maintain change, with risk of relapse)  Support resources: None  Patient Activation Measure Survey Score:  NOY Score (Last Two) 12/18/2018   NOY Raw Score 37   Activation Score 79.2   NOY Level 4     Diabetes knowledge and skills assessment:   Patient is knowledgeable in diabetes management concepts related to: Healthy Eating, Being Active, Monitoring, Taking Medication, Problem Solving, Reducing Risks and Healthy Coping    Patient needs further education on the following diabetes management concepts: No concerns today.  Pt is doing well.     Based on learning assessment above, most  appropriate setting for further diabetes education would be: Individual setting.      INTERVENTIONS:    Education provided today on:  AADE Self-Care Behaviors:  Monitoring: individual blood glucose targets and frequency of monitoring  Taking Medication: Discussed alternate options vs Metformin ER if diarrhea no longer tolerable.   Reducing Risks: A1C - goals, relating to blood glucose levels, how often to check and blood pressure and goals    Opportunities for ongoing education and support in diabetes-self management were discussed.    Pt verbalized understanding of concepts discussed and recommendations provided today.       Education Materials Provided:  No new materials today.     ASSESSMENT:  A1c today in target at 6.5%.  Weight is up 11# over the past year - discussed.  Pt still has some diarrhea from Metformin ER even with addition of probiotics.  It is not everyday.  He wishes to continue at this time.  Discussion notes pt does limit carbohydrates in diet and although no routine exercise he is active.  Overall, he is doing well.     Patient's most recent   Lab Results   Component Value Date    A1C 6.5 08/17/2021    is meeting goal of <7.0    PLAN:  Continue to follow healthy, low carbohydrate meal plan.  Test glucose at least a few times per week.   Call if alternate medication vs Metformin ER desired.   See Patient Instructions for co-developed, patient-stated behavior change goals.  AVS printed and provided to patient today.  Follow up for glucose review/A1c check in six month.     Time Spent: 35 minutes  Encounter Type: Individual    Any diabetes medication dose changes were made via the CDE Protocol and Collaborative Practice Agreement with the patient's referring provider. A copy of this encounter was shared with the provider.          Sincerely,        Jacque Lamas RD

## 2021-08-17 NOTE — PATIENT INSTRUCTIONS
-Continue to follow healthy, low carbohydrate meal plan.   -Try to get some exercise most days.   -Test glucose at least a few times per week.  Good times are fasting or 2 hours after a meal.  -Target levels are fasting  and 2 hours after a meal <180.  -Call if you desire alternate medication vs Metformin ER.  -Weight today was 200# - up 11#.  Avoid further weight gain.  -A1c today was 6.5%.  Great job!  Goal is < 7.0%.  -Follow up in six months.  -Call with any concerns - TODD Bran, [phone].

## 2021-08-17 NOTE — PROGRESS NOTES
"Diabetes Self-Management Education & Support    Presents for: Individual review (Annual)    SUBJECTIVE/OBJECTIVE:  Presents for: Individual review (Annual)  Accompanied by: Self  Diabetes education in the past 24mo: Yes  Focus of Visit: Monitoring, Assistance w/ making life changes  Diabetes type: Type 2  Date of diagnosis: 12/2014  Disease course: Improving  How confident are you filling out medical forms by yourself:: Extremely  Diabetes management related comments/concerns: No concerns  Transportation concerns: No  Difficulty affording diabetes medication?: No  Difficulty affording diabetes testing supplies?: No  Other concerns:: None  Cultural Influences/Ethnic Background:  American    Diabetes Symptoms & Complications:  Fatigue: No  Neuropathy: No  Polydipsia: No  Polyphagia: No  Polyuria: No  Visual change: No  Slow healing wounds: No  Other: No  Symptom course: Stable  Weight trend: Increasing (Weight is up 11# over the past year.)  Complications assessed today?: Yes  CVA: No  Heart disease: No  Nephropathy: No    Patient Problem List and Family Medical History reviewed for relevant medical history, current medical status, and diabetes risk factors.    Vitals:  /92   Pulse 74   Resp 16   Ht 1.695 m (5' 6.75\")   Wt 90.8 kg (200 lb 1.6 oz)   SpO2 99%   BMI 31.58 kg/m    Estimated body mass index is 31.58 kg/m  as calculated from the following:    Height as of this encounter: 1.695 m (5' 6.75\").    Weight as of this encounter: 90.8 kg (200 lb 1.6 oz).   Last 3 BP:   BP Readings from Last 3 Encounters:   08/17/21 138/92   05/14/21 136/70   08/11/20 142/72   Pt states BP can be checked at his work.  Encouraged him to have it checked at some random times to make sure level routinely < 140/90.      History   Smoking Status     Never Smoker   Smokeless Tobacco     Current User     Types: Chew     Comment: Tried to quit; No passive exposure       Labs:  Lab Results   Component Value Date    A1C 6.5 " 08/17/2021     Lab Results   Component Value Date     05/14/2021     Lab Results   Component Value Date    LDL 78 05/14/2021     HDL Cholesterol   Date Value Ref Range Status   05/14/2021 61 >39 mg/dL Final   ]  GFR Estimate   Date Value Ref Range Status   05/14/2021 85 >60 mL/min/[1.73_m2] Final     Comment:     Non  GFR Calc  Starting 12/18/2018, serum creatinine based estimated GFR (eGFR) will be   calculated using the Chronic Kidney Disease Epidemiology Collaboration   (CKD-EPI) equation.       GFR Estimate If Black   Date Value Ref Range Status   05/14/2021 >90 >60 mL/min/[1.73_m2] Final     Comment:      GFR Calc  Starting 12/18/2018, serum creatinine based estimated GFR (eGFR) will be   calculated using the Chronic Kidney Disease Epidemiology Collaboration   (CKD-EPI) equation.       Lab Results   Component Value Date    CR 0.96 05/14/2021     No results found for: MICROALBUMIN    Healthy Eating:  Healthy Eating Assessed Today: Yes  Cultural/Christian diet restrictions?: No  Meal planning/habits: Carb counting (Pt keeps meals < 60 grams carbohydrate for the most part.)  How many times a week on average do you eat food made away from home (restaurant/take-out)?: 1  Meals include: Breakfast, Lunch, Dinner, Morning Snack  Breakfast: Egg/sausage sandwich on croissant or granola bar  Lunch: salads with cheese/sometimes eggs, Albanian/blue cheese or may skip  Dinner: meat, potatoes, veggies  Snacks: peanuts - no evening snack  Other: stopped milk and chocolate milk   Beverages: Water, Diet soda, Sports drinks (Gatorade Zero)  Has patient met with a dietitian in the past?: Yes    Being Active:  Being Active Assessed Today: Yes  Exercise:: Yes (plays golf but no routine exercise)  Barrier to exercise: None    Monitoring:  Monitoring Assessed Today: Yes  Did patient bring glucose meter to appointment? : No  Blood Glucose Meter: Accu-chek  Times checking blood sugar at home  (number): Other (random)    Taking Medications:  Diabetes Medication(s)     Biguanides       metFORMIN (GLUCOPHAGE-XR) 500 MG 24 hr tablet    TAKE 4 TABLETS (2,000 MG) BY MOUTH DAILY (WITH DINNER)        Taking Medication Assessed Today: Yes  Current Treatments: Diet, Oral Medication (taken by mouth) (Metformin XR 2000 mg hs)  Problems taking diabetes medications regularly?: No  Diabetes medication side effects?: Yes (some diarrhea even with change to Metformin XR but pt able to tolerate)    Problem Solving:  Problem Solving Assessed Today: Yes  Is the patient at risk for hypoglycemia?: No  Is the patient at risk for DKA?: No  Does patient have severe weather/disaster plan for diabetes management?: Not Needed    Reducing Risks:  Reducing Risks Assessed Today: No  Diabetes Risks: Age over 45 years, Hyperlipidemia, Sedentary Lifestyle  CAD Risks: Diabetes Mellitus, Family history, Male sex, Obesity, Sedentary lifestyle  Has dilated eye exam at least once a year?: Yes  Sees dentist every 6 months?: Yes  Feet checked by healthcare provider in the last year?: Yes    Healthy Coping:  Healthy Coping Assessed Today: Yes  Emotional response to diabetes: Acceptance, Concern for health and well-being  Informal Support system:: Family  Stage of change: MAINTENANCE (Working to maintain change, with risk of relapse)  Support resources: None  Patient Activation Measure Survey Score:  NOY Score (Last Two) 12/18/2018   NOY Raw Score 37   Activation Score 79.2   NOY Level 4     Diabetes knowledge and skills assessment:   Patient is knowledgeable in diabetes management concepts related to: Healthy Eating, Being Active, Monitoring, Taking Medication, Problem Solving, Reducing Risks and Healthy Coping    Patient needs further education on the following diabetes management concepts: No concerns today.  Pt is doing well.     Based on learning assessment above, most appropriate setting for further diabetes education would be: Individual  setting.      INTERVENTIONS:    Education provided today on:  AADE Self-Care Behaviors:  Monitoring: individual blood glucose targets and frequency of monitoring  Taking Medication: Discussed alternate options vs Metformin ER if diarrhea no longer tolerable.   Reducing Risks: A1C - goals, relating to blood glucose levels, how often to check and blood pressure and goals    Opportunities for ongoing education and support in diabetes-self management were discussed.    Pt verbalized understanding of concepts discussed and recommendations provided today.       Education Materials Provided:  No new materials today.     ASSESSMENT:  A1c today in target at 6.5%.  Weight is up 11# over the past year - discussed.  Pt still has some diarrhea from Metformin ER even with addition of probiotics.  It is not everyday.  He wishes to continue at this time.  Discussion notes pt does limit carbohydrates in diet and although no routine exercise he is active.  Overall, he is doing well.     Patient's most recent   Lab Results   Component Value Date    A1C 6.5 08/17/2021    is meeting goal of <7.0    PLAN:  Continue to follow healthy, low carbohydrate meal plan.  Test glucose at least a few times per week.   Call if alternate medication vs Metformin ER desired.   See Patient Instructions for co-developed, patient-stated behavior change goals.  AVS printed and provided to patient today.  Follow up for glucose review/A1c check in six month.     Time Spent: 35 minutes  Encounter Type: Individual    Any diabetes medication dose changes were made via the CDE Protocol and Collaborative Practice Agreement with the patient's referring provider. A copy of this encounter was shared with the provider.

## 2021-09-04 DIAGNOSIS — E11.9 TYPE 2 DIABETES MELLITUS WITHOUT COMPLICATION, WITHOUT LONG-TERM CURRENT USE OF INSULIN (H): Primary | ICD-10-CM

## 2021-09-07 RX ORDER — GARLIC EXTRACT 500 MG
CAPSULE ORAL
Qty: 60 CAPSULE | Refills: 2 | Status: SHIPPED | OUTPATIENT
Start: 2021-09-07 | End: 2022-01-03

## 2021-09-07 NOTE — TELEPHONE ENCOUNTER
Probiotic      Last Written Prescription Date:  8.18.21  Last Fill Quantity: #60,   # refills: 0  Last Office Visit: 5.14.21  Future Office visit:       Routing refill request to provider for review/approval because:  Drug not on the G, P or [facility] refill protocol or controlled substance

## 2021-10-03 DIAGNOSIS — E11.9 TYPE 2 DIABETES MELLITUS WITHOUT COMPLICATION, WITHOUT LONG-TERM CURRENT USE OF INSULIN (H): ICD-10-CM

## 2021-10-04 ENCOUNTER — TRANSFERRED RECORDS (OUTPATIENT)
Dept: HEALTH INFORMATION MANAGEMENT | Facility: CLINIC | Age: 61
End: 2021-10-04

## 2021-10-04 LAB — RETINOPATHY: NEGATIVE

## 2021-10-04 NOTE — TELEPHONE ENCOUNTER
metformin      Last Written Prescription Date:  6/8/21  Last Fill Quantity: 120,   # refills: 3  Last Office Visit: 5/14/21  Future Office visit:

## 2021-10-05 RX ORDER — METFORMIN HCL 500 MG
2000 TABLET, EXTENDED RELEASE 24 HR ORAL
Qty: 120 TABLET | Refills: 2 | Status: SHIPPED | OUTPATIENT
Start: 2021-10-05 | End: 2022-01-03

## 2021-12-02 DIAGNOSIS — E78.2 MIXED HYPERLIPIDEMIA: ICD-10-CM

## 2021-12-02 RX ORDER — SIMVASTATIN 20 MG
TABLET ORAL
Qty: 30 TABLET | Refills: 5 | Status: SHIPPED | OUTPATIENT
Start: 2021-12-02 | End: 2022-05-10

## 2021-12-09 ENCOUNTER — OFFICE VISIT (OUTPATIENT)
Dept: FAMILY MEDICINE | Facility: OTHER | Age: 61
End: 2021-12-09
Attending: FAMILY MEDICINE
Payer: COMMERCIAL

## 2021-12-09 ENCOUNTER — NURSE TRIAGE (OUTPATIENT)
Dept: FAMILY MEDICINE | Facility: OTHER | Age: 61
End: 2021-12-09
Payer: COMMERCIAL

## 2021-12-09 VITALS
DIASTOLIC BLOOD PRESSURE: 88 MMHG | TEMPERATURE: 99.1 F | SYSTOLIC BLOOD PRESSURE: 170 MMHG | WEIGHT: 200 LBS | OXYGEN SATURATION: 97 % | HEIGHT: 67 IN | HEART RATE: 92 BPM | BODY MASS INDEX: 31.39 KG/M2

## 2021-12-09 DIAGNOSIS — R03.0 ELEVATED BP WITHOUT DIAGNOSIS OF HYPERTENSION: ICD-10-CM

## 2021-12-09 DIAGNOSIS — T14.8XXA HEMATOMA OF SKIN: Primary | ICD-10-CM

## 2021-12-09 PROCEDURE — 99213 OFFICE O/P EST LOW 20 MIN: CPT | Performed by: FAMILY MEDICINE

## 2021-12-09 ASSESSMENT — ANXIETY QUESTIONNAIRES
1. FEELING NERVOUS, ANXIOUS, OR ON EDGE: NOT AT ALL
2. NOT BEING ABLE TO STOP OR CONTROL WORRYING: NOT AT ALL
6. BECOMING EASILY ANNOYED OR IRRITABLE: NOT AT ALL
5. BEING SO RESTLESS THAT IT IS HARD TO SIT STILL: NOT AT ALL
7. FEELING AFRAID AS IF SOMETHING AWFUL MIGHT HAPPEN: NOT AT ALL
GAD7 TOTAL SCORE: 0
4. TROUBLE RELAXING: NOT AT ALL
3. WORRYING TOO MUCH ABOUT DIFFERENT THINGS: NOT AT ALL

## 2021-12-09 ASSESSMENT — PAIN SCALES - GENERAL: PAINLEVEL: SEVERE PAIN (7)

## 2021-12-09 ASSESSMENT — MIFFLIN-ST. JEOR: SCORE: 1666.85

## 2021-12-09 NOTE — NURSING NOTE
"Chief Complaint   Patient presents with     Fall       Initial BP (!) 180/88   Pulse 92   Temp 99.1  F (37.3  C)   Ht 1.695 m (5' 6.75\")   Wt 90.7 kg (200 lb)   SpO2 97%   BMI 31.56 kg/m   Estimated body mass index is 31.56 kg/m  as calculated from the following:    Height as of this encounter: 1.695 m (5' 6.75\").    Weight as of this encounter: 90.7 kg (200 lb).  Medication Reconciliation: complete  Marino Colunga LPN  "

## 2021-12-09 NOTE — TELEPHONE ENCOUNTER
"    Reason for Disposition    [1] After 3 days (72 hours) AND [2] pain not improving    Answer Assessment - Initial Assessment Questions  1. MECHANISM: \"How did the injury happen?\"        Fell down steps  2. ONSET: \"When did the injury happen?\" (Minutes or hours ago)       Left buttocks  3. LOCATION: \"Where is the injury located?\"       Left buttocks  4. SEVERITY: \"Can you sit?\" \"Can you walk?\"       yes  5. PAIN: \"Is there pain?\" If so, ask: \"How bad is the pain?\"    (Scale 1-10; or mild, moderate, severe)      yes  6. SIZE: For bruises, or swelling, ask: \"How large is it?\" (e.g., inches or centimeters)       Entire buttocks  7. OTHER SYMPTOMS: \"Do you have any other symptoms?\" (e.g., numbness, back pain)      Bruise hurts and is hard to the touch  8. PREGNANCY: \"Is there any chance you are pregnant?\" \"When was your last menstrual period?\"      no    Protocols used: TAILBONE INJURY-A-AH      "

## 2021-12-09 NOTE — PROGRESS NOTES
"  Assessment & Plan     Hematoma of skin (left buttocks)  - Discussed with patient that he does have a large hematoma of the left buttocks after suffering a fall on stairs. Reassurance provided that this will likely resolve on its own without complication but will take 2 or more months. I advised him to use heat, tylenol and ibuprofen as needed for pain and to try to offset the pressure off the hematoma as much as possible. I explained that in rare circumstances hematomas can become infected and if there is signs of that or ulceration of the skin to contact the clinic to be seen. I encouraged him to eat foods high in iron or take OTC supplement for ~1 month for blood loss related to the hematoma. He verbalized understanding and agreement with the plan.    Elevated BP without diagnosis of hypertension  - BP elevated in clinic today. Historically he has not had elevated BP. I discussed this with him and encouraged him to monitor it outside of the clinic or stopped in to the clinic for BP check to ensure it returns to the normal range. I will see him in January-February for his diabetes check, but I instructed him to contact me sooner if BP remains elevated.               BMI:   Estimated body mass index is 31.56 kg/m  as calculated from the following:    Height as of this encounter: 1.695 m (5' 6.75\").    Weight as of this encounter: 90.7 kg (200 lb).     No follow-ups on file.    Colin Fisher MD  [facility] - JESSICA Larson is a 60 year old who presents for the following health issues     HPI     Pain History:  When did you first notice your pain? - Less than 1 week   Have you seen anyone else for your pain? No  Where in your body do you have pain? Musculoskeletal problem/pain  Onset/Duration: Monday  Description  Location: butt and lower back - left  Joint Swelling: YES  Redness: no  Pain: YES  Warmth: no  Intensity:  moderate, severe  Progression of Symptoms:  same  Accompanying " "signs and symptoms:   Fevers: no  Numbness/tingling/weakness: no  History  Trauma to the area: YES  Recent illness:  no  Previous similar problem: no  Previous evaluation:  no  Precipitating or alleviating factors:  Aggravating factors include: sitting  Therapies tried and outcome: acetaminophen and Ibuprofen    Monday- basement steps- held on to railing with right hand- hit elbow- could not move it right after injury.    Nagging pain 6/10    Review of Systems   Constitutional, HEENT, cardiovascular, pulmonary, gi and gu systems are negative, except as otherwise noted.      Objective    BP (!) 170/88   Pulse 92   Temp 99.1  F (37.3  C)   Ht 1.695 m (5' 6.75\")   Wt 90.7 kg (200 lb)   SpO2 97%   BMI 31.56 kg/m    Body mass index is 31.56 kg/m .  Physical Exam   GENERAL: healthy, alert and no distress  MS/ SKIN: Significant ecchymosis present lower left back, left buttocks and lateral thigh. Large - softball/grapefruit sized hematoma along medial buttocks near crevice- firm with underlying erythema below bruising. Skin is intact.         "

## 2021-12-10 ASSESSMENT — PATIENT HEALTH QUESTIONNAIRE - PHQ9: SUM OF ALL RESPONSES TO PHQ QUESTIONS 1-9: 0

## 2021-12-10 ASSESSMENT — ANXIETY QUESTIONNAIRES: GAD7 TOTAL SCORE: 0

## 2022-01-01 DIAGNOSIS — E11.9 TYPE 2 DIABETES MELLITUS WITHOUT COMPLICATION, WITHOUT LONG-TERM CURRENT USE OF INSULIN (H): ICD-10-CM

## 2022-01-03 RX ORDER — METFORMIN HCL 500 MG
2000 TABLET, EXTENDED RELEASE 24 HR ORAL
Qty: 120 TABLET | Refills: 1 | Status: SHIPPED | OUTPATIENT
Start: 2022-01-03 | End: 2022-03-02

## 2022-01-03 RX ORDER — GARLIC EXTRACT 500 MG
CAPSULE ORAL
Qty: 60 CAPSULE | Refills: 1 | Status: SHIPPED | OUTPATIENT
Start: 2022-01-03 | End: 2022-03-22

## 2022-01-03 NOTE — TELEPHONE ENCOUNTER
metformin      Last Written Prescription Date:  10/5/21  Last Fill Quantity: 120,   # refills: 2  Last Office Visit: 12/9/21  Future Office visit:    Next 5 appointments (look out 90 days)    Jan 17, 2022  8:45 AM  (Arrive by 8:30 AM)  SHORT with Colin Fisher MD  [facility] ([facility] [address]  [place] [address]  [phone]         lactobacillus      Last Written Prescription Date:  9/7/21  Last Fill Quantity: 60,   # refills: 2  Last Office Visit: 12/9/21  Future Office visit:    Next 5 appointments (look out 90 days)    Jan 17, 2022  8:45 AM  (Arrive by 8:30 AM)  SHORT with Colin Fisher MD  [facility] ([facility] ) [address]  [place] [address]  [phone]

## 2022-01-17 ENCOUNTER — OFFICE VISIT (OUTPATIENT)
Dept: FAMILY MEDICINE | Facility: OTHER | Age: 62
End: 2022-01-17
Attending: FAMILY MEDICINE
Payer: COMMERCIAL

## 2022-01-17 VITALS
HEIGHT: 67 IN | SYSTOLIC BLOOD PRESSURE: 138 MMHG | HEART RATE: 88 BPM | DIASTOLIC BLOOD PRESSURE: 84 MMHG | TEMPERATURE: 97.7 F | WEIGHT: 200 LBS | OXYGEN SATURATION: 96 % | BODY MASS INDEX: 31.39 KG/M2

## 2022-01-17 DIAGNOSIS — R80.9 MICROALBUMINURIA DUE TO TYPE 2 DIABETES MELLITUS (H): ICD-10-CM

## 2022-01-17 DIAGNOSIS — E11.9 TYPE 2 DIABETES MELLITUS WITHOUT COMPLICATION, WITHOUT LONG-TERM CURRENT USE OF INSULIN (H): Primary | ICD-10-CM

## 2022-01-17 DIAGNOSIS — Z76.89 ESTABLISHING CARE WITH NEW DOCTOR, ENCOUNTER FOR: ICD-10-CM

## 2022-01-17 DIAGNOSIS — N18.2 CHRONIC KIDNEY DISEASE, STAGE 2 (MILD): ICD-10-CM

## 2022-01-17 DIAGNOSIS — E78.2 MIXED HYPERLIPIDEMIA: ICD-10-CM

## 2022-01-17 DIAGNOSIS — E11.29 MICROALBUMINURIA DUE TO TYPE 2 DIABETES MELLITUS (H): ICD-10-CM

## 2022-01-17 LAB
ANION GAP SERPL CALCULATED.3IONS-SCNC: 7 MMOL/L (ref 3–14)
BASOPHILS # BLD AUTO: 0 10E3/UL (ref 0–0.2)
BASOPHILS NFR BLD AUTO: 0 %
BUN SERPL-MCNC: 7 MG/DL (ref 7–30)
CALCIUM SERPL-MCNC: 9.4 MG/DL (ref 8.5–10.1)
CHLORIDE BLD-SCNC: 103 MMOL/L (ref 94–109)
CO2 SERPL-SCNC: 26 MMOL/L (ref 20–32)
CREAT SERPL-MCNC: 0.93 MG/DL (ref 0.66–1.25)
EOSINOPHIL # BLD AUTO: 0 10E3/UL (ref 0–0.7)
EOSINOPHIL NFR BLD AUTO: 1 %
ERYTHROCYTE [DISTWIDTH] IN BLOOD BY AUTOMATED COUNT: 12.8 % (ref 10–15)
EST. AVERAGE GLUCOSE BLD GHB EST-MCNC: 134 MG/DL
GFR SERPL CREATININE-BSD FRML MDRD: >90 ML/MIN/1.73M2
GLUCOSE BLD-MCNC: 148 MG/DL (ref 70–99)
HBA1C MFR BLD: 6.3 % (ref 0–5.6)
HCT VFR BLD AUTO: 42.7 % (ref 40–53)
HGB BLD-MCNC: 14.6 G/DL (ref 13.3–17.7)
IMM GRANULOCYTES # BLD: 0 10E3/UL
IMM GRANULOCYTES NFR BLD: 1 %
LYMPHOCYTES # BLD AUTO: 1.1 10E3/UL (ref 0.8–5.3)
LYMPHOCYTES NFR BLD AUTO: 26 %
MCH RBC QN AUTO: 33 PG (ref 26.5–33)
MCHC RBC AUTO-ENTMCNC: 34.2 G/DL (ref 31.5–36.5)
MCV RBC AUTO: 97 FL (ref 78–100)
MONOCYTES # BLD AUTO: 0.4 10E3/UL (ref 0–1.3)
MONOCYTES NFR BLD AUTO: 10 %
NEUTROPHILS # BLD AUTO: 2.7 10E3/UL (ref 1.6–8.3)
NEUTROPHILS NFR BLD AUTO: 62 %
NRBC # BLD AUTO: 0 10E3/UL
NRBC BLD AUTO-RTO: 0 /100
PLATELET # BLD AUTO: 188 10E3/UL (ref 150–450)
POTASSIUM BLD-SCNC: 3.7 MMOL/L (ref 3.4–5.3)
RBC # BLD AUTO: 4.42 10E6/UL (ref 4.4–5.9)
SODIUM SERPL-SCNC: 136 MMOL/L (ref 133–144)
WBC # BLD AUTO: 4.2 10E3/UL (ref 4–11)

## 2022-01-17 PROCEDURE — 99214 OFFICE O/P EST MOD 30 MIN: CPT | Performed by: FAMILY MEDICINE

## 2022-01-17 PROCEDURE — 85025 COMPLETE CBC W/AUTO DIFF WBC: CPT | Performed by: FAMILY MEDICINE

## 2022-01-17 PROCEDURE — 83036 HEMOGLOBIN GLYCOSYLATED A1C: CPT | Performed by: FAMILY MEDICINE

## 2022-01-17 PROCEDURE — 80048 BASIC METABOLIC PNL TOTAL CA: CPT | Performed by: FAMILY MEDICINE

## 2022-01-17 PROCEDURE — 36415 COLL VENOUS BLD VENIPUNCTURE: CPT | Performed by: FAMILY MEDICINE

## 2022-01-17 RX ORDER — LOSARTAN POTASSIUM 25 MG/1
25 TABLET ORAL DAILY
Qty: 90 TABLET | Refills: 1 | Status: SHIPPED | OUTPATIENT
Start: 2022-01-17 | End: 2022-02-18

## 2022-01-17 RX ORDER — LISINOPRIL 10 MG/1
10 TABLET ORAL DAILY
Status: CANCELLED | OUTPATIENT
Start: 2022-01-17

## 2022-01-17 ASSESSMENT — MIFFLIN-ST. JEOR: SCORE: 1661.85

## 2022-01-17 ASSESSMENT — PAIN SCALES - GENERAL: PAINLEVEL: NO PAIN (0)

## 2022-01-17 NOTE — NURSING NOTE
"Chief Complaint   Patient presents with     Diabetes     Lip Laceration     Kidney Problem       Initial /84 (BP Location: Right arm, Patient Position: Chair, Cuff Size: Adult Large)   Pulse 88   Temp 97.7  F (36.5  C) (Tympanic)   Ht 1.695 m (5' 6.75\")   Wt 90.7 kg (200 lb)   SpO2 96%   BMI 31.56 kg/m   Estimated body mass index is 31.56 kg/m  as calculated from the following:    Height as of this encounter: 1.695 m (5' 6.75\").    Weight as of this encounter: 90.7 kg (200 lb).  Medication Reconciliation: complete  Mahsa Whyte LPN    "

## 2022-01-17 NOTE — PROGRESS NOTES
"  Assessment & Plan     Type 2 diabetes mellitus without complication, without long-term current use of insulin (H)  Great control in past. A1C pending. See back in June for follow-up and px.  Start ARB today. Continue meds unless A1C up.   - CBC with Platelets & Differential; Future  - Basic metabolic panel; Future  - Hemoglobin A1c; Future  - CBC with Platelets & Differential  - Basic metabolic panel  - Hemoglobin A1c  - losartan (COZAAR) 25 MG tablet; Take 1 tablet (25 mg) by mouth daily  - Basic metabolic panel; Future    Mixed hyperlipidemia  On statin . Continue current medications and behavioral changes.   - CBC with Platelets & Differential; Future  - Basic metabolic panel; Future  - Hemoglobin A1c; Future  - CBC with Platelets & Differential  - Basic metabolic panel  - Hemoglobin A1c    Establishing care with new doctor, encounter for  Will assume care .   - CBC with Platelets & Differential; Future  - Basic metabolic panel; Future  - Hemoglobin A1c; Future  - CBC with Platelets & Differential  - Basic metabolic panel  - Hemoglobin A1c    Chronic kidney disease, stage 2 (mild)  Discussed. Will add Cozaar - has 2 sets of microalbumin being elevated . Lab only in 1 month to check Chem 8   - losartan (COZAAR) 25 MG tablet; Take 1 tablet (25 mg) by mouth daily    Microalbuminuria due to type 2 diabetes mellitus (H)  As above. Risk and benefits of ACE vs ARB was discussed and verbal consent to proceed was given.   Has chronic cough already - will go with ARB  - losartan (COZAAR) 25 MG tablet; Take 1 tablet (25 mg) by mouth daily  - Basic metabolic panel; Future             BMI:   Estimated body mass index is 31.56 kg/m  as calculated from the following:    Height as of this encounter: 1.695 m (5' 6.75\").    Weight as of this encounter: 90.7 kg (200 lb).           No follow-ups on file.    Colin Fisher MD  [facility] - JESSICA Larson is a 61 year old who presents for the following " health issues     HPI     Diabetes Follow-up    How often are you checking your blood sugar? A few times a week  What time of day are you checking your blood sugars (select all that apply)?  Before and after meals  Have you had any blood sugars above 200?  No  Have you had any blood sugars below 70?  No    What symptoms do you notice when your blood sugar is low?  Feels off     What concerns do you have today about your diabetes? None     Do you have any of these symptoms? (Select all that apply)  No numbness or tingling in feet.  No redness, sores or blisters on feet.  No complaints of excessive thirst.  No reports of blurry vision.  No significant changes to weight.      BP Readings from Last 2 Encounters:   12/09/21 (!) 170/88   08/17/21 138/92     Hemoglobin A1C POCT (%)   Date Value   08/17/2021 6.5 (A)   05/14/2021 6.3 (H)     LDL Cholesterol Calculated (mg/dL)   Date Value   05/14/2021 78   01/13/2020 76         Hyperlipidemia Follow-Up      Are you regularly taking any medication or supplement to lower your cholesterol?   Yes- simvastatin    Are you having muscle aches or other side effects that you think could be caused by your cholesterol lowering medication?  No    Chronic Kidney Disease Follow-up      Do you take any over the counter pain medicine?: No      How many servings of fruits and vegetables do you eat daily?  2-3    On average, how many sweetened beverages do you drink each day (Examples: soda, juice, sweet tea, etc.  Do NOT count diet or artificially sweetened beverages)?   0    How many days per week do you exercise enough to make your heart beat faster? 3 or less    How many minutes a day do you exercise enough to make your heart beat faster? 30 - 60    How many days per week do you miss taking your medication? 0        Review of Systems   Constitutional, HEENT, cardiovascular, pulmonary, gi and gu systems are negative, except as otherwise noted.      Objective    /84 (BP Location:  "Right arm, Patient Position: Chair, Cuff Size: Adult Large)   Pulse 88   Temp 97.7  F (36.5  C) (Tympanic)   Ht 1.695 m (5' 6.75\")   Wt 90.7 kg (200 lb)   SpO2 96%   BMI 31.56 kg/m    Body mass index is 31.56 kg/m .  Physical Exam   GENERAL: healthy, alert and no distress  NECK: no adenopathy, no asymmetry, masses, or scars and thyroid normal to palpation  RESP: lungs clear to auscultation - no rales, rhonchi or wheezes  CV: regular rate and rhythm, normal S1 S2, no S3 or S4, no murmur, click or rub, no peripheral edema and peripheral pulses strong  ABDOMEN: soft, nontender, no hepatosplenomegaly, no masses and bowel sounds normal  MS: no gross musculoskeletal defects noted, no edema    Results for orders placed or performed in visit on 01/17/22   Basic metabolic panel     Status: Abnormal   Result Value Ref Range    Sodium 136 133 - 144 mmol/L    Potassium 3.7 3.4 - 5.3 mmol/L    Chloride 103 94 - 109 mmol/L    Carbon Dioxide (CO2) 26 20 - 32 mmol/L    Anion Gap 7 3 - 14 mmol/L    Urea Nitrogen 7 7 - 30 mg/dL    Creatinine 0.93 0.66 - 1.25 mg/dL    Calcium 9.4 8.5 - 10.1 mg/dL    Glucose 148 (H) 70 - 99 mg/dL    GFR Estimate >90 >60 mL/min/1.73m2   CBC with platelets and differential     Status: None   Result Value Ref Range    WBC Count 4.2 4.0 - 11.0 10e3/uL    RBC Count 4.42 4.40 - 5.90 10e6/uL    Hemoglobin 14.6 13.3 - 17.7 g/dL    Hematocrit 42.7 40.0 - 53.0 %    MCV 97 78 - 100 fL    MCH 33.0 26.5 - 33.0 pg    MCHC 34.2 31.5 - 36.5 g/dL    RDW 12.8 10.0 - 15.0 %    Platelet Count 188 150 - 450 10e3/uL    % Neutrophils 62 %    % Lymphocytes 26 %    % Monocytes 10 %    % Eosinophils 1 %    % Basophils 0 %    % Immature Granulocytes 1 %    NRBCs per 100 WBC 0 <1 /100    Absolute Neutrophils 2.7 1.6 - 8.3 10e3/uL    Absolute Lymphocytes 1.1 0.8 - 5.3 10e3/uL    Absolute Monocytes 0.4 0.0 - 1.3 10e3/uL    Absolute Eosinophils 0.0 0.0 - 0.7 10e3/uL    Absolute Basophils 0.0 0.0 - 0.2 10e3/uL    Absolute " Immature Granulocytes 0.0 <=0.4 10e3/uL    Absolute NRBCs 0.0 10e3/uL   CBC with Platelets & Differential     Status: None    Narrative    The following orders were created for panel order CBC with Platelets & Differential.  Procedure                               Abnormality         Status                     ---------                               -----------         ------                     CBC with platelets and d...[[id number]]                      Final result                 Please view results for these tests on the individual orders.

## 2022-02-17 ENCOUNTER — LAB (OUTPATIENT)
Dept: LAB | Facility: OTHER | Age: 62
End: 2022-02-17
Attending: NURSE PRACTITIONER
Payer: COMMERCIAL

## 2022-02-17 ENCOUNTER — HOSPITAL ENCOUNTER (OUTPATIENT)
Dept: EDUCATION SERVICES | Facility: HOSPITAL | Age: 62
End: 2022-02-17
Attending: NURSE PRACTITIONER
Payer: COMMERCIAL

## 2022-02-17 VITALS
OXYGEN SATURATION: 97 % | BODY MASS INDEX: 32.69 KG/M2 | RESPIRATION RATE: 16 BRPM | WEIGHT: 208.3 LBS | DIASTOLIC BLOOD PRESSURE: 105 MMHG | HEART RATE: 80 BPM | SYSTOLIC BLOOD PRESSURE: 175 MMHG | HEIGHT: 67 IN

## 2022-02-17 DIAGNOSIS — E11.9 TYPE 2 DIABETES MELLITUS WITHOUT COMPLICATION, WITHOUT LONG-TERM CURRENT USE OF INSULIN (H): ICD-10-CM

## 2022-02-17 DIAGNOSIS — E11.29 MICROALBUMINURIA DUE TO TYPE 2 DIABETES MELLITUS (H): ICD-10-CM

## 2022-02-17 DIAGNOSIS — E11.9 TYPE 2 DIABETES MELLITUS WITHOUT COMPLICATION, WITHOUT LONG-TERM CURRENT USE OF INSULIN (H): Primary | ICD-10-CM

## 2022-02-17 DIAGNOSIS — R80.9 MICROALBUMINURIA DUE TO TYPE 2 DIABETES MELLITUS (H): ICD-10-CM

## 2022-02-17 LAB
ANION GAP SERPL CALCULATED.3IONS-SCNC: 3 MMOL/L (ref 3–14)
BUN SERPL-MCNC: 11 MG/DL (ref 7–30)
CALCIUM SERPL-MCNC: 9.8 MG/DL (ref 8.5–10.1)
CHLORIDE BLD-SCNC: 103 MMOL/L (ref 94–109)
CO2 SERPL-SCNC: 29 MMOL/L (ref 20–32)
CREAT SERPL-MCNC: 0.99 MG/DL (ref 0.66–1.25)
GFR SERPL CREATININE-BSD FRML MDRD: 86 ML/MIN/1.73M2
GLUCOSE BLD-MCNC: 149 MG/DL (ref 70–99)
POTASSIUM BLD-SCNC: 3.9 MMOL/L (ref 3.4–5.3)
SODIUM SERPL-SCNC: 135 MMOL/L (ref 133–144)

## 2022-02-17 PROCEDURE — 80048 BASIC METABOLIC PNL TOTAL CA: CPT

## 2022-02-17 PROCEDURE — G0108 DIAB MANAGE TRN  PER INDIV: HCPCS | Performed by: DIETITIAN, REGISTERED

## 2022-02-17 PROCEDURE — 36415 COLL VENOUS BLD VENIPUNCTURE: CPT

## 2022-02-17 ASSESSMENT — PAIN SCALES - GENERAL: PAINLEVEL: NO PAIN (0)

## 2022-02-17 NOTE — PROGRESS NOTES
Abnormal VS:    Problem: I spoke to Hollie Oleary RN face to face regarding the pt's elevated BP. Pt has no lightheadedness, HA, CP, chest tightness or chest pressure. Pt was recently started on Cozaar for kidney protection from Dm.  Plan: RN recommended BP recheck with provider next week. Appt scheduled.  Pt notified and is satisfied with this plan.    Melva Westbrook LPN

## 2022-02-17 NOTE — PATIENT INSTRUCTIONS
-Continue to work on healthy, low carbohydrate diet.   -Work on daily exercise - 30 minutes per day is a good goal.  -Test glucose 1-2x/day - alternate times.   -Target levels are fasting , 2 hours after a meal < 180.  -Recent A1c 6.3% - great job!  Goal is < 7.0%.   -Call with any concerns.   -Follow up Sept 2022 for A1c check.  -TODD Bran, -[phone].

## 2022-02-17 NOTE — LETTER
"    2/17/2022        RE: Neo Cowan  [address] [facility] [address]        Abnormal VS:    Problem: I spoke to Hollie Oleary RN face to face regarding the pt's elevated BP. Pt has no lightheadedness, HA, CP, chest tightness or chest pressure. Pt was recently started on Cozaar for kidney protection from Dm.  Plan: RN recommended BP recheck with provider next week. Appt scheduled.  Pt notified and is satisfied with this plan.    Melva Westbrook LPN              Diabetes Self-Management Education & Support    Presents for: Individual review    SUBJECTIVE/OBJECTIVE:  Presents for: Individual review  Accompanied by: Self  Diabetes education in the past 24mo: Yes  Focus of Visit: Monitoring, Assistance w/ making life changes  Diabetes type: Type 2  Date of diagnosis: 12/2014  Disease course: Stable  How confident are you filling out medical forms by yourself:: Extremely  Diabetes management related comments/concerns: No concerns  Transportation concerns: No  Difficulty affording diabetes medication?: No  Difficulty affording diabetes testing supplies?: No  Other concerns:: None  Cultural Influences/Ethnic Background:  American    Diabetes Symptoms & Complications:  Fatigue: No  Neuropathy: No  Polydipsia: No  Polyphagia: No  Polyuria: No  Visual change: No  Slow healing wounds: No  Other: No  Symptom course: Stable  Weight trend: Increasing (Weight is up 9# over the past year.)  Complications assessed today?: Yes  CVA: No  Heart disease: No  Nephropathy: No    Patient Problem List and Family Medical History reviewed for relevant medical history, current medical status, and diabetes risk factors.    Vitals:  BP (!) 175/105   Pulse 80   Resp 16   Ht 1.695 m (5' 6.75\")   Wt 94.5 kg (208 lb 4.8 oz)   SpO2 97%   BMI 32.87 kg/m    Estimated body mass index is 32.87 kg/m  as calculated from the following:    Height as of this encounter: 1.695 m (5' 6.75\").    Weight as of this encounter: 94.5 kg (208 lb 4.8 oz).   Last " 3 BP:   BP Readings from Last 3 Encounters:   02/17/22 (!) 175/105   01/17/22 138/84   12/09/21 (!) 170/88       History   Smoking Status     Never Smoker   Smokeless Tobacco     Current User     Types: Chew     Comment: Tried to quit; No passive exposure       Labs:  Lab Results   Component Value Date    A1C 6.3 01/17/2022    A1C 6.5 08/17/2021     Lab Results   Component Value Date     01/17/2022     05/14/2021     Lab Results   Component Value Date    LDL 78 05/14/2021     HDL Cholesterol   Date Value Ref Range Status   05/14/2021 61 >39 mg/dL Final   ]  GFR Estimate   Date Value Ref Range Status   01/17/2022 >90 >60 mL/min/1.73m2 Final     Comment:     Effective December 21, 2021 eGFRcr in adults is calculated using the 2021 CKD-EPI creatinine equation which includes age and gender (Asim et al., NEJM, DOI: 10.1056/MXTGhy6965357)   05/14/2021 85 >60 mL/min/[1.73_m2] Final     Comment:     Non  GFR Calc  Starting 12/18/2018, serum creatinine based estimated GFR (eGFR) will be   calculated using the Chronic Kidney Disease Epidemiology Collaboration   (CKD-EPI) equation.       GFR Estimate If Black   Date Value Ref Range Status   05/14/2021 >90 >60 mL/min/[1.73_m2] Final     Comment:      GFR Calc  Starting 12/18/2018, serum creatinine based estimated GFR (eGFR) will be   calculated using the Chronic Kidney Disease Epidemiology Collaboration   (CKD-EPI) equation.       Lab Results   Component Value Date    CR 0.93 01/17/2022    CR 0.96 05/14/2021     No results found for: MICROALBUMIN    Healthy Eating:  Healthy Eating Assessed Today: Yes  Cultural/Religion diet restrictions?: No  Meal planning/habits: Carb counting (Pt keeps meals < 60 grams carbohydrate for the most part.)  How many times a week on average do you eat food made away from home (restaurant/take-out)?: 1  Meals include: Breakfast, Lunch, Dinner, Morning Snack  Breakfast: granola bar x2 or egg  sandwich  Lunch: piece of chicken or leftovers or sandwich/chips  Dinner: meat, potatoes, veggies - smaller portions  Snacks: peanuts - no evening snack  Other: stopped milk and chocolate milk   Beverages: Water, Diet soda, Sports drinks (Gatorade Zero)  Has patient met with a dietitian in the past?: Yes    Being Active:  Being Active Assessed Today: Yes  Exercise:: Currently not exercising (More active in summer- golfs, fishing, yard work)  Barrier to exercise: None    Monitoring:  Monitoring Assessed Today: Yes  Did patient bring glucose meter to appointment? : Yes  Blood Glucose Meter: Accu-chek  Times checking blood sugar at home (number): Other (1-2x/day)  Blood glucose trend: No change  Fasting-120, 117, 126, 131, 135, 122, 114, 137, 122, 129, 116  Post supper-163, 128, 126, 156, 123, 183  Two week average is 132.     Taking Medications:  Diabetes Medication(s)     Biguanides       metFORMIN (GLUCOPHAGE-XR) 500 MG 24 hr tablet    TAKE 4 TABLETS (2,000 MG) BY MOUTH DAILY (WITH DINNER)        Taking Medication Assessed Today: Yes  Current Treatments: Diet, Oral Medication (taken by mouth) (Metformin XR 2000 mg hs)  Problems taking diabetes medications regularly?: No  Diabetes medication side effects?: Yes (some diarrhea even with change to Metformin XR but pt able to tolerate)    Problem Solving:  Problem Solving Assessed Today: Yes  Is the patient at risk for hypoglycemia?: No  Is the patient at risk for DKA?: No  Does patient have severe weather/disaster plan for diabetes management?: Not Needed    Reducing Risks:  Reducing Risks Assessed Today: No  Diabetes Risks: Age over 45 years, Hyperlipidemia, Sedentary Lifestyle  CAD Risks: Diabetes Mellitus, Family history, Male sex, Obesity, Sedentary lifestyle  Has dilated eye exam at least once a year?: Yes  Sees dentist every 6 months?: Yes  Feet checked by healthcare provider in the last year?: Yes    Healthy Coping:  Healthy Coping Assessed Today: Yes  Emotional  response to diabetes: Acceptance, Concern for health and well-being  Informal Support system:: Family  Stage of change: MAINTENANCE (Working to maintain change, with risk of relapse)  Support resources: None  Patient Activation Measure Survey Score:  NOY Score (Last Two) 12/18/2018   NOY Raw Score 37   Activation Score 79.2   NOY Level 4     Diabetes knowledge and skills assessment:   Patient is knowledgeable in diabetes management concepts related to: Healthy Eating, Being Active, Monitoring, Taking Medication, Problem Solving, Reducing Risks and Healthy Coping    Patient needs further education on the following diabetes management concepts: No concerns.  Pt is doing well.     Based on learning assessment above, most appropriate setting for further diabetes education would be: Individual setting.      INTERVENTIONS:    Education provided today on:  AADE Self-Care Behaviors:  Taking Medication: side effects of prescribed medications.  Pt has some diarrhea from Metformin.  Wants to continue at this time.   Reducing Risks: A1C - goals, relating to blood glucose levels, how often to check and blood pressure and goals    Opportunities for ongoing education and support in diabetes-self management were discussed.    Pt verbalized understanding of concepts discussed and recommendations provided today.       Education Materials Provided:  No new materials today.     ASSESSMENT:  Recent A1c in target at 6.3%.  BP elevated today on two checks.  Eye exam and foot exam are up to date.  Pt does well with limiting foods high in carbohydrates.  Exercise lacks in winter months.  Pt has some side effects from Metformin but wants to continue at this time vs try alternate medication.     Goals Addressed as of 2/17/2022                    Today    4/3/20       Patient Stated       Monitoring (pt-stated)   On track  On track    Added 2/6/20 by Jacque Lamas RD      My Goal: I will lower A1c to less than 7.0%.     What I need to meet my  goal: continue to follow meal plan, add exercise, take medication as ordered.     I plan to meet my goal by this date: next A1c check.             Patient's most recent   Lab Results   Component Value Date    A1C 6.3 01/17/2022    A1C 6.5 08/17/2021    is meeting goal of <7.0    PLAN  Continue to follow healthy, low carbohydrate meal plan.   Work towards 30 minutes exercise most days of the week.   Test glucose 1-2x/day.    BP recheck scheduled with provider's nurse next week.  See Patient Instructions for co-developed, patient-stated behavior change goals.  AVS printed and provided to patient today.   Follow up Sept 2022.      Time Spent: 30 minutes  Encounter Type: Individual    Any diabetes medication dose changes were made via the CDE Protocol and Collaborative Practice Agreement with the patient's referring provider. A copy of this encounter was shared with the provider.          Sincerely,        Jacque Lamas RD

## 2022-02-17 NOTE — PROGRESS NOTES
"Diabetes Self-Management Education & Support    Presents for: Individual review    SUBJECTIVE/OBJECTIVE:  Presents for: Individual review  Accompanied by: Self  Diabetes education in the past 24mo: Yes  Focus of Visit: Monitoring, Assistance w/ making life changes  Diabetes type: Type 2  Date of diagnosis: 12/2014  Disease course: Stable  How confident are you filling out medical forms by yourself:: Extremely  Diabetes management related comments/concerns: No concerns  Transportation concerns: No  Difficulty affording diabetes medication?: No  Difficulty affording diabetes testing supplies?: No  Other concerns:: None  Cultural Influences/Ethnic Background:  American    Diabetes Symptoms & Complications:  Fatigue: No  Neuropathy: No  Polydipsia: No  Polyphagia: No  Polyuria: No  Visual change: No  Slow healing wounds: No  Other: No  Symptom course: Stable  Weight trend: Increasing (Weight is up 9# over the past year.)  Complications assessed today?: Yes  CVA: No  Heart disease: No  Nephropathy: No    Patient Problem List and Family Medical History reviewed for relevant medical history, current medical status, and diabetes risk factors.    Vitals:  BP (!) 175/105   Pulse 80   Resp 16   Ht 1.695 m (5' 6.75\")   Wt 94.5 kg (208 lb 4.8 oz)   SpO2 97%   BMI 32.87 kg/m    Estimated body mass index is 32.87 kg/m  as calculated from the following:    Height as of this encounter: 1.695 m (5' 6.75\").    Weight as of this encounter: 94.5 kg (208 lb 4.8 oz).   Last 3 BP:   BP Readings from Last 3 Encounters:   02/17/22 (!) 175/105   01/17/22 138/84   12/09/21 (!) 170/88       History   Smoking Status     Never Smoker   Smokeless Tobacco     Current User     Types: Chew     Comment: Tried to quit; No passive exposure       Labs:  Lab Results   Component Value Date    A1C 6.3 01/17/2022    A1C 6.5 08/17/2021     Lab Results   Component Value Date     01/17/2022     05/14/2021     Lab Results   Component Value " Date    LDL 78 05/14/2021     HDL Cholesterol   Date Value Ref Range Status   05/14/2021 61 >39 mg/dL Final   ]  GFR Estimate   Date Value Ref Range Status   01/17/2022 >90 >60 mL/min/1.73m2 Final     Comment:     Effective December 21, 2021 eGFRcr in adults is calculated using the 2021 CKD-EPI creatinine equation which includes age and gender (Asim et al., [place], DOI: 10.1056/RAEBlx8760137)   05/14/2021 85 >60 mL/min/[1.73_m2] Final     Comment:     Non  GFR Calc  Starting 12/18/2018, serum creatinine based estimated GFR (eGFR) will be   calculated using the Chronic Kidney Disease Epidemiology Collaboration   (CKD-EPI) equation.       GFR Estimate If Black   Date Value Ref Range Status   05/14/2021 >90 >60 mL/min/[1.73_m2] Final     Comment:      GFR Calc  Starting 12/18/2018, serum creatinine based estimated GFR (eGFR) will be   calculated using the Chronic Kidney Disease Epidemiology Collaboration   (CKD-EPI) equation.       Lab Results   Component Value Date    CR 0.93 01/17/2022    CR 0.96 05/14/2021     No results found for: MICROALBUMIN    Healthy Eating:  Healthy Eating Assessed Today: Yes  Cultural/Christian diet restrictions?: No  Meal planning/habits: Carb counting (Pt keeps meals < 60 grams carbohydrate for the most part.)  How many times a week on average do you eat food made away from home (restaurant/take-out)?: 1  Meals include: Breakfast, Lunch, Dinner, Morning Snack  Breakfast: granola bar x2 or egg sandwich  Lunch: piece of chicken or leftovers or sandwich/chips  Dinner: meat, potatoes, veggies - smaller portions  Snacks: peanuts - no evening snack  Other: stopped milk and chocolate milk   Beverages: Water, Diet soda, Sports drinks (Gatorade Zero)  Has patient met with a dietitian in the past?: Yes    Being Active:  Being Active Assessed Today: Yes  Exercise:: Currently not exercising (More active in summer- golfs, fishing, yard work)  Barrier to exercise:  None    Monitoring:  Monitoring Assessed Today: Yes  Did patient bring glucose meter to appointment? : Yes  Blood Glucose Meter: Accu-chek  Times checking blood sugar at home (number): Other (1-2x/day)  Blood glucose trend: No change  Fasting-120, 117, 126, 131, 135, 122, 114, 137, 122, 129, 116  Post supper-163, 128, 126, 156, 123, 183  Two week average is 132.     Taking Medications:  Diabetes Medication(s)     Biguanides       metFORMIN (GLUCOPHAGE-XR) 500 MG 24 hr tablet    TAKE 4 TABLETS (2,000 MG) BY MOUTH DAILY (WITH DINNER)        Taking Medication Assessed Today: Yes  Current Treatments: Diet, Oral Medication (taken by mouth) (Metformin XR 2000 mg hs)  Problems taking diabetes medications regularly?: No  Diabetes medication side effects?: Yes (some diarrhea even with change to Metformin XR but pt able to tolerate)    Problem Solving:  Problem Solving Assessed Today: Yes  Is the patient at risk for hypoglycemia?: No  Is the patient at risk for DKA?: No  Does patient have severe weather/disaster plan for diabetes management?: Not Needed    Reducing Risks:  Reducing Risks Assessed Today: No  Diabetes Risks: Age over 45 years, Hyperlipidemia, Sedentary Lifestyle  CAD Risks: Diabetes Mellitus, Family history, Male sex, Obesity, Sedentary lifestyle  Has dilated eye exam at least once a year?: Yes  Sees dentist every 6 months?: Yes  Feet checked by healthcare provider in the last year?: Yes    Healthy Coping:  Healthy Coping Assessed Today: Yes  Emotional response to diabetes: Acceptance, Concern for health and well-being  Informal Support system:: Family  Stage of change: MAINTENANCE (Working to maintain change, with risk of relapse)  Support resources: None  Patient Activation Measure Survey Score:  NOY Score (Last Two) 12/18/2018   NOY Raw Score 37   Activation Score 79.2   NOY Level 4     Diabetes knowledge and skills assessment:   Patient is knowledgeable in diabetes management concepts related to: Healthy  Eating, Being Active, Monitoring, Taking Medication, Problem Solving, Reducing Risks and Healthy Coping    Patient needs further education on the following diabetes management concepts: No concerns.  Pt is doing well.     Based on learning assessment above, most appropriate setting for further diabetes education would be: Individual setting.      INTERVENTIONS:    Education provided today on:  AADE Self-Care Behaviors:  Taking Medication: side effects of prescribed medications.  Pt has some diarrhea from Metformin.  Wants to continue at this time.   Reducing Risks: A1C - goals, relating to blood glucose levels, how often to check and blood pressure and goals    Opportunities for ongoing education and support in diabetes-self management were discussed.    Pt verbalized understanding of concepts discussed and recommendations provided today.       Education Materials Provided:  No new materials today.     ASSESSMENT:  Recent A1c in target at 6.3%.  BP elevated today on two checks.  Eye exam and foot exam are up to date.  Pt does well with limiting foods high in carbohydrates.  Exercise lacks in winter months.  Pt has some side effects from Metformin but wants to continue at this time vs try alternate medication.     Goals Addressed as of 2/17/2022                    Today    4/3/20       Patient Stated       Monitoring (pt-stated)   On track  On track    Added 2/6/20 by Jacque Lamas RD      My Goal: I will lower A1c to less than 7.0%.     What I need to meet my goal: continue to follow meal plan, add exercise, take medication as ordered.     I plan to meet my goal by this date: next A1c check.             Patient's most recent   Lab Results   Component Value Date    A1C 6.3 01/17/2022    A1C 6.5 08/17/2021    is meeting goal of <7.0    PLAN  Continue to follow healthy, low carbohydrate meal plan.   Work towards 30 minutes exercise most days of the week.   Test glucose 1-2x/day.    BP recheck scheduled with provider's  nurse next week.  See Patient Instructions for co-developed, patient-stated behavior change goals.  AVS printed and provided to patient today.   Follow up Sept 2022.      Time Spent: 30 minutes  Encounter Type: Individual    Any diabetes medication dose changes were made via the CDE Protocol and Collaborative Practice Agreement with the patient's referring provider. A copy of this encounter was shared with the provider.

## 2022-02-18 ENCOUNTER — PATIENT OUTREACH (OUTPATIENT)
Dept: EDUCATION SERVICES | Facility: HOSPITAL | Age: 62
End: 2022-02-18
Payer: COMMERCIAL

## 2022-02-18 DIAGNOSIS — R80.9 MICROALBUMINURIA DUE TO TYPE 2 DIABETES MELLITUS (H): Primary | ICD-10-CM

## 2022-02-18 DIAGNOSIS — E11.29 MICROALBUMINURIA DUE TO TYPE 2 DIABETES MELLITUS (H): Primary | ICD-10-CM

## 2022-02-18 DIAGNOSIS — I10 BENIGN ESSENTIAL HYPERTENSION: ICD-10-CM

## 2022-02-18 RX ORDER — LOSARTAN POTASSIUM 50 MG/1
50 TABLET ORAL DAILY
Qty: 90 TABLET | Refills: 1 | Status: SHIPPED | OUTPATIENT
Start: 2022-02-18 | End: 2022-08-23

## 2022-02-18 RX ORDER — BLOOD SUGAR DIAGNOSTIC
STRIP MISCELLANEOUS
Qty: 100 STRIP | Refills: 3 | Status: SHIPPED | OUTPATIENT
Start: 2022-02-18 | End: 2022-02-18

## 2022-02-18 NOTE — PROGRESS NOTES
Called [facility] to check cost of test strips for patient.    One Touch - not covered  Leyla - 85.95/100 strips  Accu-Chek - 37.81/100 strips  They do have both insurances listed.  Will continue with Accu-Chek at this time as is most cost effective.

## 2022-02-23 ENCOUNTER — ALLIED HEALTH/NURSE VISIT (OUTPATIENT)
Dept: FAMILY MEDICINE | Facility: OTHER | Age: 62
End: 2022-02-23
Attending: FAMILY MEDICINE
Payer: COMMERCIAL

## 2022-02-23 VITALS — OXYGEN SATURATION: 98 % | HEART RATE: 86 BPM | DIASTOLIC BLOOD PRESSURE: 90 MMHG | SYSTOLIC BLOOD PRESSURE: 170 MMHG

## 2022-02-23 DIAGNOSIS — I10 HYPERTENSION, UNSPECIFIED TYPE: Primary | ICD-10-CM

## 2022-02-23 PROCEDURE — 99207 PR NO CHARGE NURSE ONLY: CPT

## 2022-02-23 NOTE — PROGRESS NOTES
After 15 minutes BP was 170/90 pulse 86 O2 98.  Patient did state he was gone from Friday to Tuesday and had not taken any BP meds until last night (Tuesday night).

## 2022-03-02 DIAGNOSIS — E11.9 TYPE 2 DIABETES MELLITUS WITHOUT COMPLICATION, WITHOUT LONG-TERM CURRENT USE OF INSULIN (H): ICD-10-CM

## 2022-03-02 RX ORDER — METFORMIN HCL 500 MG
2000 TABLET, EXTENDED RELEASE 24 HR ORAL
Qty: 120 TABLET | Refills: 4 | Status: SHIPPED | OUTPATIENT
Start: 2022-03-02 | End: 2022-08-23

## 2022-05-23 ENCOUNTER — OFFICE VISIT (OUTPATIENT)
Dept: FAMILY MEDICINE | Facility: OTHER | Age: 62
End: 2022-05-23
Attending: FAMILY MEDICINE
Payer: COMMERCIAL

## 2022-05-23 ENCOUNTER — LAB (OUTPATIENT)
Dept: LAB | Facility: OTHER | Age: 62
End: 2022-05-23
Attending: FAMILY MEDICINE
Payer: COMMERCIAL

## 2022-05-23 VITALS
TEMPERATURE: 98.1 F | SYSTOLIC BLOOD PRESSURE: 148 MMHG | HEART RATE: 88 BPM | DIASTOLIC BLOOD PRESSURE: 92 MMHG | RESPIRATION RATE: 16 BRPM | OXYGEN SATURATION: 98 % | WEIGHT: 207 LBS | BODY MASS INDEX: 32.66 KG/M2

## 2022-05-23 DIAGNOSIS — E11.29 MICROALBUMINURIA DUE TO TYPE 2 DIABETES MELLITUS (H): ICD-10-CM

## 2022-05-23 DIAGNOSIS — R80.9 MICROALBUMINURIA DUE TO TYPE 2 DIABETES MELLITUS (H): ICD-10-CM

## 2022-05-23 DIAGNOSIS — E78.2 MIXED HYPERLIPIDEMIA: ICD-10-CM

## 2022-05-23 DIAGNOSIS — Z86.0101 HX OF ADENOMATOUS COLONIC POLYPS: ICD-10-CM

## 2022-05-23 DIAGNOSIS — N18.2 CHRONIC KIDNEY DISEASE, STAGE 2 (MILD): ICD-10-CM

## 2022-05-23 DIAGNOSIS — R79.89 ELEVATED LIVER FUNCTION TESTS: ICD-10-CM

## 2022-05-23 DIAGNOSIS — R35.1 NOCTURIA: ICD-10-CM

## 2022-05-23 DIAGNOSIS — E11.9 TYPE 2 DIABETES MELLITUS WITHOUT COMPLICATION, WITHOUT LONG-TERM CURRENT USE OF INSULIN (H): ICD-10-CM

## 2022-05-23 DIAGNOSIS — N52.2 DRUG-INDUCED ERECTILE DYSFUNCTION: ICD-10-CM

## 2022-05-23 DIAGNOSIS — I10 BENIGN ESSENTIAL HYPERTENSION: ICD-10-CM

## 2022-05-23 DIAGNOSIS — Z00.00 ANNUAL PHYSICAL EXAM: Primary | ICD-10-CM

## 2022-05-23 DIAGNOSIS — Z23 ENCOUNTER FOR IMMUNIZATION: ICD-10-CM

## 2022-05-23 LAB
ALBUMIN SERPL-MCNC: 4.1 G/DL (ref 3.4–5)
ALP SERPL-CCNC: 66 U/L (ref 40–150)
ALT SERPL W P-5'-P-CCNC: 102 U/L (ref 0–70)
ANION GAP SERPL CALCULATED.3IONS-SCNC: 6 MMOL/L (ref 3–14)
AST SERPL W P-5'-P-CCNC: 68 U/L (ref 0–45)
BASOPHILS # BLD AUTO: 0 10E3/UL (ref 0–0.2)
BASOPHILS NFR BLD AUTO: 0 %
BILIRUB SERPL-MCNC: 1.5 MG/DL (ref 0.2–1.3)
BUN SERPL-MCNC: 12 MG/DL (ref 7–30)
CALCIUM SERPL-MCNC: 9.8 MG/DL (ref 8.5–10.1)
CHLORIDE BLD-SCNC: 105 MMOL/L (ref 94–109)
CHOLEST SERPL-MCNC: 143 MG/DL
CO2 SERPL-SCNC: 26 MMOL/L (ref 20–32)
CREAT SERPL-MCNC: 0.98 MG/DL (ref 0.66–1.25)
CREAT UR-MCNC: 170 MG/DL
EOSINOPHIL # BLD AUTO: 0.1 10E3/UL (ref 0–0.7)
EOSINOPHIL NFR BLD AUTO: 1 %
ERYTHROCYTE [DISTWIDTH] IN BLOOD BY AUTOMATED COUNT: 12.8 % (ref 10–15)
EST. AVERAGE GLUCOSE BLD GHB EST-MCNC: 148 MG/DL
FASTING STATUS PATIENT QL REPORTED: YES
GFR SERPL CREATININE-BSD FRML MDRD: 87 ML/MIN/1.73M2
GLUCOSE BLD-MCNC: 159 MG/DL (ref 70–99)
HBA1C MFR BLD: 6.8 % (ref 0–5.6)
HCT VFR BLD AUTO: 39.5 % (ref 40–53)
HDLC SERPL-MCNC: 45 MG/DL
HGB BLD-MCNC: 13.7 G/DL (ref 13.3–17.7)
IMM GRANULOCYTES # BLD: 0 10E3/UL
IMM GRANULOCYTES NFR BLD: 0 %
LDLC SERPL CALC-MCNC: 47 MG/DL
LYMPHOCYTES # BLD AUTO: 1.3 10E3/UL (ref 0.8–5.3)
LYMPHOCYTES NFR BLD AUTO: 29 %
MCH RBC QN AUTO: 33.3 PG (ref 26.5–33)
MCHC RBC AUTO-ENTMCNC: 34.7 G/DL (ref 31.5–36.5)
MCV RBC AUTO: 96 FL (ref 78–100)
MICROALBUMIN UR-MCNC: 212 MG/L
MICROALBUMIN/CREAT UR: 124.71 MG/G CR (ref 0–17)
MONOCYTES # BLD AUTO: 0.4 10E3/UL (ref 0–1.3)
MONOCYTES NFR BLD AUTO: 8 %
NEUTROPHILS # BLD AUTO: 2.7 10E3/UL (ref 1.6–8.3)
NEUTROPHILS NFR BLD AUTO: 62 %
NONHDLC SERPL-MCNC: 98 MG/DL
NRBC # BLD AUTO: 0 10E3/UL
NRBC BLD AUTO-RTO: 0 /100
PLATELET # BLD AUTO: 161 10E3/UL (ref 150–450)
POTASSIUM BLD-SCNC: 4.4 MMOL/L (ref 3.4–5.3)
PROT SERPL-MCNC: 7.6 G/DL (ref 6.8–8.8)
PSA SERPL-MCNC: 2.47 UG/L (ref 0–4)
RBC # BLD AUTO: 4.12 10E6/UL (ref 4.4–5.9)
SODIUM SERPL-SCNC: 137 MMOL/L (ref 133–144)
TRIGL SERPL-MCNC: 254 MG/DL
TSH SERPL DL<=0.005 MIU/L-ACNC: 2.18 MU/L (ref 0.4–4)
WBC # BLD AUTO: 4.4 10E3/UL (ref 4–11)

## 2022-05-23 PROCEDURE — 83036 HEMOGLOBIN GLYCOSYLATED A1C: CPT

## 2022-05-23 PROCEDURE — 99396 PREV VISIT EST AGE 40-64: CPT | Mod: 25 | Performed by: FAMILY MEDICINE

## 2022-05-23 PROCEDURE — 90715 TDAP VACCINE 7 YRS/> IM: CPT | Performed by: FAMILY MEDICINE

## 2022-05-23 PROCEDURE — 82043 UR ALBUMIN QUANTITATIVE: CPT

## 2022-05-23 PROCEDURE — 36415 COLL VENOUS BLD VENIPUNCTURE: CPT

## 2022-05-23 PROCEDURE — 90471 IMMUNIZATION ADMIN: CPT | Performed by: FAMILY MEDICINE

## 2022-05-23 PROCEDURE — 80061 LIPID PANEL: CPT

## 2022-05-23 PROCEDURE — 80050 GENERAL HEALTH PANEL: CPT

## 2022-05-23 PROCEDURE — 84153 ASSAY OF PSA TOTAL: CPT

## 2022-05-23 RX ORDER — SILDENAFIL CITRATE 20 MG/1
TABLET ORAL
Qty: 30 TABLET | Refills: 3 | Status: SHIPPED | OUTPATIENT
Start: 2022-05-23 | End: 2022-11-28

## 2022-05-23 RX ORDER — DAPAGLIFLOZIN 5 MG/1
5 TABLET, FILM COATED ORAL EVERY MORNING
Qty: 90 TABLET | Refills: 1 | Status: SHIPPED | OUTPATIENT
Start: 2022-05-23 | End: 2022-12-19

## 2022-05-23 ASSESSMENT — PAIN SCALES - GENERAL: PAINLEVEL: NO PAIN (0)

## 2022-05-23 NOTE — NURSING NOTE
"Chief Complaint   Patient presents with     Physical       Initial BP (!) 148/92 (BP Location: Right arm, Patient Position: Sitting, Cuff Size: Adult Large)   Pulse 88   Temp 98.1  F (36.7  C) (Tympanic)   Resp 16   Wt 93.9 kg (207 lb)   SpO2 98%   BMI 32.66 kg/m   Estimated body mass index is 32.66 kg/m  as calculated from the following:    Height as of 2/17/22: 1.695 m (5' 6.75\").    Weight as of this encounter: 93.9 kg (207 lb).  Medication Reconciliation: complete  Marco Antonio Jung LPN  "

## 2022-05-23 NOTE — PROGRESS NOTES
SUBJECTIVE:   CC: Neo Cowan is an 61 year old male who presents for preventative health visit.       Patient has been advised of split billing requirements and indicates understanding: Yes  Healthy Habits:     Getting at least 3 servings of Calcium per day:  NO    Bi-annual eye exam:  Yes    Dental care twice a year:  Yes    Sleep apnea or symptoms of sleep apnea:  None    Diet:  Carbohydrate counting    Frequency of exercise:  None    Taking medications regularly:  Yes    Medication side effects:  None    PHQ-2 Total Score: 0    Additional concerns today:  No      1. foot deformity   No  2. Current or previous foot ulceration     No  3. Current or previous pre-ulcerative calluses     No  4. previous partial amputation of one or both feet or complete amputation of one foot     No  5. peripheral neuropathy with evidence of callus formation     No  6. poor circulation     No        Diabetes Follow-up    How often are you checking your blood sugar? A few times a month  What time of day are you checking your blood sugars (select all that apply)?  Before meals and At bedtime  Have you had any blood sugars above 200?  No  Have you had any blood sugars below 70?  No    What symptoms do you notice when your blood sugar is low?  Shaky and Weak    What concerns do you have today about your diabetes? None     Do you have any of these symptoms? (Select all that apply)  No numbness or tingling in feet.  No redness, sores or blisters on feet.  No complaints of excessive thirst.  No reports of blurry vision.  No significant changes to weight.        Hyperlipidemia Follow-Up      Are you regularly taking any medication or supplement to lower your cholesterol?   Yes- simvastatin    Are you having muscle aches or other side effects that you think could be caused by your cholesterol lowering medication?  No    Hypertension Follow-up      Do you check your blood pressure regularly outside of the clinic? No     Are you following  a low salt diet? No    Are your blood pressures ever more than 140 on the top number (systolic) OR more   than 90 on the bottom number (diastolic), for example 140/90? Yes    BP Readings from Last 2 Encounters:   05/23/22 (!) 148/92   02/23/22 (!) 170/90     Hemoglobin A1C POCT (%)   Date Value   08/17/2021 6.5 (A)   05/14/2021 6.3 (H)     Hemoglobin A1C (%)   Date Value   01/17/2022 6.3 (H)     LDL Cholesterol Calculated (mg/dL)   Date Value   05/14/2021 78   01/13/2020 76       Chronic Kidney Disease Follow-up      Do you take any over the counter pain medicine?: No      Today's PHQ-2 Score:   PHQ-2 ( 1999 Pfizer) 5/23/2022   Q1: Little interest or pleasure in doing things 0   Q2: Feeling down, depressed or hopeless 0   PHQ-2 Score 0   PHQ-2 Total Score (12-17 Years)- Positive if 3 or more points; Administer PHQ-A if positive -   Q1: Little interest or pleasure in doing things Not at all   Q2: Feeling down, depressed or hopeless Not at all   PHQ-2 Score 0       Abuse: Current or Past(Physical, Sexual or Emotional)- No  Do you feel safe in your environment? Yes        Social History     Tobacco Use     Smoking status: Never Smoker     Smokeless tobacco: Current User     Types: Chew     Tobacco comment: Tried to quit; No passive exposure   Substance Use Topics     Alcohol use: Yes     Comment: socially     If you drink alcohol do you typically have >3 drinks per day or >7 drinks per week? Yes      Alcohol Use 5/23/2022   Prescreen: >3 drinks/day or >7 drinks/week? Yes   AUDIT SCORE  6       Last PSA:   PSA   Date Value Ref Range Status   05/14/2021 2.07 0 - 4 ug/L Final     Comment:     Assay Method:  Chemiluminescence using Siemens Vista analyzer       Reviewed orders with patient. Reviewed health maintenance and updated orders accordingly - Yes  Labs reviewed in EPIC    Reviewed and updated as needed this visit by clinical staff   Tobacco  Allergies  Meds   Med Hx  Surg Hx  Fam Hx  Soc Hx          Reviewed  and updated as needed this visit by Provider                   Past Medical History:   Diagnosis Date     Alcoholism 1/1/2011     Allergic rhinitis, cause unspecified 3/2/2000     Anxiety  1/1/2011     Colonic Polyps 3/2/2000     Depression 1/1/2011     Gilbert's Syndrome 3/2/2000     Hypercholesterolemia 3/2/2000     Overweight(278.02) 3/2/2000     Prediabetes 3/2/2000      Past Surgical History:   Procedure Laterality Date     COLONOSCOPY  8/5/2013    Procedure: COLONOSCOPY;  colonoscopy ;  Surgeon: Yobani Ventura MD;  Location: [facility]     COLONOSCOPY N/A 3/12/2018    Procedure: COLONOSCOPY;  COLONOSCOPY with polypectomy;  Surgeon: Donovan Mayfield MD;  Location: [facility] OR     colonoscopy with polypectomy  1/27/2011    repeat 2 years     mandibular fracture repair       vasectomy       wisdom teeth extraction         Review of Systems  CONSTITUTIONAL: NEGATIVE for fever, chills, change in weight  INTEGUMENTARY/SKIN: NEGATIVE for worrisome rashes, moles or lesions  EYES: NEGATIVE for vision changes or irritation  ENT: NEGATIVE for ear, mouth and throat problems  RESP: NEGATIVE for significant cough or SOB  CV: NEGATIVE for chest pain, palpitations or peripheral edema  GI: NEGATIVE for nausea, abdominal pain, heartburn, or change in bowel habits   male: negative for dysuria, hematuria, decreased urinary stream, c/o erectile dysfunction, NO urethral discharge  MUSCULOSKELETAL: NEGATIVE for significant arthralgias or myalgia  NEURO: NEGATIVE for weakness, dizziness or paresthesias  PSYCHIATRIC: NEGATIVE for changes in mood or affect    OBJECTIVE:   BP (!) 148/92 (BP Location: Right arm, Patient Position: Sitting, Cuff Size: Adult Large)   Pulse 88   Temp 98.1  F (36.7  C) (Tympanic)   Resp 16   Wt 93.9 kg (207 lb)   SpO2 98%   BMI 32.66 kg/m      Physical Exam  GENERAL: healthy, alert and no distress  EYES: Eyes grossly normal to inspection, PERRL and conjunctivae and sclerae normal  HENT: ear canals and  TM's normal, nose and mouth without ulcers or lesions  NECK: no adenopathy, no asymmetry, masses, or scars and thyroid normal to palpation  RESP: lungs clear to auscultation - no rales, rhonchi or wheezes  CV: regular rate and rhythm, normal S1 S2, no S3 or S4, no murmur, click or rub, no peripheral edema and peripheral pulses strong  ABDOMEN: soft, nontender, no hepatosplenomegaly, no masses and bowel sounds normal  MS: no gross musculoskeletal defects noted, no edema  SKIN: no suspicious lesions or rashes  NEURO: Normal strength and tone, mentation intact and speech normal  PSYCH: mentation appears normal, affect normal/bright  LYMPH: no cervical, supraclavicular, axillary, or inguinal adenopathy  Diabetic foot exam: normal DP and PT pulses, no trophic changes or ulcerative lesions, normal sensory exam and normal monofilament exam    Diagnostic Test Results:  Labs reviewed in Epic  Results for orders placed or performed in visit on 05/23/22   Comprehensive metabolic panel     Status: Abnormal   Result Value Ref Range    Sodium 137 133 - 144 mmol/L    Potassium 4.4 3.4 - 5.3 mmol/L    Chloride 105 94 - 109 mmol/L    Carbon Dioxide (CO2) 26 20 - 32 mmol/L    Anion Gap 6 3 - 14 mmol/L    Urea Nitrogen 12 7 - 30 mg/dL    Creatinine 0.98 0.66 - 1.25 mg/dL    Calcium 9.8 8.5 - 10.1 mg/dL    Glucose 159 (H) 70 - 99 mg/dL    Alkaline Phosphatase 66 40 - 150 U/L    AST 68 (H) 0 - 45 U/L     (H) 0 - 70 U/L    Protein Total 7.6 6.8 - 8.8 g/dL    Albumin 4.1 3.4 - 5.0 g/dL    Bilirubin Total 1.5 (H) 0.2 - 1.3 mg/dL    GFR Estimate 87 >60 mL/min/1.73m2   Lipid Profile     Status: Abnormal   Result Value Ref Range    Cholesterol 143 <200 mg/dL    Triglycerides 254 (H) <150 mg/dL    Direct Measure HDL 45 >=40 mg/dL    LDL Cholesterol Calculated 47 <=100 mg/dL    Non HDL Cholesterol 98 <130 mg/dL    Patient Fasting > 8hrs? Yes     Narrative    Cholesterol  Desirable:  <200 mg/dL    Triglycerides  Normal:  Less than 150  mg/dL  Borderline High:  150-199 mg/dL  High:  200-499 mg/dL  Very High:  Greater than or equal to 500 mg/dL    Direct Measure HDL  Female:  Greater than or equal to 50 mg/dL   Male:  Greater than or equal to 40 mg/dL    LDL Cholesterol  Desirable:  <100mg/dL  Above Desirable:  100-129 mg/dL   Borderline High:  130-159 mg/dL   High:  160-189 mg/dL   Very High:  >= 190 mg/dL    Non HDL Cholesterol  Desirable:  130 mg/dL  Above Desirable:  130-159 mg/dL  Borderline High:  160-189 mg/dL  High:  190-219 mg/dL  Very High:  Greater than or equal to 220 mg/dL   Hemoglobin A1c     Status: Abnormal   Result Value Ref Range    Estimated Average Glucose 148 mg/dL    Hemoglobin A1C 6.8 (H) 0.0 - 5.6 %   PSA tumor marker     Status: Normal   Result Value Ref Range    PSA Tumor Marker 2.47 0.00 - 4.00 ug/L    Narrative    Assay Method:  Chemiluminescence using Siemens   Vista analyzer.   Albumin Random Urine Quantitative with Creat Ratio     Status: Abnormal   Result Value Ref Range    Creatinine Urine mg/dL 170 mg/dL    Albumin Urine mg/L 212 mg/L    Albumin Urine mg/g Cr 124.71 (H) 0.00 - 17.00 mg/g Cr   TSH     Status: Normal   Result Value Ref Range    TSH 2.18 0.40 - 4.00 mU/L   CBC with platelets and differential     Status: Abnormal   Result Value Ref Range    WBC Count 4.4 4.0 - 11.0 10e3/uL    RBC Count 4.12 (L) 4.40 - 5.90 10e6/uL    Hemoglobin 13.7 13.3 - 17.7 g/dL    Hematocrit 39.5 (L) 40.0 - 53.0 %    MCV 96 78 - 100 fL    MCH 33.3 (H) 26.5 - 33.0 pg    MCHC 34.7 31.5 - 36.5 g/dL    RDW 12.8 10.0 - 15.0 %    Platelet Count 161 150 - 450 10e3/uL    % Neutrophils 62 %    % Lymphocytes 29 %    % Monocytes 8 %    % Eosinophils 1 %    % Basophils 0 %    % Immature Granulocytes 0 %    NRBCs per 100 WBC 0 <1 /100    Absolute Neutrophils 2.7 1.6 - 8.3 10e3/uL    Absolute Lymphocytes 1.3 0.8 - 5.3 10e3/uL    Absolute Monocytes 0.4 0.0 - 1.3 10e3/uL    Absolute Eosinophils 0.1 0.0 - 0.7 10e3/uL    Absolute Basophils 0.0 0.0 -  0.2 10e3/uL    Absolute Immature Granulocytes 0.0 <=0.4 10e3/uL    Absolute NRBCs 0.0 10e3/uL   CBC with Platelets & Differential     Status: Abnormal    Narrative    The following orders were created for panel order CBC with Platelets & Differential.  Procedure                               Abnormality         Status                     ---------                               -----------         ------                     CBC with platelets and d...[[id number]]  Abnormal            Final result                 Please view results for these tests on the individual orders.         ASSESSMENT/PLAN:     (Z00.00) Annual physical exam  (primary encounter diagnosis)  Comment: overall doing ok,   Plan: see back in a year.     (E11.9) Type 2 diabetes mellitus without complication, without long-term current use of insulin (H)  Comment: good control but A1c up some. Having diarrhea with Metformin. Discussed trying Farxiga if paid for. Pt agreed to see if can afford it. IF so- then back down to half or 2 tabs of metformin and check BS more frequently.  IF not paid for-- says he will probably leave it as is. Follow-up in 4 months   Plan: CBC with Platelets & Differential,         Comprehensive metabolic panel, Lipid Profile,         Hemoglobin A1c, ZZC FOOT EXAM  NO CHARGE, TSH,         dapagliflozin (FARXIGA) 5 MG TABS tablet            (E11.29,  R80.9) Microalbuminuria due to type 2 diabetes mellitus (H)  Comment: on ARB and add Farxiga ??  Plan: Albumin Random Urine Quantitative with Creat         Ratio            (I10) Benign essential hypertension  Comment: up some today. See if going to start Farxiga -that will bring BP down some   Plan: CBC with Platelets & Differential,         Comprehensive metabolic panel, Lipid Profile,         TSH            (E78.2) Mixed hyperlipidemia  Comment: stable on statin   Plan: Comprehensive metabolic panel, Lipid Profile,         TSH            (N18.2) Chronic kidney disease, stage 2  "(mild)  Comment: stable   Plan: CBC with Platelets & Differential,         Comprehensive metabolic panel            (Z86.010) H/O adenomatous colonic polyps  Comment: UTD on colonoscopy   Plan: CBC with Platelets & Differential,         Comprehensive metabolic panel            (R35.1) Nocturia  Comment: psa ok   Plan: PSA tumor marker            (Z23) Encounter for immunization  Comment: due for Tetanus  Plan: given today     (N52.2) Drug-induced erectile dysfunction  Comment: discussed in detail  Pt wants try something. Risk and benefits of viagra was discussed and verbal consent to proceed was given.   Plan: sildenafil (REVATIO) 20 MG tablet        Rx given     (R79.89) Elevated liver function tests  Comment: mild / long h/o. Fatty liver vs statin vs both along with moderate etoh use.   Plan: will watch.            COUNSELING:   Reviewed preventive health counseling, as reflected in patient instructions       Regular exercise       Healthy diet/nutrition       Colorectal cancer screening       Prostate cancer screening    Estimated body mass index is 32.66 kg/m  as calculated from the following:    Height as of 2/17/22: 1.695 m (5' 6.75\").    Weight as of this encounter: 93.9 kg (207 lb).     Weight management plan: Discussed healthy diet and exercise guidelines    He reports that he has never smoked. His smokeless tobacco use includes chew.  Tobacco Cessation Action Plan:   Information offered: Patient not interested at this time      Counseling Resources:  ATP IV Guidelines  Pooled Cohorts Equation Calculator  FRAX Risk Assessment  ICSI Preventive Guidelines  Dietary Guidelines for Americans, 2010  USDA's MyPlate  ASA Prophylaxis  Lung CA Screening    Colin Fisher MD  [facility] - [place]  "

## 2022-08-22 DIAGNOSIS — E11.29 MICROALBUMINURIA DUE TO TYPE 2 DIABETES MELLITUS (H): ICD-10-CM

## 2022-08-22 DIAGNOSIS — I10 BENIGN ESSENTIAL HYPERTENSION: ICD-10-CM

## 2022-08-22 DIAGNOSIS — R80.9 MICROALBUMINURIA DUE TO TYPE 2 DIABETES MELLITUS (H): ICD-10-CM

## 2022-08-22 DIAGNOSIS — E11.9 TYPE 2 DIABETES MELLITUS WITHOUT COMPLICATION, WITHOUT LONG-TERM CURRENT USE OF INSULIN (H): ICD-10-CM

## 2022-08-23 RX ORDER — LOSARTAN POTASSIUM 50 MG/1
50 TABLET ORAL DAILY
Qty: 90 TABLET | Refills: 3 | Status: SHIPPED | OUTPATIENT
Start: 2022-08-23 | End: 2022-08-29

## 2022-08-23 RX ORDER — METFORMIN HCL 500 MG
2000 TABLET, EXTENDED RELEASE 24 HR ORAL
Qty: 120 TABLET | Refills: 4 | Status: SHIPPED | OUTPATIENT
Start: 2022-08-23 | End: 2022-11-30

## 2022-08-23 NOTE — TELEPHONE ENCOUNTER
Metformin        Last Written Prescription Date:  3-2-22  Last Fill Quantity: 120,   # refills: 4  Last Office Visit: 5-23-22  Future Office visit:    Next 5 appointments (look out 90 days)    Aug 29, 2022  3:15 PM  (Arrive by 3:00 PM)  SHORT with Colin Fisher MD  [facility] ([facility] ) [address]  [place] [address]  [phone]           [facility]       Last Written Prescription Date:  2-18-22  Last Fill Quantity: 90,   # refills: 1

## 2022-08-25 NOTE — PROGRESS NOTES
Assessment & Plan     Type 2 diabetes mellitus without complication, without long-term current use of insulin (H)  A1C pending -- will call with results and adjust meds accordantly.  Follow-up in 4 months   - Basic metabolic panel; Future  - Hemoglobin A1c; Future  - blood glucose monitoring (NO BRAND SPECIFIED) meter device kit; Use to test blood sugar 2 times daily or as directed.    Benign essential hypertension  Still too high. Will double Cozaar and Patient was instructed to check blood pressure 1-2 times per week until I see back in the Clinic. If blood pressure gettng too high or too low as discussed then need to see patient back sooner.   fu in 5-6 weeks with labs   - Basic metabolic panel; Future  - Hemoglobin A1c; Future  - losartan (COZAAR) 100 MG tablet; Take 1 tablet (100 mg) by mouth daily    Microalbuminuria due to type 2 diabetes mellitus (H)  Stable on ARB  - Basic metabolic panel; Future  - Hemoglobin A1c; Future  - losartan (COZAAR) 100 MG tablet; Take 1 tablet (100 mg) by mouth daily    Chronic kidney disease, stage 2 (mild)  Stable   - Basic metabolic panel; Future  - Hemoglobin A1c; Future  - losartan (COZAAR) 100 MG tablet; Take 1 tablet (100 mg) by mouth daily                 No follow-ups on file.    Colin Fisher MD  [facility] - JESSICA Larson is a 61 year old, presenting for the following health issues:  Recheck Medication      HPI     Diabetes Follow-up    How often are you checking your blood sugar? A few times a week  What time of day are you checking your blood sugars (select all that apply)?  Before and after meals  Have you had any blood sugars above 200?  No  Have you had any blood sugars below 70?  No    What symptoms do you notice when your blood sugar is low?  None    What concerns do you have today about your diabetes? None     Do you have any of these symptoms? (Select all that apply)  No numbness or tingling in feet.  No redness, sores or  blisters on feet.  No complaints of excessive thirst.  No reports of blurry vision.  No significant changes to weight.    Does not check real frequent  Morning sugars slight higher - will change in meds    BP Readings from Last 2 Encounters:   08/29/22 (!) 158/88   05/23/22 (!) 148/92     Hemoglobin A1C (%)   Date Value   05/23/2022 6.8 (H)   01/17/2022 6.3 (H)   08/17/2021 6.5 (A)   05/14/2021 6.3 (H)     LDL Cholesterol Calculated (mg/dL)   Date Value   05/23/2022 47   05/14/2021 78   01/13/2020 76               Hypertension Follow-up      Do you check your blood pressure regularly outside of the clinic? No     Are you following a low salt diet? No    Are your blood pressures ever more than 140 on the top number (systolic) OR more   than 90 on the bottom number (diastolic), for example 140/90? Yes    Chronic Kidney Disease Follow-up      Do you take any over the counter pain medicine?: No            Review of Systems   Constitutional, HEENT, cardiovascular, pulmonary, gi and gu systems are negative, except as otherwise noted.      Objective    BP (!) 158/88 (BP Location: Right arm, Patient Position: Sitting, Cuff Size: Adult Large)   Pulse 78   Temp 98.6  F (37  C) (Tympanic)   Resp 18   Wt 87.5 kg (193 lb)   SpO2 98%   BMI 30.45 kg/m    Body mass index is 30.45 kg/m .  Physical Exam   GENERAL: healthy, alert and no distress  NECK: no adenopathy, no asymmetry, masses, or scars and thyroid normal to palpation  RESP: lungs clear to auscultation - no rales, rhonchi or wheezes  CV: regular rate and rhythm, normal S1 S2, no S3 or S4, no murmur, click or rub, no peripheral edema and peripheral pulses strong  ABDOMEN: soft, nontender, no hepatosplenomegaly, no masses and bowel sounds normal  MS: no gross musculoskeletal defects noted, no edema    Results for orders placed or performed in visit on 08/29/22   Basic metabolic panel     Status: Abnormal   Result Value Ref Range    Sodium 137 133 - 144 mmol/L     Potassium 4.3 3.4 - 5.3 mmol/L    Chloride 103 94 - 109 mmol/L    Carbon Dioxide (CO2) 30 20 - 32 mmol/L    Anion Gap 4 3 - 14 mmol/L    Urea Nitrogen 11 7 - 30 mg/dL    Creatinine 1.05 0.66 - 1.25 mg/dL    Calcium 10.1 8.5 - 10.1 mg/dL    Glucose 188 (H) 70 - 99 mg/dL    GFR Estimate 80 >60 mL/min/1.73m2                     .  ..

## 2022-08-29 ENCOUNTER — LAB (OUTPATIENT)
Dept: LAB | Facility: OTHER | Age: 62
End: 2022-08-29
Payer: COMMERCIAL

## 2022-08-29 ENCOUNTER — OFFICE VISIT (OUTPATIENT)
Dept: FAMILY MEDICINE | Facility: OTHER | Age: 62
End: 2022-08-29
Attending: FAMILY MEDICINE
Payer: COMMERCIAL

## 2022-08-29 VITALS
WEIGHT: 193 LBS | OXYGEN SATURATION: 98 % | BODY MASS INDEX: 30.45 KG/M2 | SYSTOLIC BLOOD PRESSURE: 161 MMHG | HEART RATE: 78 BPM | DIASTOLIC BLOOD PRESSURE: 87 MMHG | TEMPERATURE: 98.6 F | RESPIRATION RATE: 18 BRPM

## 2022-08-29 DIAGNOSIS — I10 BENIGN ESSENTIAL HYPERTENSION: ICD-10-CM

## 2022-08-29 DIAGNOSIS — E11.9 TYPE 2 DIABETES MELLITUS WITHOUT COMPLICATION, WITHOUT LONG-TERM CURRENT USE OF INSULIN (H): ICD-10-CM

## 2022-08-29 DIAGNOSIS — N18.2 CHRONIC KIDNEY DISEASE, STAGE 2 (MILD): ICD-10-CM

## 2022-08-29 DIAGNOSIS — E11.9 TYPE 2 DIABETES MELLITUS WITHOUT COMPLICATION, WITHOUT LONG-TERM CURRENT USE OF INSULIN (H): Primary | ICD-10-CM

## 2022-08-29 DIAGNOSIS — R80.9 MICROALBUMINURIA DUE TO TYPE 2 DIABETES MELLITUS (H): ICD-10-CM

## 2022-08-29 DIAGNOSIS — E11.29 MICROALBUMINURIA DUE TO TYPE 2 DIABETES MELLITUS (H): ICD-10-CM

## 2022-08-29 LAB
ANION GAP SERPL CALCULATED.3IONS-SCNC: 4 MMOL/L (ref 3–14)
BUN SERPL-MCNC: 11 MG/DL (ref 7–30)
CALCIUM SERPL-MCNC: 10.1 MG/DL (ref 8.5–10.1)
CHLORIDE BLD-SCNC: 103 MMOL/L (ref 94–109)
CO2 SERPL-SCNC: 30 MMOL/L (ref 20–32)
CREAT SERPL-MCNC: 1.05 MG/DL (ref 0.66–1.25)
EST. AVERAGE GLUCOSE BLD GHB EST-MCNC: 143 MG/DL
GFR SERPL CREATININE-BSD FRML MDRD: 80 ML/MIN/1.73M2
GLUCOSE BLD-MCNC: 188 MG/DL (ref 70–99)
HBA1C MFR BLD: 6.6 % (ref 0–5.6)
POTASSIUM BLD-SCNC: 4.3 MMOL/L (ref 3.4–5.3)
SODIUM SERPL-SCNC: 137 MMOL/L (ref 133–144)

## 2022-08-29 PROCEDURE — 36415 COLL VENOUS BLD VENIPUNCTURE: CPT

## 2022-08-29 PROCEDURE — 83036 HEMOGLOBIN GLYCOSYLATED A1C: CPT

## 2022-08-29 PROCEDURE — 99214 OFFICE O/P EST MOD 30 MIN: CPT | Performed by: FAMILY MEDICINE

## 2022-08-29 PROCEDURE — 80048 BASIC METABOLIC PNL TOTAL CA: CPT

## 2022-08-29 RX ORDER — LOSARTAN POTASSIUM 100 MG/1
100 TABLET ORAL DAILY
Qty: 30 TABLET | Refills: 3 | Status: SHIPPED | OUTPATIENT
Start: 2022-08-29 | End: 2022-12-16

## 2022-08-29 NOTE — NURSING NOTE
"Chief Complaint   Patient presents with     Recheck Medication       Initial BP (!) 161/87 (BP Location: Right arm, Patient Position: Sitting, Cuff Size: Adult Large)   Pulse 78   Temp 98.6  F (37  C) (Tympanic)   Resp 18   Wt 87.5 kg (193 lb)   SpO2 98%   BMI 30.45 kg/m   Estimated body mass index is 30.45 kg/m  as calculated from the following:    Height as of 2/17/22: 1.695 m (5' 6.75\").    Weight as of this encounter: 87.5 kg (193 lb).  Medication Reconciliation: complete  Marco Antonio Somers LPN  "

## 2022-08-29 NOTE — NURSING NOTE
"Chief Complaint   Patient presents with     Recheck Medication       Initial BP (!) 158/88 (BP Location: Right arm, Patient Position: Sitting, Cuff Size: Adult Large)   Pulse 78   Temp 98.6  F (37  C) (Tympanic)   Resp 18   Wt 87.5 kg (193 lb)   SpO2 98%   BMI 30.45 kg/m   Estimated body mass index is 30.45 kg/m  as calculated from the following:    Height as of 2/17/22: 1.695 m (5' 6.75\").    Weight as of this encounter: 87.5 kg (193 lb).  Medication Reconciliation: complete  Marco Antonio Somers LPN  "

## 2022-09-13 ENCOUNTER — TELEPHONE (OUTPATIENT)
Dept: FAMILY MEDICINE | Facility: OTHER | Age: 62
End: 2022-09-13

## 2022-09-13 DIAGNOSIS — E11.9 TYPE 2 DIABETES MELLITUS WITHOUT COMPLICATION, WITHOUT LONG-TERM CURRENT USE OF INSULIN (H): Primary | ICD-10-CM

## 2022-09-13 NOTE — TELEPHONE ENCOUNTER
Anesthesia Evaluation     NPO Solid Status: > 8 hours  NPO Liquid Status: > 2 hours           Airway   Mallampati: I  TM distance: >3 FB  Neck ROM: full  Dental      Pulmonary - negative pulmonary ROS and normal exam   Cardiovascular - normal exam    ECG reviewed    (+) valvular problems/murmurs MR and TI, dysrhythmias Tachycardia,     ROS comment: Vent. Rate : 070 BPM     Atrial Rate : 070 BPM     P-R Int : 154 ms          QRS Dur : 078 ms      QT Int : 388 ms       P-R-T Axes : 009 017 044 degrees     QTc Int : 419 ms     Normal sinus rhythm with sinus arrhythmia  Normal ECG  No previous ECGs available  Confirmed by ELIDA ELIZABETH, B. N. ([id number]) on 10/3/2019 9:46:43 PM    Neuro/Psych  GI/Hepatic/Renal/Endo    (+) obesity,  GERD,      Musculoskeletal     Abdominal    Substance History      OB/GYN      Comment: BTL      Other                      Anesthesia Plan    ASA 2     MAC   total IV anesthesia  intravenous induction     Anesthetic plan, all risks, benefits, and alternatives have been provided, discussed and informed consent has been obtained with: patient.        CODE STATUS:        Pt has an appt with Dm Ed, please sign the pended referral.  Melva Westbrook

## 2022-09-19 ENCOUNTER — HOSPITAL ENCOUNTER (OUTPATIENT)
Dept: EDUCATION SERVICES | Facility: HOSPITAL | Age: 62
Discharge: HOME | End: 2022-09-19
Attending: NURSE PRACTITIONER | Admitting: NURSE PRACTITIONER
Payer: COMMERCIAL

## 2022-09-19 VITALS
HEIGHT: 66 IN | DIASTOLIC BLOOD PRESSURE: 104 MMHG | RESPIRATION RATE: 16 BRPM | HEART RATE: 81 BPM | SYSTOLIC BLOOD PRESSURE: 168 MMHG | OXYGEN SATURATION: 95 % | BODY MASS INDEX: 32.17 KG/M2 | WEIGHT: 200.2 LBS

## 2022-09-19 DIAGNOSIS — E11.9 TYPE 2 DIABETES MELLITUS WITHOUT COMPLICATION, WITHOUT LONG-TERM CURRENT USE OF INSULIN (H): Primary | ICD-10-CM

## 2022-09-19 PROCEDURE — G0108 DIAB MANAGE TRN  PER INDIV: HCPCS | Performed by: DIETITIAN, REGISTERED

## 2022-09-19 ASSESSMENT — PAIN SCALES - GENERAL: PAINLEVEL: NO PAIN (0)

## 2022-09-19 NOTE — PROGRESS NOTES
Diabetes Self-Management Education & Support    Presents for: Individual review    CDE VISIT MODE: In Person    ASSESSMENT:  Recent A1c in target at 6.6%.  Pt is now taking Metformin XR at reduced dose with addition of Farxiga at 5 mg daily r/t diarrhea with increased dose of Metformin.  Weight is down 8# since visit to [facility] in February.  Foot exam and eye exam are up to date.  BP elevated on two checks today.  Pt working with provider on this and has appointment scheduled for next week.    Patient's most recent   Lab Results   Component Value Date    A1C 6.6 08/29/2022    A1C 6.5 08/17/2021     is meeting goal of <7.0    Diabetes knowledge and skills assessment:   Patient is knowledgeable in diabetes management concepts related to: Healthy Eating, Being Active and Taking Medication    Education provided today on:   Discussed how to use lancing device for Leyla Meter that was obtained r/t increased cost of Accu-Check test strips.    Importance of BP control.      Based on learning assessment above, most appropriate setting for further diabetes education would be: Individual setting.      PLAN  Continue to follow healthy, low carbohydrate meal plan.  Try to get some activity daily.  Test glucose 1x/day - alternate times.  Record BP for review at provider visit next week.     Follow-up: 6 months    See Care Plan for co-developed, patient-state behavior change goals.  AVS provided for patient today.    Education Materials Provided:  No new materials today.     SUBJECTIVE/OBJECTIVE:  Presents for: Individual review  Accompanied by: Self  Diabetes education in the past 24mo: Yes  Focus of Visit: Monitoring  Diabetes type: Type 2  Date of diagnosis: 12/2014  Disease course: Stable  How confident are you filling out medical forms by yourself:: Extremely  Diabetes management related comments/concerns: None  Transportation concerns: No  Difficulty affording diabetes medication?: No  Difficulty affording diabetes testing  "supplies?: No  Other concerns:: None  Cultural Influences/Ethnic Background:  Not  or     Diabetes Symptoms & Complications:  Fatigue: No  Neuropathy: No  Polyuria: Yes (On Jardiance)  Visual change: No  Symptom course: Stable  Weight trend: Decreasing (Weight is down 8# since Feb 2022)  Complications assessed today?: Yes  CVA: No  Heart disease: No  Nephropathy: No  Retinopathy: No    Patient Problem List and Family Medical History reviewed for relevant medical history, current medical status, and diabetes risk factors.    Vitals:  BP (!) 168/104   Pulse 81   Resp 16   Ht 1.664 m (5' 5.5\")   Wt 90.8 kg (200 lb 3.2 oz)   SpO2 95%   BMI 32.81 kg/m    Estimated body mass index is 32.81 kg/m  as calculated from the following:    Height as of this encounter: 1.664 m (5' 5.5\").    Weight as of this encounter: 90.8 kg (200 lb 3.2 oz).   Last 3 BP:   BP Readings from Last 3 Encounters:   09/19/22 (!) 168/104   08/29/22 (!) 161/87   05/23/22 (!) 148/92       History   Smoking Status     Never Smoker   Smokeless Tobacco     Current User     Types: Chew     Comment: Tried to quit; No passive exposure       Labs:  Lab Results   Component Value Date    A1C 6.6 08/29/2022    A1C 6.5 08/17/2021     Lab Results   Component Value Date     08/29/2022     05/14/2021     Lab Results   Component Value Date    LDL 47 05/23/2022    LDL 78 05/14/2021     HDL Cholesterol   Date Value Ref Range Status   05/14/2021 61 >39 mg/dL Final     Direct Measure HDL   Date Value Ref Range Status   05/23/2022 45 >=40 mg/dL Final   ]  GFR Estimate   Date Value Ref Range Status   08/29/2022 80 >60 mL/min/1.73m2 Final     Comment:     Effective December 21, 2021 eGFRcr in adults is calculated using the 2021 CKD-EPI creatinine equation which includes age and gender (Asim harris al., NEJM, DOI: 10.1056/JZQIce9836433)   05/14/2021 85 >60 mL/min/[1.73_m2] Final     Comment:     Non  GFR Calc  Starting 12/18/2018, " serum creatinine based estimated GFR (eGFR) will be   calculated using the Chronic Kidney Disease Epidemiology Collaboration   (CKD-EPI) equation.       GFR Estimate If Black   Date Value Ref Range Status   05/14/2021 >90 >60 mL/min/[1.73_m2] Final     Comment:      GFR Calc  Starting 12/18/2018, serum creatinine based estimated GFR (eGFR) will be   calculated using the Chronic Kidney Disease Epidemiology Collaboration   (CKD-EPI) equation.       Lab Results   Component Value Date    CR 1.05 08/29/2022    CR 0.96 05/14/2021     No results found for: MICROALBUMIN    Healthy Eating:  Healthy Eating Assessed Today: Yes  Cultural/Yarsanism diet restrictions?: No  Meal planning/habits: Carb counting (Limits carbohdyrates to 60 grams/meal)  Meals include: Breakfast, Lunch, Dinner  Breakfast: 2 granola bars or egg sandwich  Lunch: sandwich/chips or leftovers  Dinner: meat, veggies, potatoes- smaller portions  Snacks: minimal snacks - no hs - sometimes cheese stick or nuts during the day  Beverages: Water, Sports drinks, Diet soda (Gatorade Zero)  Has patient met with a dietitian in the past?: Yes    Being Active:  Being Active Assessed Today: Yes  Exercise:: Yes (Pt is active on most days - does much hunting in fall.)    Monitoring:  Monitoring Assessed Today: Yes  Did patient bring glucose meter to appointment? : Yes  Blood Glucose Meter: ContourNext, Accu-chek (Pt was using Accu-Chek but will transition to Leyla Contour Next r/t cost of strips.)  Times checking blood sugar at home (number): 1  Times checking blood sugar at home (per): Day  Blood glucose trend: No change  Fasting-151, 133, 132, 122, 131, 138, 130, 128, 131, 129  Post supper (sometimes <2 hours)-136, 143, 169, 225, 162   Two week average is 144.    Taking Medications:  Diabetes Medication(s)     Biguanides       metFORMIN (GLUCOPHAGE XR) 500 MG 24 hr tablet    TAKE 4 TABLETS (2,000 MG) BY MOUTH DAILY (WITH DINNER)    Sodium-Glucose  Co-Transporter 2 (SGLT2) Inhibitors       dapagliflozin (FARXIGA) 5 MG TABS tablet    Take 1 tablet (5 mg) by mouth every morning          Taking Medication Assessed Today: Yes  Current Treatments: Diet, Oral Medication (taken by mouth) (Metformin  mg bid, Farxiga 5 mg daily (takes in pm as was forgetting in am - provider aware))  Problems taking diabetes medications regularly?: No  Diabetes medication side effects?: No    Problem Solving:  Problem Solving Assessed Today: Yes  Is the patient at risk for hypoglycemia?: No  Is the patient at risk for DKA?: No    Reducing Risks:  Has dilated eye exam at least once a year?: Yes  Sees dentist every 6 months?: Yes  Feet checked by healthcare provider in the last year?: Yes    Healthy Coping:  Emotional response to diabetes: Acceptance, Concern for health and well-being  Informal Support system:: Family  Stage of change: MAINTENANCE (Working to maintain change, with risk of relapse)  Patient Activation Measure Survey Score:  NOY Score (Last Two) 12/18/2018 8/29/2022   NOY Raw Score 37 37   Activation Score 79.2 79.2   NOY Level 4 4     Care Plan and Education Provided:  Care Plan: Diabetes   Updates made by Jacque Lamas RD since 9/19/2022 12:00 AM      Problem: HbA1C Not In Goal       Goal: Establish Regular Follow-Ups with PCP       Task: Discuss with PCP the recommended timing for patient's next follow up visit(s)    Responsible User: Jacque Lamas RD      Task: Discuss schedule for PCP visits with patient    Responsible User: Jacque Lamas RD      Goal: Get HbA1C Level in Goal       Task: Educate patient on diabetes education self-management topics    Responsible User: Jacque Lamas RD      Task: Educate patient on benefits of regular glucose monitoring    Responsible User: Jacque Lamas RD      Task: Refer patient to appropriate extended care team member, as needed (Medication Therapy Management, Behavioral Health, Physical Therapy, etc.)    Responsible  User: Jacque Lamas RD      Task: Discuss diabetes treatment plan with patient    Responsible User: Jacque Lamas RD      Problem: Diabetes Self-Management Education Needed to Optimize Self-Care Behaviors       Goal: Understand diabetes pathophysiology and disease progression       Task: Provide education on diabetes pathophysiology and disease progression specfic to patient's diabetes type    Responsible User: Jacque Lamas RD      Goal: Healthy Eating - follow a healthy eating pattern for diabetes       Task: Provide education on portion control and consistency in amount, composition and timing of food intake    Responsible User: Jacque Lamas RD      Task: Provide education on managing carbohydrate intake (carbohydrate counting, plate planning method, etc.)    Responsible User: Jacque Lamas RD      Task: Provide education on weight management    Responsible User: Jacque Lamas RD      Task: Provide education on heart healthy eating    Responsible User: Jacque Lamas RD      Task: Provide education on eating out    Responsible User: Jacque Lamas RD      Task: Develop individualized healthy eating plan with patient    Responsible User: Jacque Lamas RD      Goal: Being Active - get regular physical activity, working up to at least 150 minutes per week       Task: Provide education on relationship of activity to glucose and precautions to take if at risk for low glucose    Responsible User: Jacque Lamas RD      Task: Discuss barriers to physical activity with patient    Responsible User: Jacque Lamas RD      Task: Develop physical activity plan with patient    Responsible User: Jacque Lamas RD      Task: Explore community resources including walking groups, assistance programs, and home videos    Responsible User: Jacque Lamas RD      Goal: Monitoring - monitor glucose and ketones as directed       Task: Provide education on blood glucose monitoring (purpose, proper technique, frequency, glucose  targets, interpreting results, when to use glucose control solution, sharps disposal) Completed 9/19/2022   Responsible User: Jacque Lamas RD      Task: Provide education on continuous glucose monitoring (sensor placement, use of jitendra or /reader, understanding glucose trends, alerts and alarms, differences between sensor glucose and blood glucose)    Responsible User: Jacque Lamas RD      Task: Provide education on ketone monitoring (when to monitor, frequency, etc.)    Responsible User: Jacque Lamas RD      Goal: Taking Medication - patient is consistently taking medications as directed       Task: Provide education on action of prescribed medication, including when to take and possible side effects    Responsible User: Jacque Lamas RD      Task: Provide education on insulin and injectable diabetes medications, including administration, storage, site selection and rotation for injection sites    Responsible User: Jacque Lamas RD      Task: Discuss barriers to medication adherence with patient and provide management technique ideas as appropriate    Responsible User: Jacque Lamas RD      Task: Provide education on frequency and refill details of medications    Responsible User: Jacque Lamas RD      Goal: Problem Solving - know how to prevent and manage short-term diabetes complications       Task: Provide education on high blood glucose - causes, signs/symptoms, prevention and treatment    Responsible User: Jacque Lamas RD      Task: Provide education on low blood glucose - causes, signs/symptoms, prevention, treatment, carrying a carbohydrate source at all times, and medical identification    Responsible User: Jacque Lamas RD      Task: Provide education on safe travel with diabetes    Responsible User: Jacque Lamas RD      Task: Provide education on how to care for diabetes on sick days    Responsible User: Jacque Lamas RD      Task: Provide education on when to call a health care  provider    Responsible User: Jacque Lamas RD      Goal: Reducing Risks - know how to prevent and treat long-term diabetes complications    Start Date: 9/19/2022   This Visit's Progress: 0%   Note:    Pt will have BP in target at next visit.  BP meds currently being adjusted by provider.      Task: Provide education on major complications of diabetes, prevention, early diagnostic measures and treatment of complications    Responsible User: Jacque Lamas RD      Task: Provide education on recommended care for dental, eye and foot health    Responsible User: Jacque Lamas RD      Task: Provide education on Hemoglobin A1c - goals and relationship to blood glucose levels Completed 9/19/2022   Responsible User: Jacque Lamas RD      Task: Provide education on recommendations for heart health - lipid levels and goals, blood pressure and goals, and aspirin therapy, if indicated    Responsible User: Jacque Lamas RD      Task: Provide education on tobacco cessation    Responsible User: Jacque Lamas RD      Goal: Healthy Coping - use available resources to cope with the challenges of managing diabetes       Task: Discuss recognizing feelings about having diabetes    Responsible User: Jacque Lamas RD      Task: Provide education on the benefits of making appropriate lifestyle changes    Responsible User: Jacque Lamas RD      Task: Provide education on benefits of utilizing support systems    Responsible User: Jacque Lamas RD      Task: Discuss methods for coping with stress    Responsible User: Jacque Lamas RD      Task: Provide education on when to seek professional counseling    Responsible User: Jacque Lamas RD        Time Spent: 35 minutes  Encounter Type: Individual    Any diabetes medication dose changes were made via the CDE Protocol per the patient's referring provider. A copy of this encounter was shared with the provider.

## 2022-09-19 NOTE — PROGRESS NOTES
Abnormal VS:    Problem: I spoke to Dr Fisher  face to face regarding the pt's elevated BP. Pt has no lightheadedness, HA, CP, chest tightness or chest pressure.  Plan: Provider recommended continued BP checks at home/work and bring to follow-up appt with Dr Fisher next week.  Pt notified and is satisfied with this plan.    Melva Westbrook LPN

## 2022-09-19 NOTE — PATIENT INSTRUCTIONS
-Keep following healthy, low carbohydrate meal plan.  -Continue to try to be active daily.   -Test glucose 1x/day.  Good times are fasting or 2 hours after a meal.  -Target levels are fasting , 2 hours after a meal < 180.  -A1c today was 6.6%.  Great job! Goal is < 7.0%.    -Follow up in six months.   -Call with any concerns - TODD Bran, [facility] [phone].

## 2022-09-19 NOTE — LETTER
9/19/2022        RE: Neo Cowan  [address] [facility] [address]            Abnormal VS:    Problem: I spoke to Dr Fisher  face to face regarding the pt's elevated BP. Pt has no lightheadedness, HA, CP, chest tightness or chest pressure.  Plan: Provider recommended continued BP checks at home/work and bring to follow-up appt with Dr Fisher next week.  Pt notified and is satisfied with this plan.    eMlva Westbrook LPMERRILL        Diabetes Self-Management Education & Support    Presents for: Individual review    CDE VISIT MODE: In Person    ASSESSMENT:  Recent A1c in target at 6.6%.  Pt is now taking Metformin XR at reduced dose with addition of Farxiga at 5 mg daily r/t diarrhea with increased dose of Metformin.  Weight is down 8# since visit to [facility] in February.  Foot exam and eye exam are up to date.  BP elevated on two checks today.  Pt working with provider on this and has appointment scheduled for next week.    Patient's most recent   Lab Results   Component Value Date    A1C 6.6 08/29/2022    A1C 6.5 08/17/2021     is meeting goal of <7.0    Diabetes knowledge and skills assessment:   Patient is knowledgeable in diabetes management concepts related to: Healthy Eating, Being Active and Taking Medication    Education provided today on:   Discussed how to use lancing device for Leyla Meter that was obtained r/t increased cost of Accu-Check test strips.    Importance of BP control.      Based on learning assessment above, most appropriate setting for further diabetes education would be: Individual setting.      PLAN  Continue to follow healthy, low carbohydrate meal plan.  Try to get some activity daily.  Test glucose 1x/day - alternate times.  Record BP for review at provider visit next week.     Follow-up: 6 months    See Care Plan for co-developed, patient-state behavior change goals.  AVS provided for patient today.    Education Materials Provided:  No new materials today.  "    SUBJECTIVE/OBJECTIVE:  Presents for: Individual review  Accompanied by: Self  Diabetes education in the past 24mo: Yes  Focus of Visit: Monitoring  Diabetes type: Type 2  Date of diagnosis: 12/2014  Disease course: Stable  How confident are you filling out medical forms by yourself:: Extremely  Diabetes management related comments/concerns: None  Transportation concerns: No  Difficulty affording diabetes medication?: No  Difficulty affording diabetes testing supplies?: No  Other concerns:: None  Cultural Influences/Ethnic Background:  Not  or     Diabetes Symptoms & Complications:  Fatigue: No  Neuropathy: No  Polyuria: Yes (On Jardiance)  Visual change: No  Symptom course: Stable  Weight trend: Decreasing (Weight is down 8# since Feb 2022)  Complications assessed today?: Yes  CVA: No  Heart disease: No  Nephropathy: No  Retinopathy: No    Patient Problem List and Family Medical History reviewed for relevant medical history, current medical status, and diabetes risk factors.    Vitals:  BP (!) 168/104   Pulse 81   Resp 16   Ht 1.664 m (5' 5.5\")   Wt 90.8 kg (200 lb 3.2 oz)   SpO2 95%   BMI 32.81 kg/m    Estimated body mass index is 32.81 kg/m  as calculated from the following:    Height as of this encounter: 1.664 m (5' 5.5\").    Weight as of this encounter: 90.8 kg (200 lb 3.2 oz).   Last 3 BP:   BP Readings from Last 3 Encounters:   09/19/22 (!) 168/104   08/29/22 (!) 161/87   05/23/22 (!) 148/92       History   Smoking Status     Never Smoker   Smokeless Tobacco     Current User     Types: Chew     Comment: Tried to quit; No passive exposure       Labs:  Lab Results   Component Value Date    A1C 6.6 08/29/2022    A1C 6.5 08/17/2021     Lab Results   Component Value Date     08/29/2022     05/14/2021     Lab Results   Component Value Date    LDL 47 05/23/2022    LDL 78 05/14/2021     HDL Cholesterol   Date Value Ref Range Status   05/14/2021 61 >39 mg/dL Final     Direct " Measure HDL   Date Value Ref Range Status   05/23/2022 45 >=40 mg/dL Final   ]  GFR Estimate   Date Value Ref Range Status   08/29/2022 80 >60 mL/min/1.73m2 Final     Comment:     Effective December 21, 2021 eGFRcr in adults is calculated using the 2021 CKD-EPI creatinine equation which includes age and gender (Asim harris al., [place], DOI: 10.1056/ZEEWac6114384)   05/14/2021 85 >60 mL/min/[1.73_m2] Final     Comment:     Non  GFR Calc  Starting 12/18/2018, serum creatinine based estimated GFR (eGFR) will be   calculated using the Chronic Kidney Disease Epidemiology Collaboration   (CKD-EPI) equation.       GFR Estimate If Black   Date Value Ref Range Status   05/14/2021 >90 >60 mL/min/[1.73_m2] Final     Comment:      GFR Calc  Starting 12/18/2018, serum creatinine based estimated GFR (eGFR) will be   calculated using the Chronic Kidney Disease Epidemiology Collaboration   (CKD-EPI) equation.       Lab Results   Component Value Date    CR 1.05 08/29/2022    CR 0.96 05/14/2021     No results found for: MICROALBUMIN    Healthy Eating:  Healthy Eating Assessed Today: Yes  Cultural/Pentecostalism diet restrictions?: No  Meal planning/habits: Carb counting (Limits carbohdyrates to 60 grams/meal)  Meals include: Breakfast, Lunch, Dinner  Breakfast: 2 granola bars or egg sandwich  Lunch: sandwich/chips or leftovers  Dinner: meat, veggies, potatoes- smaller portions  Snacks: minimal snacks - no hs - sometimes cheese stick or nuts during the day  Beverages: Water, Sports drinks, Diet soda (Gatorade Zero)  Has patient met with a dietitian in the past?: Yes    Being Active:  Being Active Assessed Today: Yes  Exercise:: Yes (Pt is active on most days - does much hunting in fall.)    Monitoring:  Monitoring Assessed Today: Yes  Did patient bring glucose meter to appointment? : Yes  Blood Glucose Meter: ContourNext, Accu-chek (Pt was using Accu-Chek but will transition to Leyla Contour Next r/t cost of  strips.)  Times checking blood sugar at home (number): 1  Times checking blood sugar at home (per): Day  Blood glucose trend: No change  Fasting-151, 133, 132, 122, 131, 138, 130, 128, 131, 129  Post supper (sometimes <2 hours)-136, 143, 169, 225, 162   Two week average is 144.    Taking Medications:  Diabetes Medication(s)     Biguanides       metFORMIN (GLUCOPHAGE XR) 500 MG 24 hr tablet    TAKE 4 TABLETS (2,000 MG) BY MOUTH DAILY (WITH DINNER)    Sodium-Glucose Co-Transporter 2 (SGLT2) Inhibitors       dapagliflozin (FARXIGA) 5 MG TABS tablet    Take 1 tablet (5 mg) by mouth every morning          Taking Medication Assessed Today: Yes  Current Treatments: Diet, Oral Medication (taken by mouth) (Metformin  mg bid, Farxiga 5 mg daily (takes in pm as was forgetting in am - provider aware))  Problems taking diabetes medications regularly?: No  Diabetes medication side effects?: No    Problem Solving:  Problem Solving Assessed Today: Yes  Is the patient at risk for hypoglycemia?: No  Is the patient at risk for DKA?: No    Reducing Risks:  Has dilated eye exam at least once a year?: Yes  Sees dentist every 6 months?: Yes  Feet checked by healthcare provider in the last year?: Yes    Healthy Coping:  Emotional response to diabetes: Acceptance, Concern for health and well-being  Informal Support system:: Family  Stage of change: MAINTENANCE (Working to maintain change, with risk of relapse)  Patient Activation Measure Survey Score:  NOY Score (Last Two) 12/18/2018 8/29/2022   NOY Raw Score 37 37   Activation Score 79.2 79.2   NOY Level 4 4     Care Plan and Education Provided:  Care Plan: Diabetes   Updates made by Jacque Lamas RD since 9/19/2022 12:00 AM      Problem: HbA1C Not In Goal       Goal: Establish Regular Follow-Ups with PCP       Task: Discuss with PCP the recommended timing for patient's next follow up visit(s)    Responsible User: Jacque Lamas RD      Task: Discuss schedule for PCP visits with  patient    Responsible User: Jacque Lamas RD      Goal: Get HbA1C Level in Goal       Task: Educate patient on diabetes education self-management topics    Responsible User: Jacque Lamas RD      Task: Educate patient on benefits of regular glucose monitoring    Responsible User: Jacque Lamas RD      Task: Refer patient to appropriate extended care team member, as needed (Medication Therapy Management, Behavioral Health, Physical Therapy, etc.)    Responsible User: Jacque Lamas RD      Task: Discuss diabetes treatment plan with patient    Responsible User: Jacque Lamas RD      Problem: Diabetes Self-Management Education Needed to Optimize Self-Care Behaviors       Goal: Understand diabetes pathophysiology and disease progression       Task: Provide education on diabetes pathophysiology and disease progression specfic to patient's diabetes type    Responsible User: Jacque Lamas RD      Goal: Healthy Eating - follow a healthy eating pattern for diabetes       Task: Provide education on portion control and consistency in amount, composition and timing of food intake    Responsible User: Jacque Lamas RD      Task: Provide education on managing carbohydrate intake (carbohydrate counting, plate planning method, etc.)    Responsible User: Jacque Lamas RD      Task: Provide education on weight management    Responsible User: Jacque Lamas RD      Task: Provide education on heart healthy eating    Responsible User: Jacque Lamas RD      Task: Provide education on eating out    Responsible User: Jacque Lamas RD      Task: Develop individualized healthy eating plan with patient    Responsible User: Jacque Lamas RD      Goal: Being Active - get regular physical activity, working up to at least 150 minutes per week       Task: Provide education on relationship of activity to glucose and precautions to take if at risk for low glucose    Responsible User: Jacque Lamas RD      Task: Discuss barriers to  physical activity with patient    Responsible User: Jacque Lamas RD      Task: Develop physical activity plan with patient    Responsible User: Jacque Lamas RD      Task: Explore community resources including walking groups, assistance programs, and home videos    Responsible User: Jacque Lamas RD      Goal: Monitoring - monitor glucose and ketones as directed       Task: Provide education on blood glucose monitoring (purpose, proper technique, frequency, glucose targets, interpreting results, when to use glucose control solution, sharps disposal) Completed 9/19/2022   Responsible User: Jacque Lamas RD      Task: Provide education on continuous glucose monitoring (sensor placement, use of jitendra or /reader, understanding glucose trends, alerts and alarms, differences between sensor glucose and blood glucose)    Responsible User: Jacque Lamas RD      Task: Provide education on ketone monitoring (when to monitor, frequency, etc.)    Responsible User: Jacque Lamas RD      Goal: Taking Medication - patient is consistently taking medications as directed       Task: Provide education on action of prescribed medication, including when to take and possible side effects    Responsible User: Jacque Lamas RD      Task: Provide education on insulin and injectable diabetes medications, including administration, storage, site selection and rotation for injection sites    Responsible User: Jacque Lamas RD      Task: Discuss barriers to medication adherence with patient and provide management technique ideas as appropriate    Responsible User: Jacque Lamas RD      Task: Provide education on frequency and refill details of medications    Responsible User: Jacque Lamas RD      Goal: Problem Solving - know how to prevent and manage short-term diabetes complications       Task: Provide education on high blood glucose - causes, signs/symptoms, prevention and treatment    Responsible User: Jacque Lamas RD       Task: Provide education on low blood glucose - causes, signs/symptoms, prevention, treatment, carrying a carbohydrate source at all times, and medical identification    Responsible User: Jacque Lamas RD      Task: Provide education on safe travel with diabetes    Responsible User: Jacque Lamas RD      Task: Provide education on how to care for diabetes on sick days    Responsible User: Jacque Lamas RD      Task: Provide education on when to call a health care provider    Responsible User: Jacque Lamas RD      Goal: Reducing Risks - know how to prevent and treat long-term diabetes complications    Start Date: 9/19/2022   This Visit's Progress: 0%   Note:    Pt will have BP in target at next visit.  BP meds currently being adjusted by provider.      Task: Provide education on major complications of diabetes, prevention, early diagnostic measures and treatment of complications    Responsible User: Jacque Lamas RD      Task: Provide education on recommended care for dental, eye and foot health    Responsible User: Jacque Lamas RD      Task: Provide education on Hemoglobin A1c - goals and relationship to blood glucose levels Completed 9/19/2022   Responsible User: Jacque Lamas RD      Task: Provide education on recommendations for heart health - lipid levels and goals, blood pressure and goals, and aspirin therapy, if indicated    Responsible User: Jacque Lamas RD      Task: Provide education on tobacco cessation    Responsible User: Jacque Lamas RD      Goal: Healthy Coping - use available resources to cope with the challenges of managing diabetes       Task: Discuss recognizing feelings about having diabetes    Responsible User: Jacque Lamas RD      Task: Provide education on the benefits of making appropriate lifestyle changes    Responsible User: Jacque Lamas RD      Task: Provide education on benefits of utilizing support systems    Responsible User: Jaqcue Lamas RD      Task: Discuss  methods for coping with stress    Responsible User: Jacque Lamas RD      Task: Provide education on when to seek professional counseling    Responsible User: Jacque Lamas RD        Time Spent: 35 minutes  Encounter Type: Individual    Any diabetes medication dose changes were made via the CDE Protocol per the patient's referring provider. A copy of this encounter was shared with the provider.        Sincerely,        Jacque Lamas RD

## 2022-09-26 ENCOUNTER — LAB (OUTPATIENT)
Dept: LAB | Facility: OTHER | Age: 62
End: 2022-09-26
Attending: FAMILY MEDICINE
Payer: COMMERCIAL

## 2022-09-26 ENCOUNTER — OFFICE VISIT (OUTPATIENT)
Dept: FAMILY MEDICINE | Facility: OTHER | Age: 62
End: 2022-09-26
Attending: FAMILY MEDICINE
Payer: COMMERCIAL

## 2022-09-26 VITALS
OXYGEN SATURATION: 99 % | TEMPERATURE: 98.4 F | RESPIRATION RATE: 18 BRPM | BODY MASS INDEX: 33.1 KG/M2 | SYSTOLIC BLOOD PRESSURE: 178 MMHG | WEIGHT: 202 LBS | DIASTOLIC BLOOD PRESSURE: 100 MMHG | HEART RATE: 68 BPM

## 2022-09-26 DIAGNOSIS — I10 BENIGN ESSENTIAL HYPERTENSION: Primary | ICD-10-CM

## 2022-09-26 DIAGNOSIS — I10 BENIGN ESSENTIAL HYPERTENSION: ICD-10-CM

## 2022-09-26 DIAGNOSIS — E87.6 HYPOKALEMIA: ICD-10-CM

## 2022-09-26 LAB
ANION GAP SERPL CALCULATED.3IONS-SCNC: 5 MMOL/L (ref 3–14)
BUN SERPL-MCNC: 11 MG/DL (ref 7–30)
CALCIUM SERPL-MCNC: 9.4 MG/DL (ref 8.5–10.1)
CHLORIDE BLD-SCNC: 104 MMOL/L (ref 94–109)
CO2 SERPL-SCNC: 27 MMOL/L (ref 20–32)
CREAT SERPL-MCNC: 0.94 MG/DL (ref 0.66–1.25)
GFR SERPL CREATININE-BSD FRML MDRD: >90 ML/MIN/1.73M2
GLUCOSE BLD-MCNC: 160 MG/DL (ref 70–99)
POTASSIUM BLD-SCNC: 3.2 MMOL/L (ref 3.4–5.3)
SODIUM SERPL-SCNC: 136 MMOL/L (ref 133–144)

## 2022-09-26 PROCEDURE — 99213 OFFICE O/P EST LOW 20 MIN: CPT | Performed by: FAMILY MEDICINE

## 2022-09-26 PROCEDURE — 83835 ASSAY OF METANEPHRINES: CPT | Mod: 90 | Performed by: FAMILY MEDICINE

## 2022-09-26 PROCEDURE — 36415 COLL VENOUS BLD VENIPUNCTURE: CPT

## 2022-09-26 PROCEDURE — 84244 ASSAY OF RENIN: CPT | Mod: 90 | Performed by: FAMILY MEDICINE

## 2022-09-26 PROCEDURE — 80048 BASIC METABOLIC PNL TOTAL CA: CPT

## 2022-09-26 PROCEDURE — 82088 ASSAY OF ALDOSTERONE: CPT | Performed by: FAMILY MEDICINE

## 2022-09-26 RX ORDER — AMLODIPINE BESYLATE 5 MG/1
5 TABLET ORAL DAILY
Qty: 30 TABLET | Refills: 1 | Status: SHIPPED | OUTPATIENT
Start: 2022-09-26 | End: 2022-10-24

## 2022-09-26 ASSESSMENT — PAIN SCALES - GENERAL: PAINLEVEL: NO PAIN (0)

## 2022-09-26 NOTE — NURSING NOTE
"Chief Complaint   Patient presents with     Hypertension       Initial BP (!) 151/98 (BP Location: Right arm, Patient Position: Sitting, Cuff Size: Adult Large)   Pulse 68   Temp 98.4  F (36.9  C) (Tympanic)   Resp 18   Wt 91.6 kg (202 lb)   SpO2 99%   BMI 33.10 kg/m   Estimated body mass index is 33.1 kg/m  as calculated from the following:    Height as of 9/19/22: 1.664 m (5' 5.5\").    Weight as of this encounter: 91.6 kg (202 lb).  Medication Reconciliation: complete  Marco Antonio Somers LPN  "

## 2022-09-26 NOTE — NURSING NOTE
"Chief Complaint   Patient presents with     Hypertension       Initial BP (!) 178/100 (BP Location: Right arm, Patient Position: Sitting, Cuff Size: Adult Large)   Pulse 68   Temp 98.4  F (36.9  C) (Tympanic)   Resp 18   Wt 91.6 kg (202 lb)   SpO2 99%   BMI 33.10 kg/m   Estimated body mass index is 33.1 kg/m  as calculated from the following:    Height as of 9/19/22: 1.664 m (5' 5.5\").    Weight as of this encounter: 91.6 kg (202 lb).  Medication Reconciliation: complete  Marco Antonio Somers LPN  "

## 2022-09-26 NOTE — PROGRESS NOTES
Assessment & Plan     Benign essential hypertension  Question if secondary htn-- will check few labs and Renal US  Add NOrvasc.  Could also consider spironolactone--- norvasc probably better bang for getting bp down  Patient was instructed to check blood pressure 1-2 times per week until I see back in the Clinic. If blood pressure gettng too high or too low as discussed then need to see patient back sooner.   Follow-up in 3-4 weeks.   Keep working on behaviors   - Basic metabolic panel; Future  - amLODIPine (NORVASC) 5 MG tablet; Take 1 tablet (5 mg) by mouth daily  - US Renal Complete w Doppler Complete  - Metanephrines Plasma Free  - Aldosterone Renin Ratio    Hypokalemia  First time lower. Discussed dietary changes. Recheck next time   - amLODIPine (NORVASC) 5 MG tablet; Take 1 tablet (5 mg) by mouth daily  - US Renal Complete w Doppler Complete  - Metanephrines Plasma Free  - Aldosterone Renin Ratio               No follow-ups on file.    Colin Fisher MD  [facility] - JESSICA Larson is a 61 year old, presenting for the following health issues:  Hypertension      HPI     Hypertension Follow-up      Do you check your blood pressure regularly outside of the clinic? No     Are you following a low salt diet? No    Are your blood pressures ever more than 140 on the top number (systolic) OR more   than 90 on the bottom number (diastolic), for example 140/90? Yes      bp still high avg range 150-200/   Not much better with double bp med  Does have stressfull job/ nicotine use - mild to moderate. / caffeine 2/day// ETOH weekends  No JENNIFER sx - some snoring  Taking meds properly   No s/s of htn    Review of Systems   Constitutional, HEENT, cardiovascular, pulmonary, gi and gu systems are negative, except as otherwise noted.      Objective    BP (!) 151/98 (BP Location: Right arm, Patient Position: Sitting, Cuff Size: Adult Large)   Pulse 68   Temp 98.4  F (36.9  C) (Tympanic)    Resp 18   Wt 91.6 kg (202 lb)   SpO2 99%   BMI 33.10 kg/m    Body mass index is 33.1 kg/m .  Physical Exam   GENERAL: healthy, alert and no distress    Results for orders placed or performed in visit on 09/26/22   Aldosterone Renin Ratio     Status: None (In process)    Narrative    The following orders were created for panel order Aldosterone Renin Ratio.  Procedure                               Abnormality         Status                     ---------                               -----------         ------                     Aldosterone[[id number]]                                      In process                 Renin activity[[id number]]                                   In process                 Aldosterone Renin Ratio[[id number]]                          In process                   Please view results for these tests on the individual orders.   Results for orders placed or performed in visit on 09/26/22   Basic metabolic panel     Status: Abnormal   Result Value Ref Range    Sodium 136 133 - 144 mmol/L    Potassium 3.2 (L) 3.4 - 5.3 mmol/L    Chloride 104 94 - 109 mmol/L    Carbon Dioxide (CO2) 27 20 - 32 mmol/L    Anion Gap 5 3 - 14 mmol/L    Urea Nitrogen 11 7 - 30 mg/dL    Creatinine 0.94 0.66 - 1.25 mg/dL    Calcium 9.4 8.5 - 10.1 mg/dL    Glucose 160 (H) 70 - 99 mg/dL    GFR Estimate >90 >60 mL/min/1.73m2

## 2022-09-27 LAB — ALDOST SERPL-MCNC: <3 NG/DL (ref 0–31)

## 2022-10-03 LAB
ANNOTATION COMMENT IMP: NORMAL
METANEPHS SERPL-SCNC: 0.17 NMOL/L
NORMETANEPHRINE SERPL-SCNC: 0.55 NMOL/L
RENIN PLAS-CCNC: 2.8 NG/ML/HR

## 2022-10-05 LAB — ALDOST/RENIN PLAS-RTO: NORMAL {RATIO}

## 2022-10-21 NOTE — PROGRESS NOTES
Assessment & Plan     Benign essential hypertension  Still not coming down. Will double Norvasc.  Renal US pending.  Previous labs reviewed. Aldosterone very low-- due ot Losartan?  No s/s of JENNIFER.  Patient was instructed to check blood pressure 1-2 times per week until I see back in the Clinic. If blood pressure gettng too high or too low as discussed then need to see patient back sooner.   Follow-up in 5 weeks   - Basic metabolic panel; Future  - amLODIPine (NORVASC) 10 MG tablet; Take 1 tablet (10 mg) by mouth daily    Hypokalemia  Better   - Basic metabolic panel; Future    Type 2 diabetes mellitus without complication, without long-term current use of insulin (H)  Needs eye referral  - Adult Eye  Referral; Future                 No follow-ups on file.    Colin Fisher MD  [facility] - JESISCA Larson is a 61 year old, presenting for the following health issues:  No chief complaint on file.      HPI     Diabetes Follow-up      How often are you checking your blood sugar? Not at all    What concerns do you have today about your diabetes? None     Do you have any of these symptoms? (Select all that apply)  No numbness or tingling in feet.  No redness, sores or blisters on feet.  No complaints of excessive thirst.  No reports of blurry vision.  No significant changes to weight.    Have you had a diabetic eye exam in the last 12 months? No        BP Readings from Last 2 Encounters:   09/26/22 (!) 178/100   09/19/22 (!) 168/104     Hemoglobin A1C (%)   Date Value   08/29/2022 6.6 (H)   05/23/2022 6.8 (H)   08/17/2021 6.5 (A)   05/14/2021 6.3 (H)     LDL Cholesterol Calculated (mg/dL)   Date Value   05/23/2022 47   05/14/2021 78   01/13/2020 76         Hypertension Follow-up      Do you check your blood pressure regularly outside of the clinic? No     Are you following a low salt diet? Yes    Are your blood pressures ever more than 140 on the top number (systolic) OR more   than  90 on the bottom number (diastolic), for example 140/90? Yes    bp still running high avg [id number]/  No side effects on Norvasc  Taking all meds  Trying watch behaviors  No s/s of JENNIFER  Renal US on 11/7        Review of Systems   Constitutional, HEENT, cardiovascular, pulmonary, gi and gu systems are negative, except as otherwise noted.      Objective    BP (!) 168/100 (BP Location: Right arm, Patient Position: Sitting, Cuff Size: Adult Large)   Pulse 83   Temp 98.8  F (37.1  C)   Resp 16   Wt 88.5 kg (195 lb)   SpO2 98%   BMI 31.96 kg/m    Body mass index is 31.96 kg/m .  Physical Exam   GENERAL: healthy, alert and no distress  CV: regular rate and rhythm, normal S1 S2, no S3 or S4, no murmur, click or rub, no peripheral edema and peripheral pulses strong      Results for orders placed or performed in visit on 10/24/22   Basic metabolic panel     Status: Abnormal   Result Value Ref Range    Sodium 138 136 - 145 mmol/L    Potassium 3.9 3.4 - 5.3 mmol/L    Chloride 101 98 - 107 mmol/L    Carbon Dioxide (CO2) 26 22 - 29 mmol/L    Anion Gap 11 7 - 15 mmol/L    Urea Nitrogen 16.4 8.0 - 23.0 mg/dL    Creatinine 1.05 0.67 - 1.17 mg/dL    Calcium 9.9 8.8 - 10.2 mg/dL    Glucose 190 (H) 70 - 99 mg/dL    GFR Estimate 80 >60 mL/min/1.73m2

## 2022-10-24 ENCOUNTER — OFFICE VISIT (OUTPATIENT)
Dept: FAMILY MEDICINE | Facility: OTHER | Age: 62
End: 2022-10-24
Attending: FAMILY MEDICINE
Payer: COMMERCIAL

## 2022-10-24 ENCOUNTER — LAB (OUTPATIENT)
Dept: LAB | Facility: OTHER | Age: 62
End: 2022-10-24
Payer: COMMERCIAL

## 2022-10-24 VITALS
RESPIRATION RATE: 16 BRPM | WEIGHT: 195 LBS | TEMPERATURE: 98.8 F | HEART RATE: 83 BPM | SYSTOLIC BLOOD PRESSURE: 168 MMHG | BODY MASS INDEX: 31.96 KG/M2 | OXYGEN SATURATION: 98 % | DIASTOLIC BLOOD PRESSURE: 100 MMHG

## 2022-10-24 DIAGNOSIS — I10 BENIGN ESSENTIAL HYPERTENSION: ICD-10-CM

## 2022-10-24 DIAGNOSIS — E11.9 TYPE 2 DIABETES MELLITUS WITHOUT COMPLICATION, WITHOUT LONG-TERM CURRENT USE OF INSULIN (H): ICD-10-CM

## 2022-10-24 DIAGNOSIS — I10 BENIGN ESSENTIAL HYPERTENSION: Primary | ICD-10-CM

## 2022-10-24 DIAGNOSIS — E87.6 HYPOKALEMIA: ICD-10-CM

## 2022-10-24 LAB
ANION GAP SERPL CALCULATED.3IONS-SCNC: 11 MMOL/L (ref 7–15)
BUN SERPL-MCNC: 16.4 MG/DL (ref 8–23)
CALCIUM SERPL-MCNC: 9.9 MG/DL (ref 8.8–10.2)
CHLORIDE SERPL-SCNC: 101 MMOL/L (ref 98–107)
CREAT SERPL-MCNC: 1.05 MG/DL (ref 0.67–1.17)
DEPRECATED HCO3 PLAS-SCNC: 26 MMOL/L (ref 22–29)
GFR SERPL CREATININE-BSD FRML MDRD: 80 ML/MIN/1.73M2
GLUCOSE SERPL-MCNC: 190 MG/DL (ref 70–99)
POTASSIUM SERPL-SCNC: 3.9 MMOL/L (ref 3.4–5.3)
SODIUM SERPL-SCNC: 138 MMOL/L (ref 136–145)

## 2022-10-24 PROCEDURE — 99213 OFFICE O/P EST LOW 20 MIN: CPT | Performed by: FAMILY MEDICINE

## 2022-10-24 PROCEDURE — 80048 BASIC METABOLIC PNL TOTAL CA: CPT

## 2022-10-24 PROCEDURE — 36415 COLL VENOUS BLD VENIPUNCTURE: CPT

## 2022-10-24 RX ORDER — AMLODIPINE BESYLATE 10 MG/1
10 TABLET ORAL DAILY
Qty: 30 TABLET | Refills: 1 | Status: SHIPPED | OUTPATIENT
Start: 2022-10-24 | End: 2022-12-08

## 2022-10-24 ASSESSMENT — PAIN SCALES - GENERAL: PAINLEVEL: NO PAIN (0)

## 2022-10-24 NOTE — NURSING NOTE
"Chief Complaint   Patient presents with     Imm/Inj     Flu Shot     Hypertension     Diabetes       Initial BP (!) 168/100 (BP Location: Right arm, Patient Position: Sitting, Cuff Size: Adult Large)   Pulse 83   Temp 98.8  F (37.1  C)   Resp 16   Wt 88.5 kg (195 lb)   SpO2 98%   BMI 31.96 kg/m   Estimated body mass index is 31.96 kg/m  as calculated from the following:    Height as of 9/19/22: 1.664 m (5' 5.5\").    Weight as of this encounter: 88.5 kg (195 lb).  Medication Reconciliation: complete  Geovanny Gan LPN  "

## 2022-11-07 ENCOUNTER — HOSPITAL ENCOUNTER (OUTPATIENT)
Dept: ULTRASOUND IMAGING | Facility: HOSPITAL | Age: 62
Discharge: HOME | End: 2022-11-07
Attending: FAMILY MEDICINE | Admitting: FAMILY MEDICINE
Payer: COMMERCIAL

## 2022-11-07 DIAGNOSIS — E87.6 HYPOKALEMIA: ICD-10-CM

## 2022-11-07 DIAGNOSIS — I10 BENIGN ESSENTIAL HYPERTENSION: ICD-10-CM

## 2022-11-07 PROCEDURE — 93975 VASCULAR STUDY: CPT

## 2022-11-28 ENCOUNTER — OFFICE VISIT (OUTPATIENT)
Dept: FAMILY MEDICINE | Facility: OTHER | Age: 62
End: 2022-11-28
Attending: FAMILY MEDICINE
Payer: COMMERCIAL

## 2022-11-28 VITALS
BODY MASS INDEX: 33.59 KG/M2 | SYSTOLIC BLOOD PRESSURE: 158 MMHG | HEART RATE: 95 BPM | DIASTOLIC BLOOD PRESSURE: 78 MMHG | TEMPERATURE: 101.5 F | RESPIRATION RATE: 18 BRPM | WEIGHT: 205 LBS | OXYGEN SATURATION: 99 %

## 2022-11-28 DIAGNOSIS — B34.9 VIRAL SYNDROME: ICD-10-CM

## 2022-11-28 DIAGNOSIS — I10 BENIGN ESSENTIAL HYPERTENSION: Primary | ICD-10-CM

## 2022-11-28 DIAGNOSIS — R06.83 SNORING: ICD-10-CM

## 2022-11-28 PROCEDURE — 99214 OFFICE O/P EST MOD 30 MIN: CPT | Mod: CS | Performed by: FAMILY MEDICINE

## 2022-11-28 PROCEDURE — 87637 SARSCOV2&INF A&B&RSV AMP PRB: CPT | Performed by: FAMILY MEDICINE

## 2022-11-28 RX ORDER — DOXAZOSIN 1 MG/1
TABLET ORAL
Qty: 70 TABLET | Refills: 0 | Status: SHIPPED | OUTPATIENT
Start: 2022-11-28 | End: 2022-12-28

## 2022-11-28 ASSESSMENT — PAIN SCALES - GENERAL: PAINLEVEL: NO PAIN (0)

## 2022-11-28 NOTE — PROGRESS NOTES
"  Assessment & Plan     Benign essential hypertension  Discussed. Hard to control.  Will add Alpha blocker- R/B discussed. Pt has s/s of some possible JENNIFER.  Will refer for sleep study.  This may be underlying cause of BP. Patient was instructed to check blood pressure 1-2 times per week until I see back in the Clinic. If blood pressure gettng too high or too low as discussed then need to see patient back sooner. Follow-up in 5-6 weeks   - Adult Sleep Eval & Management  Referral; Future  - doxazosin (CARDURA) 1 MG tablet; Take 1 tablet (1 mg) by mouth daily for 7 days, THEN 2 tablets (2 mg) daily for 7 days, THEN 3 tablets (3 mg) daily for 7 days, THEN 4 tablets (4 mg) daily for 30 days.    Viral syndrome  Discussed. Pt to quarantine. Will test. Wife also has same sx.   - Symptomatic; Yes; 11/27/2022 Influenza A/B & SARS-CoV2 (COVID-19) Virus PCR Multiplex Nose    Snoring  As above. Will ask for sleep study / snores and very tight oral pharynx   - Adult Sleep Eval & Management  Referral; Future  BMI:   Estimated body mass index is 33.59 kg/m  as calculated from the following:    Height as of 9/19/22: 1.664 m (5' 5.5\").    Weight as of this encounter: 93 kg (205 lb).   Weight management plan: Discussed healthy diet and exercise guidelines        No follow-ups on file.    Colin Fisher MD  [facility] - JESSICA Larson is a 61 year old, presenting for the following health issues:  Hypertension      HPI     Hypertension Follow-up      Do you check your blood pressure regularly outside of the clinic? No     Are you following a low salt diet? Yes    Are your blood pressures ever more than 140 on the top number (systolic) OR more   than 90 on the bottom number (diastolic), for example 140/90? Yes  Checking BP 2-3 /week  150-170/  No side effects from [facility]      Acute Illness  Acute illness concerns: last several days   Onset/Duration: 2 days   Symptoms:  Fever: " YES  Chills/Sweats: YES  Headache (location?): No  Sinus Pressure: No  Conjunctivitis:  No  Ear Pain: no  Rhinorrhea: YES  Congestion: YES  Sore Throat: No  Cough: YES-non-productive  Wheeze: No  Decreased Appetite: No  Nausea: No  Vomiting: No  Diarrhea: No  Dysuria/Freq.: No  Dysuria or Hematuria: No  Fatigue/Achiness: No  Sick/Strep Exposure: YES- wife   Therapies tried and outcome: None    Review of Systems   Constitutional, HEENT, cardiovascular, pulmonary, gi and gu systems are negative, except as otherwise noted.      Objective    BP (!) 158/78 (BP Location: Left arm, Patient Position: Sitting, Cuff Size: Adult Large)   Pulse 95   Temp (!) 101.5  F (38.6  C) (Tympanic)   Resp 18   Wt 93 kg (205 lb)   SpO2 99%   BMI 33.59 kg/m    Body mass index is 33.59 kg/m .  Physical Exam   GENERAL: healthy, alert and no distress- but looks tired.  Dry cough   EYES: Eyes grossly normal to inspection, PERRL and conjunctivae and sclerae normal  HENT: ear canals and TM's normal, nose and mouth without ulcers or lesions-- small oral airway   NECK: no adenopathy, no asymmetry, masses, or scars and thyroid normal to palpation  RESP: lungs clear to auscultation - no rales, rhonchi or wheezes  CV: regular rate and rhythm, normal S1 S2, no S3 or S4, no murmur, click or rub, no peripheral edema and peripheral pulses strong  ABDOMEN: soft, nontender, no hepatosplenomegaly, no masses and bowel sounds normal  MS: no gross musculoskeletal defects noted, no edema

## 2022-11-29 ENCOUNTER — TELEPHONE (OUTPATIENT)
Dept: NURSING | Facility: CLINIC | Age: 62
End: 2022-11-29

## 2022-11-29 LAB
FLUAV RNA SPEC QL NAA+PROBE: NEGATIVE
FLUBV RNA RESP QL NAA+PROBE: NEGATIVE
RSV RNA SPEC NAA+PROBE: NEGATIVE
SARS-COV-2 RNA RESP QL NAA+PROBE: POSITIVE

## 2022-11-29 NOTE — TELEPHONE ENCOUNTER
Patient classified as COVID treatment eligible by Epic high risk algorithm:  Yes    Coronavirus (COVID-19) Notification    Reason for call  Notify of POSITIVE COVID-19 lab result, assess symptoms,  review [facility] recommendations    Lab Result   Lab test for 2019-nCoV rRt-PCR or SARS-COV-2 PCR  Oropharyngeal AND/OR nasopharyngeal swabs were POSITIVE for 2019-nCoV RNA [OR] SARS-COV-2 RNA (COVID-19) RNA     We have been unable to reach patient by phone at this time to notify of their Positive COVID-19 result.    Left voicemail message requesting a call back to [phone] [facility] for results.        A Positive COVID-19 letter will be sent via [employer] or the mail.    Cristina

## 2022-11-30 ENCOUNTER — TELEPHONE (OUTPATIENT)
Dept: FAMILY MEDICINE | Facility: OTHER | Age: 62
End: 2022-11-30

## 2022-11-30 ENCOUNTER — TELEPHONE (OUTPATIENT)
Dept: PHARMACY | Facility: PHYSICIAN GROUP | Age: 62
End: 2022-11-30

## 2022-11-30 ENCOUNTER — VIRTUAL VISIT (OUTPATIENT)
Dept: FAMILY MEDICINE | Facility: OTHER | Age: 62
End: 2022-11-30
Attending: FAMILY MEDICINE
Payer: COMMERCIAL

## 2022-11-30 DIAGNOSIS — E11.9 TYPE 2 DIABETES MELLITUS WITHOUT COMPLICATION, WITHOUT LONG-TERM CURRENT USE OF INSULIN (H): ICD-10-CM

## 2022-11-30 DIAGNOSIS — I10 BENIGN ESSENTIAL HYPERTENSION: ICD-10-CM

## 2022-11-30 DIAGNOSIS — U07.1 COVID-19 VIRUS INFECTION: Primary | ICD-10-CM

## 2022-11-30 DIAGNOSIS — E66.09 NON MORBID OBESITY DUE TO EXCESS CALORIES: ICD-10-CM

## 2022-11-30 DIAGNOSIS — N18.2 CHRONIC KIDNEY DISEASE, STAGE 2 (MILD): ICD-10-CM

## 2022-11-30 PROCEDURE — 99213 OFFICE O/P EST LOW 20 MIN: CPT | Mod: 95 | Performed by: FAMILY MEDICINE

## 2022-11-30 RX ORDER — METFORMIN HCL 500 MG
1000 TABLET, EXTENDED RELEASE 24 HR ORAL
Qty: 120 TABLET | Refills: 4 | COMMUNITY
Start: 2022-11-30 | End: 2023-02-16

## 2022-11-30 NOTE — PROGRESS NOTES
Neo is a 61 year old who is being evaluated via a billable telephone visit.      What phone number would you like to be contacted at? [phone]  How would you like to obtain your AVS? [employer]    Assessment & Plan     1. COVID-19 virus infection  We reviewed medication options.  Patient states he really is feeling quite a bit better today, and if it weren't for his current diagnosis of COVID, he would be at work.  We talked about the risks and benefits of starting medication, and after a great discussion, we elected to hold off on starting any medication at this time.  We definitely have until tomorrow to make a final decision on oral antiviral therapy.  Patient states he and Dr. Fisher have been working on his BP medications, and given that we would have to adjust a couple of BP medications to use Paxlovid, I would recommend against Paxlovid and for use of Molnupiravir if we elected to start antiviral medication.  He is asked to send me a [employer] message tomorrow if he feels worse tomorrow and wants to start antiviral therapy.  I think this plan is quite reasonable given the improvement in his symptoms.    2. Obesity    3. Type 2 diabetes mellitus without complication, without long-term current use of insulin (H)    4. Benign essential hypertension    5. Chronic kidney disease, stage 2 (mild)      Return if symptoms worsen or fail to improve.    Erica Holguin MD  [facility]   Neo is a 61 year old presenting for the following health issues:  Covid Concern      HPI       Underlying Medical Conditions: obesity, CKD, type 2 DM, HTN  Patient testing positive on 11/28   Test by:  Clinic PCR test  Start of Symptoms: 11/27  Covid 19 Vaccination Status:  Pfizer initial and one booster  Are you pregnant, breastfeeding or trying to conceive? No    COVID-19 Symptom Review  Symptoms Start Date?  11/27     Are any of the following symptoms significant for you?    New or worsening  difficulty breathing? No    Cough? yes     Dry or Productive?  both    Fever?  yes     Highest temp past 24 hours?  102    Chills?  yes    Headache:  no    Sore throat: no    Chest pain: only when coughing     Diarrhea: no    Body aches?  no    Nasal congestion or drainage?  yes     What treatments has patient tried?  Tylenol, mucinex  Does patient live in a nursing home, group home, or shelter? no   Does patient have a way to get food/medications during quarantine? no     Appointment Scheduled:  11/30 with Dr. Newman at 1145 AM    [facility]    Confirmed with Pharmacy: Paxlovid & Molnupiravir in stock  Last Comprehensive Metabolic Panel:  Sodium   Date Value Ref Range Status   10/24/2022 138 136 - 145 mmol/L Final   05/14/2021 137 133 - 144 mmol/L Final     Potassium   Date Value Ref Range Status   10/24/2022 3.9 3.4 - 5.3 mmol/L Final   09/26/2022 3.2 (L) 3.4 - 5.3 mmol/L Final   05/14/2021 4.2 3.4 - 5.3 mmol/L Final     Chloride   Date Value Ref Range Status   10/24/2022 101 98 - 107 mmol/L Final   09/26/2022 104 94 - 109 mmol/L Final   05/14/2021 104 94 - 109 mmol/L Final     Carbon Dioxide   Date Value Ref Range Status   05/14/2021 28 20 - 32 mmol/L Final     Carbon Dioxide (CO2)   Date Value Ref Range Status   10/24/2022 26 22 - 29 mmol/L Final   09/26/2022 27 20 - 32 mmol/L Final     Anion Gap   Date Value Ref Range Status   10/24/2022 11 7 - 15 mmol/L Final   09/26/2022 5 3 - 14 mmol/L Final   05/14/2021 5 3 - 14 mmol/L Final     Glucose   Date Value Ref Range Status   10/24/2022 190 (H) 70 - 99 mg/dL Final   09/26/2022 160 (H) 70 - 99 mg/dL Final   05/14/2021 125 (H) 70 - 99 mg/dL Final     Comment:     Fasting specimen     Urea Nitrogen   Date Value Ref Range Status   10/24/2022 16.4 8.0 - 23.0 mg/dL Final   09/26/2022 11 7 - 30 mg/dL Final   05/14/2021 11 7 - 30 mg/dL Final     Creatinine   Date Value Ref Range Status   10/24/2022 1.05 0.67 - 1.17 mg/dL Final   05/14/2021 0.96  0.66 - 1.25 mg/dL Final     GFR Estimate   Date Value Ref Range Status   10/24/2022 80 >60 mL/min/1.73m2 Final     Comment:     Effective December 21, 2021 eGFRcr in adults is calculated using the 2021 CKD-EPI creatinine equation which includes age and gender (Asim harris al., [place], DOI: 10.1056/ASNDta2085150)   05/14/2021 85 >60 mL/min/[1.73_m2] Final     Comment:     Non  GFR Calc  Starting 12/18/2018, serum creatinine based estimated GFR (eGFR) will be   calculated using the Chronic Kidney Disease Epidemiology Collaboration   (CKD-EPI) equation.       Calcium   Date Value Ref Range Status   10/24/2022 9.9 8.8 - 10.2 mg/dL Final   05/14/2021 9.8 8.5 - 10.1 mg/dL Final     Liver Function Studies - Recent Labs   Lab Test 05/23/22  0807   PROTTOTAL 7.6   ALBUMIN 4.1   BILITOTAL 1.5*   ALKPHOS 66   AST 68*   *     Current Outpatient Medications   Medication     amLODIPine (NORVASC) 10 MG tablet     blood glucose (ACCU-CHEK GUIDE) test strip     blood glucose monitoring (ACCU-CHEK FASTCLIX) lancets     dapagliflozin (FARXIGA) 5 MG TABS tablet     doxazosin (CARDURA) 1 MG tablet     losartan (COZAAR) 100 MG tablet     metFORMIN (GLUCOPHAGE XR) 500 MG 24 hr tablet     simvastatin (ZOCOR) 20 MG tablet     blood glucose monitoring (NO BRAND SPECIFIED) meter device kit     No current facility-administered medications for this visit.     Akira West, [facility]    10:48 AM  Note  Paxlovid drug-drug interaction check:      1. Simvastatin and Paxlovid. Paxlovid can increase the serum concentration of simvastatin resulting in an increased risk for adverse effects including myopathy and potential rhabdomyolysis. Simvastatin must be held starting at least 12 hours prior to the first dose of Paxlovid, for the entire course of Paxlovid, and for 5 days after the completion of Paxlovid (10 days total).      2. Amlodipine and Paxlovid. Paxlovid can increase the serum concentration of amlodipine resulting in an  "increased risk for hypotension and edema. According to the [place] \"Management of Paxlovid Drug-Drug Interactions\" document and the Paxlovid prescribing information, use the combination of amlodipine and Paxlovid together with caution, clinical monitoring is recommended, and a dose decrease of amlodipine could be considered. Both the [place] COVID-19 interaction  and the [facility] interaction  state to consider a 50% dose decrease of amlodipine. This effect is expected to last for the entire course of Paxlovid and for 3 days after the completion of the Paxlovid course (8 days total).      3. Doxazosin and Paxlovid. Paxlovid can increase the serum concentration of doxazosin resulting in an increased risk for adverse effects including hypotension, dizziness, and fatigue (among others). Patients should monitor for signs and symptoms of hypotension (and monitor their blood pressure as they are able). There is not guidance regarding dose adjustment of doxazosin, but caution should be exercised if doxazosin and Paxlovid are taken concomitantly, along with appropriate clinical monitoring. If significant hypotension occurs, consider holding doxazosin until 3 days after the completion of the Paxlovid course.      4. Losartan and Paxlovid. Paxlovid can increase the serum concentration of losartan's active metabolite through induction of CYP2C9. However, this interaction is not expected to be clinically relevant as induction of CYP2C9 takes several days to reach its maximum effect, and Paxlovid is a short 5 day course. No dose adjustment of losartan is necessary, but the patient may benefit from monitoring blood pressure as able to ensure that hypotension is not occurring.      5. Paxlovid can increase blood glucose levels in patients with diabetes. Patients should monitor their blood glucose frequently to ensure that significant hyperglycemia is not occurring. No dose adjustment of diabetes medications is " required prior to starting Paxlovid, but dose adjustment of diabetes medications could be considered if significant hyperglycemia occurs.      Akira West, PharmD  Medication Therapy Management Pharmacist  [facility]  Office phone: [phone]            Review of Systems   Constitutional, HEENT, cardiovascular, pulmonary, gi and gu systems are negative, except as otherwise noted.      Objective           Vitals:  No vitals were obtained today due to virtual visit.    Physical Exam   PSYCH: Alert and oriented times 3; coherent speech, normal   rate and volume, able to articulate logical thoughts, able   to abstract reason, no tangential thoughts, no hallucinations   or delusions  His affect is normal and pleasant  RESP: Voice is hoarse but really no cough noted, no audible wheezing, able to talk in full sentences  Remainder of exam unable to be completed due to telephone visits            Phone call duration: 13 minutes

## 2022-11-30 NOTE — TELEPHONE ENCOUNTER
"Call placed to patient to discuss positive covid 19 results.   Underlying Medical Conditions: obesity, CKD, type 2 DM, HTN  Patient testing positive on 11/28   Test by:  Clinic PCR test  Start of Symptoms: 11/27  Covid 19 Vaccination Status:  Pfizer initial and one booster  Are you pregnant, breastfeeding or trying to conceive? No    COVID-19 Symptom Review  Symptoms Start Date?  11/27     Are any of the following symptoms significant for you?    New or worsening difficulty breathing? No    Cough? yes     Dry or Productive?  both    Fever?  yes     Highest temp past 24 hours?  102    Chills?  yes    Headache:  no    Sore throat: no    Chest pain: only when coughing     Diarrhea: no    Body aches?  no    Nasal congestion or drainage?  yes     What treatments has patient tried?  Tylenol, mucinex  Does patient live in a nursing home, group home, or shelter? no   Does patient have a way to get food/medications during quarantine? no     Appointment Scheduled:  11/30 with Dr. Newman at 1145 AM    Ph[facility]: [facility]    Confirmed with Pharmacy: Paxlovid & Molnupiravir in stock      Instructions below provided to patient. Patient verbalized understanding.     Instructions for Patients  Your COVID-19 test was positive. This means you have the virus. Please follow the \"How can I take care of myself\" and \"How do I self-isolate?\" guidelines in these instructions.    What treatments are available?  Over-the-counter medicines may help with your symptoms such as runny or stuffy nose, cough, chills, and fever. Talk to your care team about your options.     Some people are at high risk for severe illness (for example if you have a weak immune system, you're 65 or older, or you have certain medical problems). If your risk it high and your symptoms started in the last 5 to 7 days, we strongly recommend for you to get COVID treatment as soon as possible before you get really sick. Paxlovid, Molnupiravir and the " monoclonal antibody treatments are proven safe and effective, make you feel better faster, and prevent hospitalization and death.       What are the symptoms of COVID-19?  Symptoms can include fever, cough, shortness of breath, chills, headache, muscle pain sore throat, fatigue, runny or stuffy nose, and loss of taste and smell. Other less common symptoms include nausea, vomiting, or diarrhea (watery stools).    Know when to call 911. Emergency warning signs include:    Trouble breathing or shortness of breath    Pain or pressure in the chest that doesn't go away    Feeling confused like you haven't felt before, or not being able to wake up    Bluish-colored lips or face    How can I take care of myself?  1. Get lots of rest. Drink extra fluids (unless a doctor has told you not to).  2. Take Tylenol (acetaminophen) for fever or pain. If you have liver or kidney problems, ask your family doctor if it's okay to take Tylenol   Adults:   650 mg (two 325 mg pills or tablets) every 4 to 6 hours, or...   1,000 mg (two 500 mg pills or tablets) every 8 hours as needed.  Note: Don't take more than 3,000 mg in one day. Acetaminophen is found in many medicines (both prescribed and over the counter). Read all labels to be sure you don't take too much.  For children, check the Tylenol bottle for the right dose. The dose is based on the child's age or weight.  3. Take over the counter medicines for your symptoms as needed. Talk to your pharmacist.  4. If you have other health problems (like cancer, heart failure, an organ transplant, or severe kidney disease): Call your specialty clinic if you don't feel better in the next 2 days.    These guidelines are for isolating and quarantining before returning to work, school or .     For employers, schools and day cares: This is an official notice for this person's medical guidelines for returning in-person.     For health care sites: The CDC gives different isolation and  quarantine guidelines for healthcare sites, please check with these sites before arriving.     How do I self-isolate?  You isolate when you have symptoms of COVID or a test shows you have COVID, even if you don't have symptoms.     If you DO have symptoms:  o Stay home and away from others  - For at least 5 days after your symptoms started, AND   - You are fever free for 24 hours (with no medicine that reduces fever), AND  - Your other symptoms are better.  o Wear a mask for 10 full days any time you are around others.    If you DON'T have symptoms:  o Stay at home and away from others for at least 5 days after your positive test.  o Wear a mask for 10 full days any time you are around others.    How and when do I quarantine?  You quarantine when you may have been exposed to the virus and DON'T have symptoms.     Stay home and away from others.     You must quarantine for 5 days after your last contact with a person who has COVID.  o Note: If you are fully vaccinated, you don't need to quarantine. You should still follow the steps below.     Wear a mask for 10 full days any time you're around others.    Get tested at least 5 days after you were exposed, even if you don't have symptoms.     If you start to have symptoms, isolate right away and get tested.    Where can I get more information?    [facility] COVID-19 Resource Hub: [email or web address]     CDC Quarantine & Isolation: https://www.cdc.gov/coronavirus/2019-ncov/your-health/quarantine-isolation.html     CDC - What to Do If You're Sick: https://www.cdc.gov/coronavirus/2019-ncov/if-you-are-sick/index.html    [facility] clinical trials (COVID-19 research studies): clinicalaffairs.[email or web address]/umn-clinical-trials    [place] Department of Health COVID-19 Public Hotline: 1-373.631.9890

## 2022-11-30 NOTE — TELEPHONE ENCOUNTER
Received PA from [facility] for Doxazosin Mesylate 1MG tablets. Submitted on CM. Received instant APPROVAL from [employer]. Effective 10/30/2022-11/29/2023. Forms scanned to Epic.

## 2022-11-30 NOTE — TELEPHONE ENCOUNTER
"Paxlovid drug-drug interaction check:     1. Simvastatin and Paxlovid. Paxlovid can increase the serum concentration of simvastatin resulting in an increased risk for adverse effects including myopathy and potential rhabdomyolysis. Simvastatin must be held starting at least 12 hours prior to the first dose of Paxlovid, for the entire course of Paxlovid, and for 5 days after the completion of Paxlovid (10 days total).     2. Amlodipine and Paxlovid. Paxlovid can increase the serum concentration of amlodipine resulting in an increased risk for hypotension and edema. According to the [place] \"Management of Paxlovid Drug-Drug Interactions\" document and the Paxlovid prescribing information, use the combination of amlodipine and Paxlovid together with caution, clinical monitoring is recommended, and a dose decrease of amlodipine could be considered. Both the [place] COVID-19 interaction  and the [facility] interaction  state to consider a 50% dose decrease of amlodipine. This effect is expected to last for the entire course of Paxlovid and for 3 days after the completion of the Paxlovid course (8 days total).     3. Doxazosin and Paxlovid. Paxlovid can increase the serum concentration of doxazosin resulting in an increased risk for adverse effects including hypotension, dizziness, and fatigue (among others). Patients should monitor for signs and symptoms of hypotension (and monitor their blood pressure as they are able). There is not guidance regarding dose adjustment of doxazosin, but caution should be exercised if doxazosin and Paxlovid are taken concomitantly, along with appropriate clinical monitoring. If significant hypotension occurs, consider holding doxazosin until 3 days after the completion of the Paxlovid course.     4. Losartan and Paxlovid. Paxlovid can increase the serum concentration of losartan's active metabolite through induction of CYP2C9. However, this interaction is not expected to be " clinically relevant as induction of CYP2C9 takes several days to reach its maximum effect, and Paxlovid is a short 5 day course. No dose adjustment of losartan is necessary, but the patient may benefit from monitoring blood pressure as able to ensure that hypotension is not occurring.     5. Paxlovid can increase blood glucose levels in patients with diabetes. Patients should monitor their blood glucose frequently to ensure that significant hyperglycemia is not occurring. No dose adjustment of diabetes medications is required prior to starting Paxlovid, but dose adjustment of diabetes medications could be considered if significant hyperglycemia occurs.     Akira West, PharmD  Medication Therapy Management Pharmacist  [facility]  Office phone: [phone]

## 2022-12-08 DIAGNOSIS — I10 BENIGN ESSENTIAL HYPERTENSION: ICD-10-CM

## 2022-12-08 RX ORDER — AMLODIPINE BESYLATE 10 MG/1
10 TABLET ORAL DAILY
Qty: 30 TABLET | Refills: 1 | Status: SHIPPED | OUTPATIENT
Start: 2022-12-08 | End: 2023-01-16

## 2022-12-08 NOTE — TELEPHONE ENCOUNTER
amlodipine      Last Written Prescription Date:  10/24/2022  Last Fill Quantity: 30,   # refills: 1  Last Office Visit: 11/28/2022  Future Office visit:    Next 5 appointments (look out 90 days)    Jan 03, 2023  4:15 PM  (Arrive by 4:00 PM)  SHORT with Colin Fisher MD  [facility] [place] [facility] [place] ([facility] [place] ) [address]  [place] [address]  [phone]           Routing refill request to provider for review/approval because:

## 2022-12-12 ENCOUNTER — TRANSFERRED RECORDS (OUTPATIENT)
Dept: HEALTH INFORMATION MANAGEMENT | Facility: CLINIC | Age: 62
End: 2022-12-12

## 2022-12-12 LAB — RETINOPATHY: NEGATIVE

## 2022-12-14 ENCOUNTER — DOCUMENTATION ONLY (OUTPATIENT)
Dept: SLEEP MEDICINE | Facility: HOSPITAL | Age: 62
End: 2022-12-14

## 2022-12-14 NOTE — PROGRESS NOTES
STOP DIONISIO       Name: Neo Cowan MRN# [id number]   Age: 61 year old YOB: 1960     Stop Bang questionnaire completed with a score of >3 to allow for HST     Have you been told you snore loudly (louder than talking or loud enough to be heard through doors)? NO    Do you often feel tired, fatigued, or sleepy during the daytime? YES    Has anyone observed you stop breathing during your sleep? NO    Do you have or are you being treated for high blood pressure? YES    Is your BMI greater than 35? NO    Is your neck size circumference 16 inches or greater? NO    Are you over 50 years old? YES    Stop Bang Score (# of yes): 4

## 2022-12-14 NOTE — PROGRESS NOTES
Chart review prior to sleep testing.    Patient Summary:  61 year old yo male who is referred for concern for sleep-disordered breathing as part of management of HTN.    Patient Active Problem List    Diagnosis Date Noted     Elevated liver function tests 05/23/2022     Priority: Medium     Drug-induced erectile dysfunction 05/23/2022     Priority: Medium     Nocturia 05/23/2022     Priority: Medium     Benign essential hypertension 05/23/2022     Priority: Medium     Microalbuminuria due to type 2 diabetes mellitus (H) 05/23/2022     Priority: Medium     Chronic kidney disease, stage 2 (mild) 01/17/2022     Priority: Medium     Screening for prostate cancer 12/18/2017     Priority: Medium     Comprehensive Medical Examination 09/27/2016     Priority: Medium     Eczema 09/27/2016     Priority: Medium     Type 2 diabetes mellitus without complication (H) 10/01/2014     Priority: Medium     Vitamin D deficiency 10/01/2014     Priority: Medium     H/O adenomatous colonic polyps 07/19/2013     Priority: Medium     Dyslipidemia 07/17/2013     Priority: Medium     Seasonal allergic rhinitis 07/17/2013     Priority: Medium     Gilbert syndrome 03/02/2000     Priority: Medium     Obesity 03/02/2000     Priority: Medium       Current Outpatient Medications   Medication     amLODIPine (NORVASC) 10 MG tablet     blood glucose (ACCU-CHEK GUIDE) test strip     blood glucose monitoring (ACCU-CHEK FASTCLIX) lancets     blood glucose monitoring (NO BRAND SPECIFIED) meter device kit     dapagliflozin (FARXIGA) 5 MG TABS tablet     doxazosin (CARDURA) 1 MG tablet     losartan (COZAAR) 100 MG tablet     metFORMIN (GLUCOPHAGE XR) 500 MG 24 hr tablet     simvastatin (ZOCOR) 20 MG tablet     No current facility-administered medications for this visit.       Pertinent PMHx of elevated liver transaminases, HTN, DM II, CKD stage 2.    STOP-BANG score of 4, with unknown neck circumference.  [place] score of 2.  BMI of Estimated body mass  "index is 33.59 kg/m  as calculated from the following:    Height as of 9/19/22: 1.664 m (5' 5.5\").    Weight as of 11/28/22: 93 kg (205 lb).     Per questionnaire: \"High BP\"    Caffeine use:  Yes for 3+ per day.  Yes for within 6 hours of bed.    Tobacco use: Yes    Sleep pattern:  Workdays.  10pm - 5:45am, total sleep time 4-6 hours.  Weekends.  10pm to 6-7am, total sleep time 4-6 hours.  Time to fall asleep: ~15-30 minutes.  Awakenings: 1-2 times per night, ? minutes to return to sleep, awake for total of ? hours per night.  Napping.  0-1 days per week, ? hours per nap.    No for RLS screen.  No for sleep walking.  No for dream enactment behavior.  No for bruxism.    Unknown for morning headaches.  Yes for snoring.  No for observed apnea.  No for FHx of JENNIFER.    SHx:  , lives with spouse.  Currently working.    A/P:    1.)  High likelihood of JENNIFER with STOP-BANG score of 4.   - Would appear to be candidate for either home sleep testing or in-lab PSG.    ---  This note was written with the assistance of the Dragon voice-dictation technology software. The final document, although reviewed, may contain errors. For corrections, please contact the office.    Jose Rae MD    Sleep Medicine    [place], [place]  o Main Office: [phone]    [facility] [place], [place]  o [address], [address]  o Schedule visits: [phone]  o Main Office: [phone]  o Fax: [phone]    "

## 2022-12-14 NOTE — PROGRESS NOTES
SLEEP HISTORY QUESTIONNAIRE    Please describe the main reason for your sleep appointment? High BP    How long has this been a problem? Several months    Have you been diagnosed with a sleep problem in the past? NO    If so, what? n/a    What treatment was recommended? n/a    Have you had a sleep study in the past? NO    If yes, where and when? n/a    Sleep Habits:   Do you read in bed? Yes  Do you eat in bed? No  Do you watch TV in bed? Yes  Do you work in bed? No  Do you use a phone or computer in bed? No    Is you sleep disturbed by:   Bed partner: Yes  Children: No  Noise: No   Pets: Yes  Other: no      On two or more nights per week, do you drink alcohol to help you fall asleep?NO    On two or more nights per week, do you take melatonin to help you fall asleep? NO    On two or more nights per week, do you take over the counter medicine to fall asleep?  NO    Do you take drinks with caffeine (coffee, tea, soda, energy drinks)? YES    Do you have 3 or more caffeine drinks in a day? YES    Do you have caffeine drinks within 6 hours of bedtime? YES    Do you smoke or use tobacco? YES    Do you exercise? YES    Sleep Routine:   Using a 24 Hour Clock    What time do you usually get into bed on workdays? 10pm    Weekend/non work days? 10pm    What time do you get out of bed on workdays? 545am      Weekend/non work days?6-7am    Do you work the evening or night shift or do your shifts rotate? NO    How long does it usually take to fall to sleep? 15-30min    How many times do you wake during the night? 1-2    How much time do you feel that you are awake during the entire night? varies    How long does it take for you to fall back to sleep after you wake up? varies    Why do you think you wake up? Use bathroom, don't know sometimes    What do you do when you wake up? Go to bathroom    How much sleep do you think you get on work nights? 4-6    How much sleep do you think you get on weekends/non work days? 4-6    How much  sleep do you think you need to feel your best? More than I get    How many days during a week do you take a nap on average? rarely    What is the average length of your naps? ?    Do you feel better after taking a nap? NO    If you could chose the best sleep schedule for you, what time would you go to bed? 10pm  What time would you get up? 6am    Do you read in bed? YES    Do you eat in bed? NO    Do you watch TV in bed? YES    Do you do work in bed? NO    Do you use a computer or phone in bed? NO    Sleep Disruptions?   Leg movements:  Do you ever have restless, crawling, aching or other unusual feelings in your legs? NO    Do you ever wake yourself by kicking your legs during the night? NO    Are the sheets and blankets messed up or tossed about when you get up? YES    Night-time behaviors:   Do you have nightmares or night terrors? NO   How often? n/a    Have you had times when you were sleep walking? NO    Have you been seen doing anything unusual while you sleep at nights? NO  What? n/a  How often? n/a    Have you ever hurt yourself or someone else while you were sleeping? NO  Please describe: n/a    Do you clench or grind your teeth during the night? no    Sleep Apnea (pauses in breathing during sleep):  Do you wake with a headache in the morning? ?  How often? ?    Does your bed partner, family or friends ever say that you snore? YES  How many nights per week do you snore? Mild-loud, rare  Can snoring be heard outside the bedroom? no    Do you ever wake yourself up from snoring, gasping or choking? NO    Have you ever been told that you stop breathing or have pauses in your breathing? NO    Do you wake in the morning with a dry throat or mouth? NO    Do you have trouble breathing through your nose? NO    Do you have problems with heartburn, reflux or a hiatal hernia? NO    Which positions do you usually sleep in? (stomach, back, sides, all) stomach, sides    Do you use oxygen or any other medical equipment  when you sleep? NO    Do members of your family (related by blood) snore? YES    Have any members of your family been diagnosed with with sleep apnea? NO    Do other members of your family have restless leg? NO    Do other members of your family have sleep walking? NO    Have you ever had an accident, or near accident due to sleepiness while driving? NO    Does your sleepiness affect your work on the job or at school? NO    Do you ever fall asleep by accident while doing a task? NO    Have you had sudden muscle weakness when laughing, angry or surprised? NO    Have you ever been unable to move your body when falling asleep or waking up? NO    Do you ever have trouble  your dreams from real life events? NO  Please describe: n/a    Physical Health: (including illness and injury): During the past 30 days, on how many days was your physical health not good? 3-5, covid/30 days     Mental Health: (including stress, depression, and problems with emotions): During the last 30 days, how may days was your mental health not good? 0/30 days.     During the past 30 days, on how many days did poor physical or mental health keep you from doing your usual activities? This might be self-care, work, or play? 3-5, covid/30 days.     Social History:   Marital status:     Who lives in your home with you? spouse    Mother (alive or dead)? dead If has , from what? ?  Father (alive or dead)? dead If has , from what? ?    Siblings: YES  Have any ? NO  If so, from what? n/a    Currently working? YES  If yes, work: same place 21 years  Former jobs: n/a     Sleepiness Scale:   Sitting and reading 0   Watching TV 1   Sitting in a public place 0   Riding in a car 1   Lying down to rest in the afternoon 0   Sitting and talking to someone 0   Sitting quietly after a lunch without alcohol 0   In a car, stopping for a few minutes in traffic 0       Surgical History:   Past Surgical History:   Procedure Laterality Date      COLONOSCOPY  8/5/2013    Procedure: COLONOSCOPY;  colonoscopy ;  Surgeon: Yobani Ventura MD;  Location: [facility] OR     COLONOSCOPY N/A 3/12/2018    Procedure: COLONOSCOPY;  COLONOSCOPY with polypectomy;  Surgeon: Donovan Mayfield MD;  Location: [facility] OR     colonoscopy with polypectomy  1/27/2011    repeat 2 years     mandibular fracture repair       vasectomy       wisdom teeth extraction         Medical Conditions:   Past Medical History:   Diagnosis Date     Alcoholism 1/1/2011     Allergic rhinitis, cause unspecified 3/2/2000     Anxiety  1/1/2011     Colonic Polyps 3/2/2000     Depression 1/1/2011     Gilbert's Syndrome 3/2/2000     Hypercholesterolemia 3/2/2000     Overweight(278.02) 3/2/2000     Prediabetes 3/2/2000       Medications:   Current Outpatient Medications   Medication Sig     amLODIPine (NORVASC) 10 MG tablet TAKE 1 TABLET (10 MG) BY MOUTH DAILY     blood glucose (ACCU-CHEK GUIDE) test strip Use to test blood sugar 2 times daily.     blood glucose monitoring (ACCU-CHEK FASTCLIX) lancets Use to test blood sugar 2 times daily.     blood glucose monitoring (NO BRAND SPECIFIED) meter device kit Use to test blood sugar 2 times daily or as directed.     dapagliflozin (FARXIGA) 5 MG TABS tablet Take 1 tablet (5 mg) by mouth every morning     doxazosin (CARDURA) 1 MG tablet Take 1 tablet (1 mg) by mouth daily for 7 days, THEN 2 tablets (2 mg) daily for 7 days, THEN 3 tablets (3 mg) daily for 7 days, THEN 4 tablets (4 mg) daily for 30 days.     losartan (COZAAR) 100 MG tablet Take 1 tablet (100 mg) by mouth daily     metFORMIN (GLUCOPHAGE XR) 500 MG 24 hr tablet Take 2 tablets (1,000 mg) by mouth daily (with dinner)     simvastatin (ZOCOR) 20 MG tablet TAKE 1 TABLET BY MOUTH EVERY EVENING *NEEDS TO BE SEEN FOR FUTURE FILLS*     No current facility-administered medications for this visit.       Are you currently having any of the following symptoms?   General:   Obvious weight gain or loss NO  Fever,  chills or sweats NO  Drug allergies: no    Eyes:   Changes in vision NO  Blind spots NO  Double vision NO  Other no    Ear, Nose and Throat:   Ear pain NO  Sore throat NO  Sinus pain NO  Post-nasal drip NO  Runny nose YES  Bloody nose NO    Heart:   Rapid or irregular heart beat NO  Chest pain or pressure NO  Out of breath when lying down NO  Swelling in feet or legs NO  High blood pressure YES  Heart disease NO    Nervous system   Headaches NO  Weakness in arms or legs NO  Numbness in arms of legs NO  Other: no    Skin  Rashes NO  New moles or skin changes NO  Other no    Lungs  Shortness of breath at rest NO  Shortness of breath with activity NO  Dry cough YES  Coughing up mucous or phlegm YES  Coughing up blood NO  Wheezing when breathing NO    Lymph System  Swollen lymph nodes NO  New lumps or bumps NO  Changes in breasts or discharge NO    Digestive System   Nausea or vomiting NO  Loose or watery stools YES  Hard, dry stools (constipation) NO  Fat or grease in stools NO  Blood in stools NO  Stools are black or bloody NO  Abdominal (belly) pain NO    Urinary Tract   Pain when you urinate (pee) NO  Blood in your urine NO  Urinate (pee) more than normal NO  Irregular periods NO    Muscles and bones   Muscle pain NO  Joint or bone pain NO  Swollen joints NO  Other no    Glands  Increased thirst or urination NO  Diabetes YES  Morning glucose: ?  Afternoon glucose: ?    Mental Health  Depression NO  Anxiety NO  Other mental health issues: no

## 2022-12-16 DIAGNOSIS — I10 BENIGN ESSENTIAL HYPERTENSION: ICD-10-CM

## 2022-12-16 DIAGNOSIS — E11.29 MICROALBUMINURIA DUE TO TYPE 2 DIABETES MELLITUS (H): ICD-10-CM

## 2022-12-16 DIAGNOSIS — N18.2 CHRONIC KIDNEY DISEASE, STAGE 2 (MILD): ICD-10-CM

## 2022-12-16 DIAGNOSIS — R80.9 MICROALBUMINURIA DUE TO TYPE 2 DIABETES MELLITUS (H): ICD-10-CM

## 2022-12-16 RX ORDER — LOSARTAN POTASSIUM 100 MG/1
100 TABLET ORAL DAILY
Qty: 30 TABLET | Refills: 3 | Status: SHIPPED | OUTPATIENT
Start: 2022-12-16 | End: 2023-03-08

## 2022-12-16 NOTE — TELEPHONE ENCOUNTER
Cozaar       Last Written Prescription Date:  8/29/22  Last Fill Quantity: 30,   # refills: 3  Last Office Visit: 11/30/22  Future Office visit:    Next 5 appointments (look out 90 days)    Jan 03, 2023  4:15 PM  (Arrive by 4:00 PM)  SHORT with Colin Fisher MD  [facility] [place] ([facility] [place] ) [address]  [place] [address]  [phone]

## 2022-12-18 DIAGNOSIS — E11.9 TYPE 2 DIABETES MELLITUS WITHOUT COMPLICATION, WITHOUT LONG-TERM CURRENT USE OF INSULIN (H): ICD-10-CM

## 2022-12-19 RX ORDER — DAPAGLIFLOZIN 5 MG/1
TABLET, FILM COATED ORAL
Qty: 90 TABLET | Refills: 0 | Status: SHIPPED | OUTPATIENT
Start: 2022-12-19 | End: 2023-03-08

## 2022-12-21 DIAGNOSIS — G47.33 OSA (OBSTRUCTIVE SLEEP APNEA): Primary | ICD-10-CM

## 2022-12-26 DIAGNOSIS — I10 BENIGN ESSENTIAL HYPERTENSION: ICD-10-CM

## 2022-12-28 ENCOUNTER — VIRTUAL VISIT (OUTPATIENT)
Dept: SLEEP MEDICINE | Facility: HOSPITAL | Age: 62
End: 2022-12-28
Attending: FAMILY MEDICINE
Payer: COMMERCIAL

## 2022-12-28 DIAGNOSIS — I10 BENIGN ESSENTIAL HYPERTENSION: Primary | ICD-10-CM

## 2022-12-28 DIAGNOSIS — G47.33 OSA (OBSTRUCTIVE SLEEP APNEA): ICD-10-CM

## 2022-12-28 RX ORDER — DOXAZOSIN 1 MG/1
4 TABLET ORAL DAILY
Qty: 120 TABLET | Refills: 0 | OUTPATIENT
Start: 2022-12-28

## 2022-12-28 RX ORDER — DOXAZOSIN 4 MG/1
4 TABLET ORAL AT BEDTIME
Qty: 90 TABLET | Refills: 1 | Status: SHIPPED | OUTPATIENT
Start: 2022-12-28 | End: 2023-02-15

## 2022-12-28 NOTE — TELEPHONE ENCOUNTER
Cardura      Last Written Prescription Date:  11.30.22  Last Fill Quantity: #70,   # refills: 0  Last Office Visit: 11.28.22  Future Office visit:    Next 5 appointments (look out 90 days)    Jan 03, 2023  4:15 PM  (Arrive by 4:00 PM)  SHORT with Colin Fisher MD  [facility] [place] ([facility] [place] ) [address]  [place] [address]  [phone]           Routing refill request to provider for review/approval because:

## 2022-12-29 NOTE — PROGRESS NOTES
Patient was provided both verbal and written education and instructions on use of Watch PAT device. Watch PAT device has been registered and shipped via [employer] on 12/29/2022. Patient was notified that package was mailed out. Watch PAT serial number: [id number]    Tracking #[id number].

## 2023-01-01 ENCOUNTER — TRANSFERRED RECORDS (OUTPATIENT)
Dept: MULTI SPECIALTY CLINIC | Facility: CLINIC | Age: 63
End: 2023-01-01

## 2023-01-01 LAB — RETINOPATHY: NORMAL

## 2023-01-03 ENCOUNTER — OFFICE VISIT (OUTPATIENT)
Dept: FAMILY MEDICINE | Facility: OTHER | Age: 63
End: 2023-01-03
Attending: FAMILY MEDICINE
Payer: COMMERCIAL

## 2023-01-03 VITALS
BODY MASS INDEX: 33.59 KG/M2 | HEART RATE: 76 BPM | RESPIRATION RATE: 18 BRPM | WEIGHT: 205 LBS | TEMPERATURE: 98.1 F | DIASTOLIC BLOOD PRESSURE: 86 MMHG | OXYGEN SATURATION: 98 % | SYSTOLIC BLOOD PRESSURE: 151 MMHG

## 2023-01-03 DIAGNOSIS — R09.81 NASAL CONGESTION WITH RHINORRHEA: ICD-10-CM

## 2023-01-03 DIAGNOSIS — Z23 NEED FOR PROPHYLACTIC VACCINATION AND INOCULATION AGAINST INFLUENZA: Primary | ICD-10-CM

## 2023-01-03 DIAGNOSIS — J34.89 NASAL CONGESTION WITH RHINORRHEA: ICD-10-CM

## 2023-01-03 DIAGNOSIS — R09.82 POST-NASAL DRIP: ICD-10-CM

## 2023-01-03 DIAGNOSIS — I10 BENIGN ESSENTIAL HYPERTENSION: ICD-10-CM

## 2023-01-03 PROCEDURE — 99213 OFFICE O/P EST LOW 20 MIN: CPT | Mod: 25 | Performed by: FAMILY MEDICINE

## 2023-01-03 PROCEDURE — 90471 IMMUNIZATION ADMIN: CPT | Performed by: FAMILY MEDICINE

## 2023-01-03 PROCEDURE — 90682 RIV4 VACC RECOMBINANT DNA IM: CPT | Performed by: FAMILY MEDICINE

## 2023-01-03 RX ORDER — MONTELUKAST SODIUM 10 MG/1
10 TABLET ORAL AT BEDTIME
Qty: 90 TABLET | Refills: 3 | Status: SHIPPED | OUTPATIENT
Start: 2023-01-03 | End: 2023-12-27

## 2023-01-03 RX ORDER — FEXOFENADINE HCL 180 MG/1
180 TABLET ORAL DAILY
COMMUNITY

## 2023-01-03 ASSESSMENT — PAIN SCALES - GENERAL: PAINLEVEL: NO PAIN (0)

## 2023-01-03 NOTE — PROGRESS NOTES
Assessment & Plan     Need for prophylactic vaccination and inoculation against influenza  Shot given   - INFLUENZA QUAD, RECOMBINANT, P-FREE (RIV4) (FLUBLOK)    Benign essential hypertension  Still hard to control BUT pt not checking it other than during office visits most times  Has sleep study this week. NO change in meds. See what sleep study shows.  Continue current medications and behavioral changes. Follow-up in 4-8 weeks. See if needs CPAP which I think clinically he dose.     Post-nasal drip  Will treat with below. Does not want nasal steroids   - montelukast (SINGULAIR) 10 MG tablet; Take 1 tablet (10 mg) by mouth At Bedtime    Nasal congestion with rhinorrhea  As above. Follow-up in 4-8 weeks   - montelukast (SINGULAIR) 10 MG tablet; Take 1 tablet (10 mg) by mouth At Bedtime                 No follow-ups on file.    oClin Fisher MD  [facility] JESSICA Larson is a 62 year old, presenting for the following health issues:  Recheck Medication      HPI     Hypertension Follow-up      Do you check your blood pressure regularly outside of the clinic? No     Are you following a low salt diet? No    Are your blood pressures ever more than 140 on the top number (systolic) OR more   than 90 on the bottom number (diastolic), for example 140/90? Yes    ]  CONTINUED  Ongoing cough and congestion  PND and nasal congestion   No sinus pain - or - pressure  No Fever or chills  Had for years on and off -- on allegra  Remote trial of flonase/nasacort- not help     Not much change in BP with new med BUT not checking much though    Has to sleep study test tonight and follow-up next week   No side effects from Cardura         Review of Systems   Constitutional, HEENT, cardiovascular, pulmonary, gi and gu systems are negative, except as otherwise noted.      Objective    BP (!) 151/86 (BP Location: Left arm, Patient Position: Sitting, Cuff Size: Adult Large)   Pulse 76   Temp 98.1  F (36.7   C) (Tympanic)   Resp 18   Wt 93 kg (205 lb)   SpO2 98%   BMI 33.59 kg/m    Body mass index is 33.59 kg/m .  Physical Exam   GENERAL: healthy, alert and no distress  NECK: no adenopathy, no asymmetry, masses, or scars and thyroid normal to palpation  RESP: lungs clear to auscultation - no rales, rhonchi or wheezes  CV: regular rate and rhythm, normal S1 S2, no S3 or S4, no murmur, click or rub, no peripheral edema and peripheral pulses strong

## 2023-01-05 ENCOUNTER — DOCUMENTATION ONLY (OUTPATIENT)
Dept: SLEEP MEDICINE | Facility: HOSPITAL | Age: 63
End: 2023-01-05
Attending: FAMILY MEDICINE
Payer: COMMERCIAL

## 2023-01-05 DIAGNOSIS — G47.33 OSA (OBSTRUCTIVE SLEEP APNEA): Primary | ICD-10-CM

## 2023-01-05 DIAGNOSIS — G47.33 OSA (OBSTRUCTIVE SLEEP APNEA): ICD-10-CM

## 2023-01-05 PROCEDURE — 95800 SLP STDY UNATTENDED: CPT | Mod: 26

## 2023-01-05 PROCEDURE — 95800 SLP STDY UNATTENDED: CPT

## 2023-01-05 NOTE — PROGRESS NOTES
WatchPAT data has been received and has been scored using rule 1B, 4%. Patient to follow up with provider to determine appropriate therapy.    Pat AHI: 26.1    Ordering Provider: Dr Jose Rae      Sleep Technician/Technologist: Suzanne WALL

## 2023-01-06 NOTE — PROCEDURES
"WatchPAT - HOME SLEEP STUDY INTERPRETATION    Patient: Neo Cowan  MRN: [id number]  YOB: 1960  Study Date: 1/3/2023  Referring Provider: Colin Fisher  Ordering Provider: Jose Rae MD, MD    Chain of custody patient verification was not enabled.       Indications for Home Study: Neo Cowan is a 62 year old male with a history of elevated liver transaminases, HTN, DM II, CKD stage 2 who presents with symptoms suggestive of obstructive sleep apnea.    Estimated body mass index is 33.59 kg/m  as calculated from the following:    Height as of 22: 1.664 m (5' 5.5\").    Weight as of 1/3/23: 93 kg (205 lb).  [place] Sleepiness Scale:   STOP-BAN/8    Data: A full night home sleep study was performed recording the standard physiologic parameters including peripheral arterial tonometry (PAT), sound/snoring, body position,  movement, sound, and oxygen saturation by pulse oximetry. Pulse rate was estimated by oximetry recording. Sleep staging (wake, REM, light, and deep sleep) was derived from PAT signal.  This study was considered adequate based on > 4 hours of quality oximetry and respiratory recording. As specified by the AASM Manual for the Scoring of Sleep and Associated events, version 2.3, Rule VIII.D 1B, 4% oxygen desaturation scoring for hypopneas is used as a standard of care on all home sleep apnea testing.    Total Recording Time: 8 hrs, 23 min  Total Sleep Time: 6 hrs, 47 min  % of Sleep Time REM: 29.8%    Respiratory:  Snoring: Snoring was present.  Respiratory events: The PAT respiratory disturbance index [pRDI] was 27.4 events per hour.  The PAT apnea/hypopnea index [pAHI] was 26.1 events per hour.  CAMRON was 21.4 events per hour.  During REM sleep the pAHI was 54.3.  Sleep Associated Hypoxemia: sustained hypoxemia was not present. Mean oxygen saturation was 93%.  Minimum was 85%.  Time with saturation less than 88% was 2.8 minutes.    Heart Rate: By pulse " oximetry normal rate on average, but appeared quite irregular and could be suggestive of atrial fibrillation.     Position: Percent of time spent: supine - 8.8%, prone - 4.7%, on right - 62.7%, on left - 23.8%.  pAHI was 15.2 per hour supine, 53.7 per hour prone, 8.1 per hour on right side, and 73.6 per hour on left side.     Assessment:   - Moderate obstructive sleep apnea.  - Sleep associated hypoxemia was not present.  - If positional recording correct, unusual to see mild JENNIFER with AHI 8.1 in right position (62.7% of recording) and severe with AHI 73.6 in left position (23.8%)    Recommendations:  - Consider auto-CPAP at 5-15 cmH2O or polysomnography with full night PAP titration.  - Consider repeat baseline EKG given irregular rate on heart rate recording.  - Consider potential for pulmonary disease / cardiac disease given significant variation in severity based on left versus right sleep position.  - Suggest optimizing sleep hygiene and avoiding sleep deprivation.  - Weight management.    Diagnosis Code(s): Obstructive Sleep Apnea G47.33    Jose Rae MD, MD, January 6, 2023   Diplomate, American Board of Family Medicine, Sleep Medicine

## 2023-01-10 ENCOUNTER — VIRTUAL VISIT (OUTPATIENT)
Dept: PULMONOLOGY | Facility: OTHER | Age: 63
End: 2023-01-10
Attending: FAMILY MEDICINE
Payer: COMMERCIAL

## 2023-01-10 VITALS — WEIGHT: 200 LBS | BODY MASS INDEX: 32.14 KG/M2 | HEIGHT: 66 IN

## 2023-01-10 DIAGNOSIS — G47.33 OSA (OBSTRUCTIVE SLEEP APNEA): Primary | ICD-10-CM

## 2023-01-10 DIAGNOSIS — I10 BENIGN ESSENTIAL HYPERTENSION: ICD-10-CM

## 2023-01-10 DIAGNOSIS — E11.9 TYPE 2 DIABETES MELLITUS WITHOUT COMPLICATION, WITHOUT LONG-TERM CURRENT USE OF INSULIN (H): ICD-10-CM

## 2023-01-10 PROCEDURE — 99203 OFFICE O/P NEW LOW 30 MIN: CPT | Mod: 95 | Performed by: FAMILY MEDICINE

## 2023-01-10 ASSESSMENT — SLEEP AND FATIGUE QUESTIONNAIRES
HOW LIKELY ARE YOU TO NOD OFF OR FALL ASLEEP IN A CAR, WHILE STOPPED FOR A FEW MINUTES IN TRAFFIC: WOULD NEVER DOZE
HOW LIKELY ARE YOU TO NOD OFF OR FALL ASLEEP WHILE SITTING INACTIVE IN A PUBLIC PLACE: WOULD NEVER DOZE
HOW LIKELY ARE YOU TO NOD OFF OR FALL ASLEEP WHILE LYING DOWN TO REST IN THE AFTERNOON WHEN CIRCUMSTANCES PERMIT: MODERATE CHANCE OF DOZING
HOW LIKELY ARE YOU TO NOD OFF OR FALL ASLEEP WHEN YOU ARE A PASSENGER IN A CAR FOR AN HOUR WITHOUT A BREAK: WOULD NEVER DOZE
HOW LIKELY ARE YOU TO NOD OFF OR FALL ASLEEP WHILE SITTING AND READING: WOULD NEVER DOZE
HOW LIKELY ARE YOU TO NOD OFF OR FALL ASLEEP WHILE SITTING QUIETLY AFTER LUNCH WITHOUT ALCOHOL: WOULD NEVER DOZE
HOW LIKELY ARE YOU TO NOD OFF OR FALL ASLEEP WHILE WATCHING TV: MODERATE CHANCE OF DOZING
HOW LIKELY ARE YOU TO NOD OFF OR FALL ASLEEP WHILE SITTING AND TALKING TO SOMEONE: WOULD NEVER DOZE

## 2023-01-10 ASSESSMENT — PAIN SCALES - GENERAL: PAINLEVEL: NO PAIN (0)

## 2023-01-10 NOTE — PROGRESS NOTES
"Neo is a 62 year old who is being evaluated via a billable video visit.      How would you like to obtain your AVS? MyChart  If the video visit is dropped, the invitation should be resent by: Send to e-mail at: [email or web address]  Will anyone else be joining your video visit? No  {If patient encounters technical issues they should call [phone] :[id number]}  Flaco Reyes      Video-Visit Details    Type of service:  Video Visit     Originating Location (pt. Location): {video visit patient location:597169::\"Home\"}  {PROVIDER LOCATION On-site should be selected for visits conducted from your clinic location or adjoining [facility] hospital, academic office, or other nearby [facility] building. Off-site should be selected for all other provider locations, including home:[id number]}  Distant Location (provider location):  {virtual location provider:[id number]}  Platform used for Video Visit: {Virtual Visit Platforms:073476::\"[employer]\"}  "

## 2023-01-10 NOTE — PROGRESS NOTES
"Neo Cowan is a 62 year old male who is being evaluated via a billable video visit.       The patient has been notified of following:      \"This video visit will be conducted via a call between you and your physician/provider. We have found that certain health care needs can be provided without the need for an in-person physical exam.  This service lets us provide the care you need with a video conversation.  If a prescription is necessary we can send it directly to your pharmacy.  If lab work is needed we can place an order for that and you can then stop by our lab to have the test done at a later time.     Video visits are billed at different rates depending on your insurance coverage.  Please reach out to your insurance provider with any questions.     If during the course of the call the physician/provider feels a video visit is not appropriate, you will not be charged for this service.\"     Patient has given verbal consent for Video visit? Yes  How would you like to obtain your AVS? Mail a copy  If you are dropped from the video visit, the video invite should be resent to: Text to cell phone: -  Will anyone else be joining your video visit? No  If patient encounters technical issues they should call [phone]      Video-Visit Details     Type of service:  Video Visit     Start Time: 2:30pm  End Time: 2:55pm    Originating Location (pt. Location): Home     Distant Location (provider location):  Off-site, [facility] [place]       Platform used for Video Visit: [employer]    Virtual visit for review of WatchPAT home sleep testing.     A/P:     1.)  Moderate JENNIFER (pAHI 26.1) without sleep-associated hypoxemia  - Some concern for potential inaccurate recording or inaccurate position signal given unusual presentation of severe JENNIFER in left lateral (pAHI 73.6 with 23.8% of recording), mild in right lateral (pAHI 8.1 with 62.7% of recording) and mild to moderate in supine (pAHI 15.2 with 8.8% of " "recording).    No known pulmonary disease, cardiac disease.    Discussed consideration for repeat home sleep testing for verification, in-lab PSG.  He is concerned about ability to sleep for in-lab PSG and also hesitant to consider CPAP.    We agreed for repeat Noxturnal T3 HST.  If moderate JENNIFER, will likely consider oral appliance.  If seen to have repeat evidence of severe JENNIFER in left lateral, would recommend getting CXR to assess for parenchymal disease / diaphragm ankit-paralysis, echocardiogram.    SUBJECTIVE:  Neo Cowan is a 62 year old male.    62 year old yo male who is referred for concern for sleep-disordered breathing as part of management of HTN.    Pertinent PMHx of elevated liver transaminases, HTN, DM II, CKD stage 2.     STOP-BANG score of 4, with unknown neck circumference.  [place] score of 2.  BMI of Estimated body mass index is 33.59 kg/m  as calculated from the following:    Height as of 9/19/22: 1.664 m (5' 5.5\").    Weight as of 11/28/22: 93 kg (205 lb).      Today - We reviewed sleep testing results in detail..    Per questionnaire: \"High BP\"     Caffeine use:  Yes for 3+ per day.  Yes for within 6 hours of bed.     Tobacco use: Yes     Sleep pattern:  Workdays.  10pm - 5:45am, total sleep time 4-6 hours.  Weekends.  10pm to 6-7am, total sleep time 4-6 hours.  Time to fall asleep: ~15-30 minutes.  Awakenings: 1-2 times per night, ? minutes to return to sleep, awake for total of ? hours per night.  Napping.  0-1 days per week, ? hours per nap.     No for RLS screen.  No for sleep walking.  No for dream enactment behavior.  No for bruxism.     Unknown for morning headaches.  Yes for snoring.  No for observed apnea.  No for FHx of JENNIFER.     SHx:  , lives with spouse.  Currently working.    WatchPAT - HOME SLEEP STUDY INTERPRETATION     Patient: Neo Cowan  MRN: [id number]  YOB: 1960  Study Date: 1/3/2023  Referring Provider: Colin Fisher  Ordering Provider: " "Jose Rae MD, MD     Chain of custody patient verification was not enabled.       Indications for Home Study: Neo Cowan is a 62 year old male with a history of elevated liver transaminases, HTN, DM II, CKD stage 2 who presents with symptoms suggestive of obstructive sleep apnea.     Estimated body mass index is 33.59 kg/m  as calculated from the following:    Height as of 22: 1.664 m (5' 5.5\").    Weight as of 1/3/23: 93 kg (205 lb).  [place] Sleepiness Scale:   STOP-BAN/8     Data: A full night home sleep study was performed recording the standard physiologic parameters including peripheral arterial tonometry (PAT), sound/snoring, body position,  movement, sound, and oxygen saturation by pulse oximetry. Pulse rate was estimated by oximetry recording. Sleep staging (wake, REM, light, and deep sleep) was derived from PAT signal.  This study was considered adequate based on > 4 hours of quality oximetry and respiratory recording. As specified by the AASM Manual for the Scoring of Sleep and Associated events, version 2.3, Rule VIII.D 1B, 4% oxygen desaturation scoring for hypopneas is used as a standard of care on all home sleep apnea testing.     Total Recording Time: 8 hrs, 23 min  Total Sleep Time: 6 hrs, 47 min  % of Sleep Time REM: 29.8%     Respiratory:  Snoring: Snoring was present.  Respiratory events: The PAT respiratory disturbance index [pRDI] was 27.4 events per hour.  The PAT apnea/hypopnea index [pAHI] was 26.1 events per hour.  CAMRON was 21.4 events per hour.  During REM sleep the pAHI was 54.3.  Sleep Associated Hypoxemia: sustained hypoxemia was not present. Mean oxygen saturation was 93%.  Minimum was 85%.  Time with saturation less than 88% was 2.8 minutes.     Heart Rate: By pulse oximetry normal rate on average, but appeared quite irregular and could be suggestive of atrial fibrillation.      Position: Percent of time spent: supine - 8.8%, prone - 4.7%, on right - " 62.7%, on left - 23.8%.  pAHI was 15.2 per hour supine, 53.7 per hour prone, 8.1 per hour on right side, and 73.6 per hour on left side.      Assessment:   - Moderate obstructive sleep apnea.  - Sleep associated hypoxemia was not present.  - If positional recording correct, unusual to see mild JENNIFER with AHI 8.1 in right position (62.7% of recording) and severe with AHI 73.6 in left position (23.8%)     Recommendations:  - Consider auto-CPAP at 5-15 cmH2O or polysomnography with full night PAP titration.  - Consider repeat baseline EKG given irregular rate on heart rate recording.  - Consider potential for pulmonary disease / cardiac disease given significant variation in severity based on left versus right sleep position.  - Suggest optimizing sleep hygiene and avoiding sleep deprivation.  - Weight management.     Diagnosis Code(s): Obstructive Sleep Apnea G47.33     Jose Rae MD, MD, January 6, 2023   Diplomate, American Board of Family Medicine, Sleep Medicine    Past medical history:    Patient Active Problem List    Diagnosis Date Noted     Elevated liver function tests 05/23/2022     Priority: Medium     Drug-induced erectile dysfunction 05/23/2022     Priority: Medium     Nocturia 05/23/2022     Priority: Medium     Benign essential hypertension 05/23/2022     Priority: Medium     Microalbuminuria due to type 2 diabetes mellitus (H) 05/23/2022     Priority: Medium     Chronic kidney disease, stage 2 (mild) 01/17/2022     Priority: Medium     Screening for prostate cancer 12/18/2017     Priority: Medium     Comprehensive Medical Examination 09/27/2016     Priority: Medium     Eczema 09/27/2016     Priority: Medium     Type 2 diabetes mellitus without complication (H) 10/01/2014     Priority: Medium     Vitamin D deficiency 10/01/2014     Priority: Medium     H/O adenomatous colonic polyps 07/19/2013     Priority: Medium     Dyslipidemia 07/17/2013     Priority: Medium     Seasonal allergic rhinitis  07/17/2013     Priority: Medium     Gilbert syndrome 03/02/2000     Priority: Medium     Obesity 03/02/2000     Priority: Medium       10 point ROS of systems including Constitutional, Eyes, Respiratory, Cardiovascular, Gastroenterology, Genitourinary, Integumentary, Muscularskeletal, Psychiatric were all negative except for pertinent positives noted in my HPI.    Current Outpatient Medications   Medication Sig Dispense Refill     amLODIPine (NORVASC) 10 MG tablet TAKE 1 TABLET (10 MG) BY MOUTH DAILY 30 tablet 1     blood glucose (ACCU-CHEK GUIDE) test strip Use to test blood sugar 2 times daily. 100 each 3     blood glucose monitoring (ACCU-CHEK FASTCLIX) lancets Use to test blood sugar 2 times daily. 100 each 3     blood glucose monitoring (NO BRAND SPECIFIED) meter device kit Use to test blood sugar 2 times daily or as directed. 1 kit 1     doxazosin (CARDURA) 4 MG tablet Take 1 tablet (4 mg) by mouth At Bedtime 90 tablet 1     FARXIGA 5 MG TABS tablet TAKE 1 TABLET (5 MG) BY MOUTH EVERY MORNING 90 tablet 0     fexofenadine (ALLEGRA) 180 MG tablet Take 180 mg by mouth daily       losartan (COZAAR) 100 MG tablet TAKE 1 TABLET (100 MG) BY MOUTH DAILY 30 tablet 3     metFORMIN (GLUCOPHAGE XR) 500 MG 24 hr tablet Take 2 tablets (1,000 mg) by mouth daily (with dinner) 120 tablet 4     montelukast (SINGULAIR) 10 MG tablet Take 1 tablet (10 mg) by mouth At Bedtime 90 tablet 3     simvastatin (ZOCOR) 20 MG tablet TAKE 1 TABLET BY MOUTH EVERY EVENING *NEEDS TO BE SEEN FOR FUTURE FILLS* 90 tablet 3       OBJECTIVE:  There were no vitals taken for this visit.    Physical Exam     ---  This note was written with the assistance of the Dragon voice-dictation technology software. The final document, although reviewed, may contain errors. For corrections, please contact the office.    Jose Rae MD    Sleep Medicine    [facility]  - [facility] Main Office: [phone]    [place]  [facility] - [facility], [facility], [facility] - [facility]  o [address], [place], [address]  o Schedule visits: [phone]  o Main Office: [phone]  o Fax: [phone]    Time spent on the date of service:  30 minutes.

## 2023-01-15 DIAGNOSIS — I10 BENIGN ESSENTIAL HYPERTENSION: ICD-10-CM

## 2023-01-16 RX ORDER — AMLODIPINE BESYLATE 10 MG/1
10 TABLET ORAL DAILY
Qty: 30 TABLET | Refills: 1 | Status: SHIPPED | OUTPATIENT
Start: 2023-01-16 | End: 2023-03-08

## 2023-02-01 ENCOUNTER — OFFICE VISIT (OUTPATIENT)
Dept: SLEEP MEDICINE | Facility: HOSPITAL | Age: 63
End: 2023-02-01
Attending: FAMILY MEDICINE
Payer: COMMERCIAL

## 2023-02-01 PROCEDURE — G0399 HOME SLEEP TEST/TYPE 3 PORTA: HCPCS | Mod: 26 | Performed by: FAMILY MEDICINE

## 2023-02-02 ENCOUNTER — DOCUMENTATION ONLY (OUTPATIENT)
Dept: SLEEP MEDICINE | Facility: HOSPITAL | Age: 63
End: 2023-02-02
Attending: FAMILY MEDICINE
Payer: COMMERCIAL

## 2023-02-02 PROCEDURE — G0399 HOME SLEEP TEST/TYPE 3 PORTA: HCPCS

## 2023-02-03 NOTE — PROGRESS NOTES
This HSAT was performed using a Noxturnal T3 device which recorded snore, sound, movement activity, body position, nasal pressure, oronasal thermal airflow, pulse, oximetry and both chest and abdominal respiratory effort. HSAT data was restricted to the time patient states they were in bed.     HSAT was scored using 1B 4% hypopnea rule.     HST AHI (Non-PAT): 17.9     Snoring was reported as loud.  Time with SpO2 below 89% was 76.2 minutes.   Overall signal quality was good     Pt will follow up with sleep provider to determine appropriate therapy.

## 2023-02-04 NOTE — PROCEDURES
"HOME SLEEP STUDY INTERPRETATION        Patient: Neo Cowan  MRN: [id number]  YOB: 1960  Study Date: 2/1/2023  PCP/Referring Provider: Colin Fisher  Ordering Provider: Jose Rae MD, MD         Indications for Home Study: Neo Cowan is a 62 year old male with a history of HTN who presents with desire to verify prior HST results given unusual appearance of severe JENNIFER specifically in left lateral position, overall in moderate range.    Estimated body mass index is 32.28 kg/m  as calculated from the following:    Height as of 1/10/23: 1.676 m (5' 6\").    Weight as of 1/10/23: 90.7 kg (200 lb).  Total score - [place]: 4 (1/10/2023  2:06 PM)    Data: A full night home sleep study was performed recording the standard physiologic parameters including body position, movement, sound, nasal pressure, thermal oral airflow, chest and abdominal movements with respiratory inductance plethysmography, and oxygen saturation by pulse oximetry. Pulse rate was estimated by oximetry recording. This study was considered adequate based on > 4 hours of quality oximetry and respiratory recording. As specified by the AASM Manual for the Scoring of Sleep and Associated events, version 2.3, Rule VIII.D 1B, 4% oxygen desaturation scoring for hypopneas is used as a standard of care on all home sleep apnea testing.        Analysis Time:  463.4 minutes        Respiration:   Sleep Associated Hypoxemia: some concern for potential artifact in oximetry.  Sustained hypoxemia was present. Average oxygen saturation was 91.4%.  Time with saturation less than or equal to 88% was 57.5 minutes. The lowest oxygen saturation was 73%.   Snoring: Snoring was present.  Respiratory events: The home study revealed a presence of 39 obstructive apneas and 3 mixed and central apneas. There were 90 hypopneas resulting in a combined apnea/hypopnea index [AHI] of 17.9 events per hour.  AHI was 73.2 per hour supine, 18.9 per hour " prone, 21.5 per hour on left side, and 14.2 per hour on right side.   Pattern: Excluding events noted above, respiratory rate and pattern was Normal.      Position: Percent of time spent: supine - 2.5%, prone - 6.9%, on left - 24.1%, on right - 62.1%.      Heart Rate: By pulse oximetry normal rate was noted.       Assessment:     Moderate obstructive sleep apnea.    Sleep associated hypoxemia was present, though with potential for artifact in oximetry and suspect is over-reported.    Recommendations:    Consider auto-CPAP at 5-15 cmH2O, oral appliance therapy or polysomnography with full night PAP titration.    Suggest optimizing sleep hygiene and avoiding sleep deprivation.    Weight management.        Diagnosis Code(s): Obstructive Sleep Apnea G47.33, Hypoxemia G47.36    Jose Rae MD, MD, February 3, 2023   Diplomate, American Board of Family Medicine, Sleep Medicine

## 2023-02-08 NOTE — PROGRESS NOTES
Neo Cowan is a 62 year old male who is being evaluated via a billable telephone visit.       What phone number would you like to be contacted at?@ [phone]  Home Phone [phone]   Work Phone Not on file.   Mobile [phone]       How would you like to obtain your AVS? [employer]       Telephone Virtual Visit Details     Type of service:  Telephone Virtual Visit     Originating Location (pt. Location): Home     Distant Location (provider location):  Off-site, [facility] [place]      Start Time: 4pm  End Time: 4:20pm    Virtual visit for review of repeat home sleep test results.     A/P:     1.)  Moderate JENNIFER without sleep-associated hypoxemia  - Some concern for potential inaccurate recording or inaccurate position signal given unusual presentation of severe JENNIFER in left lateral (pAHI 73.6 with 23.8% of recording), mild in right lateral (pAHI 8.1 with 62.7% of recording) and mild to moderate in supine (pAHI 15.2 with 8.8% of recording).    Repeat HST (Noxturnal T3 device) consistent with finding of moderate JENNIFER (AHI 17.9) and sleep-associated hypoxemia (though appears to be over-reported and largely representing artifact).  But, did not see unusual association with left lateral position and more common increased severity in supine sleep position.     No known pulmonary disease, cardiac disease.    Overall, I feel testing represents moderate JENNIFER without sleep-associated hypoxemia.  We discussed that this level of JENNIFER is not felt to have a significant increase in long-term cardiovascular risk factors, but could have benefit for HTN.    Given that he denies any daytime fatigue, we discussed starting treatment with weight loss goal of 15 lbs.    2.)  Sleep maintenance insomnia  - Predominantly early AM hours, strong suspicion for psychophysiological insomnia given improvement when sleeping away from home.  - Likely component of sleep expansion, plan for start of trial of delaying bed time  "by 30 minutes.  - If no significant improvement, will return to consideration of treating moderate JENNIFER.      SUBJECTIVE:  Neo Cowan is a 62 year old male.    Pertinent PMHx of elevated liver transaminases, HTN, DM II, CKD stage 2.    Prior Sleep Testin/3/2023 - WatchPAT HST with weight 205 lbs, BMI 33.6.  pAHI 26.1.  Mean oxygen saturation was 93%.  Minimum was 85%.  Time with saturation less than 88% was 2.8 minutes.  Percent of time spent: supine - 8.8%, prone - 4.7%, on right - 62.7%, on left - 23.8%.  pAHI was 15.2 per hour supine, 53.7 per hour prone, 8.1 per hour on right side, and 73.6 per hour on left side.       1/10/2023 - We reviewed sleep testing results in detail.  A/P to repeat HST given unusual finding of very severe JENNIFER / hypoxemia linked to left lateral position with no known pulmonary / cardiac disease.      Today - Reviewed sleep testing in detail.    HOME SLEEP STUDY INTERPRETATION     Patient: Neo Coawn  MRN: [id number]  YOB: 1960  Study Date: 2023  PCP/Referring Provider: Colin Fisher  Ordering Provider: Jose Rae MD, MD     Indications for Home Study: Neo Cowan is a 62 year old male with a history of HTN who presents with desire to verify prior HST results given unusual appearance of severe JENNIFER specifically in left lateral position, overall in moderate range.     Estimated body mass index is 32.28 kg/m  as calculated from the following:    Height as of 1/10/23: 1.676 m (5' 6\").    Weight as of 1/10/23: 90.7 kg (200 lb).  Total score - [place]: 4 (1/10/2023  2:06 PM)     Data: A full night home sleep study was performed recording the standard physiologic parameters including body position, movement, sound, nasal pressure, thermal oral airflow, chest and abdominal movements with respiratory inductance plethysmography, and oxygen saturation by pulse oximetry. Pulse rate was estimated by oximetry recording. This study was considered " adequate based on > 4 hours of quality oximetry and respiratory recording. As specified by the AASM Manual for the Scoring of Sleep and Associated events, version 2.3, Rule VIII.D 1B, 4% oxygen desaturation scoring for hypopneas is used as a standard of care on all home sleep apnea testing.     Analysis Time:  463.4 minutes     Respiration:   Sleep Associated Hypoxemia: some concern for potential artifact in oximetry.  Sustained hypoxemia was present. Average oxygen saturation was 91.4%.  Time with saturation less than or equal to 88% was 57.5 minutes. The lowest oxygen saturation was 73%.   Snoring: Snoring was present.  Respiratory events: The home study revealed a presence of 39 obstructive apneas and 3 mixed and central apneas. There were 90 hypopneas resulting in a combined apnea/hypopnea index [AHI] of 17.9 events per hour.  AHI was 73.2 per hour supine, 18.9 per hour prone, 21.5 per hour on left side, and 14.2 per hour on right side.   Pattern: Excluding events noted above, respiratory rate and pattern was Normal.     Position: Percent of time spent: supine - 2.5%, prone - 6.9%, on left - 24.1%, on right - 62.1%.     Heart Rate: By pulse oximetry normal rate was noted.      Assessment:     Moderate obstructive sleep apnea.    Sleep associated hypoxemia was present, though with potential for artifact in oximetry and suspect is over-reported.     Recommendations:    Consider auto-CPAP at 5-15 cmH2O, oral appliance therapy or polysomnography with full night PAP titration.    Suggest optimizing sleep hygiene and avoiding sleep deprivation.    Weight management.     Diagnosis Code(s): Obstructive Sleep Apnea G47.33, Hypoxemia G47.36     Jose Rae MD, MD, February 3, 2023   Diplomate, American Board of Family Medicine, Sleep Medicine      Past medical history:    Patient Active Problem List    Diagnosis Date Noted     Elevated liver function tests 05/23/2022     Priority: Medium     Drug-induced  erectile dysfunction 05/23/2022     Priority: Medium     Nocturia 05/23/2022     Priority: Medium     Benign essential hypertension 05/23/2022     Priority: Medium     Microalbuminuria due to type 2 diabetes mellitus (H) 05/23/2022     Priority: Medium     Chronic kidney disease, stage 2 (mild) 01/17/2022     Priority: Medium     Screening for prostate cancer 12/18/2017     Priority: Medium     Comprehensive Medical Examination 09/27/2016     Priority: Medium     Eczema 09/27/2016     Priority: Medium     Type 2 diabetes mellitus without complication (H) 10/01/2014     Priority: Medium     Vitamin D deficiency 10/01/2014     Priority: Medium     H/O adenomatous colonic polyps 07/19/2013     Priority: Medium     Dyslipidemia 07/17/2013     Priority: Medium     Seasonal allergic rhinitis 07/17/2013     Priority: Medium     Gilbert syndrome 03/02/2000     Priority: Medium     Obesity 03/02/2000     Priority: Medium       10 point ROS of systems including Constitutional, Eyes, Respiratory, Cardiovascular, Gastroenterology, Genitourinary, Integumentary, Muscularskeletal, Psychiatric were all negative except for pertinent positives noted in my HPI.    Current Outpatient Medications   Medication Sig Dispense Refill     amLODIPine (NORVASC) 10 MG tablet TAKE 1 TABLET (10 MG) BY MOUTH DAILY 30 tablet 1     blood glucose (ACCU-CHEK GUIDE) test strip Use to test blood sugar 2 times daily. 100 each 3     blood glucose monitoring (ACCU-CHEK FASTCLIX) lancets Use to test blood sugar 2 times daily. 100 each 3     blood glucose monitoring (NO BRAND SPECIFIED) meter device kit Use to test blood sugar 2 times daily or as directed. 1 kit 1     doxazosin (CARDURA) 4 MG tablet Take 1 tablet (4 mg) by mouth At Bedtime 90 tablet 1     FARXIGA 5 MG TABS tablet TAKE 1 TABLET (5 MG) BY MOUTH EVERY MORNING 90 tablet 0     fexofenadine (ALLEGRA) 180 MG tablet Take 180 mg by mouth daily       losartan (COZAAR) 100 MG tablet TAKE 1 TABLET (100  MG) BY MOUTH DAILY 30 tablet 3     metFORMIN (GLUCOPHAGE XR) 500 MG 24 hr tablet Take 2 tablets (1,000 mg) by mouth daily (with dinner) 120 tablet 4     montelukast (SINGULAIR) 10 MG tablet Take 1 tablet (10 mg) by mouth At Bedtime 90 tablet 3     simvastatin (ZOCOR) 20 MG tablet TAKE 1 TABLET BY MOUTH EVERY EVENING *NEEDS TO BE SEEN FOR FUTURE FILLS* 90 tablet 3       OBJECTIVE:  There were no vitals taken for this visit.    Physical Exam:  healthy, alert and no distress  PSYCH: Alert and oriented times 3; coherent speech, normal   rate and volume, able to articulate logical thoughts, able   to abstract reason, no tangential thoughts, no hallucinations   or delusions  His affect is normal  RESP: No cough, no audible wheezing, able to talk in full sentences  Remainder of exam unable to be completed due to telephone visits    ---  This note was written with the assistance of the Dragon voice-dictation technology software. The final document, although reviewed, may contain errors. For corrections, please contact the office.    Total time spent preparing to see the patient, review of chart, obtaining history and physical examination, review of sleep testing, review of treatment options, education, discussion with patient and documenting in Epic / EMR was 25 minutes.  All time involved was spent on the day of service for the patient (the same day as the patient's appointment).    Jose Rae MD    Sleep Medicine    [facility]  - [place], [place]  o Main Office: [phone]    [facility] [place], [place]  o [address], [address]  o Schedule visits: [phone]  o Main Office: [phone]  o Fax: [phone]

## 2023-02-09 ENCOUNTER — VIRTUAL VISIT (OUTPATIENT)
Dept: PULMONOLOGY | Facility: OTHER | Age: 63
End: 2023-02-09
Attending: FAMILY MEDICINE
Payer: COMMERCIAL

## 2023-02-09 VITALS — HEIGHT: 66 IN | BODY MASS INDEX: 30.53 KG/M2 | WEIGHT: 190 LBS

## 2023-02-09 DIAGNOSIS — G47.33 OSA (OBSTRUCTIVE SLEEP APNEA): ICD-10-CM

## 2023-02-09 PROCEDURE — 99213 OFFICE O/P EST LOW 20 MIN: CPT | Mod: 95 | Performed by: FAMILY MEDICINE

## 2023-02-09 ASSESSMENT — SLEEP AND FATIGUE QUESTIONNAIRES
HOW LIKELY ARE YOU TO NOD OFF OR FALL ASLEEP WHILE SITTING AND READING: WOULD NEVER DOZE
HOW LIKELY ARE YOU TO NOD OFF OR FALL ASLEEP WHILE LYING DOWN TO REST IN THE AFTERNOON WHEN CIRCUMSTANCES PERMIT: WOULD NEVER DOZE
HOW LIKELY ARE YOU TO NOD OFF OR FALL ASLEEP WHILE SITTING AND TALKING TO SOMEONE: WOULD NEVER DOZE
HOW LIKELY ARE YOU TO NOD OFF OR FALL ASLEEP IN A CAR, WHILE STOPPED FOR A FEW MINUTES IN TRAFFIC: WOULD NEVER DOZE
HOW LIKELY ARE YOU TO NOD OFF OR FALL ASLEEP WHEN YOU ARE A PASSENGER IN A CAR FOR AN HOUR WITHOUT A BREAK: WOULD NEVER DOZE
HOW LIKELY ARE YOU TO NOD OFF OR FALL ASLEEP WHILE SITTING QUIETLY AFTER LUNCH WITHOUT ALCOHOL: WOULD NEVER DOZE
HOW LIKELY ARE YOU TO NOD OFF OR FALL ASLEEP WHILE SITTING INACTIVE IN A PUBLIC PLACE: WOULD NEVER DOZE
HOW LIKELY ARE YOU TO NOD OFF OR FALL ASLEEP WHILE WATCHING TV: WOULD NEVER DOZE

## 2023-02-09 NOTE — NURSING NOTE
Is the patient currently in the state of [place]? YES    Visit mode:TELEPHONE    If the visit is dropped, the patient can be reconnected by: TELEPHONE VISIT: Phone number: [phone]    Will anyone else be joining the visit? NO      How would you like to obtain your AVS? MyChart    Are changes needed to the allergy or medication list? NO

## 2023-02-10 PROBLEM — G47.33 OSA (OBSTRUCTIVE SLEEP APNEA): Status: ACTIVE | Noted: 2023-02-10

## 2023-02-14 DIAGNOSIS — E11.9 TYPE 2 DIABETES MELLITUS WITHOUT COMPLICATION, WITHOUT LONG-TERM CURRENT USE OF INSULIN (H): ICD-10-CM

## 2023-02-14 NOTE — TELEPHONE ENCOUNTER
metformin      Last Written Prescription Date:  11/30/22  Last Fill Quantity: 120,   # refills: 4  Last Office Visit: 1/3/23  Future Office visit:    Next 5 appointments (look out 90 days)    Feb 15, 2023  4:15 PM  (Arrive by 4:00 PM)  SHORT with Colin Fisher MD  [facility] [place] ([facility] [place] ) [address]  [place] [address]  [phone]

## 2023-02-15 ENCOUNTER — LAB (OUTPATIENT)
Dept: LAB | Facility: OTHER | Age: 63
End: 2023-02-15
Attending: FAMILY MEDICINE
Payer: COMMERCIAL

## 2023-02-15 ENCOUNTER — OFFICE VISIT (OUTPATIENT)
Dept: FAMILY MEDICINE | Facility: OTHER | Age: 63
End: 2023-02-15
Attending: FAMILY MEDICINE
Payer: COMMERCIAL

## 2023-02-15 VITALS
SYSTOLIC BLOOD PRESSURE: 150 MMHG | WEIGHT: 209 LBS | DIASTOLIC BLOOD PRESSURE: 83 MMHG | HEART RATE: 72 BPM | RESPIRATION RATE: 20 BRPM | OXYGEN SATURATION: 99 % | BODY MASS INDEX: 33.73 KG/M2

## 2023-02-15 DIAGNOSIS — Z23 ENCOUNTER FOR IMMUNIZATION: ICD-10-CM

## 2023-02-15 DIAGNOSIS — I10 BENIGN ESSENTIAL HYPERTENSION: ICD-10-CM

## 2023-02-15 DIAGNOSIS — E11.9 TYPE 2 DIABETES MELLITUS WITHOUT COMPLICATION, WITHOUT LONG-TERM CURRENT USE OF INSULIN (H): ICD-10-CM

## 2023-02-15 DIAGNOSIS — N18.2 CHRONIC KIDNEY DISEASE, STAGE 2 (MILD): ICD-10-CM

## 2023-02-15 DIAGNOSIS — G47.33 OSA (OBSTRUCTIVE SLEEP APNEA): ICD-10-CM

## 2023-02-15 DIAGNOSIS — E11.9 TYPE 2 DIABETES MELLITUS WITHOUT COMPLICATION, WITHOUT LONG-TERM CURRENT USE OF INSULIN (H): Primary | ICD-10-CM

## 2023-02-15 LAB
ANION GAP SERPL CALCULATED.3IONS-SCNC: 12 MMOL/L (ref 7–15)
BUN SERPL-MCNC: 12.9 MG/DL (ref 8–23)
CALCIUM SERPL-MCNC: 9.9 MG/DL (ref 8.8–10.2)
CHLORIDE SERPL-SCNC: 100 MMOL/L (ref 98–107)
CREAT SERPL-MCNC: 1 MG/DL (ref 0.67–1.17)
DEPRECATED HCO3 PLAS-SCNC: 25 MMOL/L (ref 22–29)
EST. AVERAGE GLUCOSE BLD GHB EST-MCNC: 146 MG/DL
GFR SERPL CREATININE-BSD FRML MDRD: 85 ML/MIN/1.73M2
GLUCOSE SERPL-MCNC: 151 MG/DL (ref 70–99)
HBA1C MFR BLD: 6.7 %
POTASSIUM SERPL-SCNC: 3.5 MMOL/L (ref 3.4–5.3)
SODIUM SERPL-SCNC: 137 MMOL/L (ref 136–145)

## 2023-02-15 PROCEDURE — 36415 COLL VENOUS BLD VENIPUNCTURE: CPT

## 2023-02-15 PROCEDURE — 80048 BASIC METABOLIC PNL TOTAL CA: CPT

## 2023-02-15 PROCEDURE — 99214 OFFICE O/P EST MOD 30 MIN: CPT | Mod: 25 | Performed by: FAMILY MEDICINE

## 2023-02-15 PROCEDURE — 83036 HEMOGLOBIN GLYCOSYLATED A1C: CPT

## 2023-02-15 PROCEDURE — 0124A COVID-19 VACCINE BIVALENT BOOSTER 12+ (PFIZER): CPT | Performed by: FAMILY MEDICINE

## 2023-02-15 PROCEDURE — 91312 COVID-19 VACCINE BIVALENT BOOSTER 12+ (PFIZER): CPT | Performed by: FAMILY MEDICINE

## 2023-02-15 ASSESSMENT — PAIN SCALES - GENERAL: PAINLEVEL: NO PAIN (0)

## 2023-02-15 NOTE — PROGRESS NOTES
Assessment & Plan     Type 2 diabetes mellitus without complication, without long-term current use of insulin (H)  Good control in past. A1C pending. I assume still good control.  Follow-up in 4 months. Continue current medications and behavioral changes.   - Basic metabolic panel; Future  - Hemoglobin A1c; Future    Benign essential hypertension  Still slight high. Switch short acting Cardura to long acting then may need to titrate up to 8mg. Patient was instructed to check blood pressure 1-2 times per week until I see back in the Clinic. If blood pressure gettng too high or too low as discussed then need to see patient back sooner. Follow-up as above.   - Basic metabolic panel; Future  - Hemoglobin A1c; Future  - doxazosin ER (CARDURA XL) 4 MG 24 hr tablet; Take 1 tablet (4 mg) by mouth daily (with breakfast)    Chronic kidney disease, stage 2 (mild)  stable  - Basic metabolic panel; Future  - Hemoglobin A1c; Future    Encounter for immunization  covid shot given       JENNIFER - DR Rae's note reviewed pt to try to loose  Wt first.              No follow-ups on file.    Colin Fisher MD  [facility] - JESSICA Larson is a 62 year old, presenting for the following health issues:  Recheck Medication      HPI     Diabetes Follow-up    How often are you checking your blood sugar? A few times a month  What time of day are you checking your blood sugars (select all that apply)?  Before meals  Have you had any blood sugars above 200?  No  Have you had any blood sugars below 70?  No    What symptoms do you notice when your blood sugar is low?  None    What concerns do you have today about your diabetes? None     Do you have any of these symptoms? (Select all that apply)  No numbness or tingling in feet.  No redness, sores or blisters on feet.  No complaints of excessive thirst.  No reports of blurry vision.  No significant changes to weight.      BP Readings from Last 2 Encounters:    02/15/23 (!) 150/83   01/03/23 (!) 151/86     Hemoglobin A1C (%)   Date Value   08/29/2022 6.6 (H)   05/23/2022 6.8 (H)   08/17/2021 6.5 (A)   05/14/2021 6.3 (H)     LDL Cholesterol Calculated (mg/dL)   Date Value   05/23/2022 47   05/14/2021 78   01/13/2020 76         Hypertension Follow-up      Do you check your blood pressure regularly outside of the clinic? No     Are you following a low salt diet? No    Are your blood pressures ever more than 140 on the top number (systolic) OR more   than 90 on the bottom number (diastolic), for example 140/90? Yes    Chronic Kidney Disease Follow-up      Do you take any over the counter pain medicine?: No          Review of Systems   Constitutional, HEENT, cardiovascular, pulmonary, gi and gu systems are negative, except as otherwise noted.      Objective    BP (!) 150/83 (BP Location: Right arm, Patient Position: Sitting, Cuff Size: Adult Large)   Pulse 72   Resp 20   Wt 94.8 kg (209 lb)   SpO2 99%   BMI 33.73 kg/m    Body mass index is 33.73 kg/m .  Physical Exam   GENERAL: healthy, alert and no distress  NECK: no adenopathy, no asymmetry, masses, or scars and thyroid normal to palpation  RESP: lungs clear to auscultation - no rales, rhonchi or wheezes  CV: regular rate and rhythm, normal S1 S2, no S3 or S4, no murmur, click or rub, no peripheral edema and peripheral pulses strong        Results for orders placed or performed in visit on 02/15/23   Basic metabolic panel     Status: Abnormal   Result Value Ref Range    Sodium 137 136 - 145 mmol/L    Potassium 3.5 3.4 - 5.3 mmol/L    Chloride 100 98 - 107 mmol/L    Carbon Dioxide (CO2) 25 22 - 29 mmol/L    Anion Gap 12 7 - 15 mmol/L    Urea Nitrogen 12.9 8.0 - 23.0 mg/dL    Creatinine 1.00 0.67 - 1.17 mg/dL    Calcium 9.9 8.8 - 10.2 mg/dL    Glucose 151 (H) 70 - 99 mg/dL    GFR Estimate 85 >60 mL/min/1.73m2

## 2023-02-16 RX ORDER — METFORMIN HCL 500 MG
TABLET, EXTENDED RELEASE 24 HR ORAL
Qty: 120 TABLET | Refills: 5 | Status: SHIPPED | OUTPATIENT
Start: 2023-02-16 | End: 2024-01-08

## 2023-02-21 ENCOUNTER — MYC MEDICAL ADVICE (OUTPATIENT)
Dept: FAMILY MEDICINE | Facility: OTHER | Age: 63
End: 2023-02-21

## 2023-02-21 DIAGNOSIS — I10 BENIGN ESSENTIAL HYPERTENSION: Primary | ICD-10-CM

## 2023-02-21 RX ORDER — DOXAZOSIN 2 MG/1
6 TABLET ORAL AT BEDTIME
Qty: 270 TABLET | Refills: 1 | Status: SHIPPED | OUTPATIENT
Start: 2023-02-21 | End: 2023-09-26

## 2023-02-22 ENCOUNTER — TELEPHONE (OUTPATIENT)
Dept: FAMILY MEDICINE | Facility: OTHER | Age: 63
End: 2023-02-22

## 2023-02-22 NOTE — TELEPHONE ENCOUNTER
Received PA from Earnestine Escalona for Doxazosin Mesylate 2MG tablets. Submitted on CMM. Waiting for a response.

## 2023-02-24 NOTE — TELEPHONE ENCOUNTER
Received APPROVAL from [employer] for Doxazosin Mesylate 2MG tablets. Effective 01/23/2023 - 02/22/2024. Forms scanned to Epic.

## 2023-03-07 DIAGNOSIS — I10 BENIGN ESSENTIAL HYPERTENSION: ICD-10-CM

## 2023-03-08 DIAGNOSIS — N18.2 CHRONIC KIDNEY DISEASE, STAGE 2 (MILD): ICD-10-CM

## 2023-03-08 DIAGNOSIS — E11.9 TYPE 2 DIABETES MELLITUS WITHOUT COMPLICATION, WITHOUT LONG-TERM CURRENT USE OF INSULIN (H): ICD-10-CM

## 2023-03-08 DIAGNOSIS — E11.29 MICROALBUMINURIA DUE TO TYPE 2 DIABETES MELLITUS (H): ICD-10-CM

## 2023-03-08 DIAGNOSIS — R80.9 MICROALBUMINURIA DUE TO TYPE 2 DIABETES MELLITUS (H): ICD-10-CM

## 2023-03-08 DIAGNOSIS — I10 BENIGN ESSENTIAL HYPERTENSION: ICD-10-CM

## 2023-03-08 RX ORDER — DAPAGLIFLOZIN 5 MG/1
TABLET, FILM COATED ORAL
Qty: 90 TABLET | Refills: 3 | Status: SHIPPED | OUTPATIENT
Start: 2023-03-08 | End: 2024-01-16

## 2023-03-08 RX ORDER — LOSARTAN POTASSIUM 100 MG/1
100 TABLET ORAL DAILY
Qty: 90 TABLET | Refills: 3 | Status: SHIPPED | OUTPATIENT
Start: 2023-03-08 | End: 2024-01-16

## 2023-03-08 RX ORDER — AMLODIPINE BESYLATE 10 MG/1
10 TABLET ORAL DAILY
Qty: 90 TABLET | Refills: 3 | Status: SHIPPED | OUTPATIENT
Start: 2023-03-08 | End: 2024-01-16

## 2023-03-08 NOTE — TELEPHONE ENCOUNTER
amlodipine      Last Written Prescription Date:  1/16/2023  Last Fill Quantity: 30,   # refills: 1  Last Office Visit: 2/15/2023  Future Office visit:    Next 5 appointments (look out 90 days)    Jun 05, 2023  8:15 AM  (Arrive by 8:00 AM)  PHYSICAL with Colin Fisher MD  [facility] [place] ([facility] ) [address]  [place] [address]  [phone]           Routing refill request to provider for review/approval because:

## 2023-03-08 NOTE — TELEPHONE ENCOUNTER
losartan      Last Written Prescription Date:  12/16/2022  Last Fill Quantity: 30,   # refills: 3  Last Office Visit: 2/15/2023  Future Office visit:    Next 5 appointments (look out 90 days)    Jun 05, 2023  8:15 AM  (Arrive by 8:00 AM)  PHYSICAL with Colin Fisher MD  [facility] [place] ([facility] [place] ) [address]  [place] [address]  [phone]           Routing refill request to provider for review/approval because:    farxiga      Last Written Prescription Date:  12/19/2022  Last Fill Quantity: 90,   # refills: 0  Last Office Visit: 2/15/2023  Future Office visit:    Next 5 appointments (look out 90 days)    Jun 05, 2023  8:15 AM  (Arrive by 8:00 AM)  PHYSICAL with Colin Fisher MD  [facility] [place] ([facility] [place] ) [address] [place] [address]  [phone]           Routing refill request to provider for review/approval because:

## 2023-03-30 ENCOUNTER — HOSPITAL ENCOUNTER (OUTPATIENT)
Dept: EDUCATION SERVICES | Facility: HOSPITAL | Age: 63
Discharge: HOME | End: 2023-03-30
Attending: DIETITIAN, REGISTERED | Admitting: FAMILY MEDICINE
Payer: COMMERCIAL

## 2023-03-30 ENCOUNTER — TELEPHONE (OUTPATIENT)
Dept: FAMILY MEDICINE | Facility: OTHER | Age: 63
End: 2023-03-30

## 2023-03-30 VITALS
RESPIRATION RATE: 16 BRPM | SYSTOLIC BLOOD PRESSURE: 158 MMHG | HEART RATE: 81 BPM | OXYGEN SATURATION: 97 % | DIASTOLIC BLOOD PRESSURE: 77 MMHG | BODY MASS INDEX: 34.39 KG/M2 | WEIGHT: 214 LBS | HEIGHT: 66 IN

## 2023-03-30 DIAGNOSIS — E11.9 TYPE 2 DIABETES MELLITUS WITHOUT COMPLICATION, WITHOUT LONG-TERM CURRENT USE OF INSULIN (H): Primary | ICD-10-CM

## 2023-03-30 PROCEDURE — 97803 MED NUTRITION INDIV SUBSEQ: CPT | Performed by: DIETITIAN, REGISTERED

## 2023-03-30 ASSESSMENT — PAIN SCALES - GENERAL: PAINLEVEL: NO PAIN (0)

## 2023-03-30 ASSESSMENT — ANXIETY QUESTIONNAIRES
4. TROUBLE RELAXING: NOT AT ALL
3. WORRYING TOO MUCH ABOUT DIFFERENT THINGS: NOT AT ALL
2. NOT BEING ABLE TO STOP OR CONTROL WORRYING: NOT AT ALL
6. BECOMING EASILY ANNOYED OR IRRITABLE: NOT AT ALL
IF YOU CHECKED OFF ANY PROBLEMS ON THIS QUESTIONNAIRE, HOW DIFFICULT HAVE THESE PROBLEMS MADE IT FOR YOU TO DO YOUR WORK, TAKE CARE OF THINGS AT HOME, OR GET ALONG WITH OTHER PEOPLE: NOT DIFFICULT AT ALL
5. BEING SO RESTLESS THAT IT IS HARD TO SIT STILL: NOT AT ALL
7. FEELING AFRAID AS IF SOMETHING AWFUL MIGHT HAPPEN: NOT AT ALL

## 2023-03-30 ASSESSMENT — PATIENT HEALTH QUESTIONNAIRE - PHQ9: SUM OF ALL RESPONSES TO PHQ QUESTIONS 1-9: 0

## 2023-03-30 NOTE — PROGRESS NOTES
Abnormal VS:    Problem: I spoke to Danica West  face to face regarding the pt's elevated BP. Pt has not been able to monitor at work as directed. Dr Fisher has been adjusting the pt's BP medication. Pt's next appt with Dr Fisher is in 06/23.  Plan: Provider recommended notify Dr Fisher  Pt notified and is satisfied with this plan. Dr Fisher's nurse notified as he is not available. She will discuss with him when next available.    Melva Westbrook LPN

## 2023-03-30 NOTE — TELEPHONE ENCOUNTER
Called and spoke with patient about BP readings, patient to  Come in and do nurse onlys checks. Patient scheduled so far on Monday at 4pm and Thursday at 4pm. Patient stated he is not consuming many alcoholic beverages. 1-2 throughout the week and none to a few on the weekend.

## 2023-03-30 NOTE — PATIENT INSTRUCTIONS
-Continue to limit foods high in carbohydrates.   -Add more activity as weather improves.   -Good times to test glucose are fasting or 2 hours after supper.  -Target levels are fasting , 2 hours after supper < 180.  -Recent A1c 6.7% - great job!  Goal is < 7.0%.  -Follow up October 2023.    -Call with any concerns -  TODD Bran, [facility] [phone].

## 2023-03-30 NOTE — LETTER
3/30/2023        RE: Neo LEWIS Camryn  [address] [facility] [place] [address]        Abnormal VS:    Problem: I spoke to Danica West  face to face regarding the pt's elevated BP. Pt has not been able to monitor at work as directed. Dr Fisher has been adjusting the pt's BP medication. Pt's next appt with Dr Fisher is in 06/23.  Plan: Provider recommended notify Dr Fisher  Pt notified and is satisfied with this plan. Dr Fisher's nurse notified as he is not available. She will discuss with him when next available.    Melva Westbrook LPN        Diabetes Self-Management Education & Support    Presents for: Individual review    Type of Service: In Person Visit    ASSESSMENT:  Recent A1c in target at 6.7%.  Pt does make efforts to follow healthy, low carbohydrate meal plan.  Not as much exercise in winter months.  Weight is up 14# since last visit to diabetes center Sept 20222 but pt coming off a vacation to [place].  Eye exam and foot exam are up to date.  BP elevated on two checks today.  Pt will come in for nurse only BP checks next week.      Patient's most recent   Lab Results   Component Value Date    A1C 6.7 02/15/2023    A1C 6.5 08/17/2021     is meeting goal of <7.0    Diabetes knowledge and skills assessment:   Patient is knowledgeable in diabetes management concepts related to: Healthy Eating, Being Active, Monitoring, Taking Medication, Problem Solving and Healthy Coping    Education today focused on:  A1c/BP goals     Based on learning assessment above, most appropriate setting for further diabetes education would be: Individual setting.      PLAN  Continue to work on healthy, low carbohydrate food choices.    Increase exercise as weather improves.    Test glucose 1x/day - try to get some evening checks.   Follow up with nurse only BP checks.     Follow-up: October 2023 - glucose review/A1c check.     See Care Plan for co-developed, patient-state behavior change goals.  AVS provided for patient  "today.    Education Materials Provided:  No new materials today.     SUBJECTIVE/OBJECTIVE:  Presents for: Individual review  Accompanied by: Self  Diabetes education in the past 24mo: Yes  Focus of Visit: Monitoring  Diabetes type: Type 2  Date of diagnosis: 12/2014  Disease course: Stable  How confident are you filling out medical forms by yourself:: Extremely  Diabetes management related comments/concerns: None  Transportation concerns: No  Difficulty affording diabetes medication?: No  Difficulty affording diabetes testing supplies?: No  Other concerns:: None  Cultural Influences/Ethnic Background:  Not  or     Diabetes Symptoms & Complications:  Fatigue: No  Neuropathy: No  Polyuria: Yes (On Jardiance)  Visual change: No  Slow healing wounds: No  Symptom course: Stable  Weight trend: Increasing (Pt has gained 14# since last visit Sept 2022 but was recently on vacation in [place].)  Complications assessed today?: Yes  CVA: No  Heart disease: No  Nephropathy: Yes (CKD stage 2)  Retinopathy: No    Patient Problem List and Family Medical History reviewed for relevant medical history, current medical status, and diabetes risk factors.    Vitals:  /77   Pulse 81   Resp 16   Ht 1.664 m (5' 5.5\")   Wt 97.1 kg (214 lb)   SpO2 97%   BMI 35.07 kg/m    Estimated body mass index is 35.07 kg/m  as calculated from the following:    Height as of this encounter: 1.664 m (5' 5.5\").    Weight as of this encounter: 97.1 kg (214 lb).   Last 3 BP:   BP Readings from Last 3 Encounters:   03/30/23 158/77   02/15/23 (!) 150/83   01/03/23 (!) 151/86       History   Smoking Status     Never   Smokeless Tobacco     Current     Types: Chew       Labs:  Lab Results   Component Value Date    A1C 6.7 02/15/2023    A1C 6.5 08/17/2021     Lab Results   Component Value Date     02/15/2023     09/26/2022     05/14/2021     Lab Results   Component Value Date    LDL 47 05/23/2022    LDL 78 05/14/2021 "     HDL Cholesterol   Date Value Ref Range Status   05/14/2021 61 >39 mg/dL Final     Direct Measure HDL   Date Value Ref Range Status   05/23/2022 45 >=40 mg/dL Final   ]  GFR Estimate   Date Value Ref Range Status   02/15/2023 85 >60 mL/min/1.73m2 Final     Comment:     eGFR calculated using 2021 CKD-EPI equation.   05/14/2021 85 >60 mL/min/[1.73_m2] Final     Comment:     Non  GFR Calc  Starting 12/18/2018, serum creatinine based estimated GFR (eGFR) will be   calculated using the Chronic Kidney Disease Epidemiology Collaboration   (CKD-EPI) equation.       GFR Estimate If Black   Date Value Ref Range Status   05/14/2021 >90 >60 mL/min/[1.73_m2] Final     Comment:      GFR Calc  Starting 12/18/2018, serum creatinine based estimated GFR (eGFR) will be   calculated using the Chronic Kidney Disease Epidemiology Collaboration   (CKD-EPI) equation.       Lab Results   Component Value Date    CR 1.00 02/15/2023    CR 0.96 05/14/2021     No results found for: MICROALBUMIN    Healthy Eating:  Healthy Eating Assessed Today: Yes  Cultural/Mosque diet restrictions?: No  Meal planning/habits: Carb counting (Limits carbohdyrates to 60 grams/meal)  How many times a week on average do you eat food made away from home (restaurant/take-out)?: 0  Meals include: Breakfast, Lunch, Dinner  Breakfast: 1 granola bar and yogurt  Lunch: sandwich/chips or salad or leftovers  Dinner: meat, veggies, potatoes- smaller portions  Snacks: minimal snacks - no hs - sometimes cheese stick or nuts during the day  Beverages: Water, Sports drinks, Diet soda (Gatorade Zero)  Has patient met with a dietitian in the past?: Yes    Being Active:  Being Active Assessed Today: Yes  Exercise:: Yes (Pt is active on most days in summer months - has a cabin, golfs)    Monitoring:  Monitoring Assessed Today: Yes  Did patient bring glucose meter to appointment? : No  Blood Glucose Meter: ContourNext  Times checking blood sugar  at home (number): 1  Times checking blood sugar at home (per): Day  Blood glucose trend: No change  Pt reports fasting levels 120-135.  Has not done much testing later in the day.     Taking Medications:  Diabetes Medication(s)     Biguanides       metFORMIN (GLUCOPHAGE XR) 500 MG 24 hr tablet    TAKE 4 TABLETS (2,000 MG) BY MOUTH DAILY (WITH DINNER)    Sodium-Glucose Co-Transporter 2 (SGLT2) Inhibitors       FARXIGA 5 MG TABS tablet    TAKE 1 TABLET (5 MG) BY MOUTH EVERY MORNING          Taking Medication Assessed Today: Yes  Current Treatments: Diet, Oral Medication (taken by mouth) (Metformin XR 1000 evening, Farxiga 5 mg daily (takes in pm as was forgetting in am - provider aware))  Problems taking diabetes medications regularly?: No  Diabetes medication side effects?: No    Problem Solving:  Problem Solving Assessed Today: Yes  Is the patient at risk for hypoglycemia?: No  Is the patient at risk for DKA?: No    Reducing Risks:  Reducing Risks Assessed Today: Yes  Has dilated eye exam at least once a year?: Yes  Sees dentist every 6 months?: Yes  Feet checked by healthcare provider in the last year?: Yes    Healthy Coping:  Healthy Coping Assessed Today: Yes  Emotional response to diabetes: Acceptance, Concern for health and well-being  Informal Support system:: Family  Stage of change: MAINTENANCE (Working to maintain change, with risk of relapse)  Patient Activation Measure Survey Score:  NOY Score (Last Two) 12/18/2018 8/29/2022   NOY Raw Score 37 37   Activation Score 79.2 79.2   NOY Level 4 4         Care Plan and Education Provided:  Care Plan: Diabetes   Updates made by Jacque Lamas RD since 3/30/2023 12:00 AM      Problem: HbA1C Not In Goal       Goal: Establish Regular Follow-Ups with PCP       Task: Discuss with PCP the recommended timing for patient's next follow up visit(s)    Responsible User: Jacque Lamas RD      Task: Discuss schedule for PCP visits with patient    Responsible User: Cydney  SEMAJ Santillan      Goal: Get HbA1C Level in Goal       Task: Educate patient on diabetes education self-management topics    Responsible User: Jacque Lamas RD      Task: Educate patient on benefits of regular glucose monitoring    Responsible User: Jacque Lamas RD      Task: Refer patient to appropriate extended care team member, as needed (Medication Therapy Management, Behavioral Health, Physical Therapy, etc.)    Responsible User: Jacque Lamas RD      Task: Discuss diabetes treatment plan with patient    Responsible User: Jacque Lamas RD      Problem: Diabetes Self-Management Education Needed to Optimize Self-Care Behaviors       Goal: Understand diabetes pathophysiology and disease progression       Task: Provide education on diabetes pathophysiology and disease progression specfic to patient's diabetes type    Responsible User: Jacque Lamas RD      Goal: Healthy Eating - follow a healthy eating pattern for diabetes       Task: Provide education on portion control and consistency in amount, composition and timing of food intake    Responsible User: Jacque Lamas RD      Task: Provide education on managing carbohydrate intake (carbohydrate counting, plate planning method, etc.)    Responsible User: Jacque Lamas RD      Task: Provide education on weight management    Responsible User: Jacque Lamas RD      Task: Provide education on heart healthy eating Completed 3/30/2023   Responsible User: Jacque Lamas RD      Task: Provide education on eating out    Responsible User: Jacque Lamas RD      Task: Develop individualized healthy eating plan with patient    Responsible User: Jacque Lamas RD      Goal: Being Active - get regular physical activity, working up to at least 150 minutes per week       Task: Provide education on relationship of activity to glucose and precautions to take if at risk for low glucose    Responsible User: Jacque Lamas RD      Task: Discuss barriers to physical activity with  patient    Responsible User: Jacque Lamas RD      Task: Develop physical activity plan with patient    Responsible User: Jacque Lamas RD      Task: Explore community resources including walking groups, assistance programs, and home videos    Responsible User: Jacque Lamas RD      Goal: Monitoring - monitor glucose and ketones as directed       Task: Provide education on blood glucose monitoring (purpose, proper technique, frequency, glucose targets, interpreting results, when to use glucose control solution, sharps disposal)    Responsible User: Jacque Lamas RD      Task: Provide education on continuous glucose monitoring (sensor placement, use of jitendra or /reader, understanding glucose trends, alerts and alarms, differences between sensor glucose and blood glucose)    Responsible User: Jacque Lamas RD      Task: Provide education on ketone monitoring (when to monitor, frequency, etc.)    Responsible User: Jacque Lamas RD      Goal: Taking Medication - patient is consistently taking medications as directed       Task: Provide education on action of prescribed medication, including when to take and possible side effects    Responsible User: Jacque Lamas RD      Task: Provide education on insulin and injectable diabetes medications, including administration, storage, site selection and rotation for injection sites    Responsible User: Jacque Lamas RD      Task: Discuss barriers to medication adherence with patient and provide management technique ideas as appropriate    Responsible User: Jacque Lamas RD      Task: Provide education on frequency and refill details of medications    Responsible User: Jacque Lamas RD      Goal: Problem Solving - know how to prevent and manage short-term diabetes complications       Task: Provide education on high blood glucose - causes, signs/symptoms, prevention and treatment    Responsible User: Jacque Lamas RD      Task: Provide education on low blood  glucose - causes, signs/symptoms, prevention, treatment, carrying a carbohydrate source at all times, and medical identification    Responsible User: Jacque Lamas RD      Task: Provide education on safe travel with diabetes    Responsible User: Jacque Lamas RD      Task: Provide education on how to care for diabetes on sick days    Responsible User: Jacque Lamas RD      Task: Provide education on when to call a health care provider    Responsible User: Jacque Lamas RD      Goal: Reducing Risks - know how to prevent and treat long-term diabetes complications    Start Date: 3/30/2023   This Visit's Progress: 0%   Note:    Pt will have BP in target at next visit.   Provider alerted to high levels and pt will come in for nurse only checks.      Task: Provide education on major complications of diabetes, prevention, early diagnostic measures and treatment of complications    Responsible User: Jacque Lamas RD      Task: Provide education on recommended care for dental, eye and foot health    Responsible User: Jacque Lamas RD      Task: Provide education on Hemoglobin A1c - goals and relationship to blood glucose levels    Responsible User: Jacque Lamas RD      Task: Provide education on recommendations for heart health - lipid levels and goals, blood pressure and goals, and aspirin therapy, if indicated Completed 3/30/2023   Responsible User: Jacque Lamas RD      Task: Provide education on tobacco cessation    Responsible User: Jacque Lamas RD      Goal: Healthy Coping - use available resources to cope with the challenges of managing diabetes       Task: Discuss recognizing feelings about having diabetes    Responsible User: Jacque Lamas RD      Task: Provide education on the benefits of making appropriate lifestyle changes    Responsible User: Jacque Lamas RD      Task: Provide education on benefits of utilizing support systems    Responsible User: Jacque Lamas RD      Task: Discuss methods for  coping with stress    Responsible User: Jacque Lamas RD      Task: Provide education on when to seek professional counseling    Responsible User: Jacque Lamas RD          Time Spent: 25 minutes  Encounter Type: Individual    Any diabetes medication dose changes were made via the CDE Protocol per the patient's referring provider. A copy of this encounter was shared with the provider.        Sincerely,        Jacque Lamas RD

## 2023-03-30 NOTE — PROGRESS NOTES
Diabetes Self-Management Education & Support    Presents for: Individual review    Type of Service: In Person Visit    ASSESSMENT:  Recent A1c in target at 6.7%.  Pt does make efforts to follow healthy, low carbohydrate meal plan.  Not as much exercise in winter months.  Weight is up 14# since last visit to diabetes center Sept 20222 but pt coming off a vacation to [place].  Eye exam and foot exam are up to date.  BP elevated on two checks today.  Pt will come in for nurse only BP checks next week.      Patient's most recent   Lab Results   Component Value Date    A1C 6.7 02/15/2023    A1C 6.5 08/17/2021     is meeting goal of <7.0    Diabetes knowledge and skills assessment:   Patient is knowledgeable in diabetes management concepts related to: Healthy Eating, Being Active, Monitoring, Taking Medication, Problem Solving and Healthy Coping    Education today focused on:  A1c/BP goals     Based on learning assessment above, most appropriate setting for further diabetes education would be: Individual setting.      PLAN  Continue to work on healthy, low carbohydrate food choices.    Increase exercise as weather improves.    Test glucose 1x/day - try to get some evening checks.   Follow up with nurse only BP checks.     Follow-up: October 2023 - glucose review/A1c check.     See Care Plan for co-developed, patient-state behavior change goals.  AVS provided for patient today.    Education Materials Provided:  No new materials today.     SUBJECTIVE/OBJECTIVE:  Presents for: Individual review  Accompanied by: Self  Diabetes education in the past 24mo: Yes  Focus of Visit: Monitoring  Diabetes type: Type 2  Date of diagnosis: 12/2014  Disease course: Stable  How confident are you filling out medical forms by yourself:: Extremely  Diabetes management related comments/concerns: None  Transportation concerns: No  Difficulty affording diabetes medication?: No  Difficulty affording diabetes testing supplies?: No  Other  "concerns:: None  Cultural Influences/Ethnic Background:  Not  or     Diabetes Symptoms & Complications:  Fatigue: No  Neuropathy: No  Polyuria: Yes (On Jardiance)  Visual change: No  Slow healing wounds: No  Symptom course: Stable  Weight trend: Increasing (Pt has gained 14# since last visit Sept 2022 but was recently on vacation in [place].)  Complications assessed today?: Yes  CVA: No  Heart disease: No  Nephropathy: Yes (CKD stage 2)  Retinopathy: No    Patient Problem List and Family Medical History reviewed for relevant medical history, current medical status, and diabetes risk factors.    Vitals:  /77   Pulse 81   Resp 16   Ht 1.664 m (5' 5.5\")   Wt 97.1 kg (214 lb)   SpO2 97%   BMI 35.07 kg/m    Estimated body mass index is 35.07 kg/m  as calculated from the following:    Height as of this encounter: 1.664 m (5' 5.5\").    Weight as of this encounter: 97.1 kg (214 lb).   Last 3 BP:   BP Readings from Last 3 Encounters:   03/30/23 158/77   02/15/23 (!) 150/83   01/03/23 (!) 151/86       History   Smoking Status     Never   Smokeless Tobacco     Current     Types: Chew       Labs:  Lab Results   Component Value Date    A1C 6.7 02/15/2023    A1C 6.5 08/17/2021     Lab Results   Component Value Date     02/15/2023     09/26/2022     05/14/2021     Lab Results   Component Value Date    LDL 47 05/23/2022    LDL 78 05/14/2021     HDL Cholesterol   Date Value Ref Range Status   05/14/2021 61 >39 mg/dL Final     Direct Measure HDL   Date Value Ref Range Status   05/23/2022 45 >=40 mg/dL Final   ]  GFR Estimate   Date Value Ref Range Status   02/15/2023 85 >60 mL/min/1.73m2 Final     Comment:     eGFR calculated using 2021 CKD-EPI equation.   05/14/2021 85 >60 mL/min/[1.73_m2] Final     Comment:     Non  GFR Calc  Starting 12/18/2018, serum creatinine based estimated GFR (eGFR) will be   calculated using the Chronic Kidney Disease Epidemiology Collaboration "   (CKD-EPI) equation.       GFR Estimate If Black   Date Value Ref Range Status   05/14/2021 >90 >60 mL/min/[1.73_m2] Final     Comment:      GFR Calc  Starting 12/18/2018, serum creatinine based estimated GFR (eGFR) will be   calculated using the Chronic Kidney Disease Epidemiology Collaboration   (CKD-EPI) equation.       Lab Results   Component Value Date    CR 1.00 02/15/2023    CR 0.96 05/14/2021     No results found for: MICROALBUMIN    Healthy Eating:  Healthy Eating Assessed Today: Yes  Cultural/Jain diet restrictions?: No  Meal planning/habits: Carb counting (Limits carbohdyrates to 60 grams/meal)  How many times a week on average do you eat food made away from home (restaurant/take-out)?: 0  Meals include: Breakfast, Lunch, Dinner  Breakfast: 1 granola bar and yogurt  Lunch: sandwich/chips or salad or leftovers  Dinner: meat, veggies, potatoes- smaller portions  Snacks: minimal snacks - no hs - sometimes cheese stick or nuts during the day  Beverages: Water, Sports drinks, Diet soda (Gatorade Zero)  Has patient met with a dietitian in the past?: Yes    Being Active:  Being Active Assessed Today: Yes  Exercise:: Yes (Pt is active on most days in summer months - has a [employer], [employer])    Monitoring:  Monitoring Assessed Today: Yes  Did patient bring glucose meter to appointment? : No  Blood Glucose Meter: ContourNext  Times checking blood sugar at home (number): 1  Times checking blood sugar at home (per): Day  Blood glucose trend: No change  Pt reports fasting levels 120-135.  Has not done much testing later in the day.     Taking Medications:  Diabetes Medication(s)     Biguanides       metFORMIN (GLUCOPHAGE XR) 500 MG 24 hr tablet    TAKE 4 TABLETS (2,000 MG) BY MOUTH DAILY (WITH DINNER)    Sodium-Glucose Co-Transporter 2 (SGLT2) Inhibitors       FARXIGA 5 MG TABS tablet    TAKE 1 TABLET (5 MG) BY MOUTH EVERY MORNING          Taking Medication Assessed Today: Yes  Current Treatments:  Diet, Oral Medication (taken by mouth) (Metformin XR 1000 evening, Farxiga 5 mg daily (takes in pm as was forgetting in am - provider aware))  Problems taking diabetes medications regularly?: No  Diabetes medication side effects?: No    Problem Solving:  Problem Solving Assessed Today: Yes  Is the patient at risk for hypoglycemia?: No  Is the patient at risk for DKA?: No    Reducing Risks:  Reducing Risks Assessed Today: Yes  Has dilated eye exam at least once a year?: Yes  Sees dentist every 6 months?: Yes  Feet checked by healthcare provider in the last year?: Yes    Healthy Coping:  Healthy Coping Assessed Today: Yes  Emotional response to diabetes: Acceptance, Concern for health and well-being  Informal Support system:: Family  Stage of change: MAINTENANCE (Working to maintain change, with risk of relapse)  Patient Activation Measure Survey Score:  NOY Score (Last Two) 12/18/2018 8/29/2022   NOY Raw Score 37 37   Activation Score 79.2 79.2   NOY Level 4 4         Care Plan and Education Provided:  Care Plan: Diabetes   Updates made by Jacque Lamsa RD since 3/30/2023 12:00 AM      Problem: HbA1C Not In Goal       Goal: Establish Regular Follow-Ups with PCP       Task: Discuss with PCP the recommended timing for patient's next follow up visit(s)    Responsible User: Jacque Lamas RD      Task: Discuss schedule for PCP visits with patient    Responsible User: Jacque Lamas RD      Goal: Get HbA1C Level in Goal       Task: Educate patient on diabetes education self-management topics    Responsible User: Jacque Lamas RD      Task: Educate patient on benefits of regular glucose monitoring    Responsible User: Jacque Lamas RD      Task: Refer patient to appropriate extended care team member, as needed (Medication Therapy Management, Behavioral Health, Physical Therapy, etc.)    Responsible User: Jacque Lamas RD      Task: Discuss diabetes treatment plan with patient    Responsible User: Jacque Lamas RD       Problem: Diabetes Self-Management Education Needed to Optimize Self-Care Behaviors       Goal: Understand diabetes pathophysiology and disease progression       Task: Provide education on diabetes pathophysiology and disease progression specfic to patient's diabetes type    Responsible User: Jacque Lamas RD      Goal: Healthy Eating - follow a healthy eating pattern for diabetes       Task: Provide education on portion control and consistency in amount, composition and timing of food intake    Responsible User: Jacque Lamas RD      Task: Provide education on managing carbohydrate intake (carbohydrate counting, plate planning method, etc.)    Responsible User: Jacque Lamas RD      Task: Provide education on weight management    Responsible User: Jacque Lamas RD      Task: Provide education on heart healthy eating Completed 3/30/2023   Responsible User: Jacque Lamas RD      Task: Provide education on eating out    Responsible User: Jacque Lamas RD      Task: Develop individualized healthy eating plan with patient    Responsible User: Jacque Lamas RD      Goal: Being Active - get regular physical activity, working up to at least 150 minutes per week       Task: Provide education on relationship of activity to glucose and precautions to take if at risk for low glucose    Responsible User: Jacque Lamas RD      Task: Discuss barriers to physical activity with patient    Responsible User: Jacque Lamas RD      Task: Develop physical activity plan with patient    Responsible User: Jacque Lamas RD      Task: Explore community resources including walking groups, assistance programs, and home videos    Responsible User: Jacque Lamas RD      Goal: Monitoring - monitor glucose and ketones as directed       Task: Provide education on blood glucose monitoring (purpose, proper technique, frequency, glucose targets, interpreting results, when to use glucose control solution, sharps disposal)    Responsible  User: Jacque Lamas RD      Task: Provide education on continuous glucose monitoring (sensor placement, use of jitendra or /reader, understanding glucose trends, alerts and alarms, differences between sensor glucose and blood glucose)    Responsible User: Jacque Lamas RD      Task: Provide education on ketone monitoring (when to monitor, frequency, etc.)    Responsible User: Jacque Lamas RD      Goal: Taking Medication - patient is consistently taking medications as directed       Task: Provide education on action of prescribed medication, including when to take and possible side effects    Responsible User: Jacque Lamas RD      Task: Provide education on insulin and injectable diabetes medications, including administration, storage, site selection and rotation for injection sites    Responsible User: Jacque Lamas RD      Task: Discuss barriers to medication adherence with patient and provide management technique ideas as appropriate    Responsible User: Jacque Lamas RD      Task: Provide education on frequency and refill details of medications    Responsible User: Jacque Lamas RD      Goal: Problem Solving - know how to prevent and manage short-term diabetes complications       Task: Provide education on high blood glucose - causes, signs/symptoms, prevention and treatment    Responsible User: Jacque Lamas RD      Task: Provide education on low blood glucose - causes, signs/symptoms, prevention, treatment, carrying a carbohydrate source at all times, and medical identification    Responsible User: Jacque Lamas RD      Task: Provide education on safe travel with diabetes    Responsible User: Jacque Lamas RD      Task: Provide education on how to care for diabetes on sick days    Responsible User: Jacque Lamas RD      Task: Provide education on when to call a health care provider    Responsible User: Jacque Lamas RD      Goal: Reducing Risks - know how to prevent and treat long-term diabetes  complications    Start Date: 3/30/2023   This Visit's Progress: 0%   Note:    Pt will have BP in target at next visit.   Provider alerted to high levels and pt will come in for nurse only checks.      Task: Provide education on major complications of diabetes, prevention, early diagnostic measures and treatment of complications    Responsible User: Jacque Lamas RD      Task: Provide education on recommended care for dental, eye and foot health    Responsible User: Jacque Lamas RD      Task: Provide education on Hemoglobin A1c - goals and relationship to blood glucose levels    Responsible User: Jacque Lamas RD      Task: Provide education on recommendations for heart health - lipid levels and goals, blood pressure and goals, and aspirin therapy, if indicated Completed 3/30/2023   Responsible User: Jacque Lamas RD      Task: Provide education on tobacco cessation    Responsible User: Jacque Lamas RD      Goal: Healthy Coping - use available resources to cope with the challenges of managing diabetes       Task: Discuss recognizing feelings about having diabetes    Responsible User: Jacque Lamas RD      Task: Provide education on the benefits of making appropriate lifestyle changes    Responsible User: Jacque Lamas RD      Task: Provide education on benefits of utilizing support systems    Responsible User: Jacque Lamas RD      Task: Discuss methods for coping with stress    Responsible User: Jacque Lamas RD      Task: Provide education on when to seek professional counseling    Responsible User: Jacque Lamas RD          Time Spent: 25 minutes  Encounter Type: Individual    Any diabetes medication dose changes were made via the CDE Protocol per the patient's referring provider. A copy of this encounter was shared with the provider.

## 2023-04-03 ENCOUNTER — ALLIED HEALTH/NURSE VISIT (OUTPATIENT)
Dept: FAMILY MEDICINE | Facility: OTHER | Age: 63
End: 2023-04-03
Attending: FAMILY MEDICINE
Payer: COMMERCIAL

## 2023-04-03 VITALS
RESPIRATION RATE: 18 BRPM | BODY MASS INDEX: 34.39 KG/M2 | OXYGEN SATURATION: 97 % | HEIGHT: 66 IN | DIASTOLIC BLOOD PRESSURE: 82 MMHG | WEIGHT: 214 LBS | HEART RATE: 82 BPM | SYSTOLIC BLOOD PRESSURE: 145 MMHG

## 2023-04-03 DIAGNOSIS — Z01.30 BP CHECK: Primary | ICD-10-CM

## 2023-04-03 PROCEDURE — 99207 PR NO CHARGE NURSE ONLY: CPT

## 2023-04-03 ASSESSMENT — PAIN SCALES - GENERAL: PAINLEVEL: NO PAIN (0)

## 2023-04-03 NOTE — PROGRESS NOTES
Here for nurse  Only blood pressure check. First one elevated waited about 5-7 min did decrease to 145/82 but still elevated

## 2023-04-06 ENCOUNTER — ALLIED HEALTH/NURSE VISIT (OUTPATIENT)
Dept: FAMILY MEDICINE | Facility: OTHER | Age: 63
End: 2023-04-06
Attending: FAMILY MEDICINE
Payer: COMMERCIAL

## 2023-04-06 VITALS — DIASTOLIC BLOOD PRESSURE: 70 MMHG | SYSTOLIC BLOOD PRESSURE: 140 MMHG

## 2023-04-06 DIAGNOSIS — I10 BENIGN ESSENTIAL HYPERTENSION: Primary | ICD-10-CM

## 2023-04-06 PROCEDURE — 99207 PR NO CHARGE NURSE ONLY: CPT

## 2023-05-01 ENCOUNTER — HOSPITAL ENCOUNTER (EMERGENCY)
Facility: HOSPITAL | Age: 63
Discharge: HOME | End: 2023-05-01
Attending: STUDENT IN AN ORGANIZED HEALTH CARE EDUCATION/TRAINING PROGRAM | Admitting: STUDENT IN AN ORGANIZED HEALTH CARE EDUCATION/TRAINING PROGRAM
Payer: COMMERCIAL

## 2023-05-01 VITALS
OXYGEN SATURATION: 97 % | TEMPERATURE: 98.3 F | RESPIRATION RATE: 16 BRPM | SYSTOLIC BLOOD PRESSURE: 133 MMHG | DIASTOLIC BLOOD PRESSURE: 85 MMHG | HEART RATE: 92 BPM

## 2023-05-01 DIAGNOSIS — L03.213 PRESEPTAL CELLULITIS OF LEFT UPPER EYELID: ICD-10-CM

## 2023-05-01 DIAGNOSIS — H10.32 ACUTE CONJUNCTIVITIS OF LEFT EYE, UNSPECIFIED ACUTE CONJUNCTIVITIS TYPE: ICD-10-CM

## 2023-05-01 PROCEDURE — 99283 EMERGENCY DEPT VISIT LOW MDM: CPT | Performed by: STUDENT IN AN ORGANIZED HEALTH CARE EDUCATION/TRAINING PROGRAM

## 2023-05-01 PROCEDURE — 99284 EMERGENCY DEPT VISIT MOD MDM: CPT

## 2023-05-01 RX ORDER — ERYTHROMYCIN 5 MG/G
0.5 OINTMENT OPHTHALMIC 4 TIMES DAILY
Qty: 1 G | Refills: 0 | Status: SHIPPED | OUTPATIENT
Start: 2023-05-01 | End: 2024-01-16

## 2023-05-01 RX ORDER — CEFDINIR 300 MG/1
300 CAPSULE ORAL 2 TIMES DAILY
Qty: 10 CAPSULE | Refills: 0 | Status: SHIPPED | OUTPATIENT
Start: 2023-05-01 | End: 2023-05-06

## 2023-05-01 RX ORDER — SULFAMETHOXAZOLE/TRIMETHOPRIM 800-160 MG
1 TABLET ORAL 2 TIMES DAILY
Qty: 10 TABLET | Refills: 0 | Status: SHIPPED | OUTPATIENT
Start: 2023-05-01 | End: 2023-05-06

## 2023-05-01 ASSESSMENT — ACTIVITIES OF DAILY LIVING (ADL): ADLS_ACUITY_SCORE: 35

## 2023-05-01 NOTE — ED TRIAGE NOTES
Pt states last night he had itchy eyes and today area around L eye is swollen. Pt states he has been around grandkids that had pink eye. Denies vision changes, chest pain, nausea, SOB.      Triage Assessment     Row Name 05/01/23 0639       Triage Assessment (Adult)    Airway WDL WDL       Respiratory WDL    Respiratory WDL WDL       Skin Circulation/Temperature WDL    Skin Circulation/Temperature WDL WDL       Cardiac WDL    Cardiac WDL WDL       Peripheral/Neurovascular WDL    Peripheral Neurovascular WDL WDL       Cognitive/Neuro/Behavioral WDL    Cognitive/Neuro/Behavioral WDL WDL

## 2023-05-01 NOTE — ED NOTES
Pt has swelling around L eye. Pt states its itchy, reddened, and skin is swelling. Pt denies chest pain, SOB, or vision changes. Pt states it started last night with itching and today swelling. Pt states his grandkids have pink eye.

## 2023-05-01 NOTE — ED PROVIDER NOTES
History     Chief Complaint   Patient presents with     Eye Problem     HPI  Neo Cowan is a 62 year old male with the below past medical history presents to the emergency department today with eye discomfort on the left as well as thick puslike discharge that started around 2 in the morning.  He says he cannot tell if he has a new runny nose because he always seems to get 1 in the spring.  He has not been having fevers.  He does not have significant pain when moving the eye around, but it is itchy.  No significant change in vision    Allergies:  Allergies   Allergen Reactions     Seasonal Allergies        Problem List:    Patient Active Problem List    Diagnosis Date Noted     JENNIFER (obstructive sleep apnea) - Moderate without sleep-associated hypoxemia 02/10/2023     Priority: Medium     Elevated liver function tests 05/23/2022     Priority: Medium     Drug-induced erectile dysfunction 05/23/2022     Priority: Medium     Nocturia 05/23/2022     Priority: Medium     Benign essential hypertension 05/23/2022     Priority: Medium     Microalbuminuria due to type 2 diabetes mellitus (H) 05/23/2022     Priority: Medium     Chronic kidney disease, stage 2 (mild) 01/17/2022     Priority: Medium     Screening for prostate cancer 12/18/2017     Priority: Medium     Comprehensive Medical Examination 09/27/2016     Priority: Medium     Eczema 09/27/2016     Priority: Medium     Type 2 diabetes mellitus without complication (H) 10/01/2014     Priority: Medium     Vitamin D deficiency 10/01/2014     Priority: Medium     H/O adenomatous colonic polyps 07/19/2013     Priority: Medium     Dyslipidemia 07/17/2013     Priority: Medium     Seasonal allergic rhinitis 07/17/2013     Priority: Medium     Gilbert syndrome 03/02/2000     Priority: Medium     Obesity 03/02/2000     Priority: Medium        Past Medical History:    Past Medical History:   Diagnosis Date     Alcoholism 1/1/2011     Allergic rhinitis, cause unspecified  3/2/2000     Anxiety  1/1/2011     Colonic Polyps 3/2/2000     Depression 1/1/2011     Gilbert's Syndrome 3/2/2000     Hypercholesterolemia 3/2/2000     Overweight(278.02) 3/2/2000     Prediabetes 3/2/2000       Past Surgical History:    Past Surgical History:   Procedure Laterality Date     COLONOSCOPY  8/5/2013    Procedure: COLONOSCOPY;  colonoscopy ;  Surgeon: Yobani Ventura MD;  Location: [facility]     COLONOSCOPY N/A 3/12/2018    Procedure: COLONOSCOPY;  COLONOSCOPY with polypectomy;  Surgeon: Donovan Mayfield MD;  Location: [facility] OR     colonoscopy with polypectomy  1/27/2011    repeat 2 years     mandibular fracture repair       vasectomy       wisdom teeth extraction         Family History:    Family History   Problem Relation Age of Onset     C.A.D. Father      Cancer Father         Lung, cause of death     Other - See Comments Father         Colon polyps     Alcohol/Drug Mother         Alcoholism     Other - See Comments Mother         Liver disease, cause of death     C.A.D. Other         Family hx     Cancer Paternal Uncle         Cervical     Cancer Paternal Uncle         Throat     Hypertension Other         Family hx       Social History:  Marital Status:   [2]  Social History     Tobacco Use     Smoking status: Never     Smokeless tobacco: Current     Types: Chew     Tobacco comments:     Tried to quit; No passive exposure   Vaping Use     Vaping status: Never Used   Substance Use Topics     Alcohol use: Yes     Comment: socially     Drug use: No        Medications:    cefdinir (OMNICEF) 300 MG capsule  erythromycin (ROMYCIN) 5 MG/GM ophthalmic ointment  sulfamethoxazole-trimethoprim (BACTRIM DS) 800-160 MG tablet  amLODIPine (NORVASC) 10 MG tablet  blood glucose (ACCU-CHEK GUIDE) test strip  blood glucose monitoring (ACCU-CHEK FASTCLIX) lancets  blood glucose monitoring (NO BRAND SPECIFIED) meter device kit  doxazosin (CARDURA) 2 MG tablet  FARXIGA 5 MG TABS tablet  fexofenadine (ALLEGRA)  180 MG tablet  losartan (COZAAR) 100 MG tablet  metFORMIN (GLUCOPHAGE XR) 500 MG 24 hr tablet  montelukast (SINGULAIR) 10 MG tablet  simvastatin (ZOCOR) 20 MG tablet          Review of Systems  A complete review of systems was performed and is otherwise negative.     Physical Exam   BP: 133/85  Pulse: 92  Temp: 98.3  F (36.8  C)  Resp: 16  SpO2: 97 %      Physical Exam  Constitutional: Alert and conversant. NAD   HENT: NCAT   Eyes: Normal pupils pupils equal round and reactive to light, extraocular muscles intact, there is diffuse chemosis/scleral injection.  Periorbitally, the upper eyelid on the left appears swollen and edematous, subtle swelling in the lower but primarily the issue seems to be the upper  Neck: supple   CV: No pallor  Pulmonary/Chest: Non-labored respirations  Abdominal: non-distended   MSK: WELLS.   Neuro: Alert and appropriate   Skin: Warm and dry. No diaphoresis. No rashes on exposed skin    Psych: Appropriate mood and affect     ED Course              ED Course as of 05/01/23 0758   Mon May 01, 2023   0754 62 male with chemosis and purulent discharge, no contact lenses, and periorbital swelling.  The combination of the 2 is a bit unusual, externally this looks like a periorbital cellulitis.  Doubt orbital cellulitis based on exam.  MDI clinically consistent with conjunctivitis.  Given the periorbital cellulitis and the way he describes the discharge as thick pus, reasonable concern for bacterial etiology and benefit of treatment outweighs the risk.  Given topical antibiotic for the conjunctivitis, oral antibiotic with concern for a preseptal cellulitis.  Patient has reasonable visual acuity, able to read my name tag without difficulty with the afflicted eye.  Possible viral etiology is he states his grandchildren recently had some similar symptoms, however, I think it would be most responsible to treat at this point.  No complaints of significant pain to suggest a corneal abrasion   0758 Patient  appropriate for further outpatient management discharged in stable condition all question answered and return precautions given.     Procedures         No results found for this or any previous visit (from the past 24 hour(s)).    Medications - No data to display    Assessments & Plan (with Medical Decision Making)     I have reviewed the nursing notes.    I have reviewed the findings, diagnosis, plan and need for follow up with the patient.            New Prescriptions    CEFDINIR (OMNICEF) 300 MG CAPSULE    Take 1 capsule (300 mg) by mouth 2 times daily for 5 days    ERYTHROMYCIN (ROMYCIN) 5 MG/GM OPHTHALMIC OINTMENT    Place 0.5 inches Into the left eye 4 times daily    SULFAMETHOXAZOLE-TRIMETHOPRIM (BACTRIM DS) 800-160 MG TABLET    Take 1 tablet by mouth 2 times daily for 5 days       Final diagnoses:   Acute conjunctivitis of left eye, unspecified acute conjunctivitis type   Preseptal cellulitis of left upper eyelid       5/1/2023   [facility]     Saroj Iraheta MD  05/01/23 0807

## 2023-05-25 DIAGNOSIS — E78.2 MIXED HYPERLIPIDEMIA: ICD-10-CM

## 2023-05-30 RX ORDER — SIMVASTATIN 20 MG
TABLET ORAL
Qty: 90 TABLET | Refills: 3 | Status: SHIPPED | OUTPATIENT
Start: 2023-05-30 | End: 2024-03-25

## 2023-05-30 NOTE — TELEPHONE ENCOUNTER
simvastatin (ZOCOR) 20 MG tablet 90 tablet 3 5/10/2022     Last Office Visit: 2/15/2023  Future Office visit:    Next 5 appointments (look out 90 days)    Jun 05, 2023  8:15 AM  (Arrive by 8:00 AM)  PHYSICAL with Colin Fisher MD  [facility] [place] ([facility] [place] ) [address]  [place] [address]  [phone]           Routing refill request to provider for review/approval because:

## 2023-06-01 NOTE — TELEPHONE ENCOUNTER
Norvasc       Last Written Prescription Date:  12/8/22  Last Fill Quantity: 30,   # refills: 1  Last Office Visit: 1/3/23  Future Office visit:    Next 5 appointments (look out 90 days)    Feb 15, 2023  4:15 PM  (Arrive by 4:00 PM)  SHORT with Colin Fisher MD  [facility] [place] ([facility] [place] ) [address]  [place] [address]  [phone]              01-Jun-2023 17:27

## 2023-06-05 ENCOUNTER — LAB (OUTPATIENT)
Dept: LAB | Facility: OTHER | Age: 63
End: 2023-06-05
Attending: FAMILY MEDICINE
Payer: COMMERCIAL

## 2023-06-05 ENCOUNTER — ANCILLARY PROCEDURE (OUTPATIENT)
Dept: GENERAL RADIOLOGY | Facility: OTHER | Age: 63
End: 2023-06-05
Attending: FAMILY MEDICINE
Payer: COMMERCIAL

## 2023-06-05 ENCOUNTER — OFFICE VISIT (OUTPATIENT)
Dept: FAMILY MEDICINE | Facility: OTHER | Age: 63
End: 2023-06-05
Attending: FAMILY MEDICINE
Payer: COMMERCIAL

## 2023-06-05 VITALS
SYSTOLIC BLOOD PRESSURE: 136 MMHG | DIASTOLIC BLOOD PRESSURE: 78 MMHG | OXYGEN SATURATION: 99 % | HEART RATE: 81 BPM | BODY MASS INDEX: 33.76 KG/M2 | TEMPERATURE: 97.8 F | WEIGHT: 206 LBS | RESPIRATION RATE: 16 BRPM

## 2023-06-05 DIAGNOSIS — E11.29 MICROALBUMINURIA DUE TO TYPE 2 DIABETES MELLITUS (H): ICD-10-CM

## 2023-06-05 DIAGNOSIS — E78.2 MIXED HYPERLIPIDEMIA: ICD-10-CM

## 2023-06-05 DIAGNOSIS — M25.561 BILATERAL CHRONIC KNEE PAIN: ICD-10-CM

## 2023-06-05 DIAGNOSIS — N18.2 CHRONIC KIDNEY DISEASE, STAGE 2 (MILD): ICD-10-CM

## 2023-06-05 DIAGNOSIS — E11.9 TYPE 2 DIABETES MELLITUS WITHOUT COMPLICATION, WITHOUT LONG-TERM CURRENT USE OF INSULIN (H): ICD-10-CM

## 2023-06-05 DIAGNOSIS — M17.11 PRIMARY OSTEOARTHRITIS OF RIGHT KNEE: ICD-10-CM

## 2023-06-05 DIAGNOSIS — G89.29 BILATERAL CHRONIC KNEE PAIN: ICD-10-CM

## 2023-06-05 DIAGNOSIS — M25.562 BILATERAL CHRONIC KNEE PAIN: ICD-10-CM

## 2023-06-05 DIAGNOSIS — R80.9 MICROALBUMINURIA DUE TO TYPE 2 DIABETES MELLITUS (H): ICD-10-CM

## 2023-06-05 DIAGNOSIS — I10 BENIGN ESSENTIAL HYPERTENSION: ICD-10-CM

## 2023-06-05 DIAGNOSIS — M25.851 HIP IMPINGEMENT SYNDROME, RIGHT: ICD-10-CM

## 2023-06-05 DIAGNOSIS — Z23 NEED FOR VACCINATION: ICD-10-CM

## 2023-06-05 DIAGNOSIS — M25.861 IMPINGEMENT OF KNEE JOINT, RIGHT: ICD-10-CM

## 2023-06-05 DIAGNOSIS — M25.551 HIP PAIN, RIGHT: ICD-10-CM

## 2023-06-05 DIAGNOSIS — Z00.00 ANNUAL PHYSICAL EXAM: Primary | ICD-10-CM

## 2023-06-05 DIAGNOSIS — R35.1 NOCTURIA: ICD-10-CM

## 2023-06-05 DIAGNOSIS — M16.11 PRIMARY OSTEOARTHRITIS OF RIGHT HIP: ICD-10-CM

## 2023-06-05 DIAGNOSIS — F40.240 CLAUSTROPHOBIA: ICD-10-CM

## 2023-06-05 LAB
ALBUMIN SERPL BCG-MCNC: 4.6 G/DL (ref 3.5–5.2)
ALP SERPL-CCNC: 72 U/L (ref 40–129)
ALT SERPL W P-5'-P-CCNC: 88 U/L (ref 10–50)
ANION GAP SERPL CALCULATED.3IONS-SCNC: 11 MMOL/L (ref 7–15)
AST SERPL W P-5'-P-CCNC: 54 U/L (ref 10–50)
BASOPHILS # BLD AUTO: 0 10E3/UL (ref 0–0.2)
BASOPHILS NFR BLD AUTO: 0 %
BILIRUB SERPL-MCNC: 1.5 MG/DL
BUN SERPL-MCNC: 11.8 MG/DL (ref 8–23)
CALCIUM SERPL-MCNC: 10.2 MG/DL (ref 8.8–10.2)
CHLORIDE SERPL-SCNC: 101 MMOL/L (ref 98–107)
CHOLEST SERPL-MCNC: 148 MG/DL
CREAT SERPL-MCNC: 1.11 MG/DL (ref 0.67–1.17)
CREAT UR-MCNC: 136.7 MG/DL
DEPRECATED HCO3 PLAS-SCNC: 24 MMOL/L (ref 22–29)
EOSINOPHIL # BLD AUTO: 0 10E3/UL (ref 0–0.7)
EOSINOPHIL NFR BLD AUTO: 1 %
ERYTHROCYTE [DISTWIDTH] IN BLOOD BY AUTOMATED COUNT: 13.1 % (ref 10–15)
EST. AVERAGE GLUCOSE BLD GHB EST-MCNC: 143 MG/DL
GFR SERPL CREATININE-BSD FRML MDRD: 75 ML/MIN/1.73M2
GLUCOSE SERPL-MCNC: 143 MG/DL (ref 70–99)
HBA1C MFR BLD: 6.6 %
HCT VFR BLD AUTO: 40.4 % (ref 40–53)
HDLC SERPL-MCNC: 55 MG/DL
HGB BLD-MCNC: 13.8 G/DL (ref 13.3–17.7)
IMM GRANULOCYTES # BLD: 0 10E3/UL
IMM GRANULOCYTES NFR BLD: 0 %
LDLC SERPL CALC-MCNC: 75 MG/DL
LYMPHOCYTES # BLD AUTO: 1.2 10E3/UL (ref 0.8–5.3)
LYMPHOCYTES NFR BLD AUTO: 27 %
MCH RBC QN AUTO: 33.2 PG (ref 26.5–33)
MCHC RBC AUTO-ENTMCNC: 34.2 G/DL (ref 31.5–36.5)
MCV RBC AUTO: 97 FL (ref 78–100)
MICROALBUMIN UR-MCNC: 32.1 MG/L
MICROALBUMIN/CREAT UR: 23.48 MG/G CR (ref 0–17)
MONOCYTES # BLD AUTO: 0.5 10E3/UL (ref 0–1.3)
MONOCYTES NFR BLD AUTO: 11 %
NEUTROPHILS # BLD AUTO: 2.8 10E3/UL (ref 1.6–8.3)
NEUTROPHILS NFR BLD AUTO: 61 %
NONHDLC SERPL-MCNC: 93 MG/DL
NRBC # BLD AUTO: 0 10E3/UL
NRBC BLD AUTO-RTO: 0 /100
PLATELET # BLD AUTO: 184 10E3/UL (ref 150–450)
POTASSIUM SERPL-SCNC: 4.1 MMOL/L (ref 3.4–5.3)
PROT SERPL-MCNC: 7.4 G/DL (ref 6.4–8.3)
PSA SERPL DL<=0.01 NG/ML-MCNC: 1.82 NG/ML (ref 0–4.5)
RBC # BLD AUTO: 4.16 10E6/UL (ref 4.4–5.9)
SODIUM SERPL-SCNC: 136 MMOL/L (ref 136–145)
TRIGL SERPL-MCNC: 90 MG/DL
WBC # BLD AUTO: 4.6 10E3/UL (ref 4–11)

## 2023-06-05 PROCEDURE — 83036 HEMOGLOBIN GLYCOSYLATED A1C: CPT

## 2023-06-05 PROCEDURE — 80053 COMPREHEN METABOLIC PANEL: CPT

## 2023-06-05 PROCEDURE — 36415 COLL VENOUS BLD VENIPUNCTURE: CPT

## 2023-06-05 PROCEDURE — 99214 OFFICE O/P EST MOD 30 MIN: CPT | Mod: 25 | Performed by: FAMILY MEDICINE

## 2023-06-05 PROCEDURE — 80061 LIPID PANEL: CPT

## 2023-06-05 PROCEDURE — 73502 X-RAY EXAM HIP UNI 2-3 VIEWS: CPT | Mod: TC | Performed by: STUDENT IN AN ORGANIZED HEALTH CARE EDUCATION/TRAINING PROGRAM

## 2023-06-05 PROCEDURE — 99396 PREV VISIT EST AGE 40-64: CPT | Mod: 25 | Performed by: FAMILY MEDICINE

## 2023-06-05 PROCEDURE — 84153 ASSAY OF PSA TOTAL: CPT

## 2023-06-05 PROCEDURE — 73562 X-RAY EXAM OF KNEE 3: CPT | Mod: TC | Performed by: STUDENT IN AN ORGANIZED HEALTH CARE EDUCATION/TRAINING PROGRAM

## 2023-06-05 PROCEDURE — 85025 COMPLETE CBC W/AUTO DIFF WBC: CPT

## 2023-06-05 PROCEDURE — 90750 HZV VACC RECOMBINANT IM: CPT | Performed by: FAMILY MEDICINE

## 2023-06-05 PROCEDURE — 82043 UR ALBUMIN QUANTITATIVE: CPT

## 2023-06-05 PROCEDURE — 82570 ASSAY OF URINE CREATININE: CPT

## 2023-06-05 PROCEDURE — 90471 IMMUNIZATION ADMIN: CPT | Performed by: FAMILY MEDICINE

## 2023-06-05 RX ORDER — LORAZEPAM 1 MG/1
TABLET ORAL
Qty: 2 TABLET | Refills: 0 | Status: SHIPPED | OUTPATIENT
Start: 2023-06-05 | End: 2024-01-16

## 2023-06-05 ASSESSMENT — ENCOUNTER SYMPTOMS
SHORTNESS OF BREATH: 0
FEVER: 0
HEMATOCHEZIA: 0
COUGH: 0
HEMATURIA: 0
CONSTIPATION: 0
SORE THROAT: 0
HEARTBURN: 0
MYALGIAS: 0
CHILLS: 0
NERVOUS/ANXIOUS: 0
FREQUENCY: 0
JOINT SWELLING: 1
PARESTHESIAS: 0
DIZZINESS: 0
DIARRHEA: 0
ABDOMINAL PAIN: 0
HEADACHES: 0
DYSURIA: 0
PALPITATIONS: 0
WEAKNESS: 0
EYE PAIN: 0
NAUSEA: 0
ARTHRALGIAS: 1

## 2023-06-05 ASSESSMENT — PAIN SCALES - GENERAL: PAINLEVEL: MODERATE PAIN (4)

## 2023-06-05 NOTE — PROGRESS NOTES
SUBJECTIVE:   CC: Neo is an 62 year old who presents for preventative health visit.       6/5/2023     8:10 AM   Additional Questions   Roomed by james palomino     HPI            Diabetes Follow-up    How often are you checking your blood sugar? A few times a week  What time of day are you checking your blood sugars (select all that apply)?  Before and after meals  Have you had any blood sugars above 200?  No  Have you had any blood sugars below 70?  No    What symptoms do you notice when your blood sugar is low?  None    What concerns do you have today about your diabetes? None     Do you have any of these symptoms? (Select all that apply)  No numbness or tingling in feet.  No redness, sores or blisters on feet.  No complaints of excessive thirst.  No reports of blurry vision.  No significant changes to weight.  1. foot deformity   No  2. Current or previous foot ulceration     No  3. Current or previous pre-ulcerative calluses     No  4. previous partial amputation of one or both feet or complete amputation of one foot     No  5. peripheral neuropathy with evidence of callus formation     No  6. poor circulation     No          Hyperlipidemia Follow-Up      Are you regularly taking any medication or supplement to lower your cholesterol?   Yes- simvastatin    Are you having muscle aches or other side effects that you think could be caused by your cholesterol lowering medication?  No    Hypertension Follow-up      Do you check your blood pressure regularly outside of the clinic? No     Are you following a low salt diet? No    Are your blood pressures ever more than 140 on the top number (systolic) OR more   than 90 on the bottom number (diastolic), for example 140/90? No    BP Readings from Last 2 Encounters:   06/05/23 (!) 153/83   05/01/23 133/85     Hemoglobin A1C (%)   Date Value   02/15/2023 6.7 (H)   08/29/2022 6.6 (H)   08/17/2021 6.5 (A)   05/14/2021 6.3 (H)     LDL Cholesterol Calculated (mg/dL)   Date  Value   05/23/2022 47   05/14/2021 78   01/13/2020 76       Chronic Kidney Disease Follow-up      Do you take any over the counter pain medicine?: No      Today's PHQ-2 Score:       6/5/2023     8:12 AM   PHQ-2 ( 1999 [employer])   Q1: Little interest or pleasure in doing things 0   Q2: Feeling down, depressed or hopeless 0   PHQ-2 Score 0           Social History     Tobacco Use     Smoking status: Never     Smokeless tobacco: Current     Types: Chew     Tobacco comments:     Tried to quit; No passive exposure   Vaping Use     Vaping status: Never Used   Substance Use Topics     Alcohol use: Yes     Comment: socially             6/5/2023     8:10 AM   Alcohol Use   Prescreen: >3 drinks/day or >7 drinks/week? Yes   AUDIT SCORE  5       Last PSA:   PSA   Date Value Ref Range Status   05/14/2021 2.07 0 - 4 ug/L Final     Comment:     Assay Method:  Chemiluminescence using Siemens Vista analyzer     PSA Tumor Marker   Date Value Ref Range Status   05/23/2022 2.47 0.00 - 4.00 ug/L Final       Reviewed orders with patient. Reviewed health maintenance and updated orders accordingly - Yes  Labs reviewed in EPIC    Reviewed and updated as needed this visit by clinical staff   Tobacco  Allergies  Meds              Reviewed and updated as needed this visit by Provider                 Past Medical History:   Diagnosis Date     Alcoholism 1/1/2011     Allergic rhinitis, cause unspecified 3/2/2000     Anxiety  1/1/2011     Colonic Polyps 3/2/2000     Depression 1/1/2011     Gilbert's Syndrome 3/2/2000     Hypercholesterolemia 3/2/2000     Overweight(278.02) 3/2/2000     Prediabetes 3/2/2000      Past Surgical History:   Procedure Laterality Date     COLONOSCOPY  8/5/2013    Procedure: COLONOSCOPY;  colonoscopy ;  Surgeon: Yobani Ventura MD;  Location: [facility]     COLONOSCOPY N/A 3/12/2018    Procedure: COLONOSCOPY;  COLONOSCOPY with polypectomy;  Surgeon: Donovan Mayfield MD;  Location: [facility] OR     colonoscopy with  polypectomy  1/27/2011    repeat 2 years     mandibular fracture repair       vasectomy       wisdom teeth extraction         Review of Systems  CONSTITUTIONAL: NEGATIVE for fever, chills, change in weight  INTEGUMENTARY/SKIN: NEGATIVE for worrisome rashes, moles or lesions  EYES: NEGATIVE for vision changes or irritation  ENT: NEGATIVE for ear, mouth and throat problems  RESP: NEGATIVE for significant cough or SOB  CV: NEGATIVE for chest pain, palpitations or peripheral edema  GI: NEGATIVE for nausea, abdominal pain, heartburn, or change in bowel habits   male: negative for dysuria, hematuria, decreased urinary stream, erectile dysfunction, urethral discharge  MUSCULOSKELETAL: NEGATIVE for significant arthralgias or myalgia-- c/o Right greater than left knee pain./ C/o locking in both knees and pain in right hip at times   NEURO: NEGATIVE for weakness, dizziness or paresthesias  PSYCHIATRIC: NEGATIVE for changes in mood or affect    OBJECTIVE:   /78   Pulse 81   Temp 97.8  F (36.6  C) (Tympanic)   Resp 16   Wt 93.4 kg (206 lb)   SpO2 99%   BMI 33.76 kg/m      Physical Exam  GENERAL: healthy, alert and no distress  EYES: Eyes grossly normal to inspection, PERRL and conjunctivae and sclerae normal  HENT: ear canals and TM's normal, nose and mouth without ulcers or lesions  NECK: no adenopathy, no asymmetry, masses, or scars and thyroid normal to palpation  RESP: lungs clear to auscultation - no rales, rhonchi or wheezes  CV: regular rate and rhythm, normal S1 S2, no S3 or S4, no murmur, click or rub, no peripheral edema and peripheral pulses strong  ABDOMEN: soft, nontender, no hepatosplenomegaly, no masses and bowel sounds normal  MS: no gross musculoskeletal defects noted, no edema  SKIN: no suspicious lesions or rashes  NEURO: Normal strength and tone, mentation intact and speech normal  PSYCH: mentation appears normal, affect normal/bright  LYMPH: no cervical, supraclavicular, axillary, or inguinal  adenopathy  Diabetic foot exam: normal DP and PT pulses, no trophic changes or ulcerative lesions, normal sensory exam and normal monofilament exam    Results for orders placed or performed in visit on 06/05/23   XR Hip Right 2-3 Views     Status: None    Narrative    Exam: XR HIP RIGHT 2-3 VIEWS    Technique: Right hip, 2 views    Comparison: None.    Exam reason: Hip pain, right    Findings:  No acute fracture or dislocation. Moderate degenerative changes of the  right hip with joint space narrowing, subchondral sclerosis, and  osteophytes. There is a fairly well-corticated density at the medial  inferior hip, possibly sequela of remote injury or an intra-articular  loose body.    Soft tissues appear normal.      Impression    Impression:  No acute fracture or dislocation.    Moderate degenerative changes of the right hip.    TERA ADAME MD         SYSTEM ID:  [id number]   Results for orders placed or performed in visit on 06/05/23   XR Knee Standing Bilateral 3 Views     Status: None    Narrative    Exam: XR KNEE STANDING BILATERAL 3 VIEWS    Technique: Bilateral knees, 3 views each side    Comparison: None.    Exam reason: Bilateral chronic knee pain; Bilateral chronic knee pain;  Bilateral chronic knee pain    Findings:  No acute fracture or dislocation. Mild lateral compartment joint space  narrowing in the right knee. Mild to moderate medial and mild lateral  compartment joint space narrowing in the left knee. Small  patellofemoral osteophytes on the left.    Small knee joint effusion on the right.      Impression    Impression:  No acute fracture or dislocation.    Mild to moderate degenerative changes in the left knee and mild  degenerative changes in the right knee.    TERA ADAME MD         SYSTEM ID:  [id number]   Results for orders placed or performed in visit on 06/05/23   PSA tumor marker     Status: Normal   Result Value Ref Range    PSA Tumor Marker 1.82 0.00 - 4.50 ng/mL    Narrative    This  result is obtained using the Roche Elecsys total PSA method on the shelley [id number] immunoassay analyzer. Results obtained with different assay methods or kits cannot be used interchangeably.   Hemoglobin A1c     Status: Abnormal   Result Value Ref Range    Estimated Average Glucose 143 mg/dL    Hemoglobin A1C 6.6 (H) <5.7 %   Comprehensive metabolic panel     Status: Abnormal   Result Value Ref Range    Sodium 136 136 - 145 mmol/L    Potassium 4.1 3.4 - 5.3 mmol/L    Chloride 101 98 - 107 mmol/L    Carbon Dioxide (CO2) 24 22 - 29 mmol/L    Anion Gap 11 7 - 15 mmol/L    Urea Nitrogen 11.8 8.0 - 23.0 mg/dL    Creatinine 1.11 0.67 - 1.17 mg/dL    Calcium 10.2 8.8 - 10.2 mg/dL    Glucose 143 (H) 70 - 99 mg/dL    Alkaline Phosphatase 72 40 - 129 U/L    AST 54 (H) 10 - 50 U/L    ALT 88 (H) 10 - 50 U/L    Protein Total 7.4 6.4 - 8.3 g/dL    Albumin 4.6 3.5 - 5.2 g/dL    Bilirubin Total 1.5 (H) <=1.2 mg/dL    GFR Estimate 75 >60 mL/min/1.73m2   Lipid Profile     Status: Normal   Result Value Ref Range    Cholesterol 148 <200 mg/dL    Triglycerides 90 <150 mg/dL    Direct Measure HDL 55 >=40 mg/dL    LDL Cholesterol Calculated 75 <=100 mg/dL    Non HDL Cholesterol 93 <130 mg/dL    Narrative    Cholesterol  Desirable:  <200 mg/dL    Triglycerides  Normal:  Less than 150 mg/dL  Borderline High:  150-199 mg/dL  High:  200-499 mg/dL  Very High:  Greater than or equal to 500 mg/dL    Direct Measure HDL  Female:  Greater than or equal to 50 mg/dL   Male:  Greater than or equal to 40 mg/dL    LDL Cholesterol  Desirable:  <100mg/dL  Above Desirable:  100-129 mg/dL   Borderline High:  130-159 mg/dL   High:  160-189 mg/dL   Very High:  >= 190 mg/dL    Non HDL Cholesterol  Desirable:  130 mg/dL  Above Desirable:  130-159 mg/dL  Borderline High:  160-189 mg/dL  High:  190-219 mg/dL  Very High:  Greater than or equal to 220 mg/dL   Albumin Random Urine Quantitative with Creat Ratio     Status: Abnormal   Result Value Ref Range    Creatinine  Urine mg/dL 136.7 mg/dL    Albumin Urine mg/L 32.1 mg/L    Albumin Urine mg/g Cr 23.48 (H) 0.00 - 17.00 mg/g Cr   CBC with platelets and differential     Status: Abnormal   Result Value Ref Range    WBC Count 4.6 4.0 - 11.0 10e3/uL    RBC Count 4.16 (L) 4.40 - 5.90 10e6/uL    Hemoglobin 13.8 13.3 - 17.7 g/dL    Hematocrit 40.4 40.0 - 53.0 %    MCV 97 78 - 100 fL    MCH 33.2 (H) 26.5 - 33.0 pg    MCHC 34.2 31.5 - 36.5 g/dL    RDW 13.1 10.0 - 15.0 %    Platelet Count 184 150 - 450 10e3/uL    % Neutrophils 61 %    % Lymphocytes 27 %    % Monocytes 11 %    % Eosinophils 1 %    % Basophils 0 %    % Immature Granulocytes 0 %    NRBCs per 100 WBC 0 <1 /100    Absolute Neutrophils 2.8 1.6 - 8.3 10e3/uL    Absolute Lymphocytes 1.2 0.8 - 5.3 10e3/uL    Absolute Monocytes 0.5 0.0 - 1.3 10e3/uL    Absolute Eosinophils 0.0 0.0 - 0.7 10e3/uL    Absolute Basophils 0.0 0.0 - 0.2 10e3/uL    Absolute Immature Granulocytes 0.0 <=0.4 10e3/uL    Absolute NRBCs 0.0 10e3/uL   CBC with Platelets & Differential     Status: Abnormal    Narrative    The following orders were created for panel order CBC with Platelets & Differential.  Procedure                               Abnormality         Status                     ---------                               -----------         ------                     CBC with platelets and d...[[id number]]  Abnormal            Final result                 Please view results for these tests on the individual orders.         ASSESSMENT/PLAN:   (Z00.00) Annual physical exam  (primary encounter diagnosis)  Comment: doing well overal.  Plan:  Follow-up in a year     (E11.9) Type 2 diabetes mellitus without complication, without long-term current use of insulin (H)  Comment: great control. Continue current medications and behavioral changes. Follow-up in 6 months   Plan: [facility] FOOT EXAM  NO CHARGE, Albumin Random Urine         Quantitative with Creat Ratio, Lipid Profile,         CBC with Platelets &  Differential,         Comprehensive metabolic panel, Hemoglobin A1c            (N18.2) Chronic kidney disease, stage 2 (mild)  Comment: stable  Plan: Comprehensive metabolic panel, Hemoglobin A1c        On ARB and Farxiga    (E11.29,  R80.9) Microalbuminuria due to type 2 diabetes mellitus (H)  Comment: stable on ARB and Farxiga  Plan: Albumin Random Urine Quantitative with Creat         Ratio, Comprehensive metabolic panel,         Hemoglobin A1c            (I10) Benign essential hypertension  Comment: better. No change in meds   Plan: Lipid Profile, Comprehensive metabolic panel        Continue current medications and behavioral changes.     (E78.2) Mixed hyperlipidemia  Comment: stable on statin  Plan: Lipid Profile        Continue current medications and behavioral changes.     (R35.1) Nocturia  Comment: stable - psa ok  Plan: PSA tumor marker            (Z23) Need for vaccination  Comment: needs Zostravax   Plan: will do next time     (M25.561,  M25.562,  G89.29) Bilateral chronic knee pain  Comment: discussed. Right worse.   Plan: XR Knee Standing Bilateral 3 Views        See below     (M25.551) Hip pain, right  Comment: see below  Plan: XR Hip Right 2-3 Views            (M17.11) Primary osteoarthritis of right knee  Comment: will get mri - probable meniscal tear laterally by hx.   Plan: MR Knee Right w/o Contrast, Orthopedic          Referral        MRI and ortho ordered     (M16.11) Primary osteoarthritis of right hip  Comment-MRI and ortho ordered    Plan: MR Hip Right w/o Contrast, Orthopedic         Referral            (M25.851) Hip impingement syndrome, right  Comment: as above.   Plan: MR Hip Right w/o Contrast, Orthopedic         Referral            (M25.861) Impingement of knee joint, right  Comment: as above,.   Plan: MR Knee Right w/o Contrast, Orthopedic          Referral            (F40.240) Claustrophobia  Comment: will treat before mri  Plan: LORazepam  "(ATIVAN) 1 MG tablet                    COUNSELING:   Reviewed preventive health counseling, as reflected in patient instructions       Regular exercise       Healthy diet/nutrition       Colorectal cancer screening       Prostate cancer screening      BMI:   Estimated body mass index is 33.76 kg/m  as calculated from the following:    Height as of 4/3/23: 1.664 m (5' 5.5\").    Weight as of this encounter: 93.4 kg (206 lb).   Weight management plan: Discussed healthy diet and exercise guidelines      He reports that he has never smoked. His smokeless tobacco use includes chew.        Colin Fisher MD  [facility] [place]  "

## 2023-06-20 ENCOUNTER — HOSPITAL ENCOUNTER (OUTPATIENT)
Dept: MRI IMAGING | Facility: HOSPITAL | Age: 63
Discharge: HOME | End: 2023-06-20
Attending: FAMILY MEDICINE
Payer: COMMERCIAL

## 2023-06-20 DIAGNOSIS — M25.851 HIP IMPINGEMENT SYNDROME, RIGHT: ICD-10-CM

## 2023-06-20 DIAGNOSIS — M16.11 PRIMARY OSTEOARTHRITIS OF RIGHT HIP: ICD-10-CM

## 2023-06-20 DIAGNOSIS — M17.11 PRIMARY OSTEOARTHRITIS OF RIGHT KNEE: ICD-10-CM

## 2023-06-20 DIAGNOSIS — M25.861 IMPINGEMENT OF KNEE JOINT, RIGHT: ICD-10-CM

## 2023-06-20 PROCEDURE — 73721 MRI JNT OF LWR EXTRE W/O DYE: CPT | Mod: RT

## 2023-06-20 PROCEDURE — 73721 MRI JNT OF LWR EXTRE W/O DYE: CPT | Mod: RT,76

## 2023-09-24 DIAGNOSIS — I10 BENIGN ESSENTIAL HYPERTENSION: ICD-10-CM

## 2023-09-25 NOTE — TELEPHONE ENCOUNTER
[facility]ura      Last Written Prescription Date:  2/21/23  Last Fill Quantity: 270,   # refills: 1  Last Office Visit: 6/5/23  Future Office visit:    Next 5 appointments (look out 90 days)      Nov 13, 2023  7:45 AM  (Arrive by 7:30 AM)  SHORT with Colin Fisher MD  [facility] [place] ([facility] [place] ) [address]  [place] [address]  [phone]             Routing refill request to provider for review/approval because:

## 2023-09-26 RX ORDER — DOXAZOSIN 2 MG/1
6 TABLET ORAL AT BEDTIME
Qty: 270 TABLET | Refills: 2 | Status: SHIPPED | OUTPATIENT
Start: 2023-09-26 | End: 2024-06-10

## 2023-10-17 ENCOUNTER — MYC MEDICAL ADVICE (OUTPATIENT)
Dept: FAMILY MEDICINE | Facility: OTHER | Age: 63
End: 2023-10-17

## 2023-10-17 ENCOUNTER — TELEPHONE (OUTPATIENT)
Dept: FAMILY MEDICINE | Facility: OTHER | Age: 63
End: 2023-10-17

## 2023-10-17 DIAGNOSIS — E11.9 TYPE 2 DIABETES MELLITUS WITHOUT COMPLICATION, WITHOUT LONG-TERM CURRENT USE OF INSULIN (H): Primary | ICD-10-CM

## 2023-10-17 NOTE — TELEPHONE ENCOUNTER
Call placed to [facility] and per them pt simvastatin is on an auto refill and will be shipped to him.  Per the pharmacy he should receive his refills in the next day or two.  Pt call and informed.  Pt encouraged if he did not want auto refill to call the pharmacy.  Pt verbalized understanding.

## 2023-10-30 ENCOUNTER — HOSPITAL ENCOUNTER (OUTPATIENT)
Dept: EDUCATION SERVICES | Facility: HOSPITAL | Age: 63
Discharge: HOME | End: 2023-10-30
Attending: DIETITIAN, REGISTERED | Admitting: DIETITIAN, REGISTERED
Payer: COMMERCIAL

## 2023-10-30 VITALS
DIASTOLIC BLOOD PRESSURE: 72 MMHG | RESPIRATION RATE: 16 BRPM | BODY MASS INDEX: 34.1 KG/M2 | OXYGEN SATURATION: 99 % | HEART RATE: 78 BPM | WEIGHT: 208.1 LBS | SYSTOLIC BLOOD PRESSURE: 144 MMHG

## 2023-10-30 DIAGNOSIS — E11.9 TYPE 2 DIABETES MELLITUS WITHOUT COMPLICATION, WITHOUT LONG-TERM CURRENT USE OF INSULIN (H): Primary | ICD-10-CM

## 2023-10-30 PROCEDURE — 97803 MED NUTRITION INDIV SUBSEQ: CPT | Performed by: DIETITIAN, REGISTERED

## 2023-10-30 ASSESSMENT — PAIN SCALES - GENERAL: PAINLEVEL: NO PAIN (0)

## 2023-10-30 NOTE — PATIENT INSTRUCTIONS
-Continue to work on healthy, low carbohydrate food choices.    -Limit sodium in diet - may help with blood pressure.   -Good times to test your glucose are fasting, before a meal or 2 hours after a meal.    -Target glucose levels are fasting and before meal , 2 hours after a meal < 180.   -Last A1c was in target at 6.6% in June - you are due for recheck when you see Dr. Fisher at the end of the month.    -Follow up June 2024.    -Call with any concerns - TODD Bran, [facility] [phone].

## 2023-10-30 NOTE — PROGRESS NOTES
Diabetes Self-Management Education & Support    Presents for: Individual review    Type of Service: In Person Visit    ASSESSMENT:  Most recent A1c in target at 6.6% June 2023.  Pt will have follow up A1c at upcoming provider visit.   Pt does make efforts to limit carbohydrates and sodium in diet.  Eye exam and foot exam are up to date.  BP high on two checks today - discussed possibly obtaining a BP cuff for home use.      Patient's most recent   Lab Results   Component Value Date    A1C 6.6 06/05/2023    A1C 6.5 08/17/2021     is meeting goal of <7.0    Diabetes knowledge and skills assessment:   Patient is knowledgeable in diabetes management concepts related to: Healthy Eating, Being Active, Monitoring, Taking Medication, Problem Solving, Reducing Risks, and Healthy Coping    Education today focused on the following diabetes management concepts: Reviewed goal BP level, foods high in sodium to limit.      Based on learning assessment above, most appropriate setting for further diabetes education would be: Individual setting.      PLAN  A1c check at upcoming provider visit.    Follow up with provider regarding BP - ? Cuff for home use.     Follow-up: June 2024.     See Care Plan for co-developed, patient-state behavior change goals.  AVS provided for patient today.    Education Materials Provided:  No new materials today.     SUBJECTIVE/OBJECTIVE:  Presents for: Individual review  Accompanied by: Self  Diabetes education in the past 24mo: Yes  Focus of Visit: Assistance w/ making life changes  Diabetes type: Type 2  Date of diagnosis: 12/2014  Disease course: Stable  How confident are you filling out medical forms by yourself:: Extremely  Diabetes management related comments/concerns: None  Transportation concerns: No  Difficulty affording diabetes medication?: No  Difficulty affording diabetes testing supplies?: No  Other concerns:: None  Cultural Influences/Ethnic Background:  Not  or     Diabetes  "Symptoms & Complications:  Fatigue: No  Neuropathy: No  Polyuria: Yes (On Jardiance)  Visual change: No  Slow healing wounds: No  Symptom course: Stable  Weight trend: Decreasing (Weight is down 6# since last visit March 2023.)  Complications assessed today?: Yes  CVA: No  Heart disease: No  Nephropathy: Yes (CKD stage 2)  Retinopathy: No    Patient Problem List and Family Medical History reviewed for relevant medical history, current medical status, and diabetes risk factors.    Vitals:  /72   Pulse 78   Resp 16   Wt 94.4 kg (208 lb 1.6 oz)   SpO2 99%   BMI 34.10 kg/m    Estimated body mass index is 34.1 kg/m  as calculated from the following:    Height as of 4/3/23: 1.664 m (5' 5.5\").    Weight as of this encounter: 94.4 kg (208 lb 1.6 oz).   Last 3 BP:   BP Readings from Last 3 Encounters:   10/30/23 144/72   06/05/23 136/78   05/01/23 133/85       History   Smoking Status    Never   Smokeless Tobacco    Current    Types: Chew       Labs:  Lab Results   Component Value Date    A1C 6.6 06/05/2023    A1C 6.5 08/17/2021     Lab Results   Component Value Date     06/05/2023     09/26/2022     05/14/2021     Lab Results   Component Value Date    LDL 75 06/05/2023    LDL 78 05/14/2021     HDL Cholesterol   Date Value Ref Range Status   05/14/2021 61 >39 mg/dL Final     Direct Measure HDL   Date Value Ref Range Status   06/05/2023 55 >=40 mg/dL Final   ]  GFR Estimate   Date Value Ref Range Status   06/05/2023 75 >60 mL/min/1.73m2 Final     Comment:     eGFR calculated using 2021 CKD-EPI equation.   05/14/2021 85 >60 mL/min/[1.73_m2] Final     Comment:     Non  GFR Calc  Starting 12/18/2018, serum creatinine based estimated GFR (eGFR) will be   calculated using the Chronic Kidney Disease Epidemiology Collaboration   (CKD-EPI) equation.       GFR Estimate If Black   Date Value Ref Range Status   05/14/2021 >90 >60 mL/min/[1.73_m2] Final     Comment:      GFR " "Calc  Starting 12/18/2018, serum creatinine based estimated GFR (eGFR) will be   calculated using the Chronic Kidney Disease Epidemiology Collaboration   (CKD-EPI) equation.       Lab Results   Component Value Date    CR 1.11 06/05/2023    CR 0.96 05/14/2021     No results found for: \"MICROALBUMIN\"    Healthy Eating:  Healthy Eating Assessed Today: Yes  Cultural/Cheondoism diet restrictions?: No  Meal planning/habits: Carb counting (Limits carbohdyrates to 60 grams/meal)  How many times a week on average do you eat food made away from home (restaurant/take-out)?: 0  Meals include: Breakfast, Lunch, Dinner  Breakfast: 1 granola bar and yogurt  Lunch: sandwich/chips or salad or leftovers  Dinner: meat, veggies, potatoes- smaller portions  Snacks: minimal snacks - no hs - sometimes cheese stick or nuts during the day  Beverages: Water, Sports drinks, Diet soda (Gatorade Zero)  Has patient met with a dietitian in the past?: Yes    Being Active:  Being Active Assessed Today: Yes  Exercise:: Yes (Pt is active hunting in the fall.)  Barrier to exercise: Physical limitation (Has arthritis in hip and  knee.)    Monitoring:  Monitoring Assessed Today: Yes  Did patient bring glucose meter to appointment? : Yes  Blood Glucose Meter: ContourNext  Times checking blood sugar at home (number): 1  Times checking blood sugar at home (per): Day  Blood glucose trend: No change  Fasting-123, 136, 125, 108, 133, 130, 121  2 hours post supper-125, 127, 148  Two week average is 128.     Taking Medications:  Diabetes Medication(s)       Biguanides       metFORMIN (GLUCOPHAGE XR) 500 MG 24 hr tablet    TAKE 4 TABLETS (2,000 MG) BY MOUTH DAILY (WITH DINNER)      Sodium-Glucose Co-Transporter 2 (SGLT2) Inhibitors       FARXIGA 5 MG TABS tablet    TAKE 1 TABLET (5 MG) BY MOUTH EVERY MORNING            Taking Medication Assessed Today: Yes  Current Treatments: Diet, Oral Medication (taken by mouth) (Metformin XR 1000 evening, Farxiga 5 mg daily " (takes in pm as was forgetting in am - provider aware))  Problems taking diabetes medications regularly?: No  Diabetes medication side effects?: No (Had diarrhea on 2000 mg Metformin XR daily.)    Problem Solving:  Problem Solving Assessed Today: Yes  Is the patient at risk for hypoglycemia?: No  Is the patient at risk for DKA?: No    Reducing Risks:  Reducing Risks Assessed Today: Yes  Diabetes Risks: Age over 45 years  CAD Risks: Diabetes Mellitus, Male sex, Tobacco exposure  Has dilated eye exam at least once a year?: Yes  Sees dentist every 6 months?: Yes  Feet checked by healthcare provider in the last year?: Yes    Healthy Coping:  Healthy Coping Assessed Today: Yes  Emotional response to diabetes: Acceptance, Concern for health and well-being  Informal Support system:: Family  Stage of change: MAINTENANCE (Working to maintain change, with risk of relapse)  Patient Activation Measure Survey Score:      12/18/2018     2:00 PM 8/29/2022     3:00 PM   NOY Score (Last Two)   NOY Raw Score 37 37   Activation Score 79.2 79.2   NOY Level 4 4       Time Spent: 20 minutes  Encounter Type: Individual    Any diabetes medication dose changes were made via the CDE Protocol per the patient's referring provider. A copy of this encounter was shared with the provider.

## 2023-10-30 NOTE — LETTER
10/30/2023        RE: Neo Cowan  [address] [facility] [address]        Diabetes Self-Management Education & Support    Presents for: Individual review    Type of Service: In Person Visit    ASSESSMENT:  Most recent A1c in target at 6.6% June 2023.  Pt will have follow up A1c at upcoming provider visit.   Pt does make efforts to limit carbohydrates and sodium in diet.  Eye exam and foot exam are up to date.  BP high on two checks today - discussed possibly obtaining a BP cuff for home use.      Patient's most recent   Lab Results   Component Value Date    A1C 6.6 06/05/2023    A1C 6.5 08/17/2021     is meeting goal of <7.0    Diabetes knowledge and skills assessment:   Patient is knowledgeable in diabetes management concepts related to: Healthy Eating, Being Active, Monitoring, Taking Medication, Problem Solving, Reducing Risks, and Healthy Coping    Education today focused on the following diabetes management concepts: Reviewed goal BP level, foods high in sodium to limit.      Based on learning assessment above, most appropriate setting for further diabetes education would be: Individual setting.      PLAN  A1c check at upcoming provider visit.    Follow up with provider regarding BP - ? Cuff for home use.     Follow-up: June 2024.     See Care Plan for co-developed, patient-state behavior change goals.  AVS provided for patient today.    Education Materials Provided:  No new materials today.     SUBJECTIVE/OBJECTIVE:  Presents for: Individual review  Accompanied by: Self  Diabetes education in the past 24mo: Yes  Focus of Visit: Assistance w/ making life changes  Diabetes type: Type 2  Date of diagnosis: 12/2014  Disease course: Stable  How confident are you filling out medical forms by yourself:: Extremely  Diabetes management related comments/concerns: None  Transportation concerns: No  Difficulty affording diabetes medication?: No  Difficulty affording diabetes testing supplies?: No  Other  "concerns:: None  Cultural Influences/Ethnic Background:  Not  or     Diabetes Symptoms & Complications:  Fatigue: No  Neuropathy: No  Polyuria: Yes (On Jardiance)  Visual change: No  Slow healing wounds: No  Symptom course: Stable  Weight trend: Decreasing (Weight is down 6# since last visit March 2023.)  Complications assessed today?: Yes  CVA: No  Heart disease: No  Nephropathy: Yes (CKD stage 2)  Retinopathy: No    Patient Problem List and Family Medical History reviewed for relevant medical history, current medical status, and diabetes risk factors.    Vitals:  /72   Pulse 78   Resp 16   Wt 94.4 kg (208 lb 1.6 oz)   SpO2 99%   BMI 34.10 kg/m    Estimated body mass index is 34.1 kg/m  as calculated from the following:    Height as of 4/3/23: 1.664 m (5' 5.5\").    Weight as of this encounter: 94.4 kg (208 lb 1.6 oz).   Last 3 BP:   BP Readings from Last 3 Encounters:   10/30/23 144/72   06/05/23 136/78   05/01/23 133/85       History   Smoking Status     Never   Smokeless Tobacco     Current     Types: Chew       Labs:  Lab Results   Component Value Date    A1C 6.6 06/05/2023    A1C 6.5 08/17/2021     Lab Results   Component Value Date     06/05/2023     09/26/2022     05/14/2021     Lab Results   Component Value Date    LDL 75 06/05/2023    LDL 78 05/14/2021     HDL Cholesterol   Date Value Ref Range Status   05/14/2021 61 >39 mg/dL Final     Direct Measure HDL   Date Value Ref Range Status   06/05/2023 55 >=40 mg/dL Final   ]  GFR Estimate   Date Value Ref Range Status   06/05/2023 75 >60 mL/min/1.73m2 Final     Comment:     eGFR calculated using 2021 CKD-EPI equation.   05/14/2021 85 >60 mL/min/[1.73_m2] Final     Comment:     Non  GFR Calc  Starting 12/18/2018, serum creatinine based estimated GFR (eGFR) will be   calculated using the Chronic Kidney Disease Epidemiology Collaboration   (CKD-EPI) equation.       GFR Estimate If Black   Date Value Ref " "Range Status   05/14/2021 >90 >60 mL/min/[1.73_m2] Final     Comment:      GFR Calc  Starting 12/18/2018, serum creatinine based estimated GFR (eGFR) will be   calculated using the Chronic Kidney Disease Epidemiology Collaboration   (CKD-EPI) equation.       Lab Results   Component Value Date    CR 1.11 06/05/2023    CR 0.96 05/14/2021     No results found for: \"MICROALBUMIN\"    Healthy Eating:  Healthy Eating Assessed Today: Yes  Cultural/Confucianism diet restrictions?: No  Meal planning/habits: Carb counting (Limits carbohdyrates to 60 grams/meal)  How many times a week on average do you eat food made away from home (restaurant/take-out)?: 0  Meals include: Breakfast, Lunch, Dinner  Breakfast: 1 granola bar and yogurt  Lunch: sandwich/chips or salad or leftovers  Dinner: meat, veggies, potatoes- smaller portions  Snacks: minimal snacks - no hs - sometimes cheese stick or nuts during the day  Beverages: Water, Sports drinks, Diet soda (Gatorade Zero)  Has patient met with a dietitian in the past?: Yes    Being Active:  Being Active Assessed Today: Yes  Exercise:: Yes (Pt is active hunting in the fall.)  Barrier to exercise: Physical limitation (Has arthritis in hip and  knee.)    Monitoring:  Monitoring Assessed Today: Yes  Did patient bring glucose meter to appointment? : Yes  Blood Glucose Meter: ContourNext  Times checking blood sugar at home (number): 1  Times checking blood sugar at home (per): Day  Blood glucose trend: No change  Fasting-123, 136, 125, 108, 133, 130, 121  2 hours post supper-125, 127, 148  Two week average is 128.     Taking Medications:  Diabetes Medication(s)       Biguanides       metFORMIN (GLUCOPHAGE XR) 500 MG 24 hr tablet    TAKE 4 TABLETS (2,000 MG) BY MOUTH DAILY (WITH DINNER)      Sodium-Glucose Co-Transporter 2 (SGLT2) Inhibitors       FARXIGA 5 MG TABS tablet    TAKE 1 TABLET (5 MG) BY MOUTH EVERY MORNING            Taking Medication Assessed Today: Yes  Current " Treatments: Diet, Oral Medication (taken by mouth) (Metformin XR 1000 evening, Farxiga 5 mg daily (takes in pm as was forgetting in am - provider aware))  Problems taking diabetes medications regularly?: No  Diabetes medication side effects?: No (Had diarrhea on 2000 mg Metformin XR daily.)    Problem Solving:  Problem Solving Assessed Today: Yes  Is the patient at risk for hypoglycemia?: No  Is the patient at risk for DKA?: No    Reducing Risks:  Reducing Risks Assessed Today: Yes  Diabetes Risks: Age over 45 years  CAD Risks: Diabetes Mellitus, Male sex, Tobacco exposure  Has dilated eye exam at least once a year?: Yes  Sees dentist every 6 months?: Yes  Feet checked by healthcare provider in the last year?: Yes    Healthy Coping:  Healthy Coping Assessed Today: Yes  Emotional response to diabetes: Acceptance, Concern for health and well-being  Informal Support system:: Family  Stage of change: MAINTENANCE (Working to maintain change, with risk of relapse)  Patient Activation Measure Survey Score:      12/18/2018     2:00 PM 8/29/2022     3:00 PM   NOY Score (Last Two)   NOY Raw Score 37 37   Activation Score 79.2 79.2   NOY Level 4 4       Time Spent: 20 minutes  Encounter Type: Individual    Any diabetes medication dose changes were made via the CDE Protocol per the patient's referring provider. A copy of this encounter was shared with the provider.       Sincerely,        Jacque Lamas RD

## 2023-11-24 ENCOUNTER — LAB (OUTPATIENT)
Dept: LAB | Facility: OTHER | Age: 63
End: 2023-11-24
Attending: FAMILY MEDICINE
Payer: COMMERCIAL

## 2023-11-24 ENCOUNTER — OFFICE VISIT (OUTPATIENT)
Dept: FAMILY MEDICINE | Facility: OTHER | Age: 63
End: 2023-11-24
Attending: FAMILY MEDICINE
Payer: COMMERCIAL

## 2023-11-24 VITALS
OXYGEN SATURATION: 97 % | DIASTOLIC BLOOD PRESSURE: 70 MMHG | SYSTOLIC BLOOD PRESSURE: 132 MMHG | WEIGHT: 211.1 LBS | HEART RATE: 69 BPM | BODY MASS INDEX: 35.17 KG/M2 | TEMPERATURE: 97.8 F | HEIGHT: 65 IN

## 2023-11-24 DIAGNOSIS — R80.9 MICROALBUMINURIA DUE TO TYPE 2 DIABETES MELLITUS (H): ICD-10-CM

## 2023-11-24 DIAGNOSIS — N18.2 CHRONIC KIDNEY DISEASE, STAGE 2 (MILD): ICD-10-CM

## 2023-11-24 DIAGNOSIS — M17.12 PRIMARY OSTEOARTHRITIS OF LEFT KNEE: ICD-10-CM

## 2023-11-24 DIAGNOSIS — I10 BENIGN ESSENTIAL HYPERTENSION: ICD-10-CM

## 2023-11-24 DIAGNOSIS — E11.29 MICROALBUMINURIA DUE TO TYPE 2 DIABETES MELLITUS (H): ICD-10-CM

## 2023-11-24 DIAGNOSIS — E78.2 MIXED HYPERLIPIDEMIA: ICD-10-CM

## 2023-11-24 DIAGNOSIS — E11.9 TYPE 2 DIABETES MELLITUS WITHOUT COMPLICATION, WITHOUT LONG-TERM CURRENT USE OF INSULIN (H): ICD-10-CM

## 2023-11-24 DIAGNOSIS — E11.9 TYPE 2 DIABETES MELLITUS WITHOUT COMPLICATION, WITHOUT LONG-TERM CURRENT USE OF INSULIN (H): Primary | ICD-10-CM

## 2023-11-24 LAB
ANION GAP SERPL CALCULATED.3IONS-SCNC: 11 MMOL/L (ref 7–15)
BUN SERPL-MCNC: 16.1 MG/DL (ref 8–23)
CALCIUM SERPL-MCNC: 10.3 MG/DL (ref 8.8–10.2)
CHLORIDE SERPL-SCNC: 101 MMOL/L (ref 98–107)
CREAT SERPL-MCNC: 1 MG/DL (ref 0.67–1.17)
DEPRECATED HCO3 PLAS-SCNC: 24 MMOL/L (ref 22–29)
EGFRCR SERPLBLD CKD-EPI 2021: 85 ML/MIN/1.73M2
EST. AVERAGE GLUCOSE BLD GHB EST-MCNC: 131 MG/DL
GLUCOSE SERPL-MCNC: 130 MG/DL (ref 70–99)
HBA1C MFR BLD: 6.2 %
POTASSIUM SERPL-SCNC: 4.6 MMOL/L (ref 3.4–5.3)
SODIUM SERPL-SCNC: 136 MMOL/L (ref 135–145)
TSH SERPL DL<=0.005 MIU/L-ACNC: 1.42 UIU/ML (ref 0.3–4.2)

## 2023-11-24 PROCEDURE — 83036 HEMOGLOBIN GLYCOSYLATED A1C: CPT

## 2023-11-24 PROCEDURE — 90686 IIV4 VACC NO PRSV 0.5 ML IM: CPT | Performed by: FAMILY MEDICINE

## 2023-11-24 PROCEDURE — 99214 OFFICE O/P EST MOD 30 MIN: CPT | Mod: 25 | Performed by: FAMILY MEDICINE

## 2023-11-24 PROCEDURE — 36415 COLL VENOUS BLD VENIPUNCTURE: CPT

## 2023-11-24 PROCEDURE — 80048 BASIC METABOLIC PNL TOTAL CA: CPT

## 2023-11-24 PROCEDURE — 90471 IMMUNIZATION ADMIN: CPT | Performed by: FAMILY MEDICINE

## 2023-11-24 PROCEDURE — 84443 ASSAY THYROID STIM HORMONE: CPT

## 2023-11-24 RX ORDER — TRAMADOL HYDROCHLORIDE 50 MG/1
100 TABLET ORAL EVERY 6 HOURS PRN
Qty: 18 TABLET | Refills: 0 | Status: SHIPPED | OUTPATIENT
Start: 2023-11-24 | End: 2023-11-27

## 2023-11-24 ASSESSMENT — PAIN SCALES - GENERAL: PAINLEVEL: MILD PAIN (3)

## 2023-11-24 NOTE — PROGRESS NOTES
"  Assessment & Plan     Type 2 diabetes mellitus without complication, without long-term current use of insulin (H)  Great control., Continue current medications and behavioral changes.   Follow-up in 6 months.   - Basic metabolic panel; Future  - Hemoglobin A1c; Future  - TSH; Future  - Adult Eye  Referral; Future    Benign essential hypertension  Much better control. No issues. Continue current medications and behavioral changes.   - Basic metabolic panel; Future  - Hemoglobin A1c; Future  - TSH; Future    Chronic kidney disease, stage 2 (mild)  Stable   - Basic metabolic panel; Future  - Hemoglobin A1c; Future  - TSH; Future    Microalbuminuria due to type 2 diabetes mellitus (H)  Stable on ACE/ARB  - Basic metabolic panel; Future  - Hemoglobin A1c; Future  - TSH; Future    Mixed hyperlipidemia  Stable on statin   - Basic metabolic panel; Future  - Hemoglobin A1c; Future  - TSH; Future    Primary osteoarthritis of left knee  Long discussion . Looks to need a new knee.   Refer to Dr Terrazas -- tramadol 1-2 at bedtime prn given  - Orthopedic  Referral; Future  - traMADol (ULTRAM) 50 MG tablet; Take 2 tablets (100 mg) by mouth every 6 hours as needed for severe pain             BMI:   Estimated body mass index is 35.13 kg/m  as calculated from the following:    Height as of this encounter: 1.651 m (5' 5\").    Weight as of this encounter: 95.8 kg (211 lb 1.6 oz).           No follow-ups on file.    Colin Fisher MD  [facility] - JESSICA Larson is a 62 year old, presenting for the following health issues:  Diabetes, Lipids, and Hypertension        11/24/2023     9:01 AM   Additional Questions   Roomed by Blanca EDOUARD LPN   Accompanied by self       HPI     Diabetes Follow-up    How often are you checking your blood sugar? A few times a week  What time of day are you checking your blood sugars (select all that apply)?  Before meals, After meals, and fasting checks  Have you " had any blood sugars above 200?  Yes  a while ago. Only 1  Have you had any blood sugars below 70?  No  What symptoms do you notice when your blood sugar is low?  Shaky, Dizzy, and Weak  What concerns do you have today about your diabetes? None   Do you have any of these symptoms? (Select all that apply)  No numbness or tingling in feet.  No redness, sores or blisters on feet.  No complaints of excessive thirst.  No reports of blurry vision.  No significant changes to weight.  Have you had a diabetic eye exam in the last 12 months? Yes- Date of last eye exam: 01/2023,  Location: [facility]            Hyperlipidemia Follow-Up    Are you regularly taking any medication or supplement to lower your cholesterol?   Yes- simvastatin  Are you having muscle aches or other side effects that you think could be caused by your cholesterol lowering medication?  No    Hypertension Follow-up    Do you check your blood pressure regularly outside of the clinic? No   Are you following a low salt diet? Yes  Are your blood pressures ever more than 140 on the top number (systolic) OR more   than 90 on the bottom number (diastolic), for example 140/90? No    BP Readings from Last 2 Encounters:   11/24/23 132/70   10/30/23 144/72     Hemoglobin A1C (%)   Date Value   06/05/2023 6.6 (H)   02/15/2023 6.7 (H)   08/17/2021 6.5 (A)   05/14/2021 6.3 (H)     LDL Cholesterol Calculated (mg/dL)   Date Value   06/05/2023 75   05/23/2022 47   05/14/2021 78   01/13/2020 76         Chronic Kidney Disease Follow-up    Do you take any over the counter pain medicine?: Yes  What over the counter medicine are you taking for your pain?:  Naproxen, 1 tablet in am 2 at pm. Knee pain-  Also ibuprofen nightly 800mg   How often do you take this medicine?:  One time daily  How many days per week do you exercise enough to make your heart beat faster? 3 or less  How many minutes a day do you exercise enough to make your heart beat faster? 9 or less  How many  "days per week do you miss taking your medication? 0    Left knee pain - getting way worse   Had right knee issues in past -- now it is left knee      Review of Systems   Constitutional, HEENT, cardiovascular, pulmonary, gi and gu systems are negative, except as otherwise noted.      Objective    /70 (BP Location: Left arm, Patient Position: Sitting, Cuff Size: Adult Large)   Pulse 69   Temp 97.8  F (36.6  C) (Tympanic)   Ht 1.651 m (5' 5\")   Wt 95.8 kg (211 lb 1.6 oz)   SpO2 97%   BMI 35.13 kg/m    Body mass index is 35.13 kg/m .  Physical Exam   GENERAL: healthy, alert and no distress  NECK: no adenopathy, no asymmetry, masses, or scars and thyroid normal to palpation  RESP: lungs clear to auscultation - no rales, rhonchi or wheezes  CV: regular rate and rhythm, normal S1 S2, no S3 or S4, no murmur, click or rub, no peripheral edema and peripheral pulses strong  ABDOMEN: soft, nontender, no hepatosplenomegaly, no masses and bowel sounds normal  MS: left knee - tender over medial side no gross musculoskeletal defects noted, no edema    Old XR of knees reviewed     Results for orders placed or performed in visit on 11/24/23   Basic metabolic panel     Status: Abnormal   Result Value Ref Range    Sodium 136 135 - 145 mmol/L    Potassium 4.6 3.4 - 5.3 mmol/L    Chloride 101 98 - 107 mmol/L    Carbon Dioxide (CO2) 24 22 - 29 mmol/L    Anion Gap 11 7 - 15 mmol/L    Urea Nitrogen 16.1 8.0 - 23.0 mg/dL    Creatinine 1.00 0.67 - 1.17 mg/dL    GFR Estimate 85 >60 mL/min/1.73m2    Calcium 10.3 (H) 8.8 - 10.2 mg/dL    Glucose 130 (H) 70 - 99 mg/dL   Hemoglobin A1c     Status: Abnormal   Result Value Ref Range    Estimated Average Glucose 131 mg/dL    Hemoglobin A1C 6.2 (H) <5.7 %                   "

## 2023-12-06 ENCOUNTER — PATIENT OUTREACH (OUTPATIENT)
Dept: GASTROENTEROLOGY | Facility: CLINIC | Age: 63
End: 2023-12-06

## 2023-12-11 ENCOUNTER — MEDICAL CORRESPONDENCE (OUTPATIENT)
Dept: MRI IMAGING | Facility: HOSPITAL | Age: 63
End: 2023-12-11

## 2023-12-18 ENCOUNTER — HOSPITAL ENCOUNTER (OUTPATIENT)
Dept: MRI IMAGING | Facility: HOSPITAL | Age: 63
Discharge: HOME | End: 2023-12-18
Attending: ORTHOPAEDIC SURGERY | Admitting: ORTHOPAEDIC SURGERY
Payer: COMMERCIAL

## 2023-12-18 DIAGNOSIS — S83.519A ACL (ANTERIOR CRUCIATE LIGAMENT) TEAR: ICD-10-CM

## 2023-12-18 DIAGNOSIS — M25.562 LEFT KNEE PAIN: ICD-10-CM

## 2023-12-18 PROCEDURE — 73721 MRI JNT OF LWR EXTRE W/O DYE: CPT | Mod: LT

## 2023-12-26 DIAGNOSIS — R09.81 NASAL CONGESTION WITH RHINORRHEA: ICD-10-CM

## 2023-12-26 DIAGNOSIS — R09.82 POST-NASAL DRIP: ICD-10-CM

## 2023-12-26 DIAGNOSIS — J34.89 NASAL CONGESTION WITH RHINORRHEA: ICD-10-CM

## 2023-12-26 NOTE — TELEPHONE ENCOUNTER
montelukast (SINGULAIR) 10 MG tablet       Last Written Prescription Date:  1/3/23  Last Fill Quantity: 90,   # refills: 3  Last Office Visit: 11/24/23  Future Office visit:

## 2023-12-27 RX ORDER — MONTELUKAST SODIUM 10 MG/1
10 TABLET ORAL AT BEDTIME
Qty: 90 TABLET | Refills: 3 | Status: SHIPPED | OUTPATIENT
Start: 2023-12-27 | End: 2024-06-10

## 2024-01-07 DIAGNOSIS — E11.9 TYPE 2 DIABETES MELLITUS WITHOUT COMPLICATION, WITHOUT LONG-TERM CURRENT USE OF INSULIN (H): ICD-10-CM

## 2024-01-08 ENCOUNTER — TRANSFERRED RECORDS (OUTPATIENT)
Dept: HEALTH INFORMATION MANAGEMENT | Facility: CLINIC | Age: 64
End: 2024-01-08

## 2024-01-08 LAB — RETINOPATHY: NEGATIVE

## 2024-01-08 RX ORDER — METFORMIN HCL 500 MG
TABLET, EXTENDED RELEASE 24 HR ORAL
Qty: 120 TABLET | Refills: 3 | Status: SHIPPED | OUTPATIENT
Start: 2024-01-08 | End: 2024-09-19

## 2024-01-08 NOTE — TELEPHONE ENCOUNTER
Metformin      Last Written Prescription Date:  02/16/23  Last Fill Quantity: 120,   # refills: 5  Last Office Visit: 11/24/23  Future Office visit:    Next 5 appointments (look out 90 days)      Jan 16, 2024  3:45 PM  (Arrive by 3:30 PM)  Pre-Op physical with Colin Fisher MD  [facility] [place] ([facility] [place] ) [address]  [place] [address]  [phone]

## 2024-01-14 DIAGNOSIS — E11.29 MICROALBUMINURIA DUE TO TYPE 2 DIABETES MELLITUS (H): ICD-10-CM

## 2024-01-14 DIAGNOSIS — E11.9 TYPE 2 DIABETES MELLITUS WITHOUT COMPLICATION, WITHOUT LONG-TERM CURRENT USE OF INSULIN (H): ICD-10-CM

## 2024-01-14 DIAGNOSIS — R80.9 MICROALBUMINURIA DUE TO TYPE 2 DIABETES MELLITUS (H): ICD-10-CM

## 2024-01-14 DIAGNOSIS — I10 BENIGN ESSENTIAL HYPERTENSION: ICD-10-CM

## 2024-01-14 DIAGNOSIS — N18.2 CHRONIC KIDNEY DISEASE, STAGE 2 (MILD): ICD-10-CM

## 2024-01-15 NOTE — TELEPHONE ENCOUNTER
Norvasc      Last Written Prescription Date:  03/08/23  Last Fill Quantity: 90,   # refills: 3  Last Office Visit: 11/24/23  Future Office visit:    Next 5 appointments (look out 90 days)      Jan 16, 2024  3:45 PM  (Arrive by 3:30 PM)  Pre-Op physical with Colin Fisher MD  [facility] [place] ([facility] [place] ) [address]  [place] [address]  [phone]           Farxiga      Last Written Prescription Date:  03/08/23  Last Fill Quantity: 90,   # refills: 3  Last Office Visit: 11/24/23  Future Office visit:    Next 5 appointments (look out 90 days)      Jan 16, 2024  3:45 PM  (Arrive by 3:30 PM)  Pre-Op physical with Colin Fisher MD  [facility] [place] ([facility] [place] ) [address]  [place] [address]  [phone]          Cozaar      Last Written Prescription Date:  03/08/23  Last Fill Quantity: 90,   # refills: 3  Last Office Visit: 11/24/23  Future Office visit:    Next 5 appointments (look out 90 days)      Jan 16, 2024  3:45 PM  (Arrive by 3:30 PM)  Pre-Op physical with Colin Fisher MD  [facility] [place] ([facility] [place] ) [address]  [place] [address]  [phone]

## 2024-01-16 ENCOUNTER — OFFICE VISIT (OUTPATIENT)
Dept: FAMILY MEDICINE | Facility: OTHER | Age: 64
End: 2024-01-16
Attending: FAMILY MEDICINE
Payer: COMMERCIAL

## 2024-01-16 ENCOUNTER — LAB (OUTPATIENT)
Dept: LAB | Facility: OTHER | Age: 64
End: 2024-01-16
Payer: COMMERCIAL

## 2024-01-16 VITALS
WEIGHT: 208.4 LBS | OXYGEN SATURATION: 98 % | SYSTOLIC BLOOD PRESSURE: 142 MMHG | BODY MASS INDEX: 33.49 KG/M2 | DIASTOLIC BLOOD PRESSURE: 78 MMHG | HEIGHT: 66 IN | TEMPERATURE: 98.9 F | HEART RATE: 78 BPM

## 2024-01-16 DIAGNOSIS — R80.9 MICROALBUMINURIA DUE TO TYPE 2 DIABETES MELLITUS (H): ICD-10-CM

## 2024-01-16 DIAGNOSIS — E80.4 GILBERT SYNDROME: ICD-10-CM

## 2024-01-16 DIAGNOSIS — N18.2 CHRONIC KIDNEY DISEASE, STAGE 2 (MILD): ICD-10-CM

## 2024-01-16 DIAGNOSIS — M17.12 PRIMARY OSTEOARTHRITIS OF LEFT KNEE: ICD-10-CM

## 2024-01-16 DIAGNOSIS — I10 BENIGN ESSENTIAL HYPERTENSION: ICD-10-CM

## 2024-01-16 DIAGNOSIS — Z01.810 PRE-OPERATIVE CARDIOVASCULAR EXAMINATION: Primary | ICD-10-CM

## 2024-01-16 DIAGNOSIS — E11.29 MICROALBUMINURIA DUE TO TYPE 2 DIABETES MELLITUS (H): ICD-10-CM

## 2024-01-16 DIAGNOSIS — G47.33 OSA (OBSTRUCTIVE SLEEP APNEA): ICD-10-CM

## 2024-01-16 DIAGNOSIS — E11.9 TYPE 2 DIABETES MELLITUS WITHOUT COMPLICATION, WITHOUT LONG-TERM CURRENT USE OF INSULIN (H): ICD-10-CM

## 2024-01-16 DIAGNOSIS — R79.89 ELEVATED LIVER FUNCTION TESTS: ICD-10-CM

## 2024-01-16 DIAGNOSIS — Z01.810 PRE-OPERATIVE CARDIOVASCULAR EXAMINATION: ICD-10-CM

## 2024-01-16 LAB
ALBUMIN SERPL BCG-MCNC: 4.8 G/DL (ref 3.5–5.2)
ALP SERPL-CCNC: 91 U/L (ref 40–150)
ALT SERPL W P-5'-P-CCNC: 82 U/L (ref 0–70)
ANION GAP SERPL CALCULATED.3IONS-SCNC: 11 MMOL/L (ref 7–15)
AST SERPL W P-5'-P-CCNC: 58 U/L (ref 0–45)
ATRIAL RATE - MUSE: 77 BPM
BASOPHILS # BLD AUTO: 0 10E3/UL (ref 0–0.2)
BASOPHILS NFR BLD AUTO: 0 %
BILIRUB SERPL-MCNC: 1.4 MG/DL
BUN SERPL-MCNC: 15.9 MG/DL (ref 8–23)
CALCIUM SERPL-MCNC: 9.8 MG/DL (ref 8.8–10.2)
CHLORIDE SERPL-SCNC: 98 MMOL/L (ref 98–107)
CREAT SERPL-MCNC: 0.96 MG/DL (ref 0.67–1.17)
DEPRECATED HCO3 PLAS-SCNC: 25 MMOL/L (ref 22–29)
DIASTOLIC BLOOD PRESSURE - MUSE: NORMAL MMHG
EGFRCR SERPLBLD CKD-EPI 2021: 88 ML/MIN/1.73M2
EOSINOPHIL # BLD AUTO: 0 10E3/UL (ref 0–0.7)
EOSINOPHIL NFR BLD AUTO: 1 %
ERYTHROCYTE [DISTWIDTH] IN BLOOD BY AUTOMATED COUNT: 12.8 % (ref 10–15)
GLUCOSE SERPL-MCNC: 218 MG/DL (ref 70–99)
HCT VFR BLD AUTO: 39.7 % (ref 40–53)
HGB BLD-MCNC: 13.7 G/DL (ref 13.3–17.7)
IMM GRANULOCYTES # BLD: 0 10E3/UL
IMM GRANULOCYTES NFR BLD: 0 %
INTERPRETATION ECG - MUSE: NORMAL
LYMPHOCYTES # BLD AUTO: 1.6 10E3/UL (ref 0.8–5.3)
LYMPHOCYTES NFR BLD AUTO: 28 %
MCH RBC QN AUTO: 32.6 PG (ref 26.5–33)
MCHC RBC AUTO-ENTMCNC: 34.5 G/DL (ref 31.5–36.5)
MCV RBC AUTO: 95 FL (ref 78–100)
MONOCYTES # BLD AUTO: 0.5 10E3/UL (ref 0–1.3)
MONOCYTES NFR BLD AUTO: 9 %
NEUTROPHILS # BLD AUTO: 3.5 10E3/UL (ref 1.6–8.3)
NEUTROPHILS NFR BLD AUTO: 62 %
NRBC # BLD AUTO: 0 10E3/UL
NRBC BLD AUTO-RTO: 0 /100
P AXIS - MUSE: 64 DEGREES
PLATELET # BLD AUTO: 167 10E3/UL (ref 150–450)
POTASSIUM SERPL-SCNC: 4.1 MMOL/L (ref 3.4–5.3)
PR INTERVAL - MUSE: 152 MS
PROT SERPL-MCNC: 7.6 G/DL (ref 6.4–8.3)
QRS DURATION - MUSE: 98 MS
QT - MUSE: 386 MS
QTC - MUSE: 436 MS
R AXIS - MUSE: 44 DEGREES
RBC # BLD AUTO: 4.2 10E6/UL (ref 4.4–5.9)
SODIUM SERPL-SCNC: 134 MMOL/L (ref 135–145)
SYSTOLIC BLOOD PRESSURE - MUSE: NORMAL MMHG
T AXIS - MUSE: 40 DEGREES
VENTRICULAR RATE- MUSE: 77 BPM
WBC # BLD AUTO: 5.7 10E3/UL (ref 4–11)

## 2024-01-16 PROCEDURE — 80053 COMPREHEN METABOLIC PANEL: CPT

## 2024-01-16 PROCEDURE — 36415 COLL VENOUS BLD VENIPUNCTURE: CPT

## 2024-01-16 PROCEDURE — 99214 OFFICE O/P EST MOD 30 MIN: CPT | Performed by: FAMILY MEDICINE

## 2024-01-16 PROCEDURE — 85025 COMPLETE CBC W/AUTO DIFF WBC: CPT

## 2024-01-16 PROCEDURE — 93000 ELECTROCARDIOGRAM COMPLETE: CPT | Mod: 77 | Performed by: INTERNAL MEDICINE

## 2024-01-16 RX ORDER — AMLODIPINE BESYLATE 10 MG/1
10 TABLET ORAL DAILY
Qty: 90 TABLET | Refills: 3 | Status: SHIPPED | OUTPATIENT
Start: 2024-01-16

## 2024-01-16 RX ORDER — DAPAGLIFLOZIN 5 MG/1
TABLET, FILM COATED ORAL
Qty: 90 TABLET | Refills: 3 | Status: SHIPPED | OUTPATIENT
Start: 2024-01-16

## 2024-01-16 RX ORDER — LOSARTAN POTASSIUM 100 MG/1
100 TABLET ORAL DAILY
Qty: 90 TABLET | Refills: 3 | Status: SHIPPED | OUTPATIENT
Start: 2024-01-16

## 2024-01-16 ASSESSMENT — PAIN SCALES - GENERAL: PAINLEVEL: MILD PAIN (2)

## 2024-01-16 NOTE — PROGRESS NOTES
[facility] [place]  [address] [address]  Phone: [phone]  Primary Provider: Charu Abbasi  Pre-op Performing Provider: CHARU ABBASI      PREOPERATIVE EVALUATION:  Today's date: 1/16/2024    Neo is a 63 year old, presenting for the following:  Pre-Op Exam        1/16/2024     4:00 PM   Additional Questions   Roomed by Curtis Johnson   Accompanied by Self         1/16/2024     4:00 PM   Patient Reported Additional Medications   Patient reports taking the following new medications none       Surgical Information:  Surgery/Procedure: Left Knee Replacement   Surgery Location: [facility]   Surgeon: Dr. Terrazas   Surgery Date: 1/31/2024  Time of Surgery: TBD   Where patient plans to recover: Other: Will be determined after the surgery if he is staying or going home with family   Fax number for surgical facility:     Assessment & Plan     The proposed surgical procedure is considered INTERMEDIATE risk.      ICD-10-CM    1. Pre-operative cardiovascular examination  Z01.810 CBC with Platelets & Differential     Comprehensive metabolic panel     EKG 12-lead complete w/read - Clinics     EKG 12-lead complete w/read - Clinics      2. Type 2 diabetes mellitus without complication, without long-term current use of insulin (H)  E11.9 CBC with Platelets & Differential     Comprehensive metabolic panel     EKG 12-lead complete w/read - Clinics     EKG 12-lead complete w/read - Clinics      3. Primary osteoarthritis of left knee  M17.12 CBC with Platelets & Differential     Comprehensive metabolic panel     EKG 12-lead complete w/read - Clinics     EKG 12-lead complete w/read - Clinics      4. Benign essential hypertension  I10 CBC with Platelets & Differential     Comprehensive metabolic panel     EKG 12-lead complete w/read - Clinics     EKG 12-lead complete w/read - Clinics      5. Chronic kidney disease, stage 2 (mild)  N18.2 CBC with Platelets & Differential     Comprehensive metabolic panel      EKG 12-lead complete w/read - Clinics     EKG 12-lead complete w/read - Clinics      6. Microalbuminuria due to type 2 diabetes mellitus (H)  E11.29 CBC with Platelets & Differential    R80.9 Comprehensive metabolic panel     EKG 12-lead complete w/read - Clinics     EKG 12-lead complete w/read - Clinics      7. Gilbert syndrome  E80.4 CBC with Platelets & Differential     Comprehensive metabolic panel     EKG 12-lead complete w/read - Clinics     EKG 12-lead complete w/read - Clinics      8. Elevated liver function tests  R79.89 CBC with Platelets & Differential     Comprehensive metabolic panel     EKG 12-lead complete w/read - Clinics     EKG 12-lead complete w/read - Clinics      9. JENNIFER (obstructive sleep apnea) - Moderate without sleep-associated hypoxemia  G47.33 CBC with Platelets & Differential     Comprehensive metabolic panel     EKG 12-lead complete w/read - Clinics     EKG 12-lead complete w/read - Clinics        Has JENNIFER - not use CPAP  Has some white coat HTN- comes down after initial high              Risks and Recommendations:  The patient has the following additional risks and recommendations for perioperative complications:   - No identified additional risk factors other than previously addressed    Antiplatelet or Anticoagulation Medication Instructions:   - Patient is on no antiplatelet or anticoagulation medications.    Additional Medication Instructions:  Patient is to take all scheduled medications on the day of surgery EXCEPT for modifications listed below:  Take all med at 6-7 pm at night   CAN TAKE ALL MEDS EXCEPT GLUCOPHAGE AND FARXIGA AT SAME TIME YOU USUALLY TAKE ALL AFTER DINNER UNLESS TOLD DIFFERENTLY BY ANESTHESIA     RECOMMENDATION:  APPROVAL GIVEN to proceed with proposed procedure, without further diagnostic evaluation.            Subjective       HPI related to upcoming procedure:   64 yO male  presents for cardiopulmonary/general clearance to undergo the above procedure.  His  surgeon listed above has asked for this clearance to undergo anesthesia. Pt has had this condition for approximately a year or more .   Overall pt is of good health and has not  had any perioperative complications with previous surgeries.        Can do 4 mets of activity - last time this fall with duck hunting . No anginal sx      1/16/2024     3:45 PM   Preop Questions   1. Have you ever had a heart attack or stroke? No   2. Have you ever had surgery on your heart or blood vessels, such as a stent placement, a coronary artery bypass, or surgery on an artery in your head, neck, heart, or legs? No   3. Do you have chest pain with activity? No   4. Do you have a history of  heart failure? No   5. Do you currently have a cold, bronchitis or symptoms of other infection? No   6. Do you have a cough, shortness of breath, or wheezing? No   7. Do you or anyone in your family have previous history of blood clots? No   8. Do you or does anyone in your family have a serious bleeding problem such as prolonged bleeding following surgeries or cuts? No   9. Have you ever had problems with anemia or been told to take iron pills? No   10. Have you had any abnormal blood loss such as black, tarry or bloody stools? No   11. Have you ever had a blood transfusion? No   12. Are you willing to have a blood transfusion if it is medically needed before, during, or after your surgery? Yes   13. Have you or any of your relatives ever had problems with anesthesia? No   14. Do you have sleep apnea, excessive snoring or daytime drowsiness? No   15. Do you have any artifical heart valves or other implanted medical devices like a pacemaker, defibrillator, or continuous glucose monitor? No   16. Do you have artificial joints? No   17. Are you allergic to latex? No     Health Care Directive:  Patient does not have a Health Care Directive or Living Will: Discussed advance care planning with patient; however, patient declined at this  time.    Preoperative Review of :   reviewed - no record of controlled substances prescribed.      Status of Chronic Conditions:  DIABETES - Patient has a longstanding history of DiabetesType Type II . Patient is being treated with diet, oral agents, and exercise and denies significant side effects. Control has been good. Complicating factors include but are not limited to: hypertension and hyperlipidemia.     HYPERLIPIDEMIA - Patient has a long history of significant Hyperlipidemia requiring medication for treatment with recent good control. Patient reports no problems or side effects with the medication.     HYPERTENSION - Patient has longstanding history of HTN , currently denies any symptoms referable to elevated blood pressure. Specifically denies chest pain, palpitations, dyspnea, orthopnea, PND or peripheral edema. Blood pressure readings have been in normal range to slightly above in SBP of 150's . Current medication regimen is as listed below. Patient denies any side effects of medication.     SLEEP PROBLEM - Patient has a longstanding history of snoring, excessive daytime somnolence, and fatigue.. Patient has tried OTC medications with limited success.  DOES NOT TOLERATE OR USE CPAP    Review of Systems  Constitutional, neuro, ENT, endocrine, pulmonary, cardiac, gastrointestinal, genitourinary, musculoskeletal, integument and psychiatric systems are negative, except as otherwise noted.    Patient Active Problem List    Diagnosis Date Noted    JENNIFER (obstructive sleep apnea) - Moderate without sleep-associated hypoxemia 02/10/2023     Priority: Medium    Elevated liver function tests 05/23/2022     Priority: Medium    Drug-induced erectile dysfunction 05/23/2022     Priority: Medium    Nocturia 05/23/2022     Priority: Medium    Benign essential hypertension 05/23/2022     Priority: Medium    Microalbuminuria due to type 2 diabetes mellitus (H) 05/23/2022     Priority: Medium    Chronic kidney disease,  stage 2 (mild) 01/17/2022     Priority: Medium    Screening for prostate cancer 12/18/2017     Priority: Medium    Comprehensive Medical Examination 09/27/2016     Priority: Medium    Eczema 09/27/2016     Priority: Medium    Type 2 diabetes mellitus without complication (H) 10/01/2014     Priority: Medium    Vitamin D deficiency 10/01/2014     Priority: Medium    H/O adenomatous colonic polyps 07/19/2013     Priority: Medium    Dyslipidemia 07/17/2013     Priority: Medium    Seasonal allergic rhinitis 07/17/2013     Priority: Medium    Gilbert syndrome 03/02/2000     Priority: Medium    Obesity 03/02/2000     Priority: Medium      Past Medical History:   Diagnosis Date    Alcoholism 1/1/2011    Allergic rhinitis, cause unspecified 3/2/2000    Anxiety  1/1/2011    Colonic Polyps 3/2/2000    Depression 1/1/2011    Gilbert's Syndrome 3/2/2000    Hypercholesterolemia 3/2/2000    Overweight(278.02) 3/2/2000    Prediabetes 3/2/2000     Past Surgical History:   Procedure Laterality Date    COLONOSCOPY  8/5/2013    Procedure: COLONOSCOPY;  colonoscopy ;  Surgeon: Yobani Ventura MD;  Location: [facility]    COLONOSCOPY N/A 3/12/2018    Procedure: COLONOSCOPY;  COLONOSCOPY with polypectomy;  Surgeon: Donovan Mayfield MD;  Location: [facility] OR    colonoscopy with polypectomy  1/27/2011    repeat 2 years    mandibular fracture repair      vasectomy      wisdom teeth extraction       Current Outpatient Medications   Medication Sig Dispense Refill    amLODIPine (NORVASC) 10 MG tablet TAKE 1 TABLET (10 MG) BY MOUTH DAILY 90 tablet 3    blood glucose (ACCU-CHEK GUIDE) test strip Use to test blood sugar 2 times daily. 100 each 3    blood glucose monitoring (ACCU-CHEK FASTCLIX) lancets Use to test blood sugar 2 times daily. 100 each 3    blood glucose monitoring (NO BRAND SPECIFIED) meter device kit Use to test blood sugar 2 times daily or as directed. 1 kit 1    doxazosin (CARDURA) 2 MG tablet TAKE 3 TABLETS (6 MG) BY MOUTH AT  "BEDTIME 270 tablet 2    FARXIGA 5 MG TABS tablet TAKE 1 TABLET (5 MG) BY MOUTH EVERY MORNING 90 tablet 3    fexofenadine (ALLEGRA) 180 MG tablet Take 180 mg by mouth daily      losartan (COZAAR) 100 MG tablet TAKE 1 TABLET (100 MG) BY MOUTH DAILY 90 tablet 3    metFORMIN (GLUCOPHAGE XR) 500 MG 24 hr tablet TAKE 4 TABLETS (2,000 MG) BY MOUTH DAILY WITH DINNER. (Patient taking differently: Take 1,000 mg by mouth Patient reports taking 2 tablets) 120 tablet 3    montelukast (SINGULAIR) 10 MG tablet TAKE 1 TABLET (10 MG) BY MOUTH AT BEDTIME 90 tablet 3    simvastatin (ZOCOR) 20 MG tablet TAKE 1 TABLET BY MOUTH EVERY EVENING *NEEDS TO BE SEEN FOR FUTURE FILLS* 90 tablet 3       Allergies   Allergen Reactions    Seasonal Allergies         Social History     Tobacco Use    Smoking status: Never    Smokeless tobacco: Current     Types: Chew    Tobacco comments:     Tried to quit; No passive exposure   Substance Use Topics    Alcohol use: Yes     Comment: socially     Family History   Problem Relation Age of Onset    C.A.D. Father     Cancer Father         Lung, cause of death    Other - See Comments Father         Colon polyps    Alcohol/Drug Mother         Alcoholism    Other - See Comments Mother         Liver disease, cause of death    C.A.D. Other         Family hx    Cancer Paternal Uncle         Cervical    Cancer Paternal Uncle         Throat    Hypertension Other         Family hx     History   Drug Use No         Objective     BP (!) 142/78   Pulse 78   Temp 98.9  F (37.2  C) (Tympanic)   Ht 1.676 m (5' 6\")   Wt 94.5 kg (208 lb 6.4 oz)   SpO2 98%   BMI 33.64 kg/m      Physical Exam    GENERAL APPEARANCE: healthy, alert and no distress     EYES: EOMI,  PERRL     HENT: ear canals and TM's normal and nose and mouth without ulcers or lesions     NECK: no adenopathy, no asymmetry, masses, or scars and thyroid normal to palpation     RESP: lungs clear to auscultation - no rales, rhonchi or wheezes     CV: regular " rates and rhythm, normal S1 S2, no S3 or S4 and no murmur, click or rub     ABDOMEN:  soft, nontender, no HSM or masses and bowel sounds normal     MS: extremities normal- no gross deformities noted, no evidence of inflammation in joints, FROM in all extremities.     SKIN: no suspicious lesions or rashes     NEURO: Normal strength and tone, sensory exam grossly normal, mentation intact and speech normal     PSYCH: mentation appears normal. and affect normal/bright     LYMPHATICS: No cervical adenopathy    Recent Labs   Lab Test 11/24/23  0901 06/05/23  0803 08/29/22  1505 05/23/22  0807   HGB  --  13.8  --  13.7   PLT  --  184  --  161    136   < > 137   POTASSIUM 4.6 4.1   < > 4.4   CR 1.00 1.11   < > 0.98   A1C 6.2* 6.6*   < > 6.8*    < > = values in this interval not displayed.        Diagnostics:  Recent Results (from the past 24 hour(s))   Comprehensive metabolic panel    Collection Time: 01/16/24  3:49 PM   Result Value Ref Range    Sodium 134 (L) 135 - 145 mmol/L    Potassium 4.1 3.4 - 5.3 mmol/L    Carbon Dioxide (CO2) 25 22 - 29 mmol/L    Anion Gap 11 7 - 15 mmol/L    Urea Nitrogen 15.9 8.0 - 23.0 mg/dL    Creatinine 0.96 0.67 - 1.17 mg/dL    GFR Estimate 88 >60 mL/min/1.73m2    Calcium 9.8 8.8 - 10.2 mg/dL    Chloride 98 98 - 107 mmol/L    Glucose 218 (H) 70 - 99 mg/dL    Alkaline Phosphatase 91 40 - 150 U/L    AST 58 (H) 0 - 45 U/L    ALT 82 (H) 0 - 70 U/L    Protein Total 7.6 6.4 - 8.3 g/dL    Albumin 4.8 3.5 - 5.2 g/dL    Bilirubin Total 1.4 (H) <=1.2 mg/dL   CBC with platelets and differential    Collection Time: 01/16/24  3:49 PM   Result Value Ref Range    WBC Count 5.7 4.0 - 11.0 10e3/uL    RBC Count 4.20 (L) 4.40 - 5.90 10e6/uL    Hemoglobin 13.7 13.3 - 17.7 g/dL    Hematocrit 39.7 (L) 40.0 - 53.0 %    MCV 95 78 - 100 fL    MCH 32.6 26.5 - 33.0 pg    MCHC 34.5 31.5 - 36.5 g/dL    RDW 12.8 10.0 - 15.0 %    Platelet Count 167 150 - 450 10e3/uL    % Neutrophils 62 %    % Lymphocytes 28 %    %  Monocytes 9 %    % Eosinophils 1 %    % Basophils 0 %    % Immature Granulocytes 0 %    NRBCs per 100 WBC 0 <1 /100    Absolute Neutrophils 3.5 1.6 - 8.3 10e3/uL    Absolute Lymphocytes 1.6 0.8 - 5.3 10e3/uL    Absolute Monocytes 0.5 0.0 - 1.3 10e3/uL    Absolute Eosinophils 0.0 0.0 - 0.7 10e3/uL    Absolute Basophils 0.0 0.0 - 0.2 10e3/uL    Absolute Immature Granulocytes 0.0 <=0.4 10e3/uL    Absolute NRBCs 0.0 10e3/uL   EKG 12-lead complete w/read - Clinics    Collection Time: 01/16/24  3:55 PM   Result Value Ref Range    Systolic Blood Pressure  mmHg    Diastolic Blood Pressure  mmHg    Ventricular Rate 77 BPM    Atrial Rate 77 BPM    KY Interval 152 ms    QRS Duration 98 ms     ms    QTc 436 ms    P Axis 64 degrees    R AXIS 44 degrees    T Axis 40 degrees    Interpretation ECG       Sinus rhythm with sinus arrhythmia  Possible Anterior infarct , age undetermined  Abnormal ECG  No previous ECGs available  Confirmed by MD Winter Anthony ([id number]) on 1/16/2024 4:14:20 PM        EKG: appears normal, NSR, sinus arrhythmia , normal axis, normal intervals, no acute ST/T changes c/w ischemia, no LVH by voltage criteria, unchanged from previous tracings  No infarct noted    Revised Cardiac Risk Index (RCRI):  The patient has the following serious cardiovascular risks for perioperative complications:   - No serious cardiac risks = 0 points     RCRI Interpretation: 0 points: Class I (very low risk - 0.4% complication rate)         Signed Electronically by: Colin Fisher MD  Copy of this evaluation report is provided to requesting physician.

## 2024-01-16 NOTE — PATIENT INSTRUCTIONS
CAN TAKE ALL MEDS EXCEPT GLUCOPHAGE AND FARXIGA AT SAME TIME YOU USUALLY TAKE ALL AFTER DINNER UNLESS TOLD DIFFERENTLY BY ANESTHESIA

## 2024-01-31 ENCOUNTER — TRANSFERRED RECORDS (OUTPATIENT)
Dept: HEALTH INFORMATION MANAGEMENT | Facility: CLINIC | Age: 64
End: 2024-01-31

## 2024-02-02 ENCOUNTER — APPOINTMENT (OUTPATIENT)
Dept: ULTRASOUND IMAGING | Facility: HOSPITAL | Age: 64
End: 2024-02-02
Attending: INTERNAL MEDICINE
Payer: COMMERCIAL

## 2024-02-02 ENCOUNTER — APPOINTMENT (OUTPATIENT)
Dept: GENERAL RADIOLOGY | Facility: HOSPITAL | Age: 64
End: 2024-02-02
Attending: INTERNAL MEDICINE
Payer: COMMERCIAL

## 2024-02-02 ENCOUNTER — HOSPITAL ENCOUNTER (EMERGENCY)
Facility: HOSPITAL | Age: 64
Discharge: HOME | End: 2024-02-03
Attending: INTERNAL MEDICINE | Admitting: INTERNAL MEDICINE
Payer: COMMERCIAL

## 2024-02-02 VITALS
RESPIRATION RATE: 18 BRPM | OXYGEN SATURATION: 95 % | SYSTOLIC BLOOD PRESSURE: 108 MMHG | HEART RATE: 106 BPM | DIASTOLIC BLOOD PRESSURE: 54 MMHG | TEMPERATURE: 98.5 F

## 2024-02-02 DIAGNOSIS — L03.114 CELLULITIS OF LEFT UPPER EXTREMITY: ICD-10-CM

## 2024-02-02 LAB
ALBUMIN SERPL BCG-MCNC: 4 G/DL (ref 3.5–5.2)
ALP SERPL-CCNC: 65 U/L (ref 40–150)
ALT SERPL W P-5'-P-CCNC: 21 U/L (ref 0–70)
ANION GAP SERPL CALCULATED.3IONS-SCNC: 15 MMOL/L (ref 7–15)
AST SERPL W P-5'-P-CCNC: 15 U/L (ref 0–45)
BASOPHILS # BLD AUTO: 0 10E3/UL (ref 0–0.2)
BASOPHILS NFR BLD AUTO: 0 %
BILIRUB SERPL-MCNC: 1.2 MG/DL
BUN SERPL-MCNC: 13.4 MG/DL (ref 8–23)
CALCIUM SERPL-MCNC: 9.1 MG/DL (ref 8.8–10.2)
CHLORIDE SERPL-SCNC: 97 MMOL/L (ref 98–107)
CK SERPL-CCNC: 108 U/L (ref 39–308)
CREAT SERPL-MCNC: 0.97 MG/DL (ref 0.67–1.17)
DEPRECATED HCO3 PLAS-SCNC: 20 MMOL/L (ref 22–29)
EGFRCR SERPLBLD CKD-EPI 2021: 87 ML/MIN/1.73M2
EOSINOPHIL # BLD AUTO: 0.1 10E3/UL (ref 0–0.7)
EOSINOPHIL NFR BLD AUTO: 1 %
ERYTHROCYTE [DISTWIDTH] IN BLOOD BY AUTOMATED COUNT: 12.5 % (ref 10–15)
GLUCOSE SERPL-MCNC: 166 MG/DL (ref 70–99)
HCT VFR BLD AUTO: 32.3 % (ref 40–53)
HGB BLD-MCNC: 10.9 G/DL (ref 13.3–17.7)
IMM GRANULOCYTES # BLD: 0 10E3/UL
IMM GRANULOCYTES NFR BLD: 0 %
LACTATE SERPL-SCNC: 1.7 MMOL/L (ref 0.7–2)
LYMPHOCYTES # BLD AUTO: 0.9 10E3/UL (ref 0.8–5.3)
LYMPHOCYTES NFR BLD AUTO: 11 %
MCH RBC QN AUTO: 32.2 PG (ref 26.5–33)
MCHC RBC AUTO-ENTMCNC: 33.7 G/DL (ref 31.5–36.5)
MCV RBC AUTO: 95 FL (ref 78–100)
MONOCYTES # BLD AUTO: 0.7 10E3/UL (ref 0–1.3)
MONOCYTES NFR BLD AUTO: 9 %
NEUTROPHILS # BLD AUTO: 6.3 10E3/UL (ref 1.6–8.3)
NEUTROPHILS NFR BLD AUTO: 79 %
NRBC # BLD AUTO: 0 10E3/UL
NRBC BLD AUTO-RTO: 0 /100
PLATELET # BLD AUTO: 169 10E3/UL (ref 150–450)
POTASSIUM SERPL-SCNC: 3.3 MMOL/L (ref 3.4–5.3)
PROT SERPL-MCNC: 6.5 G/DL (ref 6.4–8.3)
RBC # BLD AUTO: 3.39 10E6/UL (ref 4.4–5.9)
SODIUM SERPL-SCNC: 132 MMOL/L (ref 135–145)
WBC # BLD AUTO: 8.1 10E3/UL (ref 4–11)

## 2024-02-02 PROCEDURE — 36415 COLL VENOUS BLD VENIPUNCTURE: CPT | Performed by: INTERNAL MEDICINE

## 2024-02-02 PROCEDURE — 93971 EXTREMITY STUDY: CPT | Mod: LT

## 2024-02-02 PROCEDURE — 73130 X-RAY EXAM OF HAND: CPT | Mod: LT

## 2024-02-02 PROCEDURE — 85025 COMPLETE CBC W/AUTO DIFF WBC: CPT | Performed by: INTERNAL MEDICINE

## 2024-02-02 PROCEDURE — 99284 EMERGENCY DEPT VISIT MOD MDM: CPT | Performed by: INTERNAL MEDICINE

## 2024-02-02 PROCEDURE — 99284 EMERGENCY DEPT VISIT MOD MDM: CPT | Mod: 25

## 2024-02-02 PROCEDURE — 83605 ASSAY OF LACTIC ACID: CPT | Performed by: INTERNAL MEDICINE

## 2024-02-02 PROCEDURE — 82550 ASSAY OF CK (CPK): CPT | Performed by: INTERNAL MEDICINE

## 2024-02-02 PROCEDURE — 80053 COMPREHEN METABOLIC PANEL: CPT | Performed by: INTERNAL MEDICINE

## 2024-02-02 RX ORDER — ASPIRIN 81 MG/1
81 TABLET ORAL 2 TIMES DAILY
COMMUNITY
End: 2024-08-29

## 2024-02-02 RX ORDER — IBUPROFEN 800 MG/1
800 TABLET, FILM COATED ORAL EVERY 8 HOURS PRN
COMMUNITY
End: 2024-06-10

## 2024-02-02 RX ORDER — BISACODYL 5 MG
5 TABLET, DELAYED RELEASE (ENTERIC COATED) ORAL DAILY PRN
COMMUNITY
End: 2024-06-10

## 2024-02-02 RX ORDER — OXYCODONE HYDROCHLORIDE 5 MG/1
5 TABLET ORAL 4 TIMES DAILY PRN
COMMUNITY
End: 2024-06-10

## 2024-02-02 RX ORDER — ACETAMINOPHEN 500 MG
1000 TABLET ORAL EVERY 6 HOURS PRN
COMMUNITY
End: 2024-06-10

## 2024-02-02 ASSESSMENT — ACTIVITIES OF DAILY LIVING (ADL): ADLS_ACUITY_SCORE: 35

## 2024-02-03 LAB
HOLD SPECIMEN: NORMAL
HOLD SPECIMEN: NORMAL

## 2024-02-03 PROCEDURE — 250N000013 HC RX MED GY IP 250 OP 250 PS 637: Performed by: INTERNAL MEDICINE

## 2024-02-03 RX ORDER — CEPHALEXIN 500 MG/1
500 CAPSULE ORAL 3 TIMES DAILY
Qty: 21 CAPSULE | Refills: 0 | Status: SHIPPED | OUTPATIENT
Start: 2024-02-03 | End: 2024-02-10

## 2024-02-03 RX ORDER — CEPHALEXIN 500 MG/1
500 CAPSULE ORAL ONCE
Status: COMPLETED | OUTPATIENT
Start: 2024-02-03 | End: 2024-02-03

## 2024-02-03 RX ADMIN — CEPHALEXIN 500 MG: 500 CAPSULE ORAL at 00:17

## 2024-02-03 ASSESSMENT — ENCOUNTER SYMPTOMS
SHORTNESS OF BREATH: 0
CHILLS: 0
DYSURIA: 0
FEVER: 0
EYE REDNESS: 0
RHINORRHEA: 0
NAUSEA: 0
ACTIVITY CHANGE: 0
DIARRHEA: 0
NECK STIFFNESS: 0
VOMITING: 0
APPETITE CHANGE: 0
MYALGIAS: 0
FATIGUE: 0
COUGH: 0
ABDOMINAL PAIN: 0
DIZZINESS: 0
HEMATURIA: 0
ARTHRALGIAS: 0
SORE THROAT: 0
HEADACHES: 0

## 2024-02-03 NOTE — ED NOTES
Discharge instructions gone over with patient and he states understanding. Discharged in stable condition.

## 2024-02-03 NOTE — ED TRIAGE NOTES
States that he had knee surgery on Wednesday and returned home on Wednesday. Started to developed rash on left hand and swelling to left hand. Denies pain in hand.      Triage Assessment (Adult)       Row Name 02/02/24 9809          Triage Assessment    Airway WDL WDL

## 2024-02-03 NOTE — ED PROVIDER NOTES
History     Chief Complaint   Patient presents with    Rash    hand swelling     The history is provided by the patient.   Arm Pain  Location:  Hand  Hand location:  L palm  Pain details:     Quality:  Aching    Radiates to:  L forearm    Severity:  Mild    Onset quality:  Gradual    Duration:  1 day    Timing:  Constant  Associated symptoms: no fatigue and no fever          Allergies:  Allergies   Allergen Reactions    Seasonal Allergies        Problem List:    Patient Active Problem List    Diagnosis Date Noted    JENNIFER (obstructive sleep apnea) - Moderate without sleep-associated hypoxemia 02/10/2023     Priority: Medium    Elevated liver function tests 05/23/2022     Priority: Medium    Drug-induced erectile dysfunction 05/23/2022     Priority: Medium    Nocturia 05/23/2022     Priority: Medium    Benign essential hypertension 05/23/2022     Priority: Medium    Microalbuminuria due to type 2 diabetes mellitus (H) 05/23/2022     Priority: Medium    Chronic kidney disease, stage 2 (mild) 01/17/2022     Priority: Medium    Screening for prostate cancer 12/18/2017     Priority: Medium    Comprehensive Medical Examination 09/27/2016     Priority: Medium    Eczema 09/27/2016     Priority: Medium    Type 2 diabetes mellitus without complication (H) 10/01/2014     Priority: Medium    Vitamin D deficiency 10/01/2014     Priority: Medium    H/O adenomatous colonic polyps 07/19/2013     Priority: Medium    Dyslipidemia 07/17/2013     Priority: Medium    Seasonal allergic rhinitis 07/17/2013     Priority: Medium    Gilbert syndrome 03/02/2000     Priority: Medium    Obesity 03/02/2000     Priority: Medium        Past Medical History:    Past Medical History:   Diagnosis Date    Alcoholism 1/1/2011    Allergic rhinitis, cause unspecified 3/2/2000    Anxiety  1/1/2011    Colonic Polyps 3/2/2000    Depression 1/1/2011    Gilbert's Syndrome 3/2/2000    Hypercholesterolemia 3/2/2000    Overweight(278.02) 3/2/2000    Prediabetes  3/2/2000       Past Surgical History:    Past Surgical History:   Procedure Laterality Date    COLONOSCOPY  8/5/2013    Procedure: COLONOSCOPY;  colonoscopy ;  Surgeon: Yobani Ventura MD;  Location: [facility]    COLONOSCOPY N/A 3/12/2018    Procedure: COLONOSCOPY;  COLONOSCOPY with polypectomy;  Surgeon: Donovan Mayfield MD;  Location: [facility]    colonoscopy with polypectomy  1/27/2011    repeat 2 years    mandibular fracture repair      vasectomy      wisdom teeth extraction         Family History:    Family History   Problem Relation Age of Onset    C.A.D. Father     Cancer Father         Lung, cause of death    Other - See Comments Father         Colon polyps    Alcohol/Drug Mother         Alcoholism    Other - See Comments Mother         Liver disease, cause of death    C.A.D. Other         Family hx    Cancer Paternal Uncle         Cervical    Cancer Paternal Uncle         Throat    Hypertension Other         Family hx       Social History:  Marital Status:   [2]  Social History     Tobacco Use    Smoking status: Never    Smokeless tobacco: Current     Types: Chew    Tobacco comments:     Tried to quit; No passive exposure   Vaping Use    Vaping Use: Never used   Substance Use Topics    Alcohol use: Yes     Comment: socially    Drug use: No        Medications:    acetaminophen (TYLENOL) 500 MG tablet  amLODIPine (NORVASC) 10 MG tablet  aspirin 81 MG EC tablet  bisacodyl (DULCOLAX) 5 MG EC tablet  cephALEXin (KEFLEX) 500 MG capsule  doxazosin (CARDURA) 2 MG tablet  FARXIGA 5 MG TABS tablet  fexofenadine (ALLEGRA) 180 MG tablet  ibuprofen (ADVIL/MOTRIN) 800 MG tablet  losartan (COZAAR) 100 MG tablet  metFORMIN (GLUCOPHAGE XR) 500 MG 24 hr tablet  montelukast (SINGULAIR) 10 MG tablet  oxyCODONE (ROXICODONE) 5 MG tablet  simvastatin (ZOCOR) 20 MG tablet  blood glucose (ACCU-CHEK GUIDE) test strip  blood glucose monitoring (ACCU-CHEK FASTCLIX) lancets  blood glucose monitoring (NO BRAND SPECIFIED) meter  device kit          Review of Systems   Constitutional:  Negative for activity change, appetite change, chills, fatigue and fever.   HENT:  Negative for congestion, rhinorrhea and sore throat.    Eyes:  Negative for redness.   Respiratory:  Negative for cough and shortness of breath.    Cardiovascular:  Negative for chest pain.   Gastrointestinal:  Negative for abdominal pain, diarrhea, nausea and vomiting.   Genitourinary:  Negative for dysuria and hematuria.   Musculoskeletal:  Negative for arthralgias, myalgias and neck stiffness.   Skin:  Negative for rash.   Neurological:  Negative for dizziness and headaches.       Physical Exam   BP: 108/54  Pulse: 106  Temp: 98.5  F (36.9  C)  Resp: 18  SpO2: 95 %      Physical Exam  Vitals and nursing note reviewed.   Constitutional:       Appearance: He is well-developed.   HENT:      Head: Normocephalic and atraumatic.   Eyes:      Conjunctiva/sclera: Conjunctivae normal.      Pupils: Pupils are equal, round, and reactive to light.   Neck:      Thyroid: No thyromegaly.      Vascular: No JVD.      Trachea: No tracheal deviation.   Cardiovascular:      Rate and Rhythm: Normal rate and regular rhythm.      Heart sounds: Normal heart sounds. No murmur heard.     No gallop.   Pulmonary:      Effort: Pulmonary effort is normal. No respiratory distress.      Breath sounds: Normal breath sounds. No stridor. No wheezing or rales.   Chest:      Chest wall: No tenderness.   Abdominal:      General: Bowel sounds are normal. There is no distension.      Palpations: Abdomen is soft. There is no mass.      Tenderness: There is no abdominal tenderness. There is no guarding or rebound.   Musculoskeletal:         General: No tenderness. Normal range of motion.        Arms:       Cervical back: Normal range of motion and neck supple.      Comments: Redness and slight tenderness around 1st finger web of left hand, redness extending as a strip to lateral of left forearm    Lymphadenopathy:       Cervical: No cervical adenopathy.   Skin:     General: Skin is warm.      Coloration: Skin is not pale.      Findings: No erythema or rash.   Neurological:      Mental Status: He is alert and oriented to person, place, and time.   Psychiatric:         Behavior: Behavior normal.         ED Course                 Procedures             Results for orders placed or performed during the hospital encounter of 02/02/24 (from the past 24 hour(s))   Comprehensive metabolic panel   Result Value Ref Range    Sodium 132 (L) 135 - 145 mmol/L    Potassium 3.3 (L) 3.4 - 5.3 mmol/L    Carbon Dioxide (CO2) 20 (L) 22 - 29 mmol/L    Anion Gap 15 7 - 15 mmol/L    Urea Nitrogen 13.4 8.0 - 23.0 mg/dL    Creatinine 0.97 0.67 - 1.17 mg/dL    GFR Estimate 87 >60 mL/min/1.73m2    Calcium 9.1 8.8 - 10.2 mg/dL    Chloride 97 (L) 98 - 107 mmol/L    Glucose 166 (H) 70 - 99 mg/dL    Alkaline Phosphatase 65 40 - 150 U/L    AST 15 0 - 45 U/L    ALT 21 0 - 70 U/L    Protein Total 6.5 6.4 - 8.3 g/dL    Albumin 4.0 3.5 - 5.2 g/dL    Bilirubin Total 1.2 <=1.2 mg/dL   CBC with Platelets & Differential    Narrative    The following orders were created for panel order CBC with Platelets & Differential.  Procedure                               Abnormality         Status                     ---------                               -----------         ------                     CBC with platelets and d...[[id number]]  Abnormal            Final result                 Please view results for these tests on the individual orders.   Lactic acid whole blood   Result Value Ref Range    Lactic Acid 1.7 0.7 - 2.0 mmol/L   CK total   Result Value Ref Range     39 - 308 U/L   CBC with platelets and differential   Result Value Ref Range    WBC Count 8.1 4.0 - 11.0 10e3/uL    RBC Count 3.39 (L) 4.40 - 5.90 10e6/uL    Hemoglobin 10.9 (L) 13.3 - 17.7 g/dL    Hematocrit 32.3 (L) 40.0 - 53.0 %    MCV 95 78 - 100 fL    MCH 32.2 26.5 - 33.0 pg    MCHC 33.7 31.5 - 36.5  g/dL    RDW 12.5 10.0 - 15.0 %    Platelet Count 169 150 - 450 10e3/uL    % Neutrophils 79 %    % Lymphocytes 11 %    % Monocytes 9 %    % Eosinophils 1 %    % Basophils 0 %    % Immature Granulocytes 0 %    NRBCs per 100 WBC 0 <1 /100    Absolute Neutrophils 6.3 1.6 - 8.3 10e3/uL    Absolute Lymphocytes 0.9 0.8 - 5.3 10e3/uL    Absolute Monocytes 0.7 0.0 - 1.3 10e3/uL    Absolute Eosinophils 0.1 0.0 - 0.7 10e3/uL    Absolute Basophils 0.0 0.0 - 0.2 10e3/uL    Absolute Immature Granulocytes 0.0 <=0.4 10e3/uL    Absolute NRBCs 0.0 10e3/uL   Extra Tube    Narrative    The following orders were created for panel order Extra Tube.  Procedure                               Abnormality         Status                     ---------                               -----------         ------                     Extra Blue Top Tube[[id number]]                              Final result               Extra Red Top Tube[[id number]]                               Final result                 Please view results for these tests on the individual orders.   Extra Blue Top Tube   Result Value Ref Range    Hold Specimen JIC    Extra Red Top Tube   Result Value Ref Range    Hold Specimen JIC        Medications   cephALEXin (KEFLEX) capsule 500 mg (500 mg Oral $Given 2/3/24 0017)       Assessments & Plan (with Medical Decision Making)   Left hand, forarm  redness    Xray and DVT studies negative  Labs reviewed    Celllultiis vs allergic reaction  Keflex started  I advised return to ER if symptoms got worse  He understood and agreed.     I have reviewed the nursing notes.    I have reviewed the findings, diagnosis, plan and need for follow up with the patient.          Discharge Medication List as of 2/3/2024 12:20 AM        START taking these medications    Details   cephALEXin (KEFLEX) 500 MG capsule Take 1 capsule (500 mg) by mouth 3 times daily for 7 days, Disp-21 capsule, R-0, E-Prescribe             Final diagnoses:   Cellulitis of left  upper extremity       2/2/2024   [facility]       Layton Ramirez MD  02/03/24 3211

## 2024-02-03 NOTE — ED NOTES
Has rash and swelling to left hand. States it started after he returned home Wednesday from [place] after having left knee surgery. He denies having IV in left hand. He denies pain in hand. Did not contact PCP or [facility] regarding symptoms.

## 2024-02-05 ENCOUNTER — HOSPITAL ENCOUNTER (EMERGENCY)
Facility: HOSPITAL | Age: 64
Discharge: HOME | End: 2024-02-05
Attending: STUDENT IN AN ORGANIZED HEALTH CARE EDUCATION/TRAINING PROGRAM | Admitting: STUDENT IN AN ORGANIZED HEALTH CARE EDUCATION/TRAINING PROGRAM
Payer: COMMERCIAL

## 2024-02-05 ENCOUNTER — APPOINTMENT (OUTPATIENT)
Dept: ULTRASOUND IMAGING | Facility: HOSPITAL | Age: 64
End: 2024-02-05
Attending: STUDENT IN AN ORGANIZED HEALTH CARE EDUCATION/TRAINING PROGRAM
Payer: COMMERCIAL

## 2024-02-05 VITALS
DIASTOLIC BLOOD PRESSURE: 76 MMHG | OXYGEN SATURATION: 97 % | SYSTOLIC BLOOD PRESSURE: 138 MMHG | HEART RATE: 70 BPM | RESPIRATION RATE: 18 BRPM | TEMPERATURE: 98 F

## 2024-02-05 DIAGNOSIS — G89.18 POST-OPERATIVE PAIN: ICD-10-CM

## 2024-02-05 PROCEDURE — 99283 EMERGENCY DEPT VISIT LOW MDM: CPT | Performed by: STUDENT IN AN ORGANIZED HEALTH CARE EDUCATION/TRAINING PROGRAM

## 2024-02-05 PROCEDURE — 93971 EXTREMITY STUDY: CPT | Mod: LT

## 2024-02-05 PROCEDURE — 99284 EMERGENCY DEPT VISIT MOD MDM: CPT | Mod: 25

## 2024-02-05 NOTE — ED TRIAGE NOTES
Patient presents with c/o left calf pain. Patient reports that he noticed pain in the back of calf that started today. Patient reports having a Left knee replacement on the 31st of January. PT sent patient to ED.

## 2024-02-05 NOTE — ED PROVIDER NOTES
[facility]  ED Provider Note    Chief Complaint   Patient presents with    Leg Pain     History:  Neo Cowan is a 63 year old male with recent knee surgery presents the emergency department today with concern about pain in the leg. He went to PT for the first time today where they unwrapped the leg and when they squeezes calf and he had a lot of pain and they sent him here for DVT evaluation.  He has no other complaints    Review of Systems   Performed; see HPI for pertinent positives and negatives.     Medical history, surgical history, and social history was reviewed.  Nursing documentation, triage note, and vitals were reviewed.    Vitals:  BP: 138/76  Pulse: 70  Temp: 98  F (36.7  C)  Resp: 16  SpO2: 97 %    Physical Exam:  Constitutional: Alert and conversant. NAD   HENT: NCAT   Eyes: Normal pupils   Neck: supple   CV: No pallor  Pulmonary/Chest: Non-labored respirations  Abdominal: non-distended   MSK: WELLS.  Left lower extremity is quite orange little swollen and bruised, great pulses moving hip moving the toes moving the ankle, the dressing appears clean dry and intact  Neuro: Alert and appropriate   Skin: Warm and dry. No diaphoresis. No rashes on exposed skin    Psych: Appropriate mood and affect       MDM:      ED Course as of 02/05/24 1654   Mon Feb 05, 2024   1653 63 male here for DVT rule out.  DVT ultrasound negative.  Excellent pulses sensation intact no other concerns.  Discharged in stable condition with orthopedic follow-up recommendded       Impression:  Final diagnoses:   Post-operative pain          Saroj Iraheta MD  02/05/24 1654

## 2024-02-05 NOTE — DISCHARGE INSTRUCTIONS
Return the emergency department for worsening symptoms or new concerning symptoms.  Follow-up with your primary care provider within the next week for reevaluation of your hand.  Follow-up with your surgeon and continue your PT efforts regarding the pain in your leg

## 2024-02-05 NOTE — ED NOTES
A/ox4. Ambulatory with steady gait/walker. Dr. Iraheta in to see pt and review US results/discharge instructions. Discharge instructions reviewed and patient verbalizes understanding.

## 2024-03-15 ENCOUNTER — TELEPHONE (OUTPATIENT)
Dept: FAMILY MEDICINE | Facility: OTHER | Age: 64
End: 2024-03-15

## 2024-03-15 NOTE — TELEPHONE ENCOUNTER
RECEIVED PA REQUEST FROM [facility] FOR doxazosin (CARDURA) 2 MG tablet. SUBMITTED ON CMM, WAITING FOR RESPONSE.

## 2024-03-18 NOTE — TELEPHONE ENCOUNTER
Received an APPROVAL from [employer] for doxazosin (CARDURA) 2 MG tablet. Effective dates 2/14/24-3/15/25.

## 2024-03-23 DIAGNOSIS — E78.2 MIXED HYPERLIPIDEMIA: ICD-10-CM

## 2024-03-25 RX ORDER — SIMVASTATIN 20 MG
TABLET ORAL
Qty: 90 TABLET | Refills: 3 | Status: SHIPPED | OUTPATIENT
Start: 2024-03-25

## 2024-03-25 NOTE — TELEPHONE ENCOUNTER
simvastatin      Last Written Prescription Date:  5/30/23  Last Fill Quantity: 90,   # refills: 3  Last Office Visit: 1/16/24  Future Office visit:    Next 5 appointments (look out 90 days)      Mike 10, 2024  7:45 AM  (Arrive by 7:30 AM)  PHYSICAL with Colin Fisher MD  [facility] [place] ([facility] [place] ) [address]  [place] [address]  [phone]             Routing refill request to provider for review/approval because:

## 2024-06-10 ENCOUNTER — LAB (OUTPATIENT)
Dept: LAB | Facility: OTHER | Age: 64
End: 2024-06-10
Attending: FAMILY MEDICINE
Payer: COMMERCIAL

## 2024-06-10 ENCOUNTER — OFFICE VISIT (OUTPATIENT)
Dept: FAMILY MEDICINE | Facility: OTHER | Age: 64
End: 2024-06-10
Attending: FAMILY MEDICINE
Payer: COMMERCIAL

## 2024-06-10 VITALS
OXYGEN SATURATION: 98 % | BODY MASS INDEX: 32.4 KG/M2 | WEIGHT: 201.6 LBS | DIASTOLIC BLOOD PRESSURE: 70 MMHG | SYSTOLIC BLOOD PRESSURE: 132 MMHG | TEMPERATURE: 98.3 F | RESPIRATION RATE: 15 BRPM | HEART RATE: 69 BPM | HEIGHT: 66 IN

## 2024-06-10 DIAGNOSIS — Z12.5 SCREENING FOR PROSTATE CANCER: ICD-10-CM

## 2024-06-10 DIAGNOSIS — R79.89 ELEVATED LIVER FUNCTION TESTS: ICD-10-CM

## 2024-06-10 DIAGNOSIS — R80.9 MICROALBUMINURIA DUE TO TYPE 2 DIABETES MELLITUS (H): ICD-10-CM

## 2024-06-10 DIAGNOSIS — R97.20 ELEVATED PROSTATE SPECIFIC ANTIGEN (PSA): ICD-10-CM

## 2024-06-10 DIAGNOSIS — G47.33 OSA (OBSTRUCTIVE SLEEP APNEA): ICD-10-CM

## 2024-06-10 DIAGNOSIS — E11.9 TYPE 2 DIABETES MELLITUS WITHOUT COMPLICATION, WITHOUT LONG-TERM CURRENT USE OF INSULIN (H): ICD-10-CM

## 2024-06-10 DIAGNOSIS — N18.2 CHRONIC KIDNEY DISEASE, STAGE 2 (MILD): ICD-10-CM

## 2024-06-10 DIAGNOSIS — Z00.00 ANNUAL PHYSICAL EXAM: ICD-10-CM

## 2024-06-10 DIAGNOSIS — E11.29 MICROALBUMINURIA DUE TO TYPE 2 DIABETES MELLITUS (H): ICD-10-CM

## 2024-06-10 DIAGNOSIS — I10 BENIGN ESSENTIAL HYPERTENSION: ICD-10-CM

## 2024-06-10 DIAGNOSIS — Z00.00 ANNUAL PHYSICAL EXAM: Primary | ICD-10-CM

## 2024-06-10 DIAGNOSIS — E78.2 MIXED HYPERLIPIDEMIA: ICD-10-CM

## 2024-06-10 LAB
ALBUMIN SERPL BCG-MCNC: 4.5 G/DL (ref 3.5–5.2)
ALP SERPL-CCNC: 86 U/L (ref 40–150)
ALT SERPL W P-5'-P-CCNC: 31 U/L (ref 0–70)
ANION GAP SERPL CALCULATED.3IONS-SCNC: 11 MMOL/L (ref 7–15)
AST SERPL W P-5'-P-CCNC: 29 U/L (ref 0–45)
BASOPHILS # BLD AUTO: 0 10E3/UL (ref 0–0.2)
BASOPHILS NFR BLD AUTO: 0 %
BILIRUB SERPL-MCNC: 0.9 MG/DL
BUN SERPL-MCNC: 8.8 MG/DL (ref 8–23)
CALCIUM SERPL-MCNC: 9.7 MG/DL (ref 8.8–10.2)
CHLORIDE SERPL-SCNC: 101 MMOL/L (ref 98–107)
CHOLEST SERPL-MCNC: 157 MG/DL
CREAT SERPL-MCNC: 0.94 MG/DL (ref 0.67–1.17)
CREAT UR-MCNC: 171.2 MG/DL
DEPRECATED HCO3 PLAS-SCNC: 27 MMOL/L (ref 22–29)
EGFRCR SERPLBLD CKD-EPI 2021: >90 ML/MIN/1.73M2
EOSINOPHIL # BLD AUTO: 0 10E3/UL (ref 0–0.7)
EOSINOPHIL NFR BLD AUTO: 1 %
ERYTHROCYTE [DISTWIDTH] IN BLOOD BY AUTOMATED COUNT: 13.8 % (ref 10–15)
EST. AVERAGE GLUCOSE BLD GHB EST-MCNC: 143 MG/DL
FASTING STATUS PATIENT QL REPORTED: NO
FASTING STATUS PATIENT QL REPORTED: NO
GLUCOSE SERPL-MCNC: 135 MG/DL (ref 70–99)
HBA1C MFR BLD: 6.6 %
HCT VFR BLD AUTO: 39.4 % (ref 40–53)
HDLC SERPL-MCNC: 68 MG/DL
HGB BLD-MCNC: 13.5 G/DL (ref 13.3–17.7)
IMM GRANULOCYTES # BLD: 0 10E3/UL
IMM GRANULOCYTES NFR BLD: 0 %
LDLC SERPL CALC-MCNC: 76 MG/DL
LYMPHOCYTES # BLD AUTO: 1 10E3/UL (ref 0.8–5.3)
LYMPHOCYTES NFR BLD AUTO: 26 %
MCH RBC QN AUTO: 31.9 PG (ref 26.5–33)
MCHC RBC AUTO-ENTMCNC: 34.3 G/DL (ref 31.5–36.5)
MCV RBC AUTO: 93 FL (ref 78–100)
MICROALBUMIN UR-MCNC: 221.3 MG/L
MICROALBUMIN/CREAT UR: 129.26 MG/G CR (ref 0–17)
MONOCYTES # BLD AUTO: 0.4 10E3/UL (ref 0–1.3)
MONOCYTES NFR BLD AUTO: 12 %
NEUTROPHILS # BLD AUTO: 2.2 10E3/UL (ref 1.6–8.3)
NEUTROPHILS NFR BLD AUTO: 61 %
NONHDLC SERPL-MCNC: 89 MG/DL
NRBC # BLD AUTO: 0 10E3/UL
NRBC BLD AUTO-RTO: 0 /100
PLATELET # BLD AUTO: 181 10E3/UL (ref 150–450)
POTASSIUM SERPL-SCNC: 3.9 MMOL/L (ref 3.4–5.3)
PROT SERPL-MCNC: 7.6 G/DL (ref 6.4–8.3)
PSA SERPL DL<=0.01 NG/ML-MCNC: 3.32 NG/ML (ref 0–4.5)
RBC # BLD AUTO: 4.23 10E6/UL (ref 4.4–5.9)
SODIUM SERPL-SCNC: 139 MMOL/L (ref 135–145)
TRIGL SERPL-MCNC: 66 MG/DL
TSH SERPL DL<=0.005 MIU/L-ACNC: 1.6 UIU/ML (ref 0.3–4.2)
WBC # BLD AUTO: 3.6 10E3/UL (ref 4–11)

## 2024-06-10 PROCEDURE — 82570 ASSAY OF URINE CREATININE: CPT

## 2024-06-10 PROCEDURE — 80050 GENERAL HEALTH PANEL: CPT

## 2024-06-10 PROCEDURE — 90471 IMMUNIZATION ADMIN: CPT | Performed by: FAMILY MEDICINE

## 2024-06-10 PROCEDURE — G0103 PSA SCREENING: HCPCS

## 2024-06-10 PROCEDURE — 36415 COLL VENOUS BLD VENIPUNCTURE: CPT

## 2024-06-10 PROCEDURE — 99214 OFFICE O/P EST MOD 30 MIN: CPT | Mod: 25 | Performed by: FAMILY MEDICINE

## 2024-06-10 PROCEDURE — 80061 LIPID PANEL: CPT

## 2024-06-10 PROCEDURE — 90678 RSV VACC PREF BIVALENT IM: CPT | Performed by: FAMILY MEDICINE

## 2024-06-10 PROCEDURE — 99207 PR FOOT EXAM NO CHARGE: CPT | Performed by: FAMILY MEDICINE

## 2024-06-10 PROCEDURE — 99396 PREV VISIT EST AGE 40-64: CPT | Mod: 25 | Performed by: FAMILY MEDICINE

## 2024-06-10 PROCEDURE — 83036 HEMOGLOBIN GLYCOSYLATED A1C: CPT

## 2024-06-10 PROCEDURE — 82043 UR ALBUMIN QUANTITATIVE: CPT

## 2024-06-10 RX ORDER — DOXAZOSIN 2 MG/1
6 TABLET ORAL AT BEDTIME
Qty: 270 TABLET | Refills: 2 | Status: SHIPPED | OUTPATIENT
Start: 2024-06-10

## 2024-06-10 RX ORDER — MONTELUKAST SODIUM 10 MG/1
10 TABLET ORAL AT BEDTIME
Qty: 90 TABLET | Refills: 3 | Status: SHIPPED | OUTPATIENT
Start: 2024-06-10

## 2024-06-10 SDOH — HEALTH STABILITY: PHYSICAL HEALTH: ON AVERAGE, HOW MANY DAYS PER WEEK DO YOU ENGAGE IN MODERATE TO STRENUOUS EXERCISE (LIKE A BRISK WALK)?: 2 DAYS

## 2024-06-10 SDOH — HEALTH STABILITY: PHYSICAL HEALTH: ON AVERAGE, HOW MANY MINUTES DO YOU ENGAGE IN EXERCISE AT THIS LEVEL?: 10 MIN

## 2024-06-10 ASSESSMENT — SOCIAL DETERMINANTS OF HEALTH (SDOH): HOW OFTEN DO YOU GET TOGETHER WITH FRIENDS OR RELATIVES?: TWICE A WEEK

## 2024-06-10 ASSESSMENT — PAIN SCALES - GENERAL: PAINLEVEL: NO PAIN (0)

## 2024-06-10 NOTE — PROGRESS NOTES
Preventive Care Visit  [facility]  Colin Fisher MD, Family Medicine  Mike 10, 2024      Assessment & Plan     Annual physical exam  Doing well overall.   - CBC with Platelets & Differential; Future  - Comprehensive metabolic panel; Future  - Albumin Random Urine Quantitative with Creat Ratio; Future  - Hemoglobin A1c; Future  - Lipid Profile; Future  - TSH; Future  - Prostate Specific Antigen Screen; Future    Type 2 diabetes mellitus without complication, without long-term current use of insulin (H)  Great control. No issues. Continue current medications and behavioral changes.   Follow-up in 6 months   - CBC with Platelets & Differential; Future  - Comprehensive metabolic panel; Future  - Albumin Random Urine Quantitative with Creat Ratio; Future  - Hemoglobin A1c; Future  - Lipid Profile; Future  - TSH; Future  - Prostate Specific Antigen Screen; Future  - MS FOOT EXAM NO CHARGE    Benign essential hypertension  Stable - good control . Continue current medications and behavioral changes. \  - CBC with Platelets & Differential; Future  - Comprehensive metabolic panel; Future  - Albumin Random Urine Quantitative with Creat Ratio; Future  - Hemoglobin A1c; Future  - Lipid Profile; Future  - TSH; Future  - Prostate Specific Antigen Screen; Future  - doxazosin (CARDURA) 2 MG tablet; Take 3 tablets (6 mg) by mouth at bedtime    Chronic kidney disease, stage 2 (mild)  Stable   - CBC with Platelets & Differential; Future  - Comprehensive metabolic panel; Future  - Albumin Random Urine Quantitative with Creat Ratio; Future  - Hemoglobin A1c; Future  - Lipid Profile; Future  - TSH; Future  - Prostate Specific Antigen Screen; Future    Mixed hyperlipidemia  Good control on statin.  Continue current medications and behavioral changes.   - CBC with Platelets & Differential; Future  - Comprehensive metabolic panel; Future  - Albumin Random Urine Quantitative with Creat Ratio; Future  - Hemoglobin A1c; Future  -  "Lipid Profile; Future  - TSH; Future  - Prostate Specific Antigen Screen; Future    Microalbuminuria due to type 2 diabetes mellitus (H)  On Farxiga and ARB. Will watch.   - CBC with Platelets & Differential; Future  - Comprehensive metabolic panel; Future  - Albumin Random Urine Quantitative with Creat Ratio; Future  - Hemoglobin A1c; Future  - Lipid Profile; Future  - TSH; Future  - Prostate Specific Antigen Screen; Future    Elevated liver function tests  Stable to now resolved   - CBC with Platelets & Differential; Future  - Comprehensive metabolic panel; Future  - Albumin Random Urine Quantitative with Creat Ratio; Future  - Hemoglobin A1c; Future  - Lipid Profile; Future  - TSH; Future  - Prostate Specific Antigen Screen; Future    JENNIFER (obstructive sleep apnea) - Moderate without sleep-associated hypoxemia  Stable  - CBC with Platelets & Differential; Future  - Comprehensive metabolic panel; Future  - Albumin Random Urine Quantitative with Creat Ratio; Future  - Hemoglobin A1c; Future  - Lipid Profile; Future  - TSH; Future  - Prostate Specific Antigen Screen; Future    Screening for prostate cancer  Psa ok   - CBC with Platelets & Differential; Future  - Comprehensive metabolic panel; Future  - Albumin Random Urine Quantitative with Creat Ratio; Future  - Hemoglobin A1c; Future  - Lipid Profile; Future  - TSH; Future  - Prostate Specific Antigen Screen; Future          BMI  Estimated body mass index is 32.54 kg/m  as calculated from the following:    Height as of this encounter: 1.676 m (5' 6\").    Weight as of this encounter: 91.4 kg (201 lb 9.6 oz).   Weight management plan: Discussed healthy diet and exercise guidelines    Counseling  Appropriate preventive services were discussed with this patient, including applicable screening as appropriate for fall prevention, nutrition, physical activity, Tobacco-use cessation, weight loss and cognition.  Checklist reviewing preventive services available has been " given to the patient.  Reviewed patient's diet, addressing concerns and/or questions.   He is at risk for lack of exercise and has been provided with information to increase physical activity for the benefit of his well-being.   The patient reports drinking more than one alcoholic drink per day and sometimes engages in binge or excessive drinking. The patient was counseled and given information about possible harmful effects of excessive alcohol intake as well as where to get help for alcohol problems.         No follow-ups on file.    Joya Larson is a 63 year old, presenting for the following:  Physical, Hypertension, and Diabetes        6/10/2024     7:38 AM   Additional Questions   Roomed by MARTINA Wayne   Accompanied by self        Health Care Directive  Patient does not have a Health Care Directive or Living Will: Discussed advance care planning with patient; however, patient declined at this time.    HPI  Recovering from knee replacement - still stiff and mild pain      Diabetes Follow-up    How often are you checking your blood sugar? Not at all  What concerns do you have today about your diabetes? None   Do you have any of these symptoms? (Select all that apply)  No numbness or tingling in feet.  No redness, sores or blisters on feet.  No complaints of excessive thirst.  No reports of blurry vision.  No significant changes to weight.      BP Readings from Last 2 Encounters:   06/10/24 132/70   02/05/24 138/76     Hemoglobin A1C (%)   Date Value   11/24/2023 6.2 (H)   06/05/2023 6.6 (H)   08/17/2021 6.5 (A)   05/14/2021 6.3 (H)     LDL Cholesterol Calculated (mg/dL)   Date Value   06/05/2023 75   05/23/2022 47   05/14/2021 78   01/13/2020 76             Hypertension Follow-up    Do you check your blood pressure regularly outside of the clinic? No   Are you following a low salt diet? No  Are your blood pressures ever more than 140 on the top number (systolic) OR more   than 90 on the bottom number  (diastolic), for example 140/90? Yes    Chronic Kidney Disease Follow-up    Do you take any over the counter pain medicine?: No        6/10/2024   General Health   How would you rate your overall physical health? Good   Feel stress (tense, anxious, or unable to sleep) Not at all         6/10/2024   Nutrition   Three or more servings of calcium each day? (!) NO   Diet: Diabetic   How many servings of fruit and vegetables per day? (!) 0-1   How many sweetened beverages each day? 0-1         6/10/2024   Exercise   Days per week of moderate/strenous exercise 2 days   Average minutes spent exercising at this level 10 min   (!) EXERCISE CONCERN      6/10/2024   Social Factors   Frequency of gathering with friends or relatives Twice a week   Worry food won't last until get money to buy more No   Food not last or not have enough money for food? No   Do you have housing?  Yes   Are you worried about losing your housing? No   Lack of transportation? No   Unable to get utilities (heat,electricity)? No         6/10/2024   Fall Risk   Fallen 2 or more times in the past year? No   Trouble with walking or balance? No          6/10/2024   Dental   Dentist two times every year? Yes         6/10/2024   TB Screening   Were you born outside of the [place]? No               6/10/2024   Substance Use   Alcohol more than 3/day or more than 7/wk Yes   How often do you have a drink containing alcohol 2 to 3 times a week   How many alcohol drinks on typical day 1 or 2   How often do you have 5+ drinks at one occasion Monthly   Audit 2/3 Score 2   How often not able to stop drinking once started Never   How often failed to do what normally expected Never   How often needed first drink in am after a heavy drinking session Never   How often feeling of guilt or remorse after drinking Never   How often unable to remember what happened the night before Never   Have you or someone else been injured because of your drinking No   Has anyone been concerned  or suggested you cut down on drinking No   TOTAL SCORE - AUDIT 5   Do you use any other substances recreationally? (!) ALCOHOL     Social History     Tobacco Use    Smoking status: Never     Passive exposure: Never    Smokeless tobacco: Current     Types: Chew    Tobacco comments:     Tried to quit; No passive exposure   Vaping Use    Vaping status: Never Used   Substance Use Topics    Alcohol use: Yes     Comment: socially    Drug use: No             6/10/2024   One time HIV Screening   Previous HIV test? No         6/10/2024   STI Screening   New sexual partner(s) since last STI/HIV test? No   Last PSA:   PSA   Date Value Ref Range Status   05/14/2021 2.07 0 - 4 ug/L Final     Comment:     Assay Method:  Chemiluminescence using Siemens Vista analyzer     PSA Tumor Marker   Date Value Ref Range Status   06/05/2023 1.82 0.00 - 4.50 ng/mL Final   05/23/2022 2.47 0.00 - 4.00 ug/L Final     ASCVD Risk   The 10-year ASCVD risk score ([place] CARRERA, et al., 2019) is: 20.1%    Values used to calculate the score:      Age: 63 years      Sex: Male      Is Non- : No      Diabetic: Yes      Tobacco smoker: No      Systolic Blood Pressure: 132 mmHg      Is BP treated: Yes      HDL Cholesterol: 55 mg/dL      Total Cholesterol: 148 mg/dL           Reviewed and updated as needed this visit by Provider                    Past Medical History:   Diagnosis Date    Alcoholism 1/1/2011    Allergic rhinitis, cause unspecified 3/2/2000    Anxiety  1/1/2011    Colonic Polyps 3/2/2000    Depression 1/1/2011    Gilbert's Syndrome 3/2/2000    Hypercholesterolemia 3/2/2000    Overweight(278.02) 3/2/2000    Prediabetes 3/2/2000     Past Surgical History:   Procedure Laterality Date    COLONOSCOPY  8/5/2013    Procedure: COLONOSCOPY;  colonoscopy ;  Surgeon: Yobani Ventura MD;  Location: [facility] OR    COLONOSCOPY N/A 3/12/2018    Procedure: COLONOSCOPY;  COLONOSCOPY with polypectomy;  Surgeon: Donovan Mayfield,  "MD;  Location: [facility] OR    colonoscopy with polypectomy  1/27/2011    repeat 2 years    mandibular fracture repair      vasectomy      wisdom teeth extraction           Review of Systems  Constitutional, neuro, ENT, endocrine, pulmonary, cardiac, gastrointestinal, genitourinary, musculoskeletal, integument and psychiatric systems are negative, except as otherwise noted.     Objective    Exam  /70 (BP Location: Right arm, Patient Position: Sitting, Cuff Size: Adult Large)   Pulse 69   Temp 98.3  F (36.8  C) (Tympanic)   Resp 15   Ht 1.676 m (5' 6\")   Wt 91.4 kg (201 lb 9.6 oz)   SpO2 98%   BMI 32.54 kg/m     Estimated body mass index is 32.54 kg/m  as calculated from the following:    Height as of this encounter: 1.676 m (5' 6\").    Weight as of this encounter: 91.4 kg (201 lb 9.6 oz).    Physical Exam  GENERAL: alert and no distress  EYES: Eyes grossly normal to inspection, PERRL and conjunctivae and sclerae normal  HENT: ear canals and TM's normal, nose and mouth without ulcers or lesions  NECK: no adenopathy, no asymmetry, masses, or scars  RESP: lungs clear to auscultation - no rales, rhonchi or wheezes  CV: regular rate and rhythm, normal S1 S2, no S3 or S4, no murmur, click or rub, no peripheral edema  ABDOMEN: soft, nontender, no hepatosplenomegaly, no masses and bowel sounds normal   (male): normal male genitalia without lesions or urethral discharge, no hernia  MS: no gross musculoskeletal defects noted, no edema  SKIN: no suspicious lesions or rashes  NEURO: Normal strength and tone, mentation intact and speech normal  PSYCH: mentation appears normal, affect normal/bright    Diabetic foot exam: normal DP and PT pulses, no trophic changes or ulcerative lesions, normal sensory exam, and normal monofilament exam    Results for orders placed or performed in visit on 06/10/24   CBC with platelets and differential     Status: Abnormal   Result Value Ref Range    WBC Count 3.6 (L) 4.0 - 11.0 10e3/uL    " RBC Count 4.23 (L) 4.40 - 5.90 10e6/uL    Hemoglobin 13.5 13.3 - 17.7 g/dL    Hematocrit 39.4 (L) 40.0 - 53.0 %    MCV 93 78 - 100 fL    MCH 31.9 26.5 - 33.0 pg    MCHC 34.3 31.5 - 36.5 g/dL    RDW 13.8 10.0 - 15.0 %    Platelet Count 181 150 - 450 10e3/uL    % Neutrophils 61 %    % Lymphocytes 26 %    % Monocytes 12 %    % Eosinophils 1 %    % Basophils 0 %    % Immature Granulocytes 0 %    NRBCs per 100 WBC 0 <1 /100    Absolute Neutrophils 2.2 1.6 - 8.3 10e3/uL    Absolute Lymphocytes 1.0 0.8 - 5.3 10e3/uL    Absolute Monocytes 0.4 0.0 - 1.3 10e3/uL    Absolute Eosinophils 0.0 0.0 - 0.7 10e3/uL    Absolute Basophils 0.0 0.0 - 0.2 10e3/uL    Absolute Immature Granulocytes 0.0 <=0.4 10e3/uL    Absolute NRBCs 0.0 10e3/uL   CBC with Platelets & Differential     Status: Abnormal    Narrative    The following orders were created for panel order CBC with Platelets & Differential.  Procedure                               Abnormality         Status                     ---------                               -----------         ------                     CBC with platelets and d...[[id number]]  Abnormal            Final result                 Please view results for these tests on the individual orders.         Signed Electronically by: Colin Fisher MD

## 2024-06-11 ENCOUNTER — HOSPITAL ENCOUNTER (OUTPATIENT)
Dept: EDUCATION SERVICES | Facility: HOSPITAL | Age: 64
Discharge: HOME | End: 2024-06-11
Attending: NURSE PRACTITIONER | Admitting: NURSE PRACTITIONER
Payer: COMMERCIAL

## 2024-06-11 VITALS
BODY MASS INDEX: 34.1 KG/M2 | HEART RATE: 82 BPM | DIASTOLIC BLOOD PRESSURE: 76 MMHG | RESPIRATION RATE: 16 BRPM | SYSTOLIC BLOOD PRESSURE: 149 MMHG | OXYGEN SATURATION: 96 % | HEIGHT: 65 IN | WEIGHT: 204.7 LBS

## 2024-06-11 DIAGNOSIS — E11.9 TYPE 2 DIABETES MELLITUS WITHOUT COMPLICATION, WITHOUT LONG-TERM CURRENT USE OF INSULIN (H): Primary | ICD-10-CM

## 2024-06-11 PROCEDURE — 97803 MED NUTRITION INDIV SUBSEQ: CPT | Performed by: DIETITIAN, REGISTERED

## 2024-06-11 ASSESSMENT — PAIN SCALES - GENERAL: PAINLEVEL: NO PAIN (0)

## 2024-06-11 NOTE — PROGRESS NOTES
Diabetes Self-Management Education & Support    Presents for: Individual review    Type of Service: In Person Visit    ASSESSMENT:  Recent A1c in target at 6.6%.  Weight is down 10# since March 2023.  Pt had knee replacement this spring so just getting back to some activity now.  Eye exam and foot exam are up to date.  Pt continues to do well with glucose control.     Patient's most recent   Lab Results   Component Value Date    A1C 6.6 06/10/2024    A1C 6.5 08/17/2021     is meeting goal of <7.0    Diabetes knowledge and skills assessment:   Patient is knowledgeable in diabetes management concepts related to: Healthy Eating, Being Active, Taking Medication, Monitoring, Problem Solving, Reducing Risks, Healthy Coping.     Will continue to educate on diabetes management concepts as indicated.      Based on learning assessment above, most appropriate setting for further diabetes education would be: Individual setting.      PLAN  Continue to work on healthy food choices and being active.  Test glucose 1x/day at alternating times.  Call with any concerns.      Follow-up: Annually or sooner if indicated.     See Care Plan for co-developed, patient-state behavior change goals.  AVS provided for patient today.    Education Materials Provided:  No new materials today.     SUBJECTIVE/OBJECTIVE:  Presents for: Individual review  Accompanied by: Self  Diabetes education in the past 24mo: Yes  Focus of Visit: Assistance w/ making life changes  Diabetes type: Type 2  Date of diagnosis: 12/2014  Disease course: Stable  How confident are you filling out medical forms by yourself:: Extremely  Diabetes management related comments/concerns: None  Transportation concerns: No  Difficulty affording diabetes medication?: No  Difficulty affording diabetes testing supplies?: No  Other concerns:: None  Cultural Influences/Ethnic Background:  Not  or     Diabetes Symptoms & Complications:  Diabetes Related Symptoms:  "None  Weight trend: Decreasing (Weight is down 10# since March 2023.)  Symptom course: Stable  Disease course: Stable  Complications assessed today?: Yes  CVA: No  Heart disease: No  Nephropathy: Yes (CKD stage 2)  Retinopathy: No    Patient Problem List and Family Medical History reviewed for relevant medical history, current medical status, and diabetes risk factors.    Vitals:  /76   Pulse 82   Resp 16   Ht 1.651 m (5' 5\")   Wt 92.9 kg (204 lb 11.2 oz)   SpO2 96%   BMI 34.06 kg/m    Estimated body mass index is 34.06 kg/m  as calculated from the following:    Height as of this encounter: 1.651 m (5' 5\").    Weight as of this encounter: 92.9 kg (204 lb 11.2 oz).   Last 3 BP:   BP Readings from Last 3 Encounters:   06/11/24 149/76   06/10/24 132/70   02/05/24 138/76       History   Smoking Status    Never   Smokeless Tobacco    Current    Types: Chew       Labs:  Lab Results   Component Value Date    A1C 6.6 06/10/2024    A1C 6.5 08/17/2021     Lab Results   Component Value Date     06/10/2024     09/26/2022     05/14/2021     Lab Results   Component Value Date    LDL 76 06/10/2024    LDL 78 05/14/2021     HDL Cholesterol   Date Value Ref Range Status   05/14/2021 61 >39 mg/dL Final     Direct Measure HDL   Date Value Ref Range Status   06/10/2024 68 >=40 mg/dL Final   ]  GFR Estimate   Date Value Ref Range Status   06/10/2024 >90 >60 mL/min/1.73m2 Final   05/14/2021 85 >60 mL/min/[1.73_m2] Final     Comment:     Non  GFR Calc  Starting 12/18/2018, serum creatinine based estimated GFR (eGFR) will be   calculated using the Chronic Kidney Disease Epidemiology Collaboration   (CKD-EPI) equation.       GFR Estimate If Black   Date Value Ref Range Status   05/14/2021 >90 >60 mL/min/[1.73_m2] Final     Comment:      GFR Calc  Starting 12/18/2018, serum creatinine based estimated GFR (eGFR) will be   calculated using the Chronic Kidney Disease Epidemiology " "Collaboration   (CKD-EPI) equation.       Lab Results   Component Value Date    CR 0.94 06/10/2024    CR 0.96 05/14/2021     No results found for: \"MICROALBUMIN\"    Healthy Eating:  Healthy Eating Assessed Today: Yes  Cultural/Worship diet restrictions?: No  Do you have any food allergies or intolerances?: No  Meal planning/habits: Low carb  Who cooks/prepares meals for you?: Self, Spouse  Who purchases food in  your home?: Self, Spouse  How many times a week on average do you eat food made away from home (restaurant/take-out)?: 1  Meals include: Breakfast, Lunch, Dinner  Breakfast: 2 granola bars and yogurt  Lunch: sandwich/chips or salad or leftovers  Dinner: meat, veggies, potatoes occasionally  Snacks: minimal snacks - no hs - sometimes cheese stick or nuts during the day  Beverages: Water, Sports drinks, Diet soda, Alcohol (Gatorade Zero)  Has patient met with a dietitian in the past?: Yes    Being Active:  Being Active Assessed Today: Yes  Exercise:: Currently not exercising (Able to do some yard chores recently and plans to begin golfing - had knee replacement this spring)  Barrier to exercise: Physical limitation (Had knee replacement this spring.  Needs other knee replaced and hips also.)    Monitoring:  Monitoring Assessed Today: Yes  Did patient bring glucose meter to appointment? : Yes  Blood Glucose Meter: ContourNext  Times checking blood sugar at home (number): 1  Times checking blood sugar at home (per): Day  Blood glucose trend: No change  Pt forgot his meter at his sister's house. She is planning to bring it up on the 4th of July.     Taking Medications:  Diabetes Medication(s)       Biguanides       metFORMIN (GLUCOPHAGE XR) 500 MG 24 hr tablet TAKE 4 TABLETS (2,000 MG) BY MOUTH DAILY WITH DINNER.     Patient taking differently: Take 1,000 mg by mouth Patient reports taking 2 tablets       Sodium-Glucose Co-Transporter 2 (SGLT2) Inhibitors       FARXIGA 5 MG TABS tablet TAKE 1 TABLET (5 MG) BY " MOUTH EVERY MORNING            Taking Medication Assessed Today: Yes  Current Treatments: Diet, Oral Medication (taken by mouth) (Metformin XR 1000 evening, Farxiga 5 mg daily (takes in pm as was forgetting in am - provider aware))  Problems taking diabetes medications regularly?: No  Diabetes medication side effects?: No (Had diarrhea on 2000 mg Metformin XR daily.)    Problem Solving:  Problem Solving Assessed Today: Yes  Is the patient at risk for hypoglycemia?: No  Is the patient at risk for DKA?: No    Reducing Risks:  Reducing Risks Assessed Today: Yes  Diabetes Risks: Age over 45 years  CAD Risks: Diabetes Mellitus, Male sex, Tobacco exposure  Has dilated eye exam at least once a year?: Yes  Sees dentist every 6 months?: Yes  Feet checked by healthcare provider in the last year?: Yes    Healthy Coping:  Healthy Coping Assessed Today: Yes  Emotional response to diabetes: Acceptance, Concern for health and well-being  Informal Support system:: Family  Stage of change: MAINTENANCE (Working to maintain change, with risk of relapse)  Patient Activation Measure Survey Score:      12/18/2018     2:00 PM 8/29/2022     3:00 PM   NOY Score (Last Two)   NOY Raw Score 37 37   Activation Score 79.2 79.2   NOY Level 4 4       Time Spent: 20 minutes  Encounter Type: Individual    Any diabetes medication dose changes were made via the CDE Protocol per the patient's referring provider. A copy of this encounter was shared with the provider.

## 2024-06-11 NOTE — PATIENT INSTRUCTIONS
-Keep working on healthy, low carbohydrate food choices.  -Be as active as you are able.  -Continue to rotate testing times with your meter.  Alternate fasting and 2 hours post meal if able.  -Target levels are fasting , 2 hours after a meal < 180.  -Recent A1c 6.6%.  Great job!  Goal is < 7.0%.   -Follow up annually or sooner with concerns.   -TODD Bran, [facility] [phone].

## 2024-06-11 NOTE — LETTER
6/11/2024      Neo Cowan  [address] [facility] [address]        Diabetes Self-Management Education & Support    Presents for: Individual review    Type of Service: In Person Visit    ASSESSMENT:  Recent A1c in target at 6.6%.  Weight is down 10# since March 2023.  Pt had knee replacement this spring so just getting back to some activity now.  Eye exam and foot exam are up to date.  Pt continues to do well with glucose control.     Patient's most recent   Lab Results   Component Value Date    A1C 6.6 06/10/2024    A1C 6.5 08/17/2021     is meeting goal of <7.0    Diabetes knowledge and skills assessment:   Patient is knowledgeable in diabetes management concepts related to: Healthy Eating, Being Active, Taking Medication, Monitoring, Problem Solving, Reducing Risks, Healthy Coping.     Will continue to educate on diabetes management concepts as indicated.      Based on learning assessment above, most appropriate setting for further diabetes education would be: Individual setting.      PLAN  Continue to work on healthy food choices and being active.  Test glucose 1x/day at alternating times.  Call with any concerns.      Follow-up: Annually or sooner if indicated.     See Care Plan for co-developed, patient-state behavior change goals.  AVS provided for patient today.    Education Materials Provided:  No new materials today.     SUBJECTIVE/OBJECTIVE:  Presents for: Individual review  Accompanied by: Self  Diabetes education in the past 24mo: Yes  Focus of Visit: Assistance w/ making life changes  Diabetes type: Type 2  Date of diagnosis: 12/2014  Disease course: Stable  How confident are you filling out medical forms by yourself:: Extremely  Diabetes management related comments/concerns: None  Transportation concerns: No  Difficulty affording diabetes medication?: No  Difficulty affording diabetes testing supplies?: No  Other concerns:: None  Cultural Influences/Ethnic Background:  Not  or  "    Diabetes Symptoms & Complications:  Diabetes Related Symptoms: None  Weight trend: Decreasing (Weight is down 10# since March 2023.)  Symptom course: Stable  Disease course: Stable  Complications assessed today?: Yes  CVA: No  Heart disease: No  Nephropathy: Yes (CKD stage 2)  Retinopathy: No    Patient Problem List and Family Medical History reviewed for relevant medical history, current medical status, and diabetes risk factors.    Vitals:  /76   Pulse 82   Resp 16   Ht 1.651 m (5' 5\")   Wt 92.9 kg (204 lb 11.2 oz)   SpO2 96%   BMI 34.06 kg/m    Estimated body mass index is 34.06 kg/m  as calculated from the following:    Height as of this encounter: 1.651 m (5' 5\").    Weight as of this encounter: 92.9 kg (204 lb 11.2 oz).   Last 3 BP:   BP Readings from Last 3 Encounters:   06/11/24 149/76   06/10/24 132/70   02/05/24 138/76       History   Smoking Status     Never   Smokeless Tobacco     Current     Types: Chew       Labs:  Lab Results   Component Value Date    A1C 6.6 06/10/2024    A1C 6.5 08/17/2021     Lab Results   Component Value Date     06/10/2024     09/26/2022     05/14/2021     Lab Results   Component Value Date    LDL 76 06/10/2024    LDL 78 05/14/2021     HDL Cholesterol   Date Value Ref Range Status   05/14/2021 61 >39 mg/dL Final     Direct Measure HDL   Date Value Ref Range Status   06/10/2024 68 >=40 mg/dL Final   ]  GFR Estimate   Date Value Ref Range Status   06/10/2024 >90 >60 mL/min/1.73m2 Final   05/14/2021 85 >60 mL/min/[1.73_m2] Final     Comment:     Non  GFR Calc  Starting 12/18/2018, serum creatinine based estimated GFR (eGFR) will be   calculated using the Chronic Kidney Disease Epidemiology Collaboration   (CKD-EPI) equation.       GFR Estimate If Black   Date Value Ref Range Status   05/14/2021 >90 >60 mL/min/[1.73_m2] Final     Comment:      GFR Calc  Starting 12/18/2018, serum creatinine based estimated GFR " "(eGFR) will be   calculated using the Chronic Kidney Disease Epidemiology Collaboration   (CKD-EPI) equation.       Lab Results   Component Value Date    CR 0.94 06/10/2024    CR 0.96 05/14/2021     No results found for: \"MICROALBUMIN\"    Healthy Eating:  Healthy Eating Assessed Today: Yes  Cultural/Church diet restrictions?: No  Do you have any food allergies or intolerances?: No  Meal planning/habits: Low carb  Who cooks/prepares meals for you?: Self, Spouse  Who purchases food in  your home?: Self, Spouse  How many times a week on average do you eat food made away from home (restaurant/take-out)?: 1  Meals include: Breakfast, Lunch, Dinner  Breakfast: 2 granola bars and yogurt  Lunch: sandwich/chips or salad or leftovers  Dinner: meat, veggies, potatoes occasionally  Snacks: minimal snacks - no hs - sometimes cheese stick or nuts during the day  Beverages: Water, Sports drinks, Diet soda, Alcohol (Gatorade Zero)  Has patient met with a dietitian in the past?: Yes    Being Active:  Being Active Assessed Today: Yes  Exercise:: Currently not exercising (Able to do some yard chores recently and plans to begin golfing - had knee replacement this spring)  Barrier to exercise: Physical limitation (Had knee replacement this spring.  Needs other knee replaced and hips also.)    Monitoring:  Monitoring Assessed Today: Yes  Did patient bring glucose meter to appointment? : Yes  Blood Glucose Meter: [employer]  Times checking blood sugar at home (number): 1  Times checking blood sugar at home (per): Day  Blood glucose trend: No change  Pt forgot his meter at his sister's house. She is planning to bring it up on the 4th of July.     Taking Medications:  Diabetes Medication(s)       Biguanides       metFORMIN (GLUCOPHAGE XR) 500 MG 24 hr tablet TAKE 4 TABLETS (2,000 MG) BY MOUTH DAILY WITH DINNER.     Patient taking differently: Take 1,000 mg by mouth Patient reports taking 2 tablets       Sodium-Glucose Co-Transporter 2 " (SGLT2) Inhibitors       FARXIGA 5 MG TABS tablet TAKE 1 TABLET (5 MG) BY MOUTH EVERY MORNING            Taking Medication Assessed Today: Yes  Current Treatments: Diet, Oral Medication (taken by mouth) (Metformin XR 1000 evening, Farxiga 5 mg daily (takes in pm as was forgetting in am - provider aware))  Problems taking diabetes medications regularly?: No  Diabetes medication side effects?: No (Had diarrhea on 2000 mg Metformin XR daily.)    Problem Solving:  Problem Solving Assessed Today: Yes  Is the patient at risk for hypoglycemia?: No  Is the patient at risk for DKA?: No    Reducing Risks:  Reducing Risks Assessed Today: Yes  Diabetes Risks: Age over 45 years  CAD Risks: Diabetes Mellitus, Male sex, Tobacco exposure  Has dilated eye exam at least once a year?: Yes  Sees dentist every 6 months?: Yes  Feet checked by healthcare provider in the last year?: Yes    Healthy Coping:  Healthy Coping Assessed Today: Yes  Emotional response to diabetes: Acceptance, Concern for health and well-being  Informal Support system:: Family  Stage of change: MAINTENANCE (Working to maintain change, with risk of relapse)  Patient Activation Measure Survey Score:      12/18/2018     2:00 PM 8/29/2022     3:00 PM   NOY Score (Last Two)   NOY Raw Score 37 37   Activation Score 79.2 79.2   NOY Level 4 4       Time Spent: 20 minutes  Encounter Type: Individual    Any diabetes medication dose changes were made via the CDE Protocol per the patient's referring provider. A copy of this encounter was shared with the provider.       Sincerely,        Jacque Lamas RD

## 2024-08-29 ENCOUNTER — APPOINTMENT (OUTPATIENT)
Dept: GENERAL RADIOLOGY | Facility: HOSPITAL | Age: 64
End: 2024-08-29
Attending: STUDENT IN AN ORGANIZED HEALTH CARE EDUCATION/TRAINING PROGRAM
Payer: COMMERCIAL

## 2024-08-29 ENCOUNTER — LAB (OUTPATIENT)
Dept: LAB | Facility: OTHER | Age: 64
End: 2024-08-29
Payer: COMMERCIAL

## 2024-08-29 ENCOUNTER — HOSPITAL ENCOUNTER (EMERGENCY)
Facility: HOSPITAL | Age: 64
Discharge: HOME | End: 2024-08-29
Attending: STUDENT IN AN ORGANIZED HEALTH CARE EDUCATION/TRAINING PROGRAM | Admitting: STUDENT IN AN ORGANIZED HEALTH CARE EDUCATION/TRAINING PROGRAM
Payer: COMMERCIAL

## 2024-08-29 VITALS
RESPIRATION RATE: 25 BRPM | BODY MASS INDEX: 33.77 KG/M2 | OXYGEN SATURATION: 92 % | WEIGHT: 202.93 LBS | DIASTOLIC BLOOD PRESSURE: 70 MMHG | SYSTOLIC BLOOD PRESSURE: 132 MMHG | TEMPERATURE: 98.5 F | HEART RATE: 67 BPM

## 2024-08-29 DIAGNOSIS — J10.1 INFLUENZA A: ICD-10-CM

## 2024-08-29 DIAGNOSIS — E87.1 HYPONATREMIA: ICD-10-CM

## 2024-08-29 DIAGNOSIS — R97.20 ELEVATED PROSTATE SPECIFIC ANTIGEN (PSA): ICD-10-CM

## 2024-08-29 DIAGNOSIS — J18.9 COMMUNITY ACQUIRED PNEUMONIA, UNSPECIFIED LATERALITY: ICD-10-CM

## 2024-08-29 DIAGNOSIS — U07.1 COVID-19: ICD-10-CM

## 2024-08-29 LAB
ALBUMIN SERPL BCG-MCNC: 4 G/DL (ref 3.5–5.2)
ALP SERPL-CCNC: 78 U/L (ref 40–150)
ALT SERPL W P-5'-P-CCNC: 50 U/L (ref 0–70)
ANION GAP SERPL CALCULATED.3IONS-SCNC: 19 MMOL/L (ref 7–15)
AST SERPL W P-5'-P-CCNC: 43 U/L (ref 0–45)
ATRIAL RATE - MUSE: 96 BPM
BASOPHILS # BLD AUTO: 0 10E3/UL (ref 0–0.2)
BASOPHILS NFR BLD AUTO: 0 %
BILIRUB SERPL-MCNC: 1.6 MG/DL
BUN SERPL-MCNC: 10.4 MG/DL (ref 8–23)
CALCIUM SERPL-MCNC: 9 MG/DL (ref 8.8–10.4)
CHLORIDE SERPL-SCNC: 91 MMOL/L (ref 98–107)
CREAT SERPL-MCNC: 1.1 MG/DL (ref 0.67–1.17)
DIASTOLIC BLOOD PRESSURE - MUSE: NORMAL MMHG
EGFRCR SERPLBLD CKD-EPI 2021: 75 ML/MIN/1.73M2
EOSINOPHIL # BLD AUTO: 0 10E3/UL (ref 0–0.7)
EOSINOPHIL NFR BLD AUTO: 0 %
ERYTHROCYTE [DISTWIDTH] IN BLOOD BY AUTOMATED COUNT: 12.1 % (ref 10–15)
FLUAV RNA SPEC QL NAA+PROBE: POSITIVE
FLUBV RNA RESP QL NAA+PROBE: NEGATIVE
GLUCOSE SERPL-MCNC: 152 MG/DL (ref 70–99)
HCO3 SERPL-SCNC: 20 MMOL/L (ref 22–29)
HCT VFR BLD AUTO: 34.1 % (ref 40–53)
HGB BLD-MCNC: 12 G/DL (ref 13.3–17.7)
HOLD SPECIMEN: NORMAL
IMM GRANULOCYTES # BLD: 0.2 10E3/UL
IMM GRANULOCYTES NFR BLD: 2 %
INTERPRETATION ECG - MUSE: NORMAL
LACTATE SERPL-SCNC: 2 MMOL/L (ref 0.7–2)
LYMPHOCYTES # BLD AUTO: 0.8 10E3/UL (ref 0.8–5.3)
LYMPHOCYTES NFR BLD AUTO: 6 %
MCH RBC QN AUTO: 31.9 PG (ref 26.5–33)
MCHC RBC AUTO-ENTMCNC: 35.2 G/DL (ref 31.5–36.5)
MCV RBC AUTO: 91 FL (ref 78–100)
MONOCYTES # BLD AUTO: 0.5 10E3/UL (ref 0–1.3)
MONOCYTES NFR BLD AUTO: 4 %
NEUTROPHILS # BLD AUTO: 10.7 10E3/UL (ref 1.6–8.3)
NEUTROPHILS NFR BLD AUTO: 88 %
NRBC # BLD AUTO: 0 10E3/UL
NRBC BLD AUTO-RTO: 0 /100
P AXIS - MUSE: 67 DEGREES
PLATELET # BLD AUTO: 175 10E3/UL (ref 150–450)
POTASSIUM SERPL-SCNC: 3.6 MMOL/L (ref 3.4–5.3)
PR INTERVAL - MUSE: 138 MS
PROT SERPL-MCNC: 7 G/DL (ref 6.4–8.3)
PSA SERPL DL<=0.01 NG/ML-MCNC: 2.58 NG/ML (ref 0–4.5)
QRS DURATION - MUSE: 96 MS
QT - MUSE: 350 MS
QTC - MUSE: 442 MS
R AXIS - MUSE: 27 DEGREES
RBC # BLD AUTO: 3.76 10E6/UL (ref 4.4–5.9)
RSV RNA SPEC NAA+PROBE: NEGATIVE
SARS-COV-2 RNA RESP QL NAA+PROBE: POSITIVE
SODIUM SERPL-SCNC: 130 MMOL/L (ref 135–145)
SYSTOLIC BLOOD PRESSURE - MUSE: NORMAL MMHG
T AXIS - MUSE: 65 DEGREES
VENTRICULAR RATE- MUSE: 96 BPM
WBC # BLD AUTO: 12.2 10E3/UL (ref 4–11)

## 2024-08-29 PROCEDURE — 82040 ASSAY OF SERUM ALBUMIN: CPT | Performed by: STUDENT IN AN ORGANIZED HEALTH CARE EDUCATION/TRAINING PROGRAM

## 2024-08-29 PROCEDURE — 250N000011 HC RX IP 250 OP 636: Performed by: STUDENT IN AN ORGANIZED HEALTH CARE EDUCATION/TRAINING PROGRAM

## 2024-08-29 PROCEDURE — 99291 CRITICAL CARE FIRST HOUR: CPT | Performed by: STUDENT IN AN ORGANIZED HEALTH CARE EDUCATION/TRAINING PROGRAM

## 2024-08-29 PROCEDURE — 71046 X-RAY EXAM CHEST 2 VIEWS: CPT

## 2024-08-29 PROCEDURE — 87637 SARSCOV2&INF A&B&RSV AMP PRB: CPT | Performed by: STUDENT IN AN ORGANIZED HEALTH CARE EDUCATION/TRAINING PROGRAM

## 2024-08-29 PROCEDURE — 96367 TX/PROPH/DG ADDL SEQ IV INF: CPT

## 2024-08-29 PROCEDURE — 96361 HYDRATE IV INFUSION ADD-ON: CPT

## 2024-08-29 PROCEDURE — 83605 ASSAY OF LACTIC ACID: CPT | Performed by: STUDENT IN AN ORGANIZED HEALTH CARE EDUCATION/TRAINING PROGRAM

## 2024-08-29 PROCEDURE — 36415 COLL VENOUS BLD VENIPUNCTURE: CPT

## 2024-08-29 PROCEDURE — 258N000003 HC RX IP 258 OP 636: Performed by: STUDENT IN AN ORGANIZED HEALTH CARE EDUCATION/TRAINING PROGRAM

## 2024-08-29 PROCEDURE — 96365 THER/PROPH/DIAG IV INF INIT: CPT

## 2024-08-29 PROCEDURE — 36415 COLL VENOUS BLD VENIPUNCTURE: CPT | Performed by: STUDENT IN AN ORGANIZED HEALTH CARE EDUCATION/TRAINING PROGRAM

## 2024-08-29 PROCEDURE — 250N000013 HC RX MED GY IP 250 OP 250 PS 637: Performed by: STUDENT IN AN ORGANIZED HEALTH CARE EDUCATION/TRAINING PROGRAM

## 2024-08-29 PROCEDURE — 84153 ASSAY OF PSA TOTAL: CPT

## 2024-08-29 PROCEDURE — 85025 COMPLETE CBC W/AUTO DIFF WBC: CPT | Performed by: STUDENT IN AN ORGANIZED HEALTH CARE EDUCATION/TRAINING PROGRAM

## 2024-08-29 PROCEDURE — 87040 BLOOD CULTURE FOR BACTERIA: CPT | Performed by: STUDENT IN AN ORGANIZED HEALTH CARE EDUCATION/TRAINING PROGRAM

## 2024-08-29 PROCEDURE — 93005 ELECTROCARDIOGRAM TRACING: CPT

## 2024-08-29 PROCEDURE — 96375 TX/PRO/DX INJ NEW DRUG ADDON: CPT

## 2024-08-29 PROCEDURE — 93010 ELECTROCARDIOGRAM REPORT: CPT | Performed by: INTERNAL MEDICINE

## 2024-08-29 PROCEDURE — 99285 EMERGENCY DEPT VISIT HI MDM: CPT | Mod: 25

## 2024-08-29 RX ORDER — DOXYCYCLINE 100 MG/10ML
100 INJECTION, POWDER, LYOPHILIZED, FOR SOLUTION INTRAVENOUS EVERY 12 HOURS
Status: DISCONTINUED | OUTPATIENT
Start: 2024-08-29 | End: 2024-08-29 | Stop reason: HOSPADM

## 2024-08-29 RX ORDER — DOXYCYCLINE 100 MG/1
100 CAPSULE ORAL 2 TIMES DAILY
Qty: 14 CAPSULE | Refills: 0 | Status: SHIPPED | OUTPATIENT
Start: 2024-08-29 | End: 2024-09-05

## 2024-08-29 RX ORDER — AMPICILLIN AND SULBACTAM 2; 1 G/1; G/1
3 INJECTION, POWDER, FOR SOLUTION INTRAMUSCULAR; INTRAVENOUS ONCE
Status: COMPLETED | OUTPATIENT
Start: 2024-08-29 | End: 2024-08-29

## 2024-08-29 RX ORDER — ACETAMINOPHEN 325 MG/1
650 TABLET ORAL ONCE
Status: COMPLETED | OUTPATIENT
Start: 2024-08-29 | End: 2024-08-29

## 2024-08-29 RX ORDER — KETOROLAC TROMETHAMINE 15 MG/ML
15 INJECTION, SOLUTION INTRAMUSCULAR; INTRAVENOUS ONCE
Status: COMPLETED | OUTPATIENT
Start: 2024-08-29 | End: 2024-08-29

## 2024-08-29 RX ADMIN — DOXYCYCLINE 100 MG: 100 INJECTION, POWDER, LYOPHILIZED, FOR SOLUTION INTRAVENOUS at 10:20

## 2024-08-29 RX ADMIN — KETOROLAC TROMETHAMINE 15 MG: 15 INJECTION, SOLUTION INTRAMUSCULAR; INTRAVENOUS at 08:14

## 2024-08-29 RX ADMIN — SODIUM CHLORIDE 1000 ML: 9 INJECTION, SOLUTION INTRAVENOUS at 08:14

## 2024-08-29 RX ADMIN — AMPICILLIN SODIUM AND SULBACTAM SODIUM 3 G: 2; 1 INJECTION, POWDER, FOR SOLUTION INTRAMUSCULAR; INTRAVENOUS at 09:29

## 2024-08-29 RX ADMIN — ACETAMINOPHEN 650 MG: 325 TABLET, FILM COATED ORAL at 08:14

## 2024-08-29 ASSESSMENT — ENCOUNTER SYMPTOMS
DIARRHEA: 0
MYALGIAS: 1
PALPITATIONS: 0
ARTHRALGIAS: 0
COUGH: 1
NECK STIFFNESS: 0
RHINORRHEA: 1
HEADACHES: 0
DYSURIA: 0
DIZZINESS: 0
ACTIVITY CHANGE: 0
APPETITE CHANGE: 0
SORE THROAT: 0
PSYCHIATRIC NEGATIVE: 1
CHILLS: 1
ABDOMINAL PAIN: 0
NAUSEA: 0
HEMATURIA: 0
FATIGUE: 1
VOMITING: 0
CHEST TIGHTNESS: 1
SHORTNESS OF BREATH: 1
FEVER: 1
EYE REDNESS: 0

## 2024-08-29 ASSESSMENT — ACTIVITIES OF DAILY LIVING (ADL)
ADLS_ACUITY_SCORE: 35

## 2024-08-29 ASSESSMENT — COLUMBIA-SUICIDE SEVERITY RATING SCALE - C-SSRS
6. HAVE YOU EVER DONE ANYTHING, STARTED TO DO ANYTHING, OR PREPARED TO DO ANYTHING TO END YOUR LIFE?: NO
1. IN THE PAST MONTH, HAVE YOU WISHED YOU WERE DEAD OR WISHED YOU COULD GO TO SLEEP AND NOT WAKE UP?: NO
2. HAVE YOU ACTUALLY HAD ANY THOUGHTS OF KILLING YOURSELF IN THE PAST MONTH?: NO

## 2024-08-29 NOTE — DISCHARGE INSTRUCTIONS
You have been diagnosed with influenza A and COVID 19 virus.  You are out of the window for Tamiflu and Paxlovid.  Please measure your fevers with a thermometer at home and treat accordingly with Tylenol and/or ibuprofen.  You may need to be careful about ibuprofen since you do have a history of kidney disease.  You have gotten your first doses of doxycycline and Augmentin here in the ER for your mild pneumonia.  I have sent prescription for these to sherrell [facility] for you to take for the next week.  Please obtain a pulse oximeter and check your oxygen saturations twice daily.  If you see any prolonged numbers below 90%, please return here to the ER as you may need supplement oxygen.  You are course welcome to return here for any reason or concern, as necessary.  I sure hope you feel better soon!

## 2024-08-29 NOTE — ED PROVIDER NOTES
History     Chief Complaint   Patient presents with    Cough    chest tightness    Fever     HPI  Neo Cowan is a 63 year old male who presents to the ED for the chief complaint of cough, fevers, congestion, body aches, fatigue that has been ongoing for the last 8 days since disembarking from an [employer] cruise.  He does endorse having his COVID vaccinations.  He has a history of diabetes, hypertension, BPH, CKD stage II, obstructive sleep apnea.  He denies that he has been taking his temperature at home, is unaware how high his fevers have been.  He has not been taking ibuprofen and Tylenol but has been taking NyQuil intermittently to sleep but states that is been difficult due to his cough.  He denies any left-sided chest pain or pressure.    Allergies:  Allergies   Allergen Reactions    Seasonal Allergies        Problem List:    Patient Active Problem List    Diagnosis Date Noted    JENNIFER (obstructive sleep apnea) - Moderate without sleep-associated hypoxemia 02/10/2023     Priority: Medium    Elevated liver function tests 05/23/2022     Priority: Medium    Drug-induced erectile dysfunction 05/23/2022     Priority: Medium    Nocturia 05/23/2022     Priority: Medium    Benign essential hypertension 05/23/2022     Priority: Medium    Microalbuminuria due to type 2 diabetes mellitus (H) 05/23/2022     Priority: Medium    Chronic kidney disease, stage 2 (mild) 01/17/2022     Priority: Medium    Screening for prostate cancer 12/18/2017     Priority: Medium    Comprehensive Medical Examination 09/27/2016     Priority: Medium    Eczema 09/27/2016     Priority: Medium    Type 2 diabetes mellitus without complication (H) 10/01/2014     Priority: Medium    Vitamin D deficiency 10/01/2014     Priority: Medium    H/O adenomatous colonic polyps 07/19/2013     Priority: Medium    Dyslipidemia 07/17/2013     Priority: Medium    Seasonal allergic rhinitis 07/17/2013     Priority: Medium    Gilbert syndrome 03/02/2000      Priority: Medium    Obesity 03/02/2000     Priority: Medium        Past Medical History:    Past Medical History:   Diagnosis Date    Alcoholism 1/1/2011    Allergic rhinitis, cause unspecified 3/2/2000    Anxiety  1/1/2011    Colonic Polyps 3/2/2000    Depression 1/1/2011    Gilbert's Syndrome 3/2/2000    Hypercholesterolemia 3/2/2000    Overweight(278.02) 3/2/2000    Prediabetes 3/2/2000       Past Surgical History:    Past Surgical History:   Procedure Laterality Date    COLONOSCOPY  8/5/2013    Procedure: COLONOSCOPY;  colonoscopy ;  Surgeon: Yobani Ventura MD;  Location: [facility]    COLONOSCOPY N/A 3/12/2018    Procedure: COLONOSCOPY;  COLONOSCOPY with polypectomy;  Surgeon: Donovan Mayfield MD;  Location: [facility] OR    colonoscopy with polypectomy  1/27/2011    repeat 2 years    mandibular fracture repair      vasectomy      wisdom teeth extraction         Family History:    Family History   Problem Relation Age of Onset    C.A.D. Father     Cancer Father         Lung, cause of death    Other - See Comments Father         Colon polyps    Alcohol/Drug Mother         Alcoholism    Other - See Comments Mother         Liver disease, cause of death    C.A.D. Other         Family hx    Cancer Paternal Uncle         Cervical    Cancer Paternal Uncle         Throat    Hypertension Other         Family hx       Social History:  Marital Status:   [2]  Social History     Tobacco Use    Smoking status: Never     Passive exposure: Never    Smokeless tobacco: Current     Types: Chew    Tobacco comments:     Tried to quit; No passive exposure   Vaping Use    Vaping status: Never Used   Substance Use Topics    Alcohol use: Yes     Comment: socially    Drug use: No        Medications:    amLODIPine (NORVASC) 10 MG tablet  amoxicillin-clavulanate (AUGMENTIN) 875-125 MG tablet  blood glucose (ACCU-CHEK GUIDE) test strip  blood glucose monitoring (ACCU-CHEK FASTCLIX) lancets  blood glucose monitoring (NO BRAND SPECIFIED)  meter device kit  doxazosin (CARDURA) 2 MG tablet  doxycycline hyclate (VIBRAMYCIN) 100 MG capsule  FARXIGA 5 MG TABS tablet  fexofenadine (ALLEGRA) 180 MG tablet  losartan (COZAAR) 100 MG tablet  metFORMIN (GLUCOPHAGE XR) 500 MG 24 hr tablet  montelukast (SINGULAIR) 10 MG tablet  simvastatin (ZOCOR) 20 MG tablet          Review of Systems   Constitutional:  Positive for chills, fatigue and fever. Negative for activity change and appetite change.   HENT:  Positive for congestion and rhinorrhea. Negative for sore throat.    Eyes:  Negative for redness.   Respiratory:  Positive for cough, chest tightness and shortness of breath.    Cardiovascular:  Negative for chest pain, palpitations and leg swelling.   Gastrointestinal:  Negative for abdominal pain, diarrhea, nausea and vomiting.   Genitourinary:  Negative for dysuria and hematuria.   Musculoskeletal:  Positive for myalgias. Negative for arthralgias and neck stiffness.   Skin:  Negative for rash.   Allergic/Immunologic: Negative for immunocompromised state.   Neurological:  Negative for dizziness and headaches.   Psychiatric/Behavioral: Negative.     All other systems reviewed and are negative.      Physical Exam   BP: 124/75  Pulse: 97  Temp: (!) 101.3  F (38.5  C)  Resp: 16  Weight: 92 kg (202 lb 14.9 oz)  SpO2: 95 %      Physical Exam  Vitals and nursing note reviewed.   Constitutional:       General: He is not in acute distress.     Appearance: Normal appearance. He is not toxic-appearing.   HENT:      Head: Normocephalic and atraumatic.      Right Ear: Tympanic membrane normal.      Left Ear: Tympanic membrane normal.      Mouth/Throat:      Mouth: Mucous membranes are moist.   Eyes:      General: No scleral icterus.     Conjunctiva/sclera: Conjunctivae normal.      Pupils: Pupils are equal, round, and reactive to light.   Cardiovascular:      Rate and Rhythm: Normal rate and regular rhythm.      Heart sounds: Normal heart sounds.   Pulmonary:      Effort:  Pulmonary effort is normal. No respiratory distress.      Breath sounds: Normal breath sounds. No stridor. No rales.      Comments: Diffuse mildly coarse breath sounds    Abdominal:      Palpations: Abdomen is soft.      Tenderness: There is no abdominal tenderness.   Musculoskeletal:         General: No deformity. Normal range of motion.      Cervical back: Normal range of motion and neck supple.   Skin:     General: Skin is warm.      Capillary Refill: Capillary refill takes less than 2 seconds.   Neurological:      General: No focal deficit present.      Mental Status: He is alert and oriented to person, place, and time. Mental status is at baseline.   Psychiatric:         Mood and Affect: Mood normal.         ED Course        Procedures              EKG Interpretation:      Interpreted by Polo Heart DO  Time reviewed: 7:55  Symptoms at time of EKG: cough, fever, chest tightness   Rhythm: normal sinus   Rate: normal  Axis: normal  Ectopy: none  Conduction: normal  ST Segments/ T Waves: No ST-T wave changes  Q Waves: none  Comparison to prior: Unchanged from Jan '24    Clinical Impression: normal EKG        Results for orders placed or performed during the hospital encounter of 08/29/24 (from the past 24 hour(s))   Symptomatic Influenza A/B, RSV, & SARS-CoV2 PCR (COVID-19) Nasopharyngeal    Specimen: Nasopharyngeal; Swab   Result Value Ref Range    Influenza A PCR Positive (A) Negative    Influenza B PCR Negative Negative    RSV PCR Negative Negative    SARS CoV2 PCR Positive (A) Negative    Narrative    Testing was performed using the Xpert Xpress CoV2/Flu/RSV Assay on the [employer] GeneXpert Instrument. This test should be ordered for the detection of SARS-CoV2, influenza, and RSV viruses in individuals with signs and symptoms of respiratory tract infection. This test is for in vitro diagnostic use under the [place] FDA for laboratories certified under CLIA to perform high or moderate complexity testing. This test  has been [place] FDA cleared. A negative result does not rule out the presence of PCR inhibitors in the specimen or target RNA in concentration below the limit of detection for the assay. If only one viral target is positive but coinfection with multiple targets is suspected, the sample should be re-tested with another FDA cleared, approved, or authorized test, if coninfection would change clinical management. This test was validated by the [facility]. These laboratories are certified under the Clinical Laboratory Improvement Amendments of 1988 (CLIA-88) as qualified to perfom high complexity laboratory testing.   EKG 12 lead   Result Value Ref Range    Systolic Blood Pressure  mmHg    Diastolic Blood Pressure  mmHg    Ventricular Rate 96 BPM    Atrial Rate 96 BPM    MA Interval 138 ms    QRS Duration 96 ms     ms    QTc 442 ms    P Axis 67 degrees    R AXIS 27 degrees    T Axis 65 degrees    Interpretation ECG       Sinus rhythm  Nonspecific T wave abnormality  Abnormal ECG  When compared with ECG of 16-Jan-2024 15:55,  No significant change was found     CBC with Platelets & Differential    Narrative    The following orders were created for panel order CBC with Platelets & Differential.  Procedure                               Abnormality         Status                     ---------                               -----------         ------                     CBC with platelets and d...[[id number]]  Abnormal            Final result                 Please view results for these tests on the individual orders.   Comprehensive metabolic panel   Result Value Ref Range    Sodium 130 (L) 135 - 145 mmol/L    Potassium 3.6 3.4 - 5.3 mmol/L    Carbon Dioxide (CO2) 20 (L) 22 - 29 mmol/L    Anion Gap 19 (H) 7 - 15 mmol/L    Urea Nitrogen 10.4 8.0 - 23.0 mg/dL    Creatinine 1.10 0.67 - 1.17 mg/dL    GFR Estimate 75 >60 mL/min/1.73m2    Calcium 9.0 8.8 - 10.4 mg/dL    Chloride 91 (L) 98 - 107 mmol/L     Glucose 152 (H) 70 - 99 mg/dL    Alkaline Phosphatase 78 40 - 150 U/L    AST 43 0 - 45 U/L    ALT 50 0 - 70 U/L    Protein Total 7.0 6.4 - 8.3 g/dL    Albumin 4.0 3.5 - 5.2 g/dL    Bilirubin Total 1.6 (H) <=1.2 mg/dL   Lactic Acid Whole Blood with 1X Repeat in 2 HR when >2   Result Value Ref Range    Lactic Acid, Initial 2.0 0.7 - 2.0 mmol/L   CBC with platelets and differential   Result Value Ref Range    WBC Count 12.2 (H) 4.0 - 11.0 10e3/uL    RBC Count 3.76 (L) 4.40 - 5.90 10e6/uL    Hemoglobin 12.0 (L) 13.3 - 17.7 g/dL    Hematocrit 34.1 (L) 40.0 - 53.0 %    MCV 91 78 - 100 fL    MCH 31.9 26.5 - 33.0 pg    MCHC 35.2 31.5 - 36.5 g/dL    RDW 12.1 10.0 - 15.0 %    Platelet Count 175 150 - 450 10e3/uL    % Neutrophils 88 %    % Lymphocytes 6 %    % Monocytes 4 %    % Eosinophils 0 %    % Basophils 0 %    % Immature Granulocytes 2 %    NRBCs per 100 WBC 0 <1 /100    Absolute Neutrophils 10.7 (H) 1.6 - 8.3 10e3/uL    Absolute Lymphocytes 0.8 0.8 - 5.3 10e3/uL    Absolute Monocytes 0.5 0.0 - 1.3 10e3/uL    Absolute Eosinophils 0.0 0.0 - 0.7 10e3/uL    Absolute Basophils 0.0 0.0 - 0.2 10e3/uL    Absolute Immature Granulocytes 0.2 <=0.4 10e3/uL    Absolute NRBCs 0.0 10e3/uL   Extra Tube    Narrative    The following orders were created for panel order Extra Tube.  Procedure                               Abnormality         Status                     ---------                               -----------         ------                     Extra Blue Top Tube[[id number]]                              Final result               Extra Red Top Tube[[id number]]                               Final result                 Please view results for these tests on the individual orders.   Extra Blue Top Tube   Result Value Ref Range    Hold Specimen JIC    Extra Red Top Tube   Result Value Ref Range    Hold Specimen JIC    Extra Tube    Narrative    The following orders were created for panel order Extra Tube.  Procedure                                Abnormality         Status                     ---------                               -----------         ------                     Extra Heparinized Syringe[[id number]]                        Final result                 Please view results for these tests on the individual orders.   Extra Heparinized Syringe   Result Value Ref Range    Hold Specimen JIC    Chest XR,  PA & LAT    Narrative    XR CHEST 2 VIEWS    HISTORY: 63 years Male chest pain, fever    COMPARISON: 2/24/2015    TECHNIQUE: 2 views of the chest were obtained.    FINDINGS: There is subtle patchy airspace opacity in the right lung.  There is retrocardiac airspace opacity in the left lower lobe.    Heart size and pulmonary vascularity are within normal limits.        Impression    IMPRESSION: Bilateral multifocal airspace opacities pneumonia,  atypical pneumonia. Follow-up recommended.    MARKELL FIERRO MD         SYSTEM ID:  [id number]       Medications   sodium chloride (PF) 0.9% PF flush 3 mL (has no administration in time range)   sodium chloride (PF) 0.9% PF flush 3 mL (has no administration in time range)   ampicillin-sulbactam (UNASYN) 3 g vial to attach to  mL bag (3 g Intravenous $New Bag 8/29/24 0929)   doxycycline (VIBRAMYCIN) 100 mg vial to attach to  mL bag (has no administration in time range)   acetaminophen (TYLENOL) tablet 650 mg (650 mg Oral $Given 8/29/24 0814)   ketorolac (TORADOL) injection 15 mg (15 mg Intravenous $Given 8/29/24 0814)   sodium chloride 0.9% BOLUS 1,000 mL (1,000 mLs Intravenous $New Bag 8/29/24 0814)       Assessments & Plan (with Medical Decision Making)     I have reviewed the nursing notes.    I have reviewed the findings, diagnosis, plan and need for follow up with the patient.     Critical Care Addendum  My initial assessment, based on my review of nursing observations, review of vital signs, focused history, physical exam, review of cardiac rhythm monitor, and 12 lead ECG analysis,  established a high suspicion that Neo Cowan has sepsis with indication for early goal-directed therapy, which requires immediate intervention, and therefore he is critically ill.     After the initial assessment, the care team initiated multiple lab tests, initiated IV fluid administration, and initiated medication therapy with Unasyn, doxycycline  to provide stabilization care. Due to the critical nature of this patient, I reassessed nursing observations, vital signs, physical exam, review of cardiac rhythm monitor, 12 lead ECG analysis, mental status, neurologic status, and respiratory status multiple times prior to his disposition.     Time also spent performing documentation, reviewing test results, and coordination of care.     Critical care time (excluding teaching time and procedures): 30 minutes.           Medical Decision Making  The patient's presentation was of high complexity (a chronic illness severe exacerbation, progression, or side effect of treatment).    The patient's evaluation involved:  ordering and/or review of 3+ test(s) in this encounter (see separate area of note for details)    The patient's management necessitated high risk (a decision regarding hospitalization).    Patient found to be positive for influenza A and COVID.  Fever broke with IV Toradol, p.o. Tylenol administration.  Patient is out of the window for Paxlovid and Tamiflu.  Patient does have bilateral mild patchy infiltrates on chest x-ray that could be atypical pneumonia, but could also be bacterial.  Will cover for community-acquired pneumonia with Augmentin and doxycycline prescription.  Unasyn and doxycycline administered IV here in the ED.  No hypoxia noted throughout ED stay but I did  the patient that we can he will need to check his oxygen saturations with a pulse oximeter twice daily until this illness resolves.  Additionally, I counseled him to take Tylenol, ibuprofen as necessary for fever and pain  control.  Pneumonia severity index is 84. Strict return precautions were given and agreed to.  Patient stable for discharge.    New Prescriptions    AMOXICILLIN-CLAVULANATE (AUGMENTIN) 875-125 MG TABLET    Take 1 tablet by mouth 2 times daily for 7 days.    DOXYCYCLINE HYCLATE (VIBRAMYCIN) 100 MG CAPSULE    Take 1 capsule (100 mg) by mouth 2 times daily for 7 days.       Final diagnoses:   COVID-19   Influenza A   Community acquired pneumonia, unspecified laterality       8/29/2024   [facility]       Polo Heart DO  08/29/24 0909

## 2024-08-29 NOTE — ED TRIAGE NOTES
Pt presents with c/o a cough, fever, andf chest tightness that began 8 days ago after returning from and [employer] cruise.

## 2024-08-29 NOTE — ED NOTES
Face to face report given with opportunity to observe patient.    Report given to KANDI Jimenez RN   8/29/2024  9:00 AM

## 2024-08-29 NOTE — ED NOTES
Patient reports coming back from [facility] [place] last Wednesday, has been feeling SOB, cough, congestion, fatigue, chills/fevers, chest tightness with coughing.

## 2024-09-03 LAB
BACTERIA BLD CULT: NO GROWTH
BACTERIA BLD CULT: NO GROWTH

## 2024-09-09 ENCOUNTER — TELEPHONE (OUTPATIENT)
Dept: FAMILY MEDICINE | Facility: OTHER | Age: 64
End: 2024-09-09

## 2024-09-09 NOTE — TELEPHONE ENCOUNTER
11:48 AM    Reason for Call: OVERBOOK    Patient is having the following symptoms: er follow up seen 08/29.    The patient is requesting an appointment for sept with Dr Fisher.    Was an appointment offered for this call? No  If yes : Appointment type              Date    Preferred method for responding to this message: Telephone Call  What is your phone number ?[phone]     If we cannot reach you directly, may we leave a detailed response at the number you provided? Yes    Can this message wait until your PCP/provider returns, if unavailable today? Not applicable    Joanne Lizarraga

## 2024-09-10 ENCOUNTER — OFFICE VISIT (OUTPATIENT)
Dept: FAMILY MEDICINE | Facility: OTHER | Age: 64
End: 2024-09-10
Attending: FAMILY MEDICINE
Payer: COMMERCIAL

## 2024-09-10 ENCOUNTER — LAB (OUTPATIENT)
Dept: LAB | Facility: OTHER | Age: 64
End: 2024-09-10
Attending: FAMILY MEDICINE
Payer: COMMERCIAL

## 2024-09-10 VITALS
HEART RATE: 71 BPM | DIASTOLIC BLOOD PRESSURE: 66 MMHG | WEIGHT: 200.13 LBS | TEMPERATURE: 98.3 F | OXYGEN SATURATION: 98 % | BODY MASS INDEX: 33.34 KG/M2 | SYSTOLIC BLOOD PRESSURE: 138 MMHG | HEIGHT: 65 IN

## 2024-09-10 DIAGNOSIS — E87.1 HYPONATREMIA: ICD-10-CM

## 2024-09-10 DIAGNOSIS — J10.1 INFLUENZA A: ICD-10-CM

## 2024-09-10 DIAGNOSIS — J12.82 PNEUMONIA DUE TO 2019 NOVEL CORONAVIRUS: Primary | ICD-10-CM

## 2024-09-10 DIAGNOSIS — I10 BENIGN ESSENTIAL HYPERTENSION: ICD-10-CM

## 2024-09-10 DIAGNOSIS — U07.1 PNEUMONIA DUE TO 2019 NOVEL CORONAVIRUS: ICD-10-CM

## 2024-09-10 DIAGNOSIS — U07.1 PNEUMONIA DUE TO 2019 NOVEL CORONAVIRUS: Primary | ICD-10-CM

## 2024-09-10 DIAGNOSIS — F34.1 DYSTHYMIA: ICD-10-CM

## 2024-09-10 DIAGNOSIS — J12.82 PNEUMONIA DUE TO 2019 NOVEL CORONAVIRUS: ICD-10-CM

## 2024-09-10 LAB
ANION GAP SERPL CALCULATED.3IONS-SCNC: 12 MMOL/L (ref 7–15)
BASOPHILS # BLD AUTO: 0 10E3/UL (ref 0–0.2)
BASOPHILS NFR BLD AUTO: 0 %
BUN SERPL-MCNC: 10.6 MG/DL (ref 8–23)
CALCIUM SERPL-MCNC: 9.6 MG/DL (ref 8.8–10.4)
CHLORIDE SERPL-SCNC: 103 MMOL/L (ref 98–107)
CREAT SERPL-MCNC: 0.8 MG/DL (ref 0.67–1.17)
EGFRCR SERPLBLD CKD-EPI 2021: >90 ML/MIN/1.73M2
EOSINOPHIL # BLD AUTO: 0 10E3/UL (ref 0–0.7)
EOSINOPHIL NFR BLD AUTO: 0 %
ERYTHROCYTE [DISTWIDTH] IN BLOOD BY AUTOMATED COUNT: 13.2 % (ref 10–15)
GLUCOSE SERPL-MCNC: 219 MG/DL (ref 70–99)
HCO3 SERPL-SCNC: 24 MMOL/L (ref 22–29)
HCT VFR BLD AUTO: 36.2 % (ref 40–53)
HGB BLD-MCNC: 12.5 G/DL (ref 13.3–17.7)
IMM GRANULOCYTES # BLD: 0 10E3/UL
IMM GRANULOCYTES NFR BLD: 0 %
LYMPHOCYTES # BLD AUTO: 1.4 10E3/UL (ref 0.8–5.3)
LYMPHOCYTES NFR BLD AUTO: 26 %
MCH RBC QN AUTO: 32.1 PG (ref 26.5–33)
MCHC RBC AUTO-ENTMCNC: 34.5 G/DL (ref 31.5–36.5)
MCV RBC AUTO: 93 FL (ref 78–100)
MONOCYTES # BLD AUTO: 0.4 10E3/UL (ref 0–1.3)
MONOCYTES NFR BLD AUTO: 8 %
NEUTROPHILS # BLD AUTO: 3.6 10E3/UL (ref 1.6–8.3)
NEUTROPHILS NFR BLD AUTO: 65 %
NRBC # BLD AUTO: 0 10E3/UL
NRBC BLD AUTO-RTO: 0 /100
PLATELET # BLD AUTO: 320 10E3/UL (ref 150–450)
POTASSIUM SERPL-SCNC: 3.3 MMOL/L (ref 3.4–5.3)
RBC # BLD AUTO: 3.89 10E6/UL (ref 4.4–5.9)
SODIUM SERPL-SCNC: 139 MMOL/L (ref 135–145)
WBC # BLD AUTO: 5.5 10E3/UL (ref 4–11)

## 2024-09-10 PROCEDURE — 36415 COLL VENOUS BLD VENIPUNCTURE: CPT

## 2024-09-10 PROCEDURE — 99214 OFFICE O/P EST MOD 30 MIN: CPT | Performed by: FAMILY MEDICINE

## 2024-09-10 PROCEDURE — 85025 COMPLETE CBC W/AUTO DIFF WBC: CPT

## 2024-09-10 PROCEDURE — G2211 COMPLEX E/M VISIT ADD ON: HCPCS | Performed by: FAMILY MEDICINE

## 2024-09-10 PROCEDURE — 80048 BASIC METABOLIC PNL TOTAL CA: CPT

## 2024-09-10 RX ORDER — BUPROPION HYDROCHLORIDE 150 MG/1
150 TABLET ORAL EVERY MORNING
Qty: 30 TABLET | Refills: 1 | Status: SHIPPED | OUTPATIENT
Start: 2024-09-10

## 2024-09-10 ASSESSMENT — PAIN SCALES - GENERAL: PAINLEVEL: NO PAIN (0)

## 2024-09-10 NOTE — PROGRESS NOTES
Assessment & Plan     Pneumonia due to 2019 novel coronavirus  Improving. Follow-up in 4-5 weeks for below and repeat CXR. Continue current medications and behavioral changes.   Symptomatic treatment was discussed along when patient should call and/or come back into the clinic or go to ER/Urgent care. All questions answered.   - CBC with Platelets & Differential; Future  - Basic metabolic panel; Future    Influenza A  As above   - CBC with Platelets & Differential; Future  - Basic metabolic panel; Future    Hyponatremia  Resolved. Slight low K level  - CBC with Platelets & Differential; Future  - Basic metabolic panel; Future    Benign essential hypertension   Stable   - CBC with Platelets & Differential; Future  - Basic metabolic panel; Future    Dysthymia  Long discussion - really wants to try something. Will try Wellbutrin - follow-up in 4 weeks   - buPROPion (WELLBUTRIN XL) 150 MG 24 hr tablet; Take 1 tablet (150 mg) by mouth every morning.        MED REC REQUIRED  Post Medication Reconciliation Status: discharge medications reconciled and changed, per note/orders        No follow-ups on file.    Joya Larson is a 63 year old, presenting for the following health issues:  ER F/U and Hypertension        9/10/2024     2:01 PM   Additional Questions   Roomed by Renu Mead   Accompanied by None         9/10/2024     2:01 PM   Patient Reported Additional Medications   Patient reports taking the following new medications None     HPI     Hypertension Follow-up    Do you check your blood pressure regularly outside of the clinic? No   Are you following a low salt diet? Yes  Are your blood pressures ever more than 140 on the top number (systolic) OR more   than 90 on the bottom number (diastolic), for example 140/90? Unknown patient does not check blood pressure outside of the clinic       ED[place]UC Follow[place]    [place]  [facility] [place] of visit: 8/29/2024  Reason for visit: COVID-19; Influenza A; Pneumonia;  "Hyponatremia  Current Status: Patient reports that he is feeling a lot better, patient states that he still has a non productive cough     Doing much better  Less SOB/Cough  No Fever  Eating and sleeping better     Still hacky cough           Wife feels he is depressed  Decrease motivation / enthusiasm   Not want to socialize  Wife not here  He feels it is not an issue so much  She like to go out / he does not  Has DJD  in [place]- this plays role in activity   Pt says though wants to try something to help with interest/ energy etc     Had sleep study -- not good study   Question if plays some role in above     Review of Systems  Constitutional, HEENT, cardiovascular, pulmonary, gi and gu systems are negative, except as otherwise noted.      Objective    /66 (BP Location: Right arm, Patient Position: Sitting, Cuff Size: Adult Regular)   Pulse 71   Temp 98.3  F (36.8  C) (Tympanic)   Ht 1.651 m (5' 5\")   Wt 90.8 kg (200 lb 2 oz)   SpO2 98%   BMI 33.30 kg/m    Body mass index is 33.3 kg/m .  Physical Exam   GENERAL: alert and no distress  NECK: no adenopathy, no asymmetry, masses, or scars  RESP: lungs clear to auscultation - no rales, rhonchi or wheezes  CV: regular rate and rhythm, normal S1 S2, no S3 or S4, no murmur, click or rub, no peripheral edema  MS: no gross musculoskeletal defects noted, no edema  PSYCH: mentation appears normal, affect normal/bright    Results for orders placed or performed in visit on 09/10/24   Basic metabolic panel     Status: Abnormal   Result Value Ref Range    Sodium 139 135 - 145 mmol/L    Potassium 3.3 (L) 3.4 - 5.3 mmol/L    Chloride 103 98 - 107 mmol/L    Carbon Dioxide (CO2) 24 22 - 29 mmol/L    Anion Gap 12 7 - 15 mmol/L    Urea Nitrogen 10.6 8.0 - 23.0 mg/dL    Creatinine 0.80 0.67 - 1.17 mg/dL    GFR Estimate >90 >60 mL/min/1.73m2    Calcium 9.6 8.8 - 10.4 mg/dL    Glucose 219 (H) 70 - 99 mg/dL   CBC with platelets and differential     Status: Abnormal   Result Value " Ref Range    WBC Count 5.5 4.0 - 11.0 10e3/uL    RBC Count 3.89 (L) 4.40 - 5.90 10e6/uL    Hemoglobin 12.5 (L) 13.3 - 17.7 g/dL    Hematocrit 36.2 (L) 40.0 - 53.0 %    MCV 93 78 - 100 fL    MCH 32.1 26.5 - 33.0 pg    MCHC 34.5 31.5 - 36.5 g/dL    RDW 13.2 10.0 - 15.0 %    Platelet Count 320 150 - 450 10e3/uL    % Neutrophils 65 %    % Lymphocytes 26 %    % Monocytes 8 %    % Eosinophils 0 %    % Basophils 0 %    % Immature Granulocytes 0 %    NRBCs per 100 WBC 0 <1 /100    Absolute Neutrophils 3.6 1.6 - 8.3 10e3/uL    Absolute Lymphocytes 1.4 0.8 - 5.3 10e3/uL    Absolute Monocytes 0.4 0.0 - 1.3 10e3/uL    Absolute Eosinophils 0.0 0.0 - 0.7 10e3/uL    Absolute Basophils 0.0 0.0 - 0.2 10e3/uL    Absolute Immature Granulocytes 0.0 <=0.4 10e3/uL    Absolute NRBCs 0.0 10e3/uL   CBC with Platelets & Differential     Status: Abnormal    Narrative    The following orders were created for panel order CBC with Platelets & Differential.  Procedure                               Abnormality         Status                     ---------                               -----------         ------                     CBC with platelets and d...[[id number]]  Abnormal            Final result                 Please view results for these tests on the individual orders.             Signed Electronically by: Colin Fisher MD

## 2024-09-19 DIAGNOSIS — E11.9 TYPE 2 DIABETES MELLITUS WITHOUT COMPLICATION, WITHOUT LONG-TERM CURRENT USE OF INSULIN (H): ICD-10-CM

## 2024-09-19 RX ORDER — METFORMIN HCL 500 MG
1000 TABLET, EXTENDED RELEASE 24 HR ORAL DAILY
Qty: 360 TABLET | Refills: 1 | Status: SHIPPED | OUTPATIENT
Start: 2024-09-19

## 2024-09-19 NOTE — TELEPHONE ENCOUNTER
Metformin (Glucophage XR) 500 MG 24 hr tablet    Last Written Prescription Date:  01/08/2024  Last Fill Quantity: 60,   # refills: 3  Last Office Visit: 09/10/2024  Future Office visit:    Next 5 appointments (look out 90 days)      Oct 14, 2024 3:15 PM  (Arrive by 3:00 PM)  Provider Visit with Colin Fisher MD  [facility] ([facility] ) [address]  [facility] [address]  [phone]     Dec 13, 2024 7:45 AM  (Arrive by 7:30 AM)  Provider Visit with Colin Fisher MD  [facility] ([facility] ) [address]  [facility] [address]  [phone]             Routing refill request to provider for review/approval because:  Patient reports taking medication differently than prescribed.

## 2024-10-14 ENCOUNTER — LAB (OUTPATIENT)
Dept: LAB | Facility: OTHER | Age: 64
End: 2024-10-14
Attending: FAMILY MEDICINE
Payer: COMMERCIAL

## 2024-10-14 ENCOUNTER — OFFICE VISIT (OUTPATIENT)
Dept: FAMILY MEDICINE | Facility: OTHER | Age: 64
End: 2024-10-14
Attending: FAMILY MEDICINE
Payer: COMMERCIAL

## 2024-10-14 ENCOUNTER — ANCILLARY PROCEDURE (OUTPATIENT)
Dept: GENERAL RADIOLOGY | Facility: OTHER | Age: 64
End: 2024-10-14
Attending: FAMILY MEDICINE
Payer: COMMERCIAL

## 2024-10-14 VITALS
BODY MASS INDEX: 33.83 KG/M2 | DIASTOLIC BLOOD PRESSURE: 74 MMHG | WEIGHT: 203.06 LBS | OXYGEN SATURATION: 97 % | TEMPERATURE: 98.3 F | RESPIRATION RATE: 16 BRPM | HEART RATE: 79 BPM | SYSTOLIC BLOOD PRESSURE: 138 MMHG | HEIGHT: 65 IN

## 2024-10-14 DIAGNOSIS — N18.2 CHRONIC KIDNEY DISEASE, STAGE 2 (MILD): ICD-10-CM

## 2024-10-14 DIAGNOSIS — Z87.01 H/O: PNEUMONIA: ICD-10-CM

## 2024-10-14 DIAGNOSIS — J12.82 PNEUMONIA DUE TO 2019 NOVEL CORONAVIRUS: ICD-10-CM

## 2024-10-14 DIAGNOSIS — I10 BENIGN ESSENTIAL HYPERTENSION: ICD-10-CM

## 2024-10-14 DIAGNOSIS — M17.11 PRIMARY OSTEOARTHRITIS OF RIGHT KNEE: ICD-10-CM

## 2024-10-14 DIAGNOSIS — M16.11 PRIMARY OSTEOARTHRITIS OF RIGHT HIP: ICD-10-CM

## 2024-10-14 DIAGNOSIS — F34.1 DYSTHYMIA: ICD-10-CM

## 2024-10-14 DIAGNOSIS — U07.1 PNEUMONIA DUE TO 2019 NOVEL CORONAVIRUS: ICD-10-CM

## 2024-10-14 DIAGNOSIS — U07.1 PNEUMONIA DUE TO 2019 NOVEL CORONAVIRUS: Primary | ICD-10-CM

## 2024-10-14 DIAGNOSIS — J12.82 PNEUMONIA DUE TO 2019 NOVEL CORONAVIRUS: Primary | ICD-10-CM

## 2024-10-14 LAB
ANION GAP SERPL CALCULATED.3IONS-SCNC: 13 MMOL/L (ref 7–15)
BASOPHILS # BLD AUTO: 0 10E3/UL (ref 0–0.2)
BASOPHILS NFR BLD AUTO: 0 %
BUN SERPL-MCNC: 16.9 MG/DL (ref 8–23)
CALCIUM SERPL-MCNC: 10 MG/DL (ref 8.8–10.4)
CHLORIDE SERPL-SCNC: 101 MMOL/L (ref 98–107)
CREAT SERPL-MCNC: 0.95 MG/DL (ref 0.67–1.17)
EGFRCR SERPLBLD CKD-EPI 2021: 89 ML/MIN/1.73M2
EOSINOPHIL # BLD AUTO: 0.1 10E3/UL (ref 0–0.7)
EOSINOPHIL NFR BLD AUTO: 2 %
ERYTHROCYTE [DISTWIDTH] IN BLOOD BY AUTOMATED COUNT: 14.3 % (ref 10–15)
GLUCOSE SERPL-MCNC: 232 MG/DL (ref 70–99)
HCO3 SERPL-SCNC: 24 MMOL/L (ref 22–29)
HCT VFR BLD AUTO: 40.6 % (ref 40–53)
HGB BLD-MCNC: 13.5 G/DL (ref 13.3–17.7)
IMM GRANULOCYTES # BLD: 0 10E3/UL
IMM GRANULOCYTES NFR BLD: 0 %
LYMPHOCYTES # BLD AUTO: 1.1 10E3/UL (ref 0.8–5.3)
LYMPHOCYTES NFR BLD AUTO: 21 %
MCH RBC QN AUTO: 32.3 PG (ref 26.5–33)
MCHC RBC AUTO-ENTMCNC: 33.3 G/DL (ref 31.5–36.5)
MCV RBC AUTO: 97 FL (ref 78–100)
MONOCYTES # BLD AUTO: 0.4 10E3/UL (ref 0–1.3)
MONOCYTES NFR BLD AUTO: 8 %
NEUTROPHILS # BLD AUTO: 3.6 10E3/UL (ref 1.6–8.3)
NEUTROPHILS NFR BLD AUTO: 68 %
NRBC # BLD AUTO: 0 10E3/UL
NRBC BLD AUTO-RTO: 0 /100
PLATELET # BLD AUTO: 213 10E3/UL (ref 150–450)
POTASSIUM SERPL-SCNC: 3.8 MMOL/L (ref 3.4–5.3)
RBC # BLD AUTO: 4.18 10E6/UL (ref 4.4–5.9)
SODIUM SERPL-SCNC: 138 MMOL/L (ref 135–145)
WBC # BLD AUTO: 5.3 10E3/UL (ref 4–11)

## 2024-10-14 PROCEDURE — 80048 BASIC METABOLIC PNL TOTAL CA: CPT

## 2024-10-14 PROCEDURE — 99214 OFFICE O/P EST MOD 30 MIN: CPT | Performed by: FAMILY MEDICINE

## 2024-10-14 PROCEDURE — 71046 X-RAY EXAM CHEST 2 VIEWS: CPT | Mod: TC | Performed by: RADIOLOGY

## 2024-10-14 PROCEDURE — G2211 COMPLEX E/M VISIT ADD ON: HCPCS | Performed by: FAMILY MEDICINE

## 2024-10-14 PROCEDURE — 85025 COMPLETE CBC W/AUTO DIFF WBC: CPT

## 2024-10-14 PROCEDURE — 36415 COLL VENOUS BLD VENIPUNCTURE: CPT

## 2024-10-14 RX ORDER — BUPROPION HYDROCHLORIDE 150 MG/1
150 TABLET ORAL EVERY MORNING
Qty: 150 TABLET | Refills: 3 | Status: SHIPPED | OUTPATIENT
Start: 2024-10-14

## 2024-10-14 ASSESSMENT — PAIN SCALES - GENERAL: PAINLEVEL: NO PAIN (0)

## 2024-10-14 NOTE — PROGRESS NOTES
Assessment & Plan     Pneumonia due to 2019 novel coronavirus  Resolved. Doing well.   - CBC with Platelets & Differential; Future  - Basic metabolic panel; Future  - XR Chest 2 Views    H/O: pneumonia  Resolved   - CBC with Platelets & Differential; Future  - Basic metabolic panel; Future  - XR Chest 2 Views    Benign essential hypertension  Little high comes down fast. Will watch   - CBC with Platelets & Differential; Future  - Basic metabolic panel; Future  - XR Chest 2 Views    Dysthymia  ? Improved. Wants to not increase dose but stay on same dose for now. Follow-up in Dec.  - CBC with Platelets & Differential; Future  - Basic metabolic panel; Future  - XR Chest 2 Views  - buPROPion (WELLBUTRIN XL) 150 MG 24 hr tablet; Take 1 tablet (150 mg) by mouth every morning.    Chronic kidney disease, stage 2 (mild)  Stable - labs better   - CBC with Platelets & Differential; Future  - Basic metabolic panel; Future  - XR Chest 2 Views    Primary osteoarthritis of right knee  Primary osteoarthritis of right hip  Discussed. Pt going to talk with Dr Terrazas on plan to replace both                 No follow-ups on file.    Joya Larson is a 63 year old, presenting for the following health issues:  Hypertension, Chronic Disease Management, and RECHECK        10/14/2024     3:08 PM   Additional Questions   Roomed by Renu Mead   Accompanied by None         10/14/2024     3:08 PM   Patient Reported Additional Medications   Patient reports taking the following new medications None     HPI       F/U Pneumonia  - covid/influenza - doing well. Mild cough     Dysthymia- wife says helping   He has not noticed much     Hypertension Follow-up    Do you check your blood pressure regularly outside of the clinic? No   Are you following a low salt diet? No  Are your blood pressures ever more than 140 on the top number (systolic) OR more   than 90 on the bottom number (diastolic), for example 140/90? Unknown patient is not  "checking blood pressure outside of the clinic     Chronic Kidney Disease Follow-up    Do you take any over the counter pain medicine?: Yes  What over the counter medicine are you taking for your pain?:  Tylenol / Ibuprofen   How often do you take this medicine?:   Very rarely       C/o Right hip and Right knee pain - has djd     Review of Systems  Constitutional, neuro, ENT, endocrine, pulmonary, cardiac, gastrointestinal, genitourinary, musculoskeletal, integument and psychiatric systems are negative, except as otherwise noted.      Objective    /74 (BP Location: Right arm, Patient Position: Sitting, Cuff Size: Adult Large)   Pulse 79   Temp 98.3  F (36.8  C) (Tympanic)   Resp 16   Ht 1.651 m (5' 5\")   Wt 92.1 kg (203 lb 1 oz)   SpO2 97%   BMI 33.79 kg/m    Body mass index is 33.79 kg/m .  Physical Exam   GENERAL: alert and no distress  RESP: lungs clear to auscultation - no rales, rhonchi or wheezes  MS: no gross musculoskeletal defects noted, no edema  PSYCH: mentation appears normal, affect normal/bright            Signed Electronically by: Colin Fisher MD    "

## 2024-11-26 ENCOUNTER — APPOINTMENT (OUTPATIENT)
Dept: LAB | Facility: OTHER | Age: 64
End: 2024-11-26
Attending: FAMILY MEDICINE
Payer: COMMERCIAL

## 2024-11-26 ENCOUNTER — OFFICE VISIT (OUTPATIENT)
Dept: FAMILY MEDICINE | Facility: OTHER | Age: 64
End: 2024-11-26
Attending: FAMILY MEDICINE
Payer: COMMERCIAL

## 2024-11-26 VITALS
HEART RATE: 65 BPM | HEIGHT: 65 IN | DIASTOLIC BLOOD PRESSURE: 70 MMHG | WEIGHT: 203.31 LBS | OXYGEN SATURATION: 97 % | RESPIRATION RATE: 16 BRPM | TEMPERATURE: 97.9 F | BODY MASS INDEX: 33.87 KG/M2 | SYSTOLIC BLOOD PRESSURE: 142 MMHG

## 2024-11-26 DIAGNOSIS — R80.9 MICROALBUMINURIA DUE TO TYPE 2 DIABETES MELLITUS (H): ICD-10-CM

## 2024-11-26 DIAGNOSIS — Z01.810 PRE-OPERATIVE CARDIOVASCULAR EXAMINATION: Primary | ICD-10-CM

## 2024-11-26 DIAGNOSIS — R94.39 ABNORMAL CARDIOVASCULAR STRESS TEST: ICD-10-CM

## 2024-11-26 DIAGNOSIS — E11.9 TYPE 2 DIABETES MELLITUS WITHOUT COMPLICATION, WITHOUT LONG-TERM CURRENT USE OF INSULIN (H): ICD-10-CM

## 2024-11-26 DIAGNOSIS — M16.11 PRIMARY OSTEOARTHRITIS OF RIGHT HIP: ICD-10-CM

## 2024-11-26 DIAGNOSIS — F40.240 CLAUSTROPHOBIA: ICD-10-CM

## 2024-11-26 DIAGNOSIS — I10 BENIGN ESSENTIAL HYPERTENSION: ICD-10-CM

## 2024-11-26 DIAGNOSIS — E11.29 MICROALBUMINURIA DUE TO TYPE 2 DIABETES MELLITUS (H): ICD-10-CM

## 2024-11-26 DIAGNOSIS — N18.2 CHRONIC KIDNEY DISEASE, STAGE 2 (MILD): ICD-10-CM

## 2024-11-26 DIAGNOSIS — G47.33 OSA (OBSTRUCTIVE SLEEP APNEA): ICD-10-CM

## 2024-11-26 DIAGNOSIS — E87.1 HYPONATREMIA: ICD-10-CM

## 2024-11-26 LAB
ALBUMIN SERPL BCG-MCNC: 4.7 G/DL (ref 3.5–5.2)
ALP SERPL-CCNC: 98 U/L (ref 40–150)
ALT SERPL W P-5'-P-CCNC: 55 U/L (ref 0–70)
ANION GAP SERPL CALCULATED.3IONS-SCNC: 15 MMOL/L (ref 7–15)
AST SERPL W P-5'-P-CCNC: 39 U/L (ref 0–45)
BASOPHILS # BLD AUTO: 0 10E3/UL (ref 0–0.2)
BASOPHILS NFR BLD AUTO: 0 %
BILIRUB SERPL-MCNC: 1.4 MG/DL
BUN SERPL-MCNC: 13 MG/DL (ref 8–23)
CALCIUM SERPL-MCNC: 10.2 MG/DL (ref 8.8–10.4)
CHLORIDE SERPL-SCNC: 102 MMOL/L (ref 98–107)
CREAT SERPL-MCNC: 0.93 MG/DL (ref 0.67–1.17)
EGFRCR SERPLBLD CKD-EPI 2021: >90 ML/MIN/1.73M2
EOSINOPHIL # BLD AUTO: 0.1 10E3/UL (ref 0–0.7)
EOSINOPHIL NFR BLD AUTO: 1 %
ERYTHROCYTE [DISTWIDTH] IN BLOOD BY AUTOMATED COUNT: 13.1 % (ref 10–15)
EST. AVERAGE GLUCOSE BLD GHB EST-MCNC: 131 MG/DL
GLUCOSE SERPL-MCNC: 125 MG/DL (ref 70–99)
HBA1C MFR BLD: 6.2 %
HCO3 SERPL-SCNC: 21 MMOL/L (ref 22–29)
HCT VFR BLD AUTO: 39.9 % (ref 40–53)
HGB BLD-MCNC: 13.7 G/DL (ref 13.3–17.7)
IMM GRANULOCYTES # BLD: 0 10E3/UL
IMM GRANULOCYTES NFR BLD: 0 %
LYMPHOCYTES # BLD AUTO: 1.3 10E3/UL (ref 0.8–5.3)
LYMPHOCYTES NFR BLD AUTO: 27 %
MCH RBC QN AUTO: 32.8 PG (ref 26.5–33)
MCHC RBC AUTO-ENTMCNC: 34.3 G/DL (ref 31.5–36.5)
MCV RBC AUTO: 96 FL (ref 78–100)
MONOCYTES # BLD AUTO: 0.5 10E3/UL (ref 0–1.3)
MONOCYTES NFR BLD AUTO: 11 %
NEUTROPHILS # BLD AUTO: 3 10E3/UL (ref 1.6–8.3)
NEUTROPHILS NFR BLD AUTO: 61 %
NRBC # BLD AUTO: 0 10E3/UL
NRBC BLD AUTO-RTO: 0 /100
PLATELET # BLD AUTO: 175 10E3/UL (ref 150–450)
POTASSIUM SERPL-SCNC: 3.9 MMOL/L (ref 3.4–5.3)
PROT SERPL-MCNC: 7.9 G/DL (ref 6.4–8.3)
RBC # BLD AUTO: 4.18 10E6/UL (ref 4.4–5.9)
SODIUM SERPL-SCNC: 138 MMOL/L (ref 135–145)
WBC # BLD AUTO: 4.9 10E3/UL (ref 4–11)

## 2024-11-26 ASSESSMENT — PATIENT HEALTH QUESTIONNAIRE - PHQ9
SUM OF ALL RESPONSES TO PHQ QUESTIONS 1-9: 2
SUM OF ALL RESPONSES TO PHQ QUESTIONS 1-9: 2
10. IF YOU CHECKED OFF ANY PROBLEMS, HOW DIFFICULT HAVE THESE PROBLEMS MADE IT FOR YOU TO DO YOUR WORK, TAKE CARE OF THINGS AT HOME, OR GET ALONG WITH OTHER PEOPLE: NOT DIFFICULT AT ALL

## 2024-11-26 ASSESSMENT — PAIN SCALES - GENERAL: PAINLEVEL_OUTOF10: NO PAIN (0)

## 2024-11-26 NOTE — PATIENT INSTRUCTIONS
HOLD FARXIGA 3 DAYS BEFORE SURGERY DAY    HOLD METFORMIN THE DAY BEFORE SURGERY      HOLD ALL SUPPLEMENTS/ VITAMINS  FOR  2 WEEKS     NO ASPIRIN PRODUCTS FOR A WEEK     STRESS TEST ORDERED -- YOUR CLEARANCE FOR SURGERY  IS THEREFORE PENDING TIL GET STRESS TEST DONE

## 2024-11-26 NOTE — PROGRESS NOTES
Preoperative Evaluation  [facility] [place]  [address]  [facility] [address]  Phone: [phone]  Primary Provider: Colin Fisher MD  Pre-op Performing Provider: Colin Fisher MD  Nov 26, 2024 11/26/2024   Surgical Information   What procedure is being done? R hip replacement    Facility or Hospital where procedure/surgery will be performed: [facility]    Who is doing the procedure / surgery? [facility]    Date of surgery / procedure: 12/20/2024    Time of surgery / procedure: unknown    Where do you plan to recover after surgery? at home with family        Patient-reported     Fax number for surgical facility:     Assessment & Plan     The proposed surgical procedure is considered INTERMEDIATE risk.      ICD-10-CM    1. Pre-operative cardiovascular examination  Z01.810 CBC with Platelets & Differential     Comprehensive metabolic panel     Hemoglobin A1c     EKG 12-lead complete w/read - Clinics     EKG 12-lead complete w/read - Clinics     CBC with Platelets & Differential     Comprehensive metabolic panel     Hemoglobin A1c     NM MPI with Lexiscan      2. Primary osteoarthritis of right hip  M16.11 CBC with Platelets & Differential     Comprehensive metabolic panel     Hemoglobin A1c     EKG 12-lead complete w/read - Clinics     EKG 12-lead complete w/read - Clinics     CBC with Platelets & Differential     Comprehensive metabolic panel     Hemoglobin A1c      3. Benign essential hypertension  I10 CBC with Platelets & Differential     Comprehensive metabolic panel     Hemoglobin A1c     EKG 12-lead complete w/read - Clinics     EKG 12-lead complete w/read - Clinics     CBC with Platelets & Differential     Comprehensive metabolic panel     Hemoglobin A1c     NM MPI with Lexiscan      4. Chronic kidney disease, stage 2 (mild)  N18.2 CBC with Platelets & Differential     Comprehensive metabolic panel     Hemoglobin A1c     EKG 12-lead complete w/read - Clinics     EKG  12-lead complete w/read - Clinics     CBC with Platelets & Differential     Comprehensive metabolic panel     Hemoglobin A1c      5. Type 2 diabetes mellitus without complication, without long-term current use of insulin (H)  E11.9 CBC with Platelets & Differential     Comprehensive metabolic panel     Hemoglobin A1c     EKG 12-lead complete w/read - Clinics     EKG 12-lead complete w/read - Clinics     CBC with Platelets & Differential     Comprehensive metabolic panel     Hemoglobin A1c     NM MPI with Lexiscan      6. Hyponatremia  E87.1 CBC with Platelets & Differential     Comprehensive metabolic panel     Hemoglobin A1c     EKG 12-lead complete w/read - Clinics     EKG 12-lead complete w/read - Clinics     CBC with Platelets & Differential     Comprehensive metabolic panel     Hemoglobin A1c      7. Microalbuminuria due to type 2 diabetes mellitus (H)  E11.29 CBC with Platelets & Differential    R80.9 Comprehensive metabolic panel     Hemoglobin A1c     EKG 12-lead complete w/read - Clinics     EKG 12-lead complete w/read - Clinics     CBC with Platelets & Differential     Comprehensive metabolic panel     Hemoglobin A1c      8. JENNIFER (obstructive sleep apnea) - Moderate without sleep-associated hypoxemia  G47.33 CBC with Platelets & Differential     Comprehensive metabolic panel     Hemoglobin A1c     EKG 12-lead complete w/read - Clinics     EKG 12-lead complete w/read - Clinics     CBC with Platelets & Differential     Comprehensive metabolic panel     Hemoglobin A1c      9. Claustrophobia  F40.240 CBC with Platelets & Differential     Comprehensive metabolic panel     Hemoglobin A1c     EKG 12-lead complete w/read - Clinics     EKG 12-lead complete w/read - Clinics     CBC with Platelets & Differential     Comprehensive metabolic panel     Hemoglobin A1c      10. Abnormal cardiovascular stress test  R94.39 NM MPI with Lexiscan      GXT abnormal in 2015- no follow-up or documentation of what was done. Pt has  risk factors - will check GXT    JENNIFER - not on CPAP         Risks and Recommendations  The patient has the following additional risks and recommendations for perioperative complications:  Diabetes:  - Patient is not on insulin therapy: regular NPO guidelines can be followed.     Antiplatelet or Anticoagulation Medication Instructions   - Patient is on no antiplatelet or anticoagulation medications.    Additional Medication Instructions  Take all scheduled medications on the day of surgery EXCEPT for modifications listed below:   - SGLT2 Inhibitor (canagliflozin, dapagliflozin, or empagliflozin): DO NOT TAKE 3 days before surgery.   HOLD FARXIGA 3 DAYS BEFORE SURGERY DAY    HOLD METFORMIN THE DAY BEFORE SURGERY      HOLD ALL SUPPLEMENTS/ VITAMINS  FOR  2 WEEKS     NO ASPIRIN PRODUCTS FOR A WEEK     STRESS TEST ORDERED -- YOUR CLEARANCE FOR SURGERY  IS THEREFORE PENDING TIL GET STRESS TEST DONE   Recommendation  Approval PENDING STRESS TEST AND WAS NOT given to proceed with proposed procedure, without further diagnostic evaluation.      Joya Larson is a 63 year old, presenting for the following:  Pre-Op Exam          11/26/2024     3:30 PM   Additional Questions   Roomed by Renu Mead   Accompanied by None         11/26/2024     3:30 PM   Patient Reported Additional Medications   Patient reports taking the following new medications None     HPI related to upcoming procedure:   64 yO MALE  presents for cardiopulmonary/general clearance to undergo the above procedure.  His surgeon listed above has asked for this clearance to undergo anesthesia. Pt has had this condition for approximately a  year .   Overall pt is of good health and has not  had any perioperative complications with previous surgeries.      Pt can do over 4mets without anginal sx.  Pt does have some ETSRADA due to out of shape and hip pain   No known CAD  but had mild abnormal stress test in 2015-- no follow-up documented and sounds like nothing  done - thought to be false positive       11/26/2024   Pre-Op Questionnaire   Have you ever had a heart attack or stroke? No    Have you ever had surgery on your heart or blood vessels, such as a stent placement, a coronary artery bypass, or surgery on an artery in your head, neck, heart, or legs? No    Do you have chest pain with activity? No    Do you have a history of heart failure? No    Do you currently have a cold, bronchitis or symptoms of other infection? No    Do you have a cough, shortness of breath, or wheezing? No    Do you or anyone in your family have previous history of blood clots? No    Do you or does anyone in your family have a serious bleeding problem such as prolonged bleeding following surgeries or cuts? No    Have you ever had problems with anemia or been told to take iron pills? No    Have you had any abnormal blood loss such as black, tarry or bloody stools? No    Have you ever had a blood transfusion? No    Are you willing to have a blood transfusion if it is medically needed before, during, or after your surgery? Yes    Have you or any of your relatives ever had problems with anesthesia? No    Do you have sleep apnea, excessive snoring or daytime drowsiness? No    Do you have any artifical heart valves or other implanted medical devices like a pacemaker, defibrillator, or continuous glucose monitor? No    Do you have artificial joints? (!) YES    Are you allergic to latex? No        Patient-reported     Health Care Directive  Patient does not have a Health Care Directive: Discussed advance care planning with patient; however, patient declined at this time.    Preoperative Review of    reviewed - no record of controlled substances prescribed.      Status of Chronic Conditions:  DIABETES - Patient has a longstanding history of DiabetesType Type II . Patient is being treated with diet, oral agents, and exercise and denies significant side effects. Control has been good. Complicating  factors include but are not limited to: hypertension and hyperlipidemia.     HYPERLIPIDEMIA - Patient has a long history of significant Hyperlipidemia requiring medication for treatment with recent good control. Patient reports no problems or side effects with the medication.     HYPERTENSION - Patient has longstanding history of HTN , currently denies any symptoms referable to elevated blood pressure. Specifically denies chest pain, palpitations, dyspnea, orthopnea, PND or peripheral edema. Blood pressure readings have been in normal range. Current medication regimen is as listed below. Patient denies any side effects of medication.     Patient Active Problem List    Diagnosis Date Noted    JENNIFER (obstructive sleep apnea) - Moderate without sleep-associated hypoxemia 02/10/2023     Priority: Medium    Elevated liver function tests 05/23/2022     Priority: Medium    Drug-induced erectile dysfunction 05/23/2022     Priority: Medium    Nocturia 05/23/2022     Priority: Medium    Benign essential hypertension 05/23/2022     Priority: Medium    Microalbuminuria due to type 2 diabetes mellitus (H) 05/23/2022     Priority: Medium    Chronic kidney disease, stage 2 (mild) 01/17/2022     Priority: Medium    Screening for prostate cancer 12/18/2017     Priority: Medium    Comprehensive Medical Examination 09/27/2016     Priority: Medium    Eczema 09/27/2016     Priority: Medium    Type 2 diabetes mellitus without complication (H) 10/01/2014     Priority: Medium    Vitamin D deficiency 10/01/2014     Priority: Medium    H/O adenomatous colonic polyps 07/19/2013     Priority: Medium    Dyslipidemia 07/17/2013     Priority: Medium    Seasonal allergic rhinitis 07/17/2013     Priority: Medium    Gilbert syndrome 03/02/2000     Priority: Medium    Obesity 03/02/2000     Priority: Medium      Past Medical History:   Diagnosis Date    Alcoholism 1/1/2011    Allergic rhinitis, cause unspecified 3/2/2000    Anxiety  1/1/2011    Colonic  Polyps 3/2/2000    Depression 1/1/2011    Gilbert's Syndrome 3/2/2000    Hypercholesterolemia 3/2/2000    Overweight(278.02) 3/2/2000    Prediabetes 3/2/2000     Past Surgical History:   Procedure Laterality Date    COLONOSCOPY  8/5/2013    Procedure: COLONOSCOPY;  colonoscopy ;  Surgeon: Yobani Ventura MD;  Location: [facility] OR    COLONOSCOPY N/A 3/12/2018    Procedure: COLONOSCOPY;  COLONOSCOPY with polypectomy;  Surgeon: Donovan Mayfield MD;  Location: [facility] OR    colonoscopy with polypectomy  1/27/2011    repeat 2 years    mandibular fracture repair      vasectomy      wisdom teeth extraction       Current Outpatient Medications   Medication Sig Dispense Refill    amLODIPine (NORVASC) 10 MG tablet TAKE 1 TABLET (10 MG) BY MOUTH DAILY 90 tablet 3    buPROPion (WELLBUTRIN XL) 150 MG 24 hr tablet Take 1 tablet (150 mg) by mouth every morning. 150 tablet 3    doxazosin (CARDURA) 2 MG tablet Take 3 tablets (6 mg) by mouth at bedtime 270 tablet 2    FARXIGA 5 MG TABS tablet TAKE 1 TABLET (5 MG) BY MOUTH EVERY MORNING 90 tablet 3    fexofenadine (ALLEGRA) 180 MG tablet Take 180 mg by mouth daily      losartan (COZAAR) 100 MG tablet TAKE 1 TABLET (100 MG) BY MOUTH DAILY 90 tablet 3    metFORMIN (GLUCOPHAGE XR) 500 MG 24 hr tablet Take 2 tablets (1,000 mg) by mouth daily. Patient reports taking 2 tablets 360 tablet 1    montelukast (SINGULAIR) 10 MG tablet Take 1 tablet (10 mg) by mouth at bedtime 90 tablet 3    simvastatin (ZOCOR) 20 MG tablet TAKE 1 TABLET BY MOUTH EVERY EVENING - *NEEDS TO BE SEEN FOR FUTURE FILLS* - 90 tablet 3    blood glucose monitoring (NO BRAND SPECIFIED) meter device kit Use to test blood sugar 2 times daily or as directed. (Patient not taking: Reported on 9/10/2024) 1 kit 1       Allergies   Allergen Reactions    Seasonal Allergies         Social History     Tobacco Use    Smoking status: Never     Passive exposure: Never    Smokeless tobacco: Current     Types: Chew    Tobacco comments:      "Tried to quit; No passive exposure   Substance Use Topics    Alcohol use: Yes     Comment: socially     Family History   Problem Relation Age of Onset    C.A.D. Father     Cancer Father         Lung, cause of death    Other - See Comments Father         Colon polyps    Alcohol/Drug Mother         Alcoholism    Other - See Comments Mother         Liver disease, cause of death    C.A.D. Other         Family hx    Cancer Paternal Uncle         Cervical    Cancer Paternal Uncle         Throat    Hypertension Other         Family hx     History   Drug Use No             Review of Systems  Constitutional, neuro, ENT, endocrine, pulmonary, cardiac, gastrointestinal, genitourinary, musculoskeletal, integument and psychiatric systems are negative, except as otherwise noted.    Objective    BP (!) 156/80 (BP Location: Left arm, Patient Position: Sitting, Cuff Size: Adult Regular)   Pulse 65   Temp 97.9  F (36.6  C) (Tympanic)   Resp 16   Ht 1.651 m (5' 5\")   Wt 92.2 kg (203 lb 5 oz)   SpO2 97%   BMI 33.83 kg/m     Estimated body mass index is 33.83 kg/m  as calculated from the following:    Height as of this encounter: 1.651 m (5' 5\").    Weight as of this encounter: 92.2 kg (203 lb 5 oz).  Physical Exam  GENERAL: alert and no distress  EYES: Eyes grossly normal to inspection, PERRL and conjunctivae and sclerae normal  HENT: ear canals and TM's normal, nose and mouth without ulcers or lesions  NECK: no adenopathy, no asymmetry, masses, or scars  RESP: lungs clear to auscultation - no rales, rhonchi or wheezes  CV: regular rate and rhythm, normal S1 S2, no S3 or S4, no murmur, click or rub, no peripheral edema  ABDOMEN: soft, nontender, no hepatosplenomegaly, no masses and bowel sounds normal  MS: no gross musculoskeletal defects noted, no edema  SKIN: no suspicious lesions or rashes  NEURO: Normal strength and tone, mentation intact and speech normal  PSYCH: mentation appears normal, affect normal/bright    Recent Labs "   Lab Test 10/14/24  1509 09/10/24  1405 08/29/24  0805 06/10/24  0733   HGB 13.5 12.5*   < > 13.5    320   < > 181    139   < > 139   POTASSIUM 3.8 3.3*   < > 3.9   CR 0.95 0.80   < > 0.94   A1C  --   --   --  6.6*    < > = values in this interval not displayed.        Diagnostics  Recent Results (from the past 24 hours)   Comprehensive metabolic panel    Collection Time: 11/26/24  3:42 PM   Result Value Ref Range    Sodium 138 135 - 145 mmol/L    Potassium 3.9 3.4 - 5.3 mmol/L    Carbon Dioxide (CO2) 21 (L) 22 - 29 mmol/L    Anion Gap 15 7 - 15 mmol/L    Urea Nitrogen 13.0 8.0 - 23.0 mg/dL    Creatinine 0.93 0.67 - 1.17 mg/dL    GFR Estimate >90 >60 mL/min/1.73m2    Calcium 10.2 8.8 - 10.4 mg/dL    Chloride 102 98 - 107 mmol/L    Glucose 125 (H) 70 - 99 mg/dL    Alkaline Phosphatase 98 40 - 150 U/L    AST 39 0 - 45 U/L    ALT 55 0 - 70 U/L    Protein Total 7.9 6.4 - 8.3 g/dL    Albumin 4.7 3.5 - 5.2 g/dL    Bilirubin Total 1.4 (H) <=1.2 mg/dL   Hemoglobin A1c    Collection Time: 11/26/24  3:42 PM   Result Value Ref Range    Estimated Average Glucose 131 (H) <117 mg/dL    Hemoglobin A1C 6.2 (H) <5.7 %   CBC with platelets and differential    Collection Time: 11/26/24  3:42 PM   Result Value Ref Range    WBC Count 4.9 4.0 - 11.0 10e3/uL    RBC Count 4.18 (L) 4.40 - 5.90 10e6/uL    Hemoglobin 13.7 13.3 - 17.7 g/dL    Hematocrit 39.9 (L) 40.0 - 53.0 %    MCV 96 78 - 100 fL    MCH 32.8 26.5 - 33.0 pg    MCHC 34.3 31.5 - 36.5 g/dL    RDW 13.1 10.0 - 15.0 %    Platelet Count 175 150 - 450 10e3/uL    % Neutrophils 61 %    % Lymphocytes 27 %    % Monocytes 11 %    % Eosinophils 1 %    % Basophils 0 %    % Immature Granulocytes 0 %    NRBCs per 100 WBC 0 <1 /100    Absolute Neutrophils 3.0 1.6 - 8.3 10e3/uL    Absolute Lymphocytes 1.3 0.8 - 5.3 10e3/uL    Absolute Monocytes 0.5 0.0 - 1.3 10e3/uL    Absolute Eosinophils 0.1 0.0 - 0.7 10e3/uL    Absolute Basophils 0.0 0.0 - 0.2 10e3/uL    Absolute Immature  Granulocytes 0.0 <=0.4 10e3/uL    Absolute NRBCs 0.0 10e3/uL   EKG 12-lead complete w/read - Clinics    Collection Time: 11/26/24  3:51 PM   Result Value Ref Range    Systolic Blood Pressure  mmHg    Diastolic Blood Pressure  mmHg    Ventricular Rate 68 BPM    Atrial Rate 68 BPM    FL Interval 146 ms    QRS Duration 102 ms     ms    QTc 421 ms    P Axis 63 degrees    R AXIS 30 degrees    T Axis 27 degrees    Interpretation ECG       Sinus rhythm with sinus arrhythmia  Possible Anterior infarct , age undetermined  Abnormal ECG  When compared with ECG of 29-Aug-2024 07:55,  No significant change was found            Revised Cardiac Risk Index (RCRI)  The patient has the following serious cardiovascular risks for perioperative complications:   - Coronary Artery Disease (MI, positive stress test, angina, Qs on EKG) = 1 point     RCRI Interpretation: 1 point: Class II (low risk - 0.9% complication rate)         Signed Electronically by: Colin Fisher MD  A copy of this evaluation report is provided to the requesting physician.         Answers submitted by the patient for this visit:  Patient Health Questionnaire (Submitted on 11/26/2024)  If you checked off any problems, how difficult have these problems made it for you to do your work, take care of things at home, or get along with other people?: Not difficult at all  PHQ9 TOTAL SCORE: 2

## 2024-11-27 LAB
ATRIAL RATE - MUSE: 68 BPM
DIASTOLIC BLOOD PRESSURE - MUSE: NORMAL MMHG
INTERPRETATION ECG - MUSE: NORMAL
P AXIS - MUSE: 63 DEGREES
PR INTERVAL - MUSE: 146 MS
QRS DURATION - MUSE: 102 MS
QT - MUSE: 396 MS
QTC - MUSE: 421 MS
R AXIS - MUSE: 30 DEGREES
SYSTOLIC BLOOD PRESSURE - MUSE: NORMAL MMHG
T AXIS - MUSE: 27 DEGREES
VENTRICULAR RATE- MUSE: 68 BPM

## 2024-11-27 NOTE — PROGRESS NOTES
Faxed pre-op & EKG to [facility] at [phone] & Dr. Terrazas/[facility] at [phone].

## 2024-12-12 ENCOUNTER — HOSPITAL ENCOUNTER (OUTPATIENT)
Dept: CARDIOLOGY | Facility: HOSPITAL | Age: 64
Setting detail: NUCLEAR MEDICINE
End: 2024-12-12
Attending: FAMILY MEDICINE
Payer: MEDICARE

## 2024-12-12 PROCEDURE — 250N000011 HC RX IP 250 OP 636: Performed by: INTERNAL MEDICINE

## 2024-12-12 PROCEDURE — A9500 TC99M SESTAMIBI: HCPCS | Performed by: RADIOLOGY

## 2024-12-12 PROCEDURE — 343N000001 HC RX 343 MED OP 636: Performed by: RADIOLOGY

## 2024-12-12 RX ORDER — REGADENOSON 0.08 MG/ML
0.4 INJECTION, SOLUTION INTRAVENOUS ONCE
Status: COMPLETED | OUTPATIENT
Start: 2024-12-12 | End: 2024-12-12

## 2024-12-12 RX ADMIN — REGADENOSON 0.4 MG: 0.08 INJECTION, SOLUTION INTRAVENOUS at 08:21

## 2024-12-12 RX ADMIN — Medication 32.4 MILLICURIE: at 08:20

## 2024-12-16 ENCOUNTER — MYC MEDICAL ADVICE (OUTPATIENT)
Dept: FAMILY MEDICINE | Facility: OTHER | Age: 64
End: 2024-12-16

## 2025-01-10 ENCOUNTER — TRANSFERRED RECORDS (OUTPATIENT)
Dept: HEALTH INFORMATION MANAGEMENT | Facility: CLINIC | Age: 65
End: 2025-01-10

## 2025-04-01 ENCOUNTER — TELEPHONE (OUTPATIENT)
Dept: FAMILY MEDICINE | Facility: OTHER | Age: 65
End: 2025-04-01

## 2025-04-01 DIAGNOSIS — I10 BENIGN ESSENTIAL HYPERTENSION: ICD-10-CM

## 2025-04-01 RX ORDER — DOXAZOSIN 2 MG/1
6 TABLET ORAL AT BEDTIME
Qty: 270 TABLET | Refills: 2 | Status: SHIPPED | OUTPATIENT
Start: 2025-04-01

## 2025-04-01 NOTE — TELEPHONE ENCOUNTER
Reason for call:  Medication      Have you contacted your pharmacy? Yes, [facility] states that there ar no more refills (previous Dr. Calhoun patient), appointment scheduled 04/08 with Dr. Torres.  If patient has contacted Pharmacy and it has been over 72hrs, continue to #2  Medication doxazosin (CARDURA) 2 MG tablet   What Pharmacy do you use? [facility] in [place]      (Please note that the turn-around-time for prescriptions is 72 business hours; I am sending your request at this time. SEND TO appropriate Care Team Pool )

## 2025-04-01 NOTE — TELEPHONE ENCOUNTER
Cardura 2mg      Last Written Prescription Date:  6/10/2024  Last Fill Quantity: 270,   # refills: 2  Last Office Visit: 11/26/2024 (w/ Dr. Fisher)  Future Office visit:    Next 5 appointments (look out 90 days)      Apr 08, 2025 3:30 PM  (Arrive by 3:15 PM)  Provider Visit with David Torres MD  [facility] [place] ([facility] [place] ) [address] [place] [address] AVE  [place] [address]  [phone]             Routing refill request to provider for review/approval because:  Drug not on the [facility], [facility] or  Health refill protocol or controlled substance

## 2025-04-02 ENCOUNTER — TELEPHONE (OUTPATIENT)
Dept: FAMILY MEDICINE | Facility: OTHER | Age: 65
End: 2025-04-02

## 2025-04-02 NOTE — TELEPHONE ENCOUNTER
Received a PA request from Earnestine Escalona for doxazosin (CARDURA) 2 MG tablet. Submitted on CMM. Waiting for a response.

## 2025-04-07 NOTE — PROGRESS NOTES
Assessment & Plan     Encounter to establish care  - Previously followed with Dr. Fisher  - Medical histories, medication list reviewed and updated  - Last annual physical 8/2024  - CRC screen up-to-date  - Needs renewed med orders as below    Type 2 diabetes mellitus without complication, without long-term current use of insulin (H)  Obesity  Controlled.  Stable A1c in the sixes over past year.  Still struggles with weight loss; full GLP discussion today including risk/benefits/side effects.  Would like to try Ozempic for both weight loss and improved glycemic control.  Discussed prior authorization process as well as standard titration schedule.  - Hemoglobin A1c; Future  - Basic metabolic panel; Future  - CBC with platelets and differential; Future  - Hemoglobin A1c  - Basic metabolic panel  - CBC with platelets and differential  - metFORMIN (GLUCOPHAGE XR) 500 MG 24 hr tablet; Take 2 tablets (1,000 mg) by mouth daily. Patient reports taking 2 tablets  - dapagliflozin (FARXIGA) 5 MG TABS tablet; Take 1 tablet (5 mg) by mouth every morning.  - semaglutide (OZEMPIC) 2 MG/3ML pen; Inject 0.25 mg subcutaneously every 7 days for 28 days.    Microalbuminuria due to type 2 diabetes mellitus (H)  History of microalbuminuria in the setting of controlled diabetes and CKD 2.  Continue ARB.  - losartan (COZAAR) 100 MG tablet; Take 1 tablet (100 mg) by mouth daily.    Dyslipidemia  Well-controlled on statin therapy.  Continue to optimize lifestyle management.  - simvastatin (ZOCOR) 20 MG tablet; Take 1 tablet (20 mg) by mouth at bedtime.    Benign essential hypertension  At goal today.  Refill meds as below.  - Basic metabolic panel; Future  - Basic metabolic panel  - amLODIPine (NORVASC) 10 MG tablet; Take 1 tablet (10 mg) by mouth daily.  - doxazosin (CARDURA) 2 MG tablet; Take 3 tablets (6 mg) by mouth at bedtime.  - losartan (COZAAR) 100 MG tablet; Take 1 tablet (100 mg) by mouth daily.    Chronic kidney disease, stage  "2 (mild)  Borderline.  Normal CR with GFR 76 today.  Continue to optimize diabetes, hypertension, good hydration habits and limited NSAID use.  - Basic metabolic panel; Future  - Basic metabolic panel  - losartan (COZAAR) 100 MG tablet; Take 1 tablet (100 mg) by mouth daily.    Dysthymia  Stable.  Did not spend much time discussing.  Continue Wellbutrin.  - buPROPion (WELLBUTRIN XL) 150 MG 24 hr tablet; Take 1 tablet (150 mg) by mouth every morning.    Seasonal allergic rhinitis due to pollen  Supportive measures.  - montelukast (SINGULAIR) 10 MG tablet; Take 1 tablet (10 mg) by mouth at bedtime.  - Allegra    BMI  Estimated body mass index is 34.58 kg/m  as calculated from the following:    Height as of this encounter: 1.651 m (5' 5\").    Weight as of this encounter: 94.3 kg (207 lb 12.8 oz).   Weight management plan: Discussed healthy diet and exercise guidelines    I spent a total of 34 minutes on the day of the visit.   Time spent by me today doing chart review, history and exam, documentation and further activities per the note    The longitudinal plan of care for the diagnosis(es)/condition(s) as documented were addressed during this visit. Due to the added complexity in care, I will continue to support Neo LEWIS Haleyteri in the subsequent management and with ongoing continuity of care.    Follow-up 3 to 4 months for annual physical.    Joya Larson is a 64 year old, presenting for the following health issues:  Diabetes and Hypertension    History of Present Illness       Diabetes:   He presents for follow up of diabetes.  He is checking home blood glucose a few times a week.   He checks blood glucose before and after meals.  Blood glucose is never over 200 and never under 70.  When his blood glucose is low, the patient is asymptomatic for confusion, blurred vision, lethargy and reports not feeling dizzy, shaky, or weak.   He has no concerns regarding his diabetes at this time.   He is not experiencing " numbness or burning in feet, excessive thirst, blurry vision, weight changes or redness, sores or blisters on feet.           Hypertension: He presents for follow up of hypertension.  He does not check blood pressure  regularly outside of the clinic. Outside blood pressures have been over 140/90. He does not follow a low salt diet.     He eats 0-1 servings of fruits and vegetables daily.He consumes 0 sweetened beverage(s) daily.He exercises with enough effort to increase his heart rate 9 or less minutes per day.  He exercises with enough effort to increase his heart rate 3 or less days per week.   He is taking medications regularly.      Establish care  -Previously followed with Dr. Fisher for the knee  -Due for regular diabetes follow-up  -Needs all new med refills as previous orders under Dr. HAYNES  -No acute concerns  -Healthcare maintenance up-to-date    Diabetes Follow-up  How often are you checking your blood sugar? A few times a week  What time of day are you checking your blood sugars (select all that apply)?  Before and after meals  Have you had any blood sugars above 200?  No  Have you had any blood sugars below 70?  No  What symptoms do you notice when your blood sugar is low?  None  What concerns do you have today about your diabetes? None   Do you have any of these symptoms? (Select all that apply)  No numbness or tingling in feet.  No redness, sores or blisters on feet.  No complaints of excessive thirst.  No reports of blurry vision.  No significant changes to weight.  -Overall has been very stable  -A1c in the 6-7 range  -Interested in GLP  -Has really struggled to achieve any notable weight loss  -Hopefully can also help improve A1c    BP Readings from Last 2 Encounters:   04/08/25 138/74   11/26/24 (!) 142/70     Hemoglobin A1C (%)   Date Value   11/26/2024 6.2 (H)   06/10/2024 6.6 (H)   08/17/2021 6.5 (A)   05/14/2021 6.3 (H)     LDL Cholesterol Calculated (mg/dL)   Date Value   06/10/2024 76  "  06/05/2023 75   05/14/2021 78   01/13/2020 76           Hypertension Follow-up  Do you check your blood pressure regularly outside of the clinic? No   Are you following a low salt diet? No  Are your blood pressures ever more than 140 on the top number (systolic) OR more   than 90 on the bottom number (diastolic), for example 140/90? No    BP Readings from Last 2 Encounters:   04/08/25 138/74   11/26/24 (!) 142/70     Dyslipidemia  -Tolerating statin without side effect  -Did try statin holiday; No change in chronic aches and pains so restarted statin    Dysthymia  -Has been well-controlled on Wellbutrin monotherapy  -Did not spend much time discussing, not a concern today    Seasonal allergies  -Supportive OTC medications    Review of Systems  Constitutional, HEENT, cardiovascular, pulmonary, gi and gu systems are negative, except as otherwise noted.      Objective    /74 (BP Location: Left arm, Patient Position: Sitting, Cuff Size: Adult Large)   Pulse 78   Temp 98.6  F (37  C) (Tympanic)   Resp 20   Ht 1.651 m (5' 5\")   Wt 94.3 kg (207 lb 12.8 oz)   SpO2 96%   BMI 34.58 kg/m    Body mass index is 34.58 kg/m .  Physical Exam  Vitals reviewed.   Constitutional:       Appearance: Normal appearance.   HENT:      Head: Normocephalic and atraumatic.   Cardiovascular:      Rate and Rhythm: Normal rate and regular rhythm.      Heart sounds: No murmur heard.  Pulmonary:      Effort: Pulmonary effort is normal.      Breath sounds: Normal breath sounds. No stridor. No wheezing, rhonchi or rales.   Musculoskeletal:         General: Normal range of motion.   Skin:     General: Skin is warm and dry.   Neurological:      General: No focal deficit present.      Mental Status: He is alert and oriented to person, place, and time.   Psychiatric:         Mood and Affect: Mood normal.         Behavior: Behavior normal.         Results for orders placed or performed in visit on 04/08/25 (from the past 24 hours) "   Hemoglobin A1c   Result Value Ref Range    Estimated Average Glucose 131 (H) <117 mg/dL    Hemoglobin A1C 6.2 (H) <5.7 %   Basic metabolic panel   Result Value Ref Range    Sodium 136 135 - 145 mmol/L    Potassium 4.0 3.4 - 5.3 mmol/L    Chloride 100 98 - 107 mmol/L    Carbon Dioxide (CO2) 26 22 - 29 mmol/L    Anion Gap 10 7 - 15 mmol/L    Urea Nitrogen 13.8 8.0 - 23.0 mg/dL    Creatinine 1.08 0.67 - 1.17 mg/dL    GFR Estimate 76 >60 mL/min/1.73m2    Calcium 9.8 8.8 - 10.4 mg/dL    Glucose 155 (H) 70 - 99 mg/dL   CBC with platelets and differential    Narrative    The following orders were created for panel order CBC with platelets and differential.  Procedure                               Abnormality         Status                     ---------                               -----------         ------                     CBC with platelets and ...[[id number]]  Abnormal            Final result                 Please view results for these tests on the individual orders.   CBC with platelets and differential   Result Value Ref Range    WBC Count 4.8 4.0 - 11.0 10e3/uL    RBC Count 4.16 (L) 4.40 - 5.90 10e6/uL    Hemoglobin 13.5 13.3 - 17.7 g/dL    Hematocrit 39.6 (L) 40.0 - 53.0 %    MCV 95 78 - 100 fL    MCH 32.5 26.5 - 33.0 pg    MCHC 34.1 31.5 - 36.5 g/dL    RDW 13.3 10.0 - 15.0 %    Platelet Count 190 150 - 450 10e3/uL    % Neutrophils 64 %    % Lymphocytes 26 %    % Monocytes 9 %    % Eosinophils 1 %    % Basophils 0 %    % Immature Granulocytes 0 %    NRBCs per 100 WBC 0 <1 /100    Absolute Neutrophils 3.0 1.6 - 8.3 10e3/uL    Absolute Lymphocytes 1.2 0.8 - 5.3 10e3/uL    Absolute Monocytes 0.5 0.0 - 1.3 10e3/uL    Absolute Eosinophils 0.0 0.0 - 0.7 10e3/uL    Absolute Basophils 0.0 0.0 - 0.2 10e3/uL    Absolute Immature Granulocytes 0.0 <=0.4 10e3/uL    Absolute NRBCs 0.0 10e3/uL           Signed Electronically by: David Torres MD

## 2025-04-08 ENCOUNTER — OFFICE VISIT (OUTPATIENT)
Dept: FAMILY MEDICINE | Facility: OTHER | Age: 65
End: 2025-04-08
Attending: STUDENT IN AN ORGANIZED HEALTH CARE EDUCATION/TRAINING PROGRAM
Payer: MEDICARE

## 2025-04-08 VITALS
SYSTOLIC BLOOD PRESSURE: 138 MMHG | HEART RATE: 78 BPM | HEIGHT: 65 IN | OXYGEN SATURATION: 96 % | DIASTOLIC BLOOD PRESSURE: 74 MMHG | WEIGHT: 207.8 LBS | TEMPERATURE: 98.6 F | BODY MASS INDEX: 34.62 KG/M2 | RESPIRATION RATE: 20 BRPM

## 2025-04-08 DIAGNOSIS — N18.2 CHRONIC KIDNEY DISEASE, STAGE 2 (MILD): ICD-10-CM

## 2025-04-08 DIAGNOSIS — Z76.89 ENCOUNTER TO ESTABLISH CARE: Primary | ICD-10-CM

## 2025-04-08 DIAGNOSIS — F34.1 DYSTHYMIA: ICD-10-CM

## 2025-04-08 DIAGNOSIS — R80.9 MICROALBUMINURIA DUE TO TYPE 2 DIABETES MELLITUS (H): ICD-10-CM

## 2025-04-08 DIAGNOSIS — E11.29 MICROALBUMINURIA DUE TO TYPE 2 DIABETES MELLITUS (H): ICD-10-CM

## 2025-04-08 DIAGNOSIS — E11.9 TYPE 2 DIABETES MELLITUS WITHOUT COMPLICATION, WITHOUT LONG-TERM CURRENT USE OF INSULIN (H): ICD-10-CM

## 2025-04-08 DIAGNOSIS — E66.09 NON MORBID OBESITY DUE TO EXCESS CALORIES: ICD-10-CM

## 2025-04-08 DIAGNOSIS — J30.1 SEASONAL ALLERGIC RHINITIS DUE TO POLLEN: ICD-10-CM

## 2025-04-08 DIAGNOSIS — I10 BENIGN ESSENTIAL HYPERTENSION: ICD-10-CM

## 2025-04-08 DIAGNOSIS — E78.2 MIXED HYPERLIPIDEMIA: ICD-10-CM

## 2025-04-08 PROBLEM — Z12.5 SCREENING FOR PROSTATE CANCER: Status: RESOLVED | Noted: 2017-12-18 | Resolved: 2025-04-08

## 2025-04-08 PROBLEM — N52.2 DRUG-INDUCED ERECTILE DYSFUNCTION: Status: RESOLVED | Noted: 2022-05-23 | Resolved: 2025-04-08

## 2025-04-08 LAB
ANION GAP SERPL CALCULATED.3IONS-SCNC: 10 MMOL/L (ref 7–15)
BASOPHILS # BLD AUTO: 0 10E3/UL (ref 0–0.2)
BASOPHILS NFR BLD AUTO: 0 %
BUN SERPL-MCNC: 13.8 MG/DL (ref 8–23)
CALCIUM SERPL-MCNC: 9.8 MG/DL (ref 8.8–10.4)
CHLORIDE SERPL-SCNC: 100 MMOL/L (ref 98–107)
CREAT SERPL-MCNC: 1.08 MG/DL (ref 0.67–1.17)
EGFRCR SERPLBLD CKD-EPI 2021: 76 ML/MIN/1.73M2
EOSINOPHIL # BLD AUTO: 0 10E3/UL (ref 0–0.7)
EOSINOPHIL NFR BLD AUTO: 1 %
ERYTHROCYTE [DISTWIDTH] IN BLOOD BY AUTOMATED COUNT: 13.3 % (ref 10–15)
EST. AVERAGE GLUCOSE BLD GHB EST-MCNC: 131 MG/DL
GLUCOSE SERPL-MCNC: 155 MG/DL (ref 70–99)
HBA1C MFR BLD: 6.2 %
HCO3 SERPL-SCNC: 26 MMOL/L (ref 22–29)
HCT VFR BLD AUTO: 39.6 % (ref 40–53)
HGB BLD-MCNC: 13.5 G/DL (ref 13.3–17.7)
IMM GRANULOCYTES # BLD: 0 10E3/UL
IMM GRANULOCYTES NFR BLD: 0 %
LYMPHOCYTES # BLD AUTO: 1.2 10E3/UL (ref 0.8–5.3)
LYMPHOCYTES NFR BLD AUTO: 26 %
MCH RBC QN AUTO: 32.5 PG (ref 26.5–33)
MCHC RBC AUTO-ENTMCNC: 34.1 G/DL (ref 31.5–36.5)
MCV RBC AUTO: 95 FL (ref 78–100)
MONOCYTES # BLD AUTO: 0.5 10E3/UL (ref 0–1.3)
MONOCYTES NFR BLD AUTO: 9 %
NEUTROPHILS # BLD AUTO: 3 10E3/UL (ref 1.6–8.3)
NEUTROPHILS NFR BLD AUTO: 64 %
NRBC # BLD AUTO: 0 10E3/UL
NRBC BLD AUTO-RTO: 0 /100
PLATELET # BLD AUTO: 190 10E3/UL (ref 150–450)
POTASSIUM SERPL-SCNC: 4 MMOL/L (ref 3.4–5.3)
RBC # BLD AUTO: 4.16 10E6/UL (ref 4.4–5.9)
SODIUM SERPL-SCNC: 136 MMOL/L (ref 135–145)
WBC # BLD AUTO: 4.8 10E3/UL (ref 4–11)

## 2025-04-08 PROCEDURE — 80048 BASIC METABOLIC PNL TOTAL CA: CPT | Mod: ZL | Performed by: STUDENT IN AN ORGANIZED HEALTH CARE EDUCATION/TRAINING PROGRAM

## 2025-04-08 PROCEDURE — 85014 HEMATOCRIT: CPT | Mod: ZL | Performed by: STUDENT IN AN ORGANIZED HEALTH CARE EDUCATION/TRAINING PROGRAM

## 2025-04-08 PROCEDURE — 82435 ASSAY OF BLOOD CHLORIDE: CPT | Mod: ZL | Performed by: STUDENT IN AN ORGANIZED HEALTH CARE EDUCATION/TRAINING PROGRAM

## 2025-04-08 PROCEDURE — 36415 COLL VENOUS BLD VENIPUNCTURE: CPT | Mod: ZL | Performed by: STUDENT IN AN ORGANIZED HEALTH CARE EDUCATION/TRAINING PROGRAM

## 2025-04-08 PROCEDURE — 83036 HEMOGLOBIN GLYCOSYLATED A1C: CPT | Mod: ZL | Performed by: STUDENT IN AN ORGANIZED HEALTH CARE EDUCATION/TRAINING PROGRAM

## 2025-04-08 PROCEDURE — G0463 HOSPITAL OUTPT CLINIC VISIT: HCPCS

## 2025-04-08 PROCEDURE — 85004 AUTOMATED DIFF WBC COUNT: CPT | Mod: ZL | Performed by: STUDENT IN AN ORGANIZED HEALTH CARE EDUCATION/TRAINING PROGRAM

## 2025-04-08 RX ORDER — LOSARTAN POTASSIUM 100 MG/1
100 TABLET ORAL DAILY
Qty: 90 TABLET | Refills: 3 | Status: SHIPPED | OUTPATIENT
Start: 2025-04-08

## 2025-04-08 RX ORDER — AMLODIPINE BESYLATE 10 MG/1
10 TABLET ORAL DAILY
Qty: 90 TABLET | Refills: 3 | Status: SHIPPED | OUTPATIENT
Start: 2025-04-08

## 2025-04-08 RX ORDER — DAPAGLIFLOZIN 5 MG/1
5 TABLET, FILM COATED ORAL EVERY MORNING
Qty: 90 TABLET | Refills: 3 | Status: SHIPPED | OUTPATIENT
Start: 2025-04-08

## 2025-04-08 RX ORDER — MONTELUKAST SODIUM 10 MG/1
10 TABLET ORAL AT BEDTIME
Qty: 90 TABLET | Refills: 3 | Status: SHIPPED | OUTPATIENT
Start: 2025-04-08

## 2025-04-08 RX ORDER — METFORMIN HYDROCHLORIDE 500 MG/1
1000 TABLET, EXTENDED RELEASE ORAL DAILY
Qty: 180 TABLET | Refills: 3 | Status: SHIPPED | OUTPATIENT
Start: 2025-04-08

## 2025-04-08 RX ORDER — SIMVASTATIN 20 MG
20 TABLET ORAL AT BEDTIME
Qty: 90 TABLET | Refills: 3 | Status: SHIPPED | OUTPATIENT
Start: 2025-04-08

## 2025-04-08 RX ORDER — BUPROPION HYDROCHLORIDE 150 MG/1
150 TABLET ORAL EVERY MORNING
Qty: 150 TABLET | Refills: 3 | Status: SHIPPED | OUTPATIENT
Start: 2025-04-08

## 2025-04-08 RX ORDER — DOXAZOSIN 2 MG/1
6 TABLET ORAL AT BEDTIME
Qty: 270 TABLET | Refills: 2 | Status: SHIPPED | OUTPATIENT
Start: 2025-04-08

## 2025-04-08 ASSESSMENT — PAIN SCALES - GENERAL: PAINLEVEL_OUTOF10: NO PAIN (0)

## 2025-06-09 ENCOUNTER — TELEPHONE (OUTPATIENT)
Dept: FAMILY MEDICINE | Facility: OTHER | Age: 65
End: 2025-06-09

## 2025-06-09 DIAGNOSIS — E11.9 TYPE 2 DIABETES MELLITUS WITHOUT COMPLICATION, WITHOUT LONG-TERM CURRENT USE OF INSULIN (H): Primary | ICD-10-CM

## 2025-06-16 ENCOUNTER — TELEPHONE (OUTPATIENT)
Dept: FAMILY MEDICINE | Facility: OTHER | Age: 65
End: 2025-06-16

## 2025-06-16 ENCOUNTER — HOSPITAL ENCOUNTER (OUTPATIENT)
Dept: EDUCATION SERVICES | Facility: HOSPITAL | Age: 65
Discharge: HOME | End: 2025-06-16
Attending: DIETITIAN, REGISTERED | Admitting: DIETITIAN, REGISTERED
Payer: MEDICARE

## 2025-06-16 VITALS
HEART RATE: 75 BPM | DIASTOLIC BLOOD PRESSURE: 76 MMHG | RESPIRATION RATE: 16 BRPM | BODY MASS INDEX: 31.81 KG/M2 | HEIGHT: 66 IN | OXYGEN SATURATION: 98 % | SYSTOLIC BLOOD PRESSURE: 135 MMHG | WEIGHT: 197.9 LBS

## 2025-06-16 DIAGNOSIS — E11.9 TYPE 2 DIABETES MELLITUS WITHOUT COMPLICATION, WITHOUT LONG-TERM CURRENT USE OF INSULIN (H): Primary | ICD-10-CM

## 2025-06-16 PROCEDURE — G0108 DIAB MANAGE TRN  PER INDIV: HCPCS | Performed by: DIETITIAN, REGISTERED

## 2025-06-16 ASSESSMENT — PAIN SCALES - GENERAL: PAINLEVEL_OUTOF10: NO PAIN (0)

## 2025-06-16 NOTE — PATIENT INSTRUCTIONS
-Keep working on healthy food choices and being active.  -Last A1c was 6.2% in April.  Great job!  Goal is < 7.0%.   -Talk with Dr. Torres in in Sept about possibly weaning off of your Metformin since you are now on Ozempic.  -Weight today was 197# - down 7# since visit last year, down 17# since March 2023.   -Follow up annually or sooner if needed.  -TODD Bran, [facility] [phone].    Accepted

## 2025-06-16 NOTE — LETTER
6/16/2025      Neo LEWIS Camryn  [address] [facility] [address]        Diabetes Self-Management Education & Support    Presents for: Individual review (Annual)    Type of Service: In Person Visit    Assessment  Last noted A1c was in target at 6.2% in April.  Pt was started on Ozempic at that time for glucose control/weight loss benefit.  He is currently on 0.50 mg weekly dose and reports no significant side effects - some occasional nausea.  Pt states weight is down 9# since he started the Ozempic.  Eye exam and foot exam are up to date.  Pt feels well without concerns today.     Patient's most recent   Lab Results   Component Value Date    A1C 6.2 04/08/2025    A1C 6.5 08/17/2021     is meeting goal of <7.0    Diabetes knowledge and skills assessment:   Patient is knowledgeable in diabetes management concepts related to: Healthy Eating, Being Active, Monitoring, Taking Medication, Problem Solving, Reducing Risks, and Healthy Coping    Based on learning assessment above, most appropriate setting for further diabetes education would be: Individual setting.    Care Plan and Education Provided:  Will continue to educate on diabetes management concepts as indicated.  Discussed taking medications today as pt was wondering if with use of Ozempic he could possibly wean another medication.     Patient verbalized understanding of diabetes self-management education concepts discussed, opportunities for ongoing education and support, and recommendations provided today.    Plan  Pt will discuss possible wean Metformin at upcoming appointment with provider in Sept.  He does have some intermittent gi issues with it.   Titrate Ozempic dose pending weight loss.   Okay to test glucose prn.     See Care Plan for co-developed, patient-state behavior change goals.    Education Materials Provided:  No new materials today.     Follow up:  Annually or sooner if needed.    Subjective/Objective  Neo is an 64 year old, presenting for the  "following diabetes education related to: Individual review (Annual)  Accompanied by: Self  Diabetes education in the past 24mo: Yes  Focus of Visit: Assistance w/ making life changes  Diabetes type: Type 2  Date of diagnosis: 12/2014  Disease course: Stable  How confident are you filling out medical forms by yourself:: Extremely  Diabetes management related comments/concerns: None  Transportation concerns: No  Difficulty affording diabetes medication?: No  Difficulty affording diabetes testing supplies?: No  Other concerns: None  Cultural Influences/Ethnic Background:  Not  or     Diabetes Symptoms & Complications:  Diabetes Related Symptoms: None  Weight trend: Decreasing (Weight is down 7# in the past year, down 17# since March 2023.)  Symptom course: Stable  Disease course: Stable  Complications assessed today?: Yes  CVA: No  Heart disease: No  Nephropathy: Yes (CKD stage 2)  Retinopathy: No    Patient Problem List and Family Medical History reviewed for relevant medical history, current medical status, and diabetes risk factors.    Vitals:  /76   Pulse 75   Resp 16   Ht 1.67 m (5' 5.75\")   Wt 89.8 kg (197 lb 14.4 oz)   SpO2 98%   BMI 32.19 kg/m    Estimated body mass index is 32.19 kg/m  as calculated from the following:    Height as of this encounter: 1.67 m (5' 5.75\").    Weight as of this encounter: 89.8 kg (197 lb 14.4 oz).   Last 3 BP:   BP Readings from Last 3 Encounters:   06/16/25 135/76   04/08/25 138/74   11/26/24 (!) 142/70       History   Smoking Status     Never   Smokeless Tobacco     Current     Types: Chew       Labs:  Lab Results   Component Value Date    A1C 6.2 04/08/2025    A1C 6.5 08/17/2021     Lab Results   Component Value Date     04/08/2025     09/26/2022     05/14/2021     Lab Results   Component Value Date    LDL 76 06/10/2024    LDL 78 05/14/2021     HDL Cholesterol   Date Value Ref Range Status   05/14/2021 61 >39 mg/dL Final     Direct " Measure HDL   Date Value Ref Range Status   06/10/2024 68 >=40 mg/dL Final     GFR Estimate   Date Value Ref Range Status   04/08/2025 76 >60 mL/min/1.73m2 Final     Comment:     eGFR calculated using 2021 CKD-EPI equation.   05/14/2021 85 >60 mL/min/[1.73_m2] Final     Comment:     Non  GFR Calc  Starting 12/18/2018, serum creatinine based estimated GFR (eGFR) will be   calculated using the Chronic Kidney Disease Epidemiology Collaboration   (CKD-EPI) equation.       GFR Estimate If Black   Date Value Ref Range Status   05/14/2021 >90 >60 mL/min/[1.73_m2] Final     Comment:      GFR Calc  Starting 12/18/2018, serum creatinine based estimated GFR (eGFR) will be   calculated using the Chronic Kidney Disease Epidemiology Collaboration   (CKD-EPI) equation.       Lab Results   Component Value Date    CR 1.08 04/08/2025    CR 0.96 05/14/2021     Lab Results   Component Value Date    MICROL 221.3 06/10/2024    UMALCR 129.26 (H) 06/10/2024    UCRR 171.2 06/10/2024           6/16/2025   Healthy Eating   Healthy Eating Assessed Today Yes   Cultural/Adventist diet restrictions? No   Do you have any food allergies or intolerances? No   Meal planning/habits Low carb   Who cooks/prepares meals for you? Self;Spouse   Who purchases food in  your home? Self;Spouse   How many times a week on average do you eat food made away from home (restaurant/take-out)? 1   Meals include Breakfast;Lunch;Dinner   Breakfast 2 granola bars and yogurt   Lunch peanuts and cheese sticks   Dinner meat, veggies, potatoes occasionally   Snacks minimal snacks - no hs - sometimes cheese stick or nuts during the day   Beverages Water;Sports drinks;Diet soda;Alcohol       Gatorade Zero   Has patient met with a dietitian in the past? Yes         6/16/2025   Being Active   Being Active Assessed Today Yes   Exercise: Currently not exercising       [place]week but no routine exercise   Barrier to exercise  None         6/16/2025   Monitoring   Monitoring Assessed Today Yes   Did patient bring glucose meter to appointment?  No   Blood Glucose Meter ContourNext   Times checking blood sugar at home (number) Other (see Comments)   Please elaborate: Pt tests randomly.     Diabetes Medication(s)       Biguanides       metFORMIN (GLUCOPHAGE XR) 500 MG 24 hr tablet Take 2 tablets (1,000 mg) by mouth daily. Patient reports taking 2 tablets       Sodium-Glucose Co-Transporter 2 (SGLT2) Inhibitors       dapagliflozin (FARXIGA) 5 MG TABS tablet Take 1 tablet (5 mg) by mouth every morning.       Incretin Mimetic Agents       semaglutide (OZEMPIC, 0.25 OR 0.5 MG/DOSE,) 2 MG/3ML pen Inject 0.5 mg subcutaneously every 7 days.              6/16/2025   Taking Medications   Taking Medication Assessed Today Yes   Current Treatments Diet;Oral Medication (taken by mouth)       Metformin XR 1000 evening, Farxiga 5 mg daily (takes in pm as was forgetting in am - provider aware), Ozempic 0.50 mg weekly.  Pt started Ozempic 4/25.   Problems taking diabetes medications regularly? No   Diabetes medication side effects? No       Had diarrhea on 2000 mg Metformin XR daily.         6/16/2025   Problem Solving   Problem Solving Assessed Today Yes   Is the patient at risk for hypoglycemia? No   Is the patient at risk for DKA? No           6/16/2025   Reducing Risks   Reducing Risks Assessed Today Yes   Diabetes Risks Age over 45 years   CAD Risks Diabetes Mellitus;Male sex;Tobacco exposure   Has dilated eye exam at least once a year? Yes   Sees dentist every 6 months? Yes   Feet checked by healthcare provider in the last year? Yes         6/16/2025   Healthy Coping: Diabetes Distress Assessment   Healthy Coping Assessed Today Yes   I feel burned out by all of the attention and effort that diabetes demands of me. 1 - Not a Problem   It bothers me that diabetes seems to control my life. 1 - Not a Problem   I am frustrated that even when I do what I am  supposed to for my diabetes, it doesn't seem to make a difference. 1 - Not a Problem   No matter how hard I try with my diabetes, it feels like it will never be good enough. 1 - Not a Problem   I am so tired of having to worry about diabetes all the time. 1 - Not a Problem   When it comes to my diabetes, I often feel like a failure. 1 - Not a Problem   It depresses me when I realize that my diabetes will likely never go away. 1 - Not a Problem   Living with diabetes is overwhelming for me. 1 - Not a Problem   T2 DDAS Total Score (0 - 1.9 Little or no DD, 2.0 - 2.9 Moderate DD,  3.0+ High DD) 1   Informal Support system: Family       Time Spent: 30 minutes  Encounter Type: Individual    Any diabetes medication dose changes were [facility] under the patient's referring provider.     TODD Bran, [facility]       Sincerely,        Jacque Lamas RD    Electronically signed

## 2025-06-18 DIAGNOSIS — E11.9 TYPE 2 DIABETES MELLITUS WITHOUT COMPLICATION, WITHOUT LONG-TERM CURRENT USE OF INSULIN (H): ICD-10-CM

## 2025-06-18 RX ORDER — SEMAGLUTIDE 0.68 MG/ML
INJECTION, SOLUTION SUBCUTANEOUS
Qty: 3 ML | Refills: 0 | Status: SHIPPED | OUTPATIENT
Start: 2025-06-18

## 2025-06-18 NOTE — TELEPHONE ENCOUNTER
semaglutide (OZEMPIC, 0.25 OR 0.5 MG/DOSE,) 2 MG/3ML pen         Last Written Prescription Date:  5/23/25  Last Fill Quantity: 3 mL,   # refills: 0  Last Office Visit: 4/8/25  Future Office visit:    Next 5 appointments (look out 90 days)      Sep 12, 2025 3:30 PM  (Arrive by 3:15 PM)  Adult Preventative Visit with David Torres MD  [facility] [place] [facility] [place] [address]  [place] [address]  [phone]             Routing refill request to provider for review/approval because:  Drug not on the [facility], P or  Health refill protocol or controlled substance

## 2025-07-15 ENCOUNTER — MYC MEDICAL ADVICE (OUTPATIENT)
Dept: FAMILY MEDICINE | Facility: OTHER | Age: 65
End: 2025-07-15

## 2025-07-15 DIAGNOSIS — J30.1 SEASONAL ALLERGIC RHINITIS DUE TO POLLEN: Primary | ICD-10-CM

## 2025-07-15 RX ORDER — FEXOFENADINE HCL 180 MG/1
180 TABLET ORAL DAILY
Qty: 90 TABLET | Refills: 1 | Status: SHIPPED | OUTPATIENT
Start: 2025-07-15

## 2025-07-16 DIAGNOSIS — E11.9 TYPE 2 DIABETES MELLITUS WITHOUT COMPLICATION, WITHOUT LONG-TERM CURRENT USE OF INSULIN (H): ICD-10-CM

## 2025-07-16 RX ORDER — SEMAGLUTIDE 0.68 MG/ML
INJECTION, SOLUTION SUBCUTANEOUS
Qty: 3 ML | Refills: 0 | Status: SHIPPED | OUTPATIENT
Start: 2025-07-16

## 2025-08-13 DIAGNOSIS — E11.9 TYPE 2 DIABETES MELLITUS WITHOUT COMPLICATION, WITHOUT LONG-TERM CURRENT USE OF INSULIN (H): ICD-10-CM

## 2025-08-13 RX ORDER — SEMAGLUTIDE 0.68 MG/ML
INJECTION, SOLUTION SUBCUTANEOUS
Qty: 3 ML | Refills: 1 | Status: SHIPPED | OUTPATIENT
Start: 2025-08-13

## (undated) DEVICE — TUBING-SUCTION 20FT

## (undated) DEVICE — IRRIGATION-H2O 1000ML

## (undated) DEVICE — FORCEP-COLON BIOPSY LARGE W/NEEDLE 240CM

## (undated) DEVICE — CANISTER-SUCTION 2000CC

## (undated) RX ORDER — PROPOFOL 10 MG/ML
INJECTION, EMULSION INTRAVENOUS
Status: DISPENSED
Start: 2018-03-12

## (undated) RX ORDER — LIDOCAINE HYDROCHLORIDE 20 MG/ML
INJECTION, SOLUTION EPIDURAL; INFILTRATION; INTRACAUDAL; PERINEURAL
Status: DISPENSED
Start: 2018-03-12

## (undated) RX ORDER — FENTANYL CITRATE 50 UG/ML
INJECTION, SOLUTION INTRAMUSCULAR; INTRAVENOUS
Status: DISPENSED
Start: 2018-03-12